# Patient Record
Sex: FEMALE | Race: WHITE | NOT HISPANIC OR LATINO | Employment: OTHER | URBAN - NONMETROPOLITAN AREA
[De-identification: names, ages, dates, MRNs, and addresses within clinical notes are randomized per-mention and may not be internally consistent; named-entity substitution may affect disease eponyms.]

---

## 2017-03-22 ENCOUNTER — OFFICE VISIT (OUTPATIENT)
Dept: OBSTETRICS AND GYNECOLOGY | Facility: CLINIC | Age: 67
End: 2017-03-22

## 2017-03-22 VITALS
BODY MASS INDEX: 32.15 KG/M2 | HEIGHT: 65 IN | SYSTOLIC BLOOD PRESSURE: 130 MMHG | DIASTOLIC BLOOD PRESSURE: 71 MMHG | WEIGHT: 193 LBS

## 2017-03-22 DIAGNOSIS — N95.2 VAGINAL ATROPHY: ICD-10-CM

## 2017-03-22 DIAGNOSIS — R61 DIAPHORESIS: ICD-10-CM

## 2017-03-22 DIAGNOSIS — N32.81 OAB (OVERACTIVE BLADDER): ICD-10-CM

## 2017-03-22 DIAGNOSIS — R92.1 BREAST CALCIFICATIONS: ICD-10-CM

## 2017-03-22 DIAGNOSIS — Z01.419 WELL WOMAN EXAM WITH ROUTINE GYNECOLOGICAL EXAM: Primary | ICD-10-CM

## 2017-03-22 PROCEDURE — 99386 PREV VISIT NEW AGE 40-64: CPT | Performed by: NURSE PRACTITIONER

## 2017-03-22 RX ORDER — LORATADINE 10 MG/1
TABLET ORAL
Status: ON HOLD | COMMUNITY
End: 2020-12-11

## 2017-03-22 RX ORDER — LIOTHYRONINE SODIUM 5 UG/1
TABLET ORAL
Refills: 3 | Status: ON HOLD | COMMUNITY
Start: 2017-03-15 | End: 2020-02-14

## 2017-03-22 RX ORDER — PHENTERMINE HYDROCHLORIDE 30 MG/1
CAPSULE ORAL
Refills: 0 | Status: ON HOLD | COMMUNITY
Start: 2017-03-02 | End: 2020-02-14

## 2017-03-22 RX ORDER — GABAPENTIN 300 MG/1
300 CAPSULE ORAL 3 TIMES DAILY PRN
COMMUNITY
End: 2021-01-11 | Stop reason: HOSPADM

## 2017-03-22 RX ORDER — LEVOTHYROXINE SODIUM 112 MCG
TABLET ORAL
Refills: 11 | Status: ON HOLD | COMMUNITY
Start: 2017-03-02 | End: 2020-12-12

## 2017-03-22 RX ORDER — TOLTERODINE 4 MG/1
4 CAPSULE, EXTENDED RELEASE ORAL DAILY
Qty: 30 CAPSULE | Refills: 11 | Status: SHIPPED | OUTPATIENT
Start: 2017-03-22 | End: 2018-03-22

## 2017-03-22 RX ORDER — CELECOXIB 200 MG/1
200 CAPSULE ORAL DAILY
Refills: 6 | COMMUNITY
Start: 2017-03-02 | End: 2021-01-11 | Stop reason: HOSPADM

## 2017-03-22 NOTE — PROGRESS NOTES
Joann Finnegan is a 66 y.o. No obstetric history on file.     Chief Complaint   Patient presents with   • Gynecologic Exam     Pt is here today for yearly visit with no c/o.  Pt needs order for a mammo            HPI - Patient is in for annual exam.  She had a hysterectomy and BSO and is using compounded HRT that  Is provided by a physician she sees in [place].  She has horrible hot flashes for many years and has tried various HRT combinations and also has been seen by numerous other physicians to find other etiologies of the hot  feelings without any answers.  She has been to allergists and endocrinologists.  Joann has aa history of Hua's Level 3 melanoma excision several years ago.  She continues to see Dr. Velazquez for skin checks.      The following portions of the patient's history were reviewed and updated as appropriate:vital signs, allergies, current medications, past family history, past medical history, past social history, past surgical history and problem list.    Review of Systems   Constitutional: Positive for diaphoresis (excessive  heat and sweating 10 years  she has see allergist, endocrinologist) and fatigue. Negative for activity change.   HENT: Negative for congestion, ear discharge, hearing loss, postnasal drip and sneezing.    Eyes: Negative for pain, discharge and redness.   Respiratory: Negative for apnea, cough and shortness of breath.    Cardiovascular: Negative for chest pain and leg swelling.   Gastrointestinal: Negative for abdominal distention, anal bleeding, constipation and diarrhea.   Endocrine: Negative for cold intolerance and polydipsia.   Genitourinary: Negative for difficulty urinating, enuresis, genital sores and urgency.   Musculoskeletal: Positive for arthralgias and back pain.   Skin: Negative for color change and pallor.   Neurological: Negative for dizziness, seizures, syncope and light-headedness.   Psychiatric/Behavioral: Negative for agitation, confusion and  "dysphoric mood. The patient is not nervous/anxious.             /71 (BP Location: Right arm, Patient Position: Sitting, Cuff Size: Adult)  Ht 65\" (165.1 cm)  Wt 193 lb (87.5 kg)  Breastfeeding? No  BMI 32.12 kg/m2      Physical Exam   Constitutional: She is oriented to person, place, and time. She appears well-developed and well-nourished.   HENT:   Head: Normocephalic and atraumatic.   Right Ear: External ear normal.   Eyes: Conjunctivae are normal. Right eye exhibits no discharge. Left eye exhibits no discharge. No scleral icterus.   Neck: Normal range of motion. Neck supple. No tracheal deviation present. No thyromegaly present.   Cardiovascular: Normal rate and regular rhythm.    No murmur heard.  Pulmonary/Chest: Effort normal and breath sounds normal. No respiratory distress. She has no wheezes. She exhibits no tenderness. Right breast exhibits no inverted nipple, no mass and no nipple discharge. Left breast exhibits no inverted nipple, no mass and no nipple discharge.   Abdominal: Soft. She exhibits no distension and no mass. There is no tenderness. There is no guarding. Hernia confirmed negative in the right inguinal area and confirmed negative in the left inguinal area.   Genitourinary: Rectal exam shows no mass. Pelvic exam was performed with patient supine. There is no rash, tenderness or lesion on the right labia. There is no rash, tenderness or lesion on the left labia. Right adnexum displays no mass and no tenderness. Left adnexum displays no mass, no tenderness and no fullness. There is bleeding in the vagina. No erythema or tenderness in the vagina. No foreign body in the vagina. No signs of injury around the vagina. No vaginal discharge found.   Genitourinary Comments: B/S/U  Without lesions  Urethra  Normal  Perineum  Normal  Urethral meatus  Normal  Vagina  Normal, no lesions  Rectum  No masses  Bladder  nontender   Neurological: She is alert and oriented to person, place, and time. "   Skin: Skin is warm and dry.   Psychiatric: Her mood appears not anxious. Her affect is not blunt, not labile and not inappropriate. She does not exhibit a depressed mood.   Nursing note and vitals reviewed.       Assessment/Plan     Joann was seen today for gynecologic exam.      Diagnoses and all orders for this visit:        Joann was seen today for gynecologic exam.    Diagnoses and all orders for this visit:    Well woman exam with routine gynecological exam    Vaginal atrophy  -     conjugated estrogens (PREMARIN) vaginal cream 1 application; Insert 1 application into the vagina Once per day on Mon Thu.    Diaphoresis  -     Estradiol  -     Progesterone  -     Testosterone  -     DHEA-Sulfate  -     Cortisol  -     Vitamin D 25 Hydroxy    Breast calcifications  Repeat right diagnostic mammogram is due as well as screening for the left.    OAB (overactive bladder)  -     tolterodine LA (DETROL LA) 4 MG 24 hr capsule; Take 1 capsule by mouth Daily.    Patient is counseled re: BSE, diet, exercise, mammogram, calcium and Vit. D3    Diagnostic right mammogram and screening left mammogram ordered.    No orders of the defined types were placed in this encounter.          Alyce Harrison, APRN  3/22/2017

## 2017-03-23 LAB
25(OH)D3+25(OH)D2 SERPL-MCNC: 48.1 NG/ML (ref 30–100)
CORTIS SERPL-MCNC: 6 UG/DL
DHEA-S SERPL-MCNC: 206.8 UG/DL (ref 29.4–220.5)
ESTRADIOL SERPL-MCNC: 74.1 PG/ML
PROGEST SERPL-MCNC: 1.7 NG/ML
TESTOST SERPL-MCNC: 18 NG/DL (ref 3–41)

## 2017-10-23 ENCOUNTER — DOCUMENTATION (OUTPATIENT)
Dept: OBSTETRICS AND GYNECOLOGY | Facility: CLINIC | Age: 67
End: 2017-10-23

## 2017-10-23 DIAGNOSIS — R92.1 BREAST CALCIFICATIONS ON MAMMOGRAM: Primary | ICD-10-CM

## 2017-10-23 NOTE — PROGRESS NOTES
Right breast calcifications persist.  Patient is counseled.  Patient will see Dr. Aguero for consultation.

## 2017-11-08 ENCOUNTER — OFFICE VISIT (OUTPATIENT)
Dept: SURGERY | Age: 67
End: 2017-11-08
Payer: MEDICARE

## 2017-11-08 VITALS
BODY MASS INDEX: 32.89 KG/M2 | HEIGHT: 65 IN | WEIGHT: 197.4 LBS | TEMPERATURE: 98.8 F | DIASTOLIC BLOOD PRESSURE: 70 MMHG | SYSTOLIC BLOOD PRESSURE: 140 MMHG

## 2017-11-08 DIAGNOSIS — R92.0 MAMMOGRAPHIC MICROCALCIFICATION: ICD-10-CM

## 2017-11-08 DIAGNOSIS — R92.8 ABNORMAL MAMMOGRAM: ICD-10-CM

## 2017-11-08 DIAGNOSIS — N60.19 FIBROCYSTIC BREAST DISEASE (FCBD), UNSPECIFIED LATERALITY: ICD-10-CM

## 2017-11-08 PROCEDURE — 99203 OFFICE O/P NEW LOW 30 MIN: CPT | Performed by: SURGERY

## 2018-01-30 DIAGNOSIS — Z79.890 HORMONE REPLACEMENT THERAPY (HRT): Primary | ICD-10-CM

## 2018-01-30 NOTE — TELEPHONE ENCOUNTER
Patient pharmacy sent request for patient HRT med which is Estradiol 1mg/ Estradiol 2mg per 0.25ml. Patient is scheduled for yearly on 03/26/18. Patient medication is refilled x2.

## 2018-03-23 DIAGNOSIS — Z12.31 VISIT FOR SCREENING MAMMOGRAM: Primary | ICD-10-CM

## 2018-03-26 ENCOUNTER — OFFICE VISIT (OUTPATIENT)
Dept: OBSTETRICS AND GYNECOLOGY | Facility: CLINIC | Age: 68
End: 2018-03-26

## 2018-03-26 VITALS
BODY MASS INDEX: 31.65 KG/M2 | WEIGHT: 190 LBS | SYSTOLIC BLOOD PRESSURE: 148 MMHG | HEIGHT: 65 IN | DIASTOLIC BLOOD PRESSURE: 74 MMHG

## 2018-03-26 DIAGNOSIS — N95.1 MENOPAUSAL SWEATS: ICD-10-CM

## 2018-03-26 DIAGNOSIS — Z01.419 WELL WOMAN EXAM WITH ROUTINE GYNECOLOGICAL EXAM: ICD-10-CM

## 2018-03-26 DIAGNOSIS — N32.81 OAB (OVERACTIVE BLADDER): ICD-10-CM

## 2018-03-26 DIAGNOSIS — Z12.31 ENCOUNTER FOR SCREENING MAMMOGRAM FOR MALIGNANT NEOPLASM OF BREAST: Primary | ICD-10-CM

## 2018-03-26 DIAGNOSIS — R35.0 URINARY FREQUENCY: ICD-10-CM

## 2018-03-26 DIAGNOSIS — R68.82 DECREASED LIBIDO: ICD-10-CM

## 2018-03-26 DIAGNOSIS — N95.2 VAGINAL ATROPHY: ICD-10-CM

## 2018-03-26 PROCEDURE — G8419 CALC BMI OUT NRM PARAM NOF/U: HCPCS | Performed by: NURSE PRACTITIONER

## 2018-03-26 PROCEDURE — 1036F TOBACCO NON-USER: CPT | Performed by: NURSE PRACTITIONER

## 2018-03-26 PROCEDURE — G0101 CA SCREEN;PELVIC/BREAST EXAM: HCPCS | Performed by: NURSE PRACTITIONER

## 2018-03-26 NOTE — PROGRESS NOTES
"      Joann Finnegan is a 67 y.o.      Chief Complaint   Patient presents with   • Gynecologic Exam     Pt here for yearly no probs.           HPI - Patient is in for annual exam.    She is taking compounded HRT from .  She has decreased libido.    Patient has to get up 3 times per night and goes frequently all day.  She has no dysuria.  Patient had a Hyst BSO for menorrhagia.      The following portions of the patient's history were reviewed and updated as appropriate:vital signs, allergies, current medications, past family history, past medical history, past social history, past surgical history and problem list.      Current Outpatient Prescriptions:   •  celecoxib (CeleBREX) 200 MG capsule, TAKE 1 CAPSULE BY MOUTH ONCE DAILY, Disp: , Rfl: 6  •  Cholecalciferol (VITAMIN D3) 5000 UNITS tablet, Take  by mouth., Disp: , Rfl:   •  estradiol 0.1 MG/GM cream 0.25 g, Apply \"1 click\" to labia or bikini area twice daily, Disp: 15 g, Rfl: 2  •  liothyronine (CYTOMEL) 5 MCG tablet, TAKE 2 TALBETS BY MOUTH ONCE DAILY, Disp: , Rfl: 3  •  loratadine (CLARITIN) 10 MG tablet, Take  by mouth., Disp: , Rfl:   •  methylnaltrexone (RELISTOR) 12 MG/0.6ML solution, Inject  under the skin Every Other Day., Disp: , Rfl:   •  NON FORMULARY, Apply  topically., Disp: , Rfl:   •  SYNTHROID 112 MCG tablet, TAKE 1 TABLET BY MOUTH EVERY MORNING, Disp: , Rfl: 11  •  gabapentin (NEURONTIN) 300 MG capsule, Take  by mouth., Disp: , Rfl:   •  phentermine 30 MG capsule, TAKE 1 CAPSULE BY MOUTH IN THE MORNING, Disp: , Rfl: 0    Current Facility-Administered Medications:   •  conjugated estrogens (PREMARIN) vaginal cream 1 application, 1 g, Vaginal, Once per day on u, NITO [place]    Review of Systems   Constitutional: Positive for diaphoresis (excessive  heat and sweating 10 years  she has see allergist, endocrinologist) and fatigue. Negative for activity change.   HENT: Negative for congestion, ear discharge, hearing " "loss, postnasal drip and sneezing.    Eyes: Negative for pain, discharge and redness.   Respiratory: Negative for apnea, cough and shortness of breath.    Cardiovascular: Negative for chest pain and leg swelling.   Gastrointestinal: Negative for abdominal distention, anal bleeding, constipation and diarrhea.   Endocrine: Negative for cold intolerance and polydipsia.        Sweating     Genitourinary: Negative for difficulty urinating, enuresis, genital sores and urgency.   Musculoskeletal: Positive for arthralgias and back pain.   Skin: Negative for color change and pallor.        History melanoma  [place] level III   Neurological: Negative for dizziness, seizures, syncope and light-headedness.   Psychiatric/Behavioral: Negative for agitation, confusion and dysphoric mood. The patient is not nervous/anxious.      Breast ROS: negative  Followed by Dr. Aguero    Objective      /74   Ht 165.1 cm (65\")   Wt 86.2 kg (190 lb)   LMP 10/26/2003 (Exact Date) Comment: Hyst for DUB  BMI 31.62 kg/m²       Physical Exam   Constitutional: She is oriented to person, place, and time. She appears well-developed and well-nourished.   HENT:   Head: Normocephalic and atraumatic.   Right Ear: External ear normal.   Eyes: Conjunctivae are normal. Right eye exhibits no discharge. Left eye exhibits no discharge. No scleral icterus.   Neck: Normal range of motion. Neck supple. No tracheal deviation present. No thyromegaly present.   Cardiovascular: Normal rate and regular rhythm.    No murmur heard.  Pulmonary/Chest: Effort normal and breath sounds normal. No respiratory distress. She has no wheezes. She exhibits no tenderness. Right breast exhibits no inverted nipple, no mass and no nipple discharge. Left breast exhibits no inverted nipple, no mass and no nipple discharge.   fibrocystic   Abdominal: Soft. She exhibits no distension and no mass. There is no tenderness. There is no guarding. Hernia confirmed negative in the right " inguinal area and confirmed negative in the left inguinal area.   Genitourinary: Rectal exam shows no mass. Pelvic exam was performed with patient supine. There is no rash, tenderness or lesion on the right labia. There is no rash, tenderness or lesion on the left labia. Right adnexum displays no mass and no tenderness. Left adnexum displays no mass, no tenderness and no fullness. No erythema, tenderness or bleeding in the vagina. No foreign body in the vagina. No signs of injury around the vagina. No vaginal discharge found.   Genitourinary Comments: B/S/U  Without lesions  Urethra  Normal  Perineum  Normal  Urethral meatus  Normal  Vagina  Normal, no lesions  Rectum  No masses  Bladder  nontender   Neurological: She is alert and oriented to person, place, and time.   Skin: Skin is warm and dry.   Psychiatric: She has a normal mood and affect. Her behavior is normal. Her mood appears not anxious. Her affect is not blunt, not labile and not inappropriate. She does not exhibit a depressed mood.   Nursing note and vitals reviewed.       Assessment/Plan       Joann was seen today for gynecologic exam.    Diagnoses and all orders for this visit:    Encounter for screening mammogram for malignant neoplasm of breast  -     Mammo Screening Digital Tomosynthesis Bilateral With CAD; Future    Well woman exam with routine gynecological exam    Vaginal atrophy  Estrogen/testosterone compound    Urinary frequency  -     Urinalysis With Microscopic - Urine, Clean Catch  -     Urine Culture, Comprehen ([facility]) - Urine, Clean Catch    Menopausal sweats  -     Hormone Cream Base cream; Estradiol 2/Estriol 1 mgm/  0.25  1 click day  -     progesterone (PROMETRIUM) 200 MG capsule; Progesterone 200/DHEA 5 mgm 1 at hs    Decreased libido  -     Hormone Cream Base cream; Testosterone 1 /Biest 1     Vaginally  Twice per week    OAB (overactive bladder)  -     Mirabegron ER (MYRBETRIQ) 25 MG tablet sustained-release 24 hour 24 hr  tablet; Take 1 tablet by mouth Daily.    Patient is counseled re: BSE, diet, exercise, mammogram, calcium and Vit. D3          Alyce Harrison, APRN  3/26/2018

## 2018-03-28 LAB
APPEARANCE UR: CLEAR
BACTERIA #/AREA URNS HPF: ABNORMAL /HPF
BACTERIA UR CULT: NORMAL
BACTERIA UR CULT: NORMAL
BILIRUB UR QL STRIP: NEGATIVE
CASTS URNS MICRO: ABNORMAL
COLOR UR: YELLOW
EPI CELLS #/AREA URNS HPF: ABNORMAL /HPF
GLUCOSE UR QL: NEGATIVE
HGB UR QL STRIP: NEGATIVE
KETONES UR QL STRIP: NEGATIVE
LEUKOCYTE ESTERASE UR QL STRIP: NEGATIVE
NITRITE UR QL STRIP: NEGATIVE
PH UR STRIP: 7 [PH] (ref 5–8)
PROT UR QL STRIP: NEGATIVE
RBC #/AREA URNS HPF: ABNORMAL /HPF
SP GR UR: 1.02 (ref 1–1.03)
UROBILINOGEN UR STRIP-MCNC: (no result) MG/DL
WBC #/AREA URNS HPF: ABNORMAL /HPF

## 2018-04-25 ENCOUNTER — HOSPITAL ENCOUNTER (OUTPATIENT)
Dept: WOMENS IMAGING | Age: 68
Discharge: HOME | End: 2018-04-25
Payer: MEDICARE

## 2018-04-25 DIAGNOSIS — Z12.31 VISIT FOR SCREENING MAMMOGRAM: ICD-10-CM

## 2018-04-25 PROCEDURE — 77063 BREAST TOMOSYNTHESIS BI: CPT

## 2018-05-01 ENCOUNTER — TELEPHONE (OUTPATIENT)
Dept: WOMENS IMAGING | Age: 68
End: 2018-05-01

## 2018-05-03 ENCOUNTER — HOSPITAL ENCOUNTER (OUTPATIENT)
Dept: WOMENS IMAGING | Age: 68
Discharge: HOME | End: 2018-05-03
Payer: MEDICARE

## 2018-05-03 ENCOUNTER — HOSPITAL ENCOUNTER (OUTPATIENT)
Dept: ULTRASOUND IMAGING | Age: 68
Discharge: HOME | End: 2018-05-03
Payer: MEDICARE

## 2018-05-03 DIAGNOSIS — N64.89 BREAST ASYMMETRY: ICD-10-CM

## 2018-05-03 PROCEDURE — 76642 ULTRASOUND BREAST LIMITED: CPT

## 2018-05-03 PROCEDURE — G0279 TOMOSYNTHESIS, MAMMO: HCPCS

## 2018-05-11 ENCOUNTER — TELEPHONE (OUTPATIENT)
Dept: SURGERY | Age: 68
End: 2018-05-11

## 2018-05-16 ENCOUNTER — OFFICE VISIT (OUTPATIENT)
Dept: SURGERY | Age: 68
End: 2018-05-16
Payer: MEDICARE

## 2018-05-16 VITALS
BODY MASS INDEX: 32.82 KG/M2 | SYSTOLIC BLOOD PRESSURE: 134 MMHG | WEIGHT: 197 LBS | DIASTOLIC BLOOD PRESSURE: 80 MMHG | HEIGHT: 65 IN | HEART RATE: 68 BPM

## 2018-05-16 DIAGNOSIS — N63.11 MASS OF UPPER OUTER QUADRANT OF RIGHT BREAST: Primary | ICD-10-CM

## 2018-05-16 PROCEDURE — G8400 PT W/DXA NO RESULTS DOC: HCPCS | Performed by: PHYSICIAN ASSISTANT

## 2018-05-16 PROCEDURE — 4040F PNEUMOC VAC/ADMIN/RCVD: CPT | Performed by: PHYSICIAN ASSISTANT

## 2018-05-16 PROCEDURE — G8417 CALC BMI ABV UP PARAM F/U: HCPCS | Performed by: PHYSICIAN ASSISTANT

## 2018-05-16 PROCEDURE — G8427 DOCREV CUR MEDS BY ELIG CLIN: HCPCS | Performed by: PHYSICIAN ASSISTANT

## 2018-05-16 PROCEDURE — 1036F TOBACCO NON-USER: CPT | Performed by: PHYSICIAN ASSISTANT

## 2018-05-16 PROCEDURE — 3017F COLORECTAL CA SCREEN DOC REV: CPT | Performed by: PHYSICIAN ASSISTANT

## 2018-05-16 PROCEDURE — 1090F PRES/ABSN URINE INCON ASSESS: CPT | Performed by: PHYSICIAN ASSISTANT

## 2018-05-16 PROCEDURE — 1123F ACP DISCUSS/DSCN MKR DOCD: CPT | Performed by: PHYSICIAN ASSISTANT

## 2018-05-16 PROCEDURE — 99212 OFFICE O/P EST SF 10 MIN: CPT | Performed by: PHYSICIAN ASSISTANT

## 2018-05-16 PROCEDURE — 3014F SCREEN MAMMO DOC REV: CPT | Performed by: PHYSICIAN ASSISTANT

## 2018-05-16 RX ORDER — LEVOTHYROXINE SODIUM 112 MCG
125 TABLET ORAL DAILY
COMMUNITY
Start: 2018-04-20

## 2018-05-16 RX ORDER — DIAZEPAM 5 MG/1
TABLET ORAL
Qty: 10 TABLET | Refills: 0 | OUTPATIENT
Start: 2018-05-16 | End: 2018-05-18

## 2018-05-23 ENCOUNTER — HOSPITAL ENCOUNTER (OUTPATIENT)
Dept: WOMENS IMAGING | Age: 68
Discharge: HOME | End: 2018-05-23
Payer: MEDICARE

## 2018-05-23 DIAGNOSIS — N63.11 MASS OF UPPER OUTER QUADRANT OF RIGHT BREAST: ICD-10-CM

## 2018-05-23 PROCEDURE — 88305 TISSUE EXAM BY PATHOLOGIST: CPT

## 2018-05-23 PROCEDURE — 77065 DX MAMMO INCL CAD UNI: CPT

## 2018-05-23 PROCEDURE — 88341 IMHCHEM/IMCYTCHM EA ADD ANTB: CPT

## 2018-05-23 PROCEDURE — 88342 IMHCHEM/IMCYTCHM 1ST ANTB: CPT

## 2018-05-23 PROCEDURE — 88361 TUMOR IMMUNOHISTOCHEM/COMPUT: CPT

## 2018-05-23 PROCEDURE — 88374 M/PHMTRC ALYS ISHQUANT/SEMIQ: CPT

## 2018-05-23 PROCEDURE — 19083 BX BREAST 1ST LESION US IMAG: CPT

## 2018-05-25 ENCOUNTER — LAB REQUISITION (OUTPATIENT)
Dept: LAB | Facility: HOSPITAL | Age: 68
End: 2018-05-25

## 2018-05-25 DIAGNOSIS — Z00.00 ROUTINE GENERAL MEDICAL EXAMINATION AT A HEALTH CARE FACILITY: ICD-10-CM

## 2018-05-25 PROCEDURE — 88341 IMHCHEM/IMCYTCHM EA ADD ANTB: CPT

## 2018-05-25 PROCEDURE — 88342 IMHCHEM/IMCYTCHM 1ST ANTB: CPT

## 2018-05-26 LAB
LAB AP CASE REPORT: NORMAL
Lab: NORMAL
PATH REPORT.FINAL DX SPEC: NORMAL

## 2018-05-29 ENCOUNTER — TELEPHONE (OUTPATIENT)
Dept: SURGERY | Age: 68
End: 2018-05-29

## 2018-05-29 DIAGNOSIS — C50.411 MALIGNANT NEOPLASM OF UPPER-OUTER QUADRANT OF RIGHT FEMALE BREAST, UNSPECIFIED ESTROGEN RECEPTOR STATUS (HCC): Primary | ICD-10-CM

## 2018-05-31 DIAGNOSIS — C50.411 MALIGNANT NEOPLASM OF UPPER-OUTER QUADRANT OF RIGHT FEMALE BREAST, UNSPECIFIED ESTROGEN RECEPTOR STATUS (HCC): Primary | ICD-10-CM

## 2018-06-11 ENCOUNTER — TELEPHONE (OUTPATIENT)
Dept: OTHER | Age: 68
End: 2018-06-11

## 2018-06-15 ENCOUNTER — HOSPITAL ENCOUNTER (OUTPATIENT)
Dept: MRI IMAGING | Age: 68
Discharge: HOME | End: 2018-06-15
Payer: MEDICARE

## 2018-06-15 DIAGNOSIS — C50.411 MALIGNANT NEOPLASM OF UPPER-OUTER QUADRANT OF RIGHT FEMALE BREAST, UNSPECIFIED ESTROGEN RECEPTOR STATUS (HCC): ICD-10-CM

## 2018-06-15 LAB
GFR NON-AFRICAN AMERICAN: 55
PERFORMED ON: ABNORMAL
POC CREATININE: 1 MG/DL (ref 0.3–1.3)
POC SAMPLE TYPE: ABNORMAL

## 2018-06-15 PROCEDURE — A9577 INJ MULTIHANCE: HCPCS | Performed by: PHYSICIAN ASSISTANT

## 2018-06-15 PROCEDURE — 82565 ASSAY OF CREATININE: CPT

## 2018-06-15 PROCEDURE — 6360000004 HC RX CONTRAST MEDICATION: Performed by: PHYSICIAN ASSISTANT

## 2018-06-15 PROCEDURE — C8908 MRI W/O FOL W/CONT, BREAST,: HCPCS

## 2018-06-15 RX ADMIN — GADOBENATE DIMEGLUMINE 19 ML: 529 INJECTION, SOLUTION INTRAVENOUS at 20:05

## 2018-06-18 ENCOUNTER — INITIAL CONSULT (OUTPATIENT)
Dept: SURGERY | Age: 68
End: 2018-06-18
Payer: MEDICARE

## 2018-06-18 ENCOUNTER — HOSPITAL ENCOUNTER (OUTPATIENT)
Dept: WOMENS IMAGING | Age: 68
Discharge: HOME | End: 2018-06-18
Payer: MEDICARE

## 2018-06-18 DIAGNOSIS — Z17.0 MALIGNANT NEOPLASM OF UPPER-OUTER QUADRANT OF RIGHT BREAST IN FEMALE, ESTROGEN RECEPTOR POSITIVE (HCC): Primary | ICD-10-CM

## 2018-06-18 DIAGNOSIS — R93.89 ABNORMAL MRI: ICD-10-CM

## 2018-06-18 DIAGNOSIS — C50.411 MALIGNANT NEOPLASM OF UPPER-OUTER QUADRANT OF RIGHT BREAST IN FEMALE, ESTROGEN RECEPTOR POSITIVE (HCC): Primary | ICD-10-CM

## 2018-06-18 DIAGNOSIS — C50.411 MALIGNANT NEOPLASM OF UPPER-OUTER QUADRANT OF RIGHT FEMALE BREAST, UNSPECIFIED ESTROGEN RECEPTOR STATUS (HCC): ICD-10-CM

## 2018-06-18 PROCEDURE — 1090F PRES/ABSN URINE INCON ASSESS: CPT | Performed by: SURGERY

## 2018-06-18 PROCEDURE — 99215 OFFICE O/P EST HI 40 MIN: CPT | Performed by: SURGERY

## 2018-06-18 PROCEDURE — 3014F SCREEN MAMMO DOC REV: CPT | Performed by: SURGERY

## 2018-06-18 PROCEDURE — 99354 PR PROLONGED SVC OUTPATIENT SETTING 1ST HOUR: CPT | Performed by: SURGERY

## 2018-06-18 PROCEDURE — 1036F TOBACCO NON-USER: CPT | Performed by: SURGERY

## 2018-06-18 PROCEDURE — G8417 CALC BMI ABV UP PARAM F/U: HCPCS | Performed by: SURGERY

## 2018-06-18 PROCEDURE — 76642 ULTRASOUND BREAST LIMITED: CPT

## 2018-06-18 PROCEDURE — G8400 PT W/DXA NO RESULTS DOC: HCPCS | Performed by: SURGERY

## 2018-06-18 PROCEDURE — G8428 CUR MEDS NOT DOCUMENT: HCPCS | Performed by: SURGERY

## 2018-06-18 PROCEDURE — 3017F COLORECTAL CA SCREEN DOC REV: CPT | Performed by: SURGERY

## 2018-06-18 PROCEDURE — 99355 PR PROLONGED SVC OUTPATIENT SETTING EA ADDL 30 MIN: CPT | Performed by: SURGERY

## 2018-06-18 PROCEDURE — 4040F PNEUMOC VAC/ADMIN/RCVD: CPT | Performed by: SURGERY

## 2018-06-18 PROCEDURE — 1123F ACP DISCUSS/DSCN MKR DOCD: CPT | Performed by: SURGERY

## 2018-06-28 ENCOUNTER — TELEPHONE (OUTPATIENT)
Dept: SURGERY | Age: 68
End: 2018-06-28

## 2018-07-16 ENCOUNTER — HOSPITAL ENCOUNTER (OUTPATIENT)
Dept: PREADMISSION TESTING | Age: 68
Discharge: HOME | End: 2018-07-20
Payer: MEDICARE

## 2018-07-16 VITALS — WEIGHT: 201 LBS | BODY MASS INDEX: 33.49 KG/M2 | HEIGHT: 65 IN

## 2018-07-16 LAB
ANION GAP SERPL CALCULATED.3IONS-SCNC: 11 MMOL/L (ref 7–19)
BASOPHILS ABSOLUTE: 0.1 K/UL (ref 0–0.2)
BASOPHILS RELATIVE PERCENT: 0.9 % (ref 0–1)
BUN BLDV-MCNC: 13 MG/DL (ref 8–23)
CALCIUM SERPL-MCNC: 9.8 MG/DL (ref 8.8–10.2)
CHLORIDE BLD-SCNC: 105 MMOL/L (ref 98–111)
CO2: 25 MMOL/L (ref 22–29)
CREAT SERPL-MCNC: 0.8 MG/DL (ref 0.5–0.9)
EOSINOPHILS ABSOLUTE: 0.2 K/UL (ref 0–0.6)
EOSINOPHILS RELATIVE PERCENT: 2.4 % (ref 0–5)
GFR NON-AFRICAN AMERICAN: >60
GLUCOSE BLD-MCNC: 93 MG/DL (ref 74–109)
HCT VFR BLD CALC: 43 % (ref 37–47)
HEMOGLOBIN: 14.2 G/DL (ref 12–16)
LYMPHOCYTES ABSOLUTE: 1.6 K/UL (ref 1.1–4.5)
LYMPHOCYTES RELATIVE PERCENT: 21.4 % (ref 20–40)
MCH RBC QN AUTO: 30.9 PG (ref 27–31)
MCHC RBC AUTO-ENTMCNC: 33 G/DL (ref 33–37)
MCV RBC AUTO: 93.7 FL (ref 81–99)
MONOCYTES ABSOLUTE: 0.5 K/UL (ref 0–0.9)
MONOCYTES RELATIVE PERCENT: 6.8 % (ref 0–10)
NEUTROPHILS ABSOLUTE: 5 K/UL (ref 1.5–7.5)
NEUTROPHILS RELATIVE PERCENT: 68.2 % (ref 50–65)
PDW BLD-RTO: 12.3 % (ref 11.5–14.5)
PLATELET # BLD: 200 K/UL (ref 130–400)
PMV BLD AUTO: 11.8 FL (ref 9.4–12.3)
POTASSIUM SERPL-SCNC: 4 MMOL/L (ref 3.5–5)
RBC # BLD: 4.59 M/UL (ref 4.2–5.4)
SODIUM BLD-SCNC: 141 MMOL/L (ref 136–145)
WBC # BLD: 7.4 K/UL (ref 4.8–10.8)

## 2018-07-16 PROCEDURE — 85025 COMPLETE CBC W/AUTO DIFF WBC: CPT

## 2018-07-16 PROCEDURE — 80048 BASIC METABOLIC PNL TOTAL CA: CPT

## 2018-07-16 PROCEDURE — 93005 ELECTROCARDIOGRAM TRACING: CPT

## 2018-07-16 RX ORDER — CELECOXIB 200 MG/1
200 CAPSULE ORAL 2 TIMES DAILY
COMMUNITY
End: 2021-11-15

## 2018-07-17 ENCOUNTER — TELEPHONE (OUTPATIENT)
Dept: SURGERY | Age: 68
End: 2018-07-17

## 2018-07-18 LAB
EKG P AXIS: 45 DEGREES
EKG P-R INTERVAL: 172 MS
EKG Q-T INTERVAL: 390 MS
EKG QRS DURATION: 78 MS
EKG QTC CALCULATION (BAZETT): 393 MS
EKG T AXIS: 46 DEGREES

## 2018-07-19 ENCOUNTER — TELEPHONE (OUTPATIENT)
Dept: SURGERY | Age: 68
End: 2018-07-19

## 2018-07-20 RX ORDER — LETROZOLE 2.5 MG/1
2.5 TABLET, FILM COATED ORAL DAILY
Qty: 30 TABLET | Refills: 3 | Status: SHIPPED | OUTPATIENT
Start: 2018-07-20 | End: 2019-01-30 | Stop reason: ALTCHOICE

## 2018-07-26 ENCOUNTER — HOSPITAL ENCOUNTER (OUTPATIENT)
Dept: WOMENS IMAGING | Age: 68
Discharge: HOME | End: 2018-07-26
Payer: MEDICARE

## 2018-07-26 DIAGNOSIS — C50.411 MALIGNANT NEOPLASM OF UPPER-OUTER QUADRANT OF RIGHT FEMALE BREAST, UNSPECIFIED ESTROGEN RECEPTOR STATUS (HCC): ICD-10-CM

## 2018-07-26 PROCEDURE — 76642 ULTRASOUND BREAST LIMITED: CPT

## 2018-07-27 ENCOUNTER — ANESTHESIA EVENT (OUTPATIENT)
Dept: OPERATING ROOM | Age: 68
End: 2018-07-27
Payer: MEDICARE

## 2018-07-27 ENCOUNTER — ANESTHESIA (OUTPATIENT)
Dept: OPERATING ROOM | Age: 68
End: 2018-07-27
Payer: MEDICARE

## 2018-07-27 ENCOUNTER — HOSPITAL ENCOUNTER (OUTPATIENT)
Age: 68
Setting detail: OUTPATIENT SURGERY
Discharge: HOME | End: 2018-07-27
Attending: SURGERY | Admitting: SURGERY
Payer: MEDICARE

## 2018-07-27 ENCOUNTER — HOSPITAL ENCOUNTER (OUTPATIENT)
Dept: ULTRASOUND IMAGING | Age: 68
Discharge: HOME | End: 2018-07-27
Payer: MEDICARE

## 2018-07-27 ENCOUNTER — HOSPITAL ENCOUNTER (OUTPATIENT)
Dept: NUCLEAR MEDICINE | Age: 68
Discharge: HOME | End: 2018-07-29
Payer: MEDICARE

## 2018-07-27 VITALS
BODY MASS INDEX: 33.49 KG/M2 | OXYGEN SATURATION: 96 % | HEART RATE: 81 BPM | RESPIRATION RATE: 18 BRPM | DIASTOLIC BLOOD PRESSURE: 77 MMHG | TEMPERATURE: 97.8 F | HEIGHT: 65 IN | WEIGHT: 201 LBS | SYSTOLIC BLOOD PRESSURE: 142 MMHG

## 2018-07-27 VITALS
OXYGEN SATURATION: 97 % | TEMPERATURE: 98.2 F | RESPIRATION RATE: 11 BRPM | SYSTOLIC BLOOD PRESSURE: 139 MMHG | DIASTOLIC BLOOD PRESSURE: 77 MMHG

## 2018-07-27 DIAGNOSIS — C50.911 INVASIVE DUCTAL CARCINOMA OF BREAST, FEMALE, RIGHT (HCC): ICD-10-CM

## 2018-07-27 DIAGNOSIS — Z17.0 MALIGNANT NEOPLASM OF UPPER-OUTER QUADRANT OF RIGHT BREAST IN FEMALE, ESTROGEN RECEPTOR POSITIVE (HCC): Primary | ICD-10-CM

## 2018-07-27 DIAGNOSIS — C50.411 MALIGNANT NEOPLASM OF UPPER-OUTER QUADRANT OF RIGHT BREAST IN FEMALE, ESTROGEN RECEPTOR POSITIVE (HCC): Primary | ICD-10-CM

## 2018-07-27 PROCEDURE — 88307 TISSUE EXAM BY PATHOLOGIST: CPT

## 2018-07-27 PROCEDURE — 19285 PERQ DEV BREAST 1ST US IMAG: CPT | Performed by: SURGERY

## 2018-07-27 PROCEDURE — 3430000000 HC RX DIAGNOSTIC RADIOPHARMACEUTICAL: Performed by: SURGERY

## 2018-07-27 PROCEDURE — 19366 PR BREAST RECONSTRUC W OTHR TECHNIQ: CPT | Performed by: SURGERY

## 2018-07-27 PROCEDURE — 38525 BIOPSY/REMOVAL LYMPH NODES: CPT | Performed by: SURGERY

## 2018-07-27 PROCEDURE — 3600000005 HC SURGERY LEVEL 5 BASE: Performed by: SURGERY

## 2018-07-27 PROCEDURE — 76998 US GUIDE INTRAOP: CPT | Performed by: SURGERY

## 2018-07-27 PROCEDURE — 6360000002 HC RX W HCPCS

## 2018-07-27 PROCEDURE — 38900 IO MAP OF SENT LYMPH NODE: CPT | Performed by: SURGERY

## 2018-07-27 PROCEDURE — 19301 PARTIAL MASTECTOMY: CPT | Performed by: PHYSICIAN ASSISTANT

## 2018-07-27 PROCEDURE — 19340 INSJ BREAST IMPLT SM D MAST: CPT | Performed by: SURGERY

## 2018-07-27 PROCEDURE — 19297 PLACE BREAST CATH FOR RAD: CPT | Performed by: SURGERY

## 2018-07-27 PROCEDURE — 6360000002 HC RX W HCPCS: Performed by: NURSE ANESTHETIST, CERTIFIED REGISTERED

## 2018-07-27 PROCEDURE — S0028 INJECTION, FAMOTIDINE, 20 MG: HCPCS

## 2018-07-27 PROCEDURE — 7100000010 HC PHASE II RECOVERY - FIRST 15 MIN: Performed by: SURGERY

## 2018-07-27 PROCEDURE — 2780000010 HC IMPLANT OTHER: Performed by: SURGERY

## 2018-07-27 PROCEDURE — 2720000001 HC MISC SURG SUPPLY STERILE $51-500: Performed by: SURGERY

## 2018-07-27 PROCEDURE — C1728 CATH, BRACHYTX SEED ADM: HCPCS | Performed by: SURGERY

## 2018-07-27 PROCEDURE — 77424 IO RAD TX DELIVERY BY X-RAY: CPT | Performed by: SURGERY

## 2018-07-27 PROCEDURE — 2580000003 HC RX 258: Performed by: SURGERY

## 2018-07-27 PROCEDURE — 2720000000 US PLACE BREAST LOC DEVICE 1ST LESION RIGHT

## 2018-07-27 PROCEDURE — C1729 CATH, DRAINAGE: HCPCS | Performed by: SURGERY

## 2018-07-27 PROCEDURE — 38792 RA TRACER ID OF SENTINL NODE: CPT

## 2018-07-27 PROCEDURE — 3700000001 HC ADD 15 MINUTES (ANESTHESIA): Performed by: SURGERY

## 2018-07-27 PROCEDURE — 3600000015 HC SURGERY LEVEL 5 ADDTL 15MIN: Performed by: SURGERY

## 2018-07-27 PROCEDURE — 2709999900 HC NON-CHARGEABLE SUPPLY: Performed by: SURGERY

## 2018-07-27 PROCEDURE — 6360000002 HC RX W HCPCS: Performed by: SURGERY

## 2018-07-27 PROCEDURE — 7100000001 HC PACU RECOVERY - ADDTL 15 MIN: Performed by: SURGERY

## 2018-07-27 PROCEDURE — 3700000000 HC ANESTHESIA ATTENDED CARE: Performed by: SURGERY

## 2018-07-27 PROCEDURE — 7100000011 HC PHASE II RECOVERY - ADDTL 15 MIN: Performed by: SURGERY

## 2018-07-27 PROCEDURE — 7100000000 HC PACU RECOVERY - FIRST 15 MIN: Performed by: SURGERY

## 2018-07-27 PROCEDURE — 19366 PR BREAST RECONSTRUC W OTHR TECHNIQ: CPT | Performed by: PHYSICIAN ASSISTANT

## 2018-07-27 PROCEDURE — 2500000003 HC RX 250 WO HCPCS: Performed by: SURGERY

## 2018-07-27 PROCEDURE — A4648 IMPLANTABLE TISSUE MARKER: HCPCS | Performed by: SURGERY

## 2018-07-27 PROCEDURE — 2500000003 HC RX 250 WO HCPCS

## 2018-07-27 PROCEDURE — A9520 TC99 TILMANOCEPT DIAG 0.5MCI: HCPCS | Performed by: SURGERY

## 2018-07-27 PROCEDURE — 19301 PARTIAL MASTECTOMY: CPT | Performed by: SURGERY

## 2018-07-27 PROCEDURE — 6360000002 HC RX W HCPCS: Performed by: ANESTHESIOLOGY

## 2018-07-27 DEVICE — MARKER TISS W3XL3CM RADIOGRAPHIC BIOABSRB SPCR HLD RADPQ: Type: IMPLANTABLE DEVICE | Site: BREAST | Status: FUNCTIONAL

## 2018-07-27 RX ORDER — PROPOFOL 10 MG/ML
INJECTION, EMULSION INTRAVENOUS PRN
Status: DISCONTINUED | OUTPATIENT
Start: 2018-07-27 | End: 2018-07-27 | Stop reason: SDUPTHER

## 2018-07-27 RX ORDER — HYDROMORPHONE HCL IN 0.9% NACL 0.5 MG/ML
0.5 SYRINGE (ML) INTRAVENOUS EVERY 5 MIN PRN
Status: DISCONTINUED | OUTPATIENT
Start: 2018-07-27 | End: 2018-07-27 | Stop reason: HOSPADM

## 2018-07-27 RX ORDER — APREPITANT 40 MG/1
40 CAPSULE ORAL ONCE
Status: DISCONTINUED | OUTPATIENT
Start: 2018-07-27 | End: 2018-07-27 | Stop reason: HOSPADM

## 2018-07-27 RX ORDER — ONDANSETRON 2 MG/ML
INJECTION INTRAMUSCULAR; INTRAVENOUS PRN
Status: DISCONTINUED | OUTPATIENT
Start: 2018-07-27 | End: 2018-07-27 | Stop reason: SDUPTHER

## 2018-07-27 RX ORDER — MORPHINE SULFATE 4 MG/ML
4 INJECTION, SOLUTION INTRAMUSCULAR; INTRAVENOUS
Status: DISCONTINUED | OUTPATIENT
Start: 2018-07-27 | End: 2018-07-27 | Stop reason: HOSPADM

## 2018-07-27 RX ORDER — LIDOCAINE HYDROCHLORIDE 10 MG/ML
INJECTION, SOLUTION EPIDURAL; INFILTRATION; INTRACAUDAL; PERINEURAL PRN
Status: DISCONTINUED | OUTPATIENT
Start: 2018-07-27 | End: 2018-07-27 | Stop reason: SDUPTHER

## 2018-07-27 RX ORDER — DEXAMETHASONE SODIUM PHOSPHATE 10 MG/ML
10 INJECTION INTRAMUSCULAR; INTRAVENOUS
Status: DISCONTINUED | OUTPATIENT
Start: 2018-07-27 | End: 2018-07-27 | Stop reason: HOSPADM

## 2018-07-27 RX ORDER — HYDROMORPHONE HCL IN 0.9% NACL 0.5 MG/ML
0.25 SYRINGE (ML) INTRAVENOUS EVERY 5 MIN PRN
Status: DISCONTINUED | OUTPATIENT
Start: 2018-07-27 | End: 2018-07-27 | Stop reason: HOSPADM

## 2018-07-27 RX ORDER — SODIUM CHLORIDE, SODIUM LACTATE, POTASSIUM CHLORIDE, CALCIUM CHLORIDE 600; 310; 30; 20 MG/100ML; MG/100ML; MG/100ML; MG/100ML
INJECTION, SOLUTION INTRAVENOUS CONTINUOUS
Status: DISCONTINUED | OUTPATIENT
Start: 2018-07-27 | End: 2018-07-27 | Stop reason: HOSPADM

## 2018-07-27 RX ORDER — METOCLOPRAMIDE 10 MG/1
10 TABLET ORAL ONCE
Status: DISCONTINUED | OUTPATIENT
Start: 2018-07-27 | End: 2018-07-27 | Stop reason: HOSPADM

## 2018-07-27 RX ORDER — DEXAMETHASONE SODIUM PHOSPHATE 10 MG/ML
INJECTION INTRAMUSCULAR; INTRAVENOUS PRN
Status: DISCONTINUED | OUTPATIENT
Start: 2018-07-27 | End: 2018-07-27 | Stop reason: SDUPTHER

## 2018-07-27 RX ORDER — MORPHINE SULFATE 1 MG/ML
4 INJECTION, SOLUTION EPIDURAL; INTRATHECAL; INTRAVENOUS EVERY 5 MIN PRN
Status: DISCONTINUED | OUTPATIENT
Start: 2018-07-27 | End: 2018-07-27 | Stop reason: HOSPADM

## 2018-07-27 RX ORDER — FAMOTIDINE 20 MG/1
20 TABLET, FILM COATED ORAL
Status: DISCONTINUED | OUTPATIENT
Start: 2018-07-27 | End: 2018-07-27 | Stop reason: HOSPADM

## 2018-07-27 RX ORDER — DIPHENHYDRAMINE HYDROCHLORIDE 50 MG/ML
INJECTION INTRAMUSCULAR; INTRAVENOUS PRN
Status: DISCONTINUED | OUTPATIENT
Start: 2018-07-27 | End: 2018-07-27 | Stop reason: SDUPTHER

## 2018-07-27 RX ORDER — ROCURONIUM BROMIDE 10 MG/ML
INJECTION, SOLUTION INTRAVENOUS PRN
Status: DISCONTINUED | OUTPATIENT
Start: 2018-07-27 | End: 2018-07-27 | Stop reason: SDUPTHER

## 2018-07-27 RX ORDER — DIPHENHYDRAMINE HYDROCHLORIDE 50 MG/ML
12.5 INJECTION INTRAMUSCULAR; INTRAVENOUS
Status: DISCONTINUED | OUTPATIENT
Start: 2018-07-27 | End: 2018-07-27 | Stop reason: HOSPADM

## 2018-07-27 RX ORDER — HYDRALAZINE HYDROCHLORIDE 20 MG/ML
5 INJECTION INTRAMUSCULAR; INTRAVENOUS EVERY 10 MIN PRN
Status: DISCONTINUED | OUTPATIENT
Start: 2018-07-27 | End: 2018-07-27 | Stop reason: HOSPADM

## 2018-07-27 RX ORDER — SODIUM CHLORIDE 0.9 % (FLUSH) 0.9 %
10 SYRINGE (ML) INJECTION PRN
Status: DISCONTINUED | OUTPATIENT
Start: 2018-07-27 | End: 2018-07-27 | Stop reason: HOSPADM

## 2018-07-27 RX ORDER — CEPHALEXIN 500 MG/1
500 CAPSULE ORAL 3 TIMES DAILY
Qty: 30 CAPSULE | Refills: 0 | Status: SHIPPED | OUTPATIENT
Start: 2018-07-27 | End: 2018-08-09 | Stop reason: ALTCHOICE

## 2018-07-27 RX ORDER — HYDROCODONE BITARTRATE AND ACETAMINOPHEN 5; 325 MG/1; MG/1
1 TABLET ORAL EVERY 6 HOURS PRN
Qty: 20 TABLET | Refills: 0 | Status: SHIPPED | OUTPATIENT
Start: 2018-07-27 | End: 2019-07-27

## 2018-07-27 RX ORDER — LABETALOL HYDROCHLORIDE 5 MG/ML
5 INJECTION, SOLUTION INTRAVENOUS EVERY 10 MIN PRN
Status: DISCONTINUED | OUTPATIENT
Start: 2018-07-27 | End: 2018-07-27 | Stop reason: HOSPADM

## 2018-07-27 RX ORDER — FENTANYL CITRATE 50 UG/ML
50 INJECTION, SOLUTION INTRAMUSCULAR; INTRAVENOUS
Status: DISCONTINUED | OUTPATIENT
Start: 2018-07-27 | End: 2018-07-27 | Stop reason: HOSPADM

## 2018-07-27 RX ORDER — MIDAZOLAM HYDROCHLORIDE 1 MG/ML
2 INJECTION INTRAMUSCULAR; INTRAVENOUS
Status: COMPLETED | OUTPATIENT
Start: 2018-07-27 | End: 2018-07-27

## 2018-07-27 RX ORDER — PROPOFOL 10 MG/ML
INJECTION, EMULSION INTRAVENOUS CONTINUOUS PRN
Status: DISCONTINUED | OUTPATIENT
Start: 2018-07-27 | End: 2018-07-27 | Stop reason: SDUPTHER

## 2018-07-27 RX ORDER — METOCLOPRAMIDE HYDROCHLORIDE 5 MG/ML
10 INJECTION INTRAMUSCULAR; INTRAVENOUS
Status: DISCONTINUED | OUTPATIENT
Start: 2018-07-27 | End: 2018-07-27 | Stop reason: HOSPADM

## 2018-07-27 RX ORDER — SODIUM CHLORIDE 0.9 % (FLUSH) 0.9 %
10 SYRINGE (ML) INJECTION EVERY 12 HOURS SCHEDULED
Status: DISCONTINUED | OUTPATIENT
Start: 2018-07-27 | End: 2018-07-27 | Stop reason: HOSPADM

## 2018-07-27 RX ORDER — PROMETHAZINE HYDROCHLORIDE 25 MG/ML
6.25 INJECTION, SOLUTION INTRAMUSCULAR; INTRAVENOUS
Status: DISCONTINUED | OUTPATIENT
Start: 2018-07-27 | End: 2018-07-27 | Stop reason: HOSPADM

## 2018-07-27 RX ORDER — SUCCINYLCHOLINE CHLORIDE 20 MG/ML
INJECTION INTRAMUSCULAR; INTRAVENOUS PRN
Status: DISCONTINUED | OUTPATIENT
Start: 2018-07-27 | End: 2018-07-27 | Stop reason: SDUPTHER

## 2018-07-27 RX ORDER — FENTANYL CITRATE 50 UG/ML
INJECTION, SOLUTION INTRAMUSCULAR; INTRAVENOUS PRN
Status: DISCONTINUED | OUTPATIENT
Start: 2018-07-27 | End: 2018-07-27 | Stop reason: SDUPTHER

## 2018-07-27 RX ORDER — SCOLOPAMINE TRANSDERMAL SYSTEM 1 MG/1
1 PATCH, EXTENDED RELEASE TRANSDERMAL ONCE
Status: DISCONTINUED | OUTPATIENT
Start: 2018-07-27 | End: 2018-07-27 | Stop reason: HOSPADM

## 2018-07-27 RX ORDER — MORPHINE SULFATE 1 MG/ML
2 INJECTION, SOLUTION EPIDURAL; INTRATHECAL; INTRAVENOUS EVERY 5 MIN PRN
Status: DISCONTINUED | OUTPATIENT
Start: 2018-07-27 | End: 2018-07-27 | Stop reason: HOSPADM

## 2018-07-27 RX ORDER — LIDOCAINE HYDROCHLORIDE 10 MG/ML
1 INJECTION, SOLUTION EPIDURAL; INFILTRATION; INTRACAUDAL; PERINEURAL
Status: DISCONTINUED | OUTPATIENT
Start: 2018-07-27 | End: 2018-07-27 | Stop reason: HOSPADM

## 2018-07-27 RX ORDER — ISOSULFAN BLUE 50 MG/5ML
INJECTION, SOLUTION SUBCUTANEOUS PRN
Status: DISCONTINUED | OUTPATIENT
Start: 2018-07-27 | End: 2018-07-27 | Stop reason: HOSPADM

## 2018-07-27 RX ORDER — MEPERIDINE HYDROCHLORIDE 50 MG/ML
12.5 INJECTION INTRAMUSCULAR; INTRAVENOUS; SUBCUTANEOUS EVERY 5 MIN PRN
Status: DISCONTINUED | OUTPATIENT
Start: 2018-07-27 | End: 2018-07-27 | Stop reason: HOSPADM

## 2018-07-27 RX ADMIN — SODIUM CHLORIDE, SODIUM LACTATE, POTASSIUM CHLORIDE, AND CALCIUM CHLORIDE: 600; 310; 30; 20 INJECTION, SOLUTION INTRAVENOUS at 10:05

## 2018-07-27 RX ADMIN — ROCURONIUM BROMIDE 10 MG: 10 INJECTION INTRAVENOUS at 12:24

## 2018-07-27 RX ADMIN — PROPOFOL 100 MG: 10 INJECTION, EMULSION INTRAVENOUS at 12:24

## 2018-07-27 RX ADMIN — Medication 140 MG: at 12:24

## 2018-07-27 RX ADMIN — DIPHENHYDRAMINE HYDROCHLORIDE 25 MG: 50 INJECTION, SOLUTION INTRAMUSCULAR; INTRAVENOUS at 12:29

## 2018-07-27 RX ADMIN — LIDOCAINE HYDROCHLORIDE 40 MG: 10 INJECTION, SOLUTION EPIDURAL; INFILTRATION; INTRACAUDAL; PERINEURAL at 12:24

## 2018-07-27 RX ADMIN — FENTANYL CITRATE 50 MCG: 50 INJECTION INTRAMUSCULAR; INTRAVENOUS at 12:51

## 2018-07-27 RX ADMIN — FENTANYL CITRATE 50 MCG: 50 INJECTION INTRAMUSCULAR; INTRAVENOUS at 13:50

## 2018-07-27 RX ADMIN — PROPOFOL 140 MCG/KG/MIN: 10 INJECTION, EMULSION INTRAVENOUS at 12:24

## 2018-07-27 RX ADMIN — Medication 2 G: at 12:32

## 2018-07-27 RX ADMIN — FENTANYL CITRATE 100 MCG: 50 INJECTION INTRAMUSCULAR; INTRAVENOUS at 12:24

## 2018-07-27 RX ADMIN — FAMOTIDINE 20 MG: 10 INJECTION INTRAVENOUS at 12:29

## 2018-07-27 RX ADMIN — DEXAMETHASONE SODIUM PHOSPHATE 10 MG: 10 INJECTION INTRAMUSCULAR; INTRAVENOUS at 12:29

## 2018-07-27 RX ADMIN — ONDANSETRON HYDROCHLORIDE 4 MG: 2 INJECTION, SOLUTION INTRAMUSCULAR; INTRAVENOUS at 14:35

## 2018-07-27 RX ADMIN — SODIUM CHLORIDE, SODIUM LACTATE, POTASSIUM CHLORIDE, AND CALCIUM CHLORIDE: 600; 310; 30; 20 INJECTION, SOLUTION INTRAVENOUS at 13:45

## 2018-07-27 RX ADMIN — FENTANYL CITRATE 50 MCG: 50 INJECTION INTRAMUSCULAR; INTRAVENOUS at 12:43

## 2018-07-27 RX ADMIN — TILMANOCEPT 0.5 MILLICURIE: KIT at 10:08

## 2018-07-27 RX ADMIN — MIDAZOLAM 2 MG: 1 INJECTION INTRAMUSCULAR; INTRAVENOUS at 12:18

## 2018-07-27 ASSESSMENT — LIFESTYLE VARIABLES: SMOKING_STATUS: 0

## 2018-07-27 ASSESSMENT — PAIN DESCRIPTION - PAIN TYPE
TYPE: SURGICAL PAIN
TYPE: SURGICAL PAIN

## 2018-07-27 ASSESSMENT — PAIN DESCRIPTION - PROGRESSION: CLINICAL_PROGRESSION: NOT CHANGED

## 2018-07-27 ASSESSMENT — PAIN SCALES - GENERAL
PAINLEVEL_OUTOF10: 4

## 2018-07-27 ASSESSMENT — PAIN DESCRIPTION - FREQUENCY
FREQUENCY: CONTINUOUS
FREQUENCY: CONTINUOUS

## 2018-07-27 ASSESSMENT — PAIN DESCRIPTION - DESCRIPTORS
DESCRIPTORS: SORE
DESCRIPTORS: BURNING

## 2018-07-27 ASSESSMENT — PAIN DESCRIPTION - LOCATION
LOCATION: BREAST
LOCATION: BREAST

## 2018-07-27 ASSESSMENT — ENCOUNTER SYMPTOMS: SHORTNESS OF BREATH: 0

## 2018-07-27 ASSESSMENT — PAIN DESCRIPTION - ORIENTATION
ORIENTATION: RIGHT
ORIENTATION: RIGHT

## 2018-07-27 NOTE — CONSULTS
Radiation Oncology Consult Note    Requesting MD:  Jayde Vivas MD Admit Status:         History obtained from:   [x] Patient  [] Other (specify):     CC:  Carcinoma of right breast      HPI: Ms. Ela Nelson is a 79 y.o. female with a history of a mammographically detected lesion noted 4/25/2018 involving the right upper outer quadrant. Ultrasound of the breast showed an 8 mm hypoechoic lesion at the 9 o'clock position involving the right breast, 9 cm from the nipple. Ultrasound-guided biopsy obtained 5/23/2018 showed infiltrating ductal carcinoma, grade one measuring 0.8 cm at the 9 o'clock position. Breast MRI obtained 6/15/2018 showed the known infiltrating ductal carcinoma involving the right breast. Suspicious enhancement at the left breast 12 o'clock position was noted. The patient has seen Dr. Lina Owens in consultation and is planned for right breast lumpectomy, sentinel lymph node biopsy and consideration of intraoperative radiation therapy. Past Medical History:   Diagnosis Date    Arthritis     Cancer ([address]     melonoma    PONV (postoperative nausea and vomiting)     Restless leg syndrome     Sinus disease     Thyroid disease      Menstrual history: The patient is G1, P 1, Ab 0. Menopause was surgical at age 54. The patient did experience significant hot flashes following menopause. The patient did receive hormone replacement therapy.     Past Surgical History:   Procedure Laterality Date    APPENDECTOMY      BACK SURGERY      synovial cyst off spinal canal    BREAST SURGERY Right     biopsy    COLONOSCOPY      COSMETIC SURGERY      brow lift    DENTAL SURGERY      EYE SURGERY Bilateral     multi focial implants    HYSTERECTOMY      SKIN BIOPSY      melanoma, rt shoulder    TEMPOROMANDIBULAR JOINT SURGERY      TOTAL HIP ARTHROPLASTY Left 1/6/2016    HIP TOTAL ARTHROPLASTY ANTERIOR APPROACH performed by Vince Miles MD at [address] [place] [address]         Family History   Problem Relation

## 2018-07-27 NOTE — ANESTHESIA PRE PROCEDURE
Department of Anesthesiology  Preprocedure Note       Name:  Jeffrey Yeboahing   Age:  79 y.o.  :  1950                                          MRN:  [id number]         Date:  2018      Surgeon: Irlanda Díaz):  Mayco Rodríguez MD    Procedure: Procedure(s):  LUMPECTOMY W/SNB & INTRAOP US GUIDED NL & IORT    Medications prior to admission:   Prior to Admission medications    Medication Sig Start Date End Date Taking?  Authorizing Provider   letrozole [facility]) 2.5 MG tablet Take 1 tablet by mouth daily 18  Yes Mayco Rodríguez MD   celecoxib (CELEBREX) 200 MG capsule Take 200 mg by mouth 2 times daily   Yes Historical Provider, MD   SYNTHROID 112 MCG tablet Take 112 mcg by mouth Daily  18  Yes Historical Provider, MD   loratadine (CLARITIN) 10 MG tablet Take 10 mg by mouth daily Indications: Sinus Discomfort   Yes Historical Provider, MD   gabapentin (NEURONTIN) 300 MG capsule Take 300 mg by mouth nightly   Yes Historical Provider, MD   Cholecalciferol (VITAMIN D3) 5000 UNITS TABS Take 1 tablet by mouth daily    Yes Historical Provider, MD       Current medications:    Current Facility-Administered Medications   Medication Dose Route Frequency Provider Last Rate Last Dose    lactated ringers infusion   Intravenous Continuous Mayco Rodríguez  mL/hr at 18 1005      ceFAZolin (ANCEF) 2 g in 0.9% sodium chloride 50 mL IVPB  2 g Intravenous On Call to [id number], MD         Facility-Administered Medications Ordered in Other Encounters   Medication Dose Route Frequency Provider Last Rate Last Dose    technetium Tc 99m tilmanocept (LYMPHOSEEK) injection 0.5 millicurie  683 micro curie Intradermal ONCE PRN Mayco Rodríguez MD           Allergies:  No Known Allergies    Problem List:    Patient Active Problem List   Diagnosis Code    Primary osteoarthritis of left hip M16.12    Anemia D64.9    COPD (chronic obstructive pulmonary disease) ([facility] [address] J44.9    Hypothyroid E03.9    lb (91.2 kg)   05/16/18 197 lb (89.4 kg)     Body mass index is 33.45 kg/m². CBC:   Lab Results   Component Value Date    WBC 7.4 07/16/2018    RBC 4.59 07/16/2018    HGB 14.2 07/16/2018    HCT 43.0 07/16/2018    MCV 93.7 07/16/2018    RDW 12.3 07/16/2018     07/16/2018       CMP:   Lab Results   Component Value Date     07/16/2018    K 4.0 07/16/2018     07/16/2018    CO2 25 07/16/2018    BUN 13 07/16/2018    CREATININE 0.8 07/16/2018    LABGLOM >60 07/16/2018    GLUCOSE 93 07/16/2018    PROT 7.1 12/18/2015    CALCIUM 9.8 07/16/2018    BILITOT 0.5 12/18/2015    ALKPHOS 76 12/18/2015    AST 18 12/18/2015    ALT 20 12/18/2015       POC Tests: No results for input(s): POCGLU, POCNA, POCK, POCCL, POCBUN, POCHEMO, POCHCT in the last 72 hours. Coags:   Lab Results   Component Value Date    PROTIME 12.2 12/18/2015    INR 0.93 12/18/2015    APTT 30.6 12/18/2015       HCG (If Applicable): No results found for: PREGTESTUR, PREGSERUM, HCG, HCGQUANT     ABGs: No results found for: PHART, PO2ART, UCV8XRE, MIQ7FNI, BEART, T0WFKJWU     Type & Screen (If Applicable):  No results found for: LABABO, [address]    Anesthesia Evaluation  Patient summary reviewed and Nursing notes reviewed   history of anesthetic complications: PONV. Airway: Mallampati: II  TM distance: >3 FB   Neck ROM: full  Mouth opening: > = 3 FB Dental: normal exam   (+) upper dentures, lower dentures and edentulous      Pulmonary:Negative Pulmonary ROS and normal exam  breath sounds clear to auscultation  (+) COPD:      (-) shortness of breath and not a current smoker          Patient did not smoke on day of surgery.                  Cardiovascular:        (-) hypertension, CAD,  angina and  CHF    NYHA Classification: I  ECG reviewed  Rhythm: regular  Rate: normal           Beta Blocker:  Not on Beta Blocker         Neuro/Psych:   Negative Neuro/Psych ROS     (-) seizures, CVA and depression/anxiety             ROS comment: rls GI/Hepatic/Renal: Neg GI/Hepatic/Renal ROS       (-) hiatal hernia and GERD       Endo/Other: Negative Endo/Other ROS   (+) hypothyroidism, blood dyscrasia: anemia, arthritis: OA., malignancy/cancer. Pt had PAT visit. Abdominal:   (+) obese,     Abdomen: soft. Vascular:                                        Anesthesia Plan      general     ASA 3     (Due to high risk of ponv, will plan on a multimodal antiemetic regimen. (Scopalamine, emend, pepcid, zofran and perhaps decadron) unless contraindicated in this patient  )  Induction: intravenous. BIS  MIPS: Postoperative opioids intended and Prophylactic antiemetics administered. Anesthetic plan and risks discussed with patient. Use of blood products discussed with patient whom. Plan discussed with CRNA.     Attending anesthesiologist reviewed and agrees with Pre Eval content              Eufemia Ochoa MD   7/27/2018

## 2018-07-27 NOTE — OP NOTE
Radiation Oncology      Patient's Name/Date of Birth:    Judson Brantley / 1950 (20 y.o.)    DATE OF OPERATION:    07/27/2018    SURGEON:    Aide Roger MD  Phone Number: [phone]    OTHER SURGEONS:      Surgeon(s):  MD Aide Howell MD Nichole Arthur, MD    PREOPERATIVE DIAGNOSIS:    S62.807    POSTOPERATIVE DIAGNOSIS:    C50.411    BRIEF HISTORY:    Ms. Brooklyn Briseno is a 79 y.o. female with a history of a mammographically detected lesion noted 4/25/2018 involving the right upper outer quadrant. Ultrasound of the breast showed an 8 mm hypoechoic lesion at the 9 o'clock position involving the right breast, 9 cm from the nipple. Ultrasound-guided biopsy obtained 5/23/2018 showed infiltrating ductal carcinoma, grade one measuring 0.8 cm at the 9 o'clock position. Breast MRI obtained 6/15/2018 showed the known infiltrating ductal carcinoma involving the right breast. Suspicious enhancement at the left breast 12 o'clock position was noted. The patient has seen Dr. Gerhardt Punch in consultation and is planned for right breast lumpectomy, sentinel lymph node biopsy and consideration of intraoperative radiation therapy. STAGE: IA (T1b, N0, M0)    OPERATION PERFORMED:    1. Right partial mastectomy and sentinel lymph node biopsy of right axilla. 2. Ultrasound examination of the right breast following insertion of the Xoft balloon applicator into the lumpectomy cavity. 3. Treatment planning. 4. Intraoperative irradiation using the Xoft electronic brachytherapy system. DESCRIPTION OF PROCEDURE:    Under general anesthesia, following prepping and draping in the usual sterile manner, right partial mastectomy and right sentinel lymph node biopsy were performed by Dr. Moses Wolff. This will be dictated separately. A 3-4 cm Xoft balloon applicator was inserted into the lumpectomy cavity and filled with 40 cc sterile saline.  The breast tissue and overlying skin were approximated using suture material. Ultrasound examination showed a skin bridge of 3 cm. The miniature x-ray tube (with a measured length of 25.1 cm) was inserted into the central channel of the balloon applicator (with a measured depth of 24.95 cm) and a pull back test of the source was successfully performed. A thin, flexible lead shield was placed on the breast overlying the implant site. Rolling, leaded-glass shields were brought into the operating room and the room was evacuated except for the anesthesiologist, medical physicist and radiation oncologist. Radiation precaution signs were placed on the operating room entrances. The source was activated and a dose of 2000 cGy was delivered to the balloon surface in 605.0 seconds. Treatment started 07/27/2018 at 01:43:10 PM and was completed 07/27/2018 at 01:53:36 PM.    Following the treatment, the x-ray source was withdrawn and placed in a shielded chamber in the Xoft controller unit. The flexible lead shield was removed from the patient. Removal of the balloon applicator was performed. The volume of saline removed from the balloon applicator was verified. Closing of the lumpectomy and axillary incisions was performed by Dr. Rodolfo Rodriguez and also the subject of a separate dictation.     Physics support, including machine calibration and , was provided by [employer] PhD.     Sole Frost MD

## 2018-07-27 NOTE — H&P
HISTORY OF PRESENT ILLNESS:     Ms. Ekaterina Maguire  is recently status post ultrasound guided breast biopsy  on the right which revealed a 0.8  cm low grade invasive ductal carcinoma. It is ER positive at 91%. It is GA positive at 5%. It is HER-2 negative by IHC and FISH. Ki-67 is low at 9%.     MRI shows no additional lesions in the right breast. There is an area of somewhat suspicious enhancement in the left breast at approximately the 12 o'clock position. Ultrasound of this area did not demonstrate anything worrisome today. We will repeat this ultrasound in 3 months, get bilateral  mammograms and repeat ultrasound in 6 months and then repeat her MRI in one year. Scotty Arzola is a 79 y.o. female with the following history as [facility] in [facility]:  Patient Active Problem List    Diagnosis Date Noted    Malignant neoplasm of upper-outer quadrant of right female breast ([facility] [address] 05/29/2018    Abnormal mammogram 11/08/2017    Mammographic microcalcification 11/08/2017    Diffuse cystic mastopathy 11/08/2017    Anemia 01/07/2016    COPD (chronic obstructive pulmonary disease) ([facility] [address] 01/07/2016    Hypothyroid 01/07/2016    Constipation 01/07/2016    Primary osteoarthritis of left hip 01/06/2016     Current Facility-Administered Medications   Medication Dose Route Frequency Provider Last Rate Last Dose    lactated ringers infusion   Intravenous Continuous Donovan Dave  mL/hr at 07/27/18 1005       Facility-Administered Medications Ordered in Other Encounters   Medication Dose Route Frequency Provider Last Rate Last Dose    technetium Tc 99m tilmanocept (LYMPHOSEEK) injection 0.5 millicurie  267 micro curie Intradermal ONCE PRN Donovan Dave MD         Allergies: Patient has no known allergies.   Past Medical History:   Diagnosis Date    Arthritis     Cancer ([address]     melonoma    PONV (postoperative nausea and vomiting)     Restless leg syndrome     Sinus disease     Thyroid disease      Past

## 2018-07-27 NOTE — PROGRESS NOTES
The clamp of the dominic drain would not stay closed. A new bulb was placed on the drain tubing. The bulb was compressed and the plug placed. The new plug worked appropriately.

## 2018-07-27 NOTE — ANESTHESIA PRE PROCEDURE
Department of Anesthesiology  Preprocedure Note       Name:  Eliseo Montalvo   Age:  79 y.o.  :  1950                                          MRN:  [id number]         Date:  2018      Surgeon: Antonio Canseco):  Mayra Castañeda MD    Procedure: Procedure(s):  LUMPECTOMY W/SNB & INTRAOP US GUIDED NL & IORT    Medications prior to admission:   Prior to Admission medications    Medication Sig Start Date End Date Taking?  Authorizing Provider   letrozole [facility]) 2.5 MG tablet Take 1 tablet by mouth daily 18  Yes Mayra Castañeda MD   celecoxib (CELEBREX) 200 MG capsule Take 200 mg by mouth 2 times daily   Yes Historical Provider, MD   SYNTHROID 112 MCG tablet Take 112 mcg by mouth Daily  18  Yes Historical Provider, MD   loratadine (CLARITIN) 10 MG tablet Take 10 mg by mouth daily Indications: Sinus Discomfort   Yes Historical Provider, MD   gabapentin (NEURONTIN) 300 MG capsule Take 300 mg by mouth nightly   Yes Historical Provider, MD   Cholecalciferol (VITAMIN D3) 5000 UNITS TABS Take 1 tablet by mouth daily    Yes Historical Provider, MD       Current medications:    Current Facility-Administered Medications   Medication Dose Route Frequency Provider Last Rate Last Dose    lactated ringers infusion   Intravenous Continuous Mayra Castañeda  mL/hr at 18 1005       Facility-Administered Medications Ordered in Other Encounters   Medication Dose Route Frequency Provider Last Rate Last Dose    technetium Tc 99m tilmanocept (LYMPHOSEEK) injection 0.5 millicurie  052 micro curie Intradermal ONCE PRN Mayra Castañeda MD           Allergies:  No Known Allergies    Problem List:    Patient Active Problem List   Diagnosis Code    Primary osteoarthritis of left hip M16.12    Anemia D64.9    COPD (chronic obstructive pulmonary disease) ([facility] [address] J44.9    Hypothyroid E03.9    Constipation K59.00    Abnormal mammogram R92.8    Mammographic microcalcification R92.0    Diffuse cystic mastopathy N60.19    Malignant neoplasm of upper-outer quadrant of right female breast ([facility] [address] C50.411       Past Medical History:        Diagnosis Date    Arthritis     Cancer ([facility] [address]     melonoma    PONV (postoperative nausea and vomiting)     Restless leg syndrome     Sinus disease     Thyroid disease        Past Surgical History:        Procedure Laterality Date    APPENDECTOMY      BACK SURGERY      synovial cyst off spinal canal    BREAST SURGERY Right     biopsy    COLONOSCOPY      COSMETIC SURGERY      brow lift    DENTAL SURGERY      EYE SURGERY Bilateral     multi focial implants    HYSTERECTOMY      SKIN BIOPSY      melanoma, rt shoulder    TEMPOROMANDIBULAR JOINT SURGERY      TOTAL HIP ARTHROPLASTY Left 1/6/2016    HIP TOTAL ARTHROPLASTY ANTERIOR APPROACH performed by Suki Mayo MD at [address] [place] [address]         Social History:    Social History   Substance Use Topics    Smoking status: Former Smoker     Packs/day: 1.00     Years: 30.00     Quit date: 12/18/1999    Smokeless tobacco: Never Used    Alcohol use Yes      Comment: rarely                                Counseling given: Not Answered      Vital Signs (Current):   Vitals:    07/27/18 0955   BP: (!) 149/74   Pulse: 81   Resp: 18   Temp: 97.8 °F (36.6 °C)   TempSrc: Temporal   SpO2: 97%   Weight: 201 lb (91.2 kg)   Height: 5' 5\" (1.651 m)                                              BP Readings from Last 3 Encounters:   07/27/18 (!) 149/74   05/16/18 134/80   11/08/17 (!) 140/70       NPO Status: Time of last liquid consumption: 0000                        Time of last solid consumption: 0000                        Date of last liquid consumption: 07/26/18                        Date of last solid food consumption: 07/26/18    BMI:   Wt Readings from Last 3 Encounters:   07/27/18 201 lb (91.2 kg)   07/16/18 201 lb (91.2 kg)   05/16/18 197 lb (89.4 kg)     Body mass index is 33.45 kg/m².     CBC:   Lab Results   Component Value Date    WBC 7.4 07/16/2018    RBC 4.59 07/16/2018    HGB 14.2 07/16/2018    HCT 43.0 07/16/2018    MCV 93.7 07/16/2018    RDW 12.3 07/16/2018     07/16/2018       CMP:   Lab Results   Component Value Date     07/16/2018    K 4.0 07/16/2018     07/16/2018    CO2 25 07/16/2018    BUN 13 07/16/2018    CREATININE 0.8 07/16/2018    LABGLOM >60 07/16/2018    GLUCOSE 93 07/16/2018    PROT 7.1 12/18/2015    CALCIUM 9.8 07/16/2018    BILITOT 0.5 12/18/2015    ALKPHOS 76 12/18/2015    AST 18 12/18/2015    ALT 20 12/18/2015       POC Tests: No results for input(s): POCGLU, POCNA, POCK, POCCL, POCBUN, POCHEMO, POCHCT in the last 72 hours. Coags:   Lab Results   Component Value Date    PROTIME 12.2 12/18/2015    INR 0.93 12/18/2015    APTT 30.6 12/18/2015       HCG (If Applicable): No results found for: PREGTESTUR, PREGSERUM, HCG, HCGQUANT     ABGs: No results found for: PHART, PO2ART, UCY8IHZ, MEY2TML, BEART, S7AJSLRK     Type & Screen (If Applicable):  No results found for: LABABO, [address]    Anesthesia Evaluation  Patient summary reviewed and Nursing notes reviewed   history of anesthetic complications: PONV. Airway:         Dental:          Pulmonary:       (-) not a current smoker          Patient did not smoke on day of surgery. Cardiovascular:Negative CV ROS          ECG reviewed               Beta Blocker:  Not on Beta Blocker         Neuro/Psych:   Negative Neuro/Psych ROS              GI/Hepatic/Renal:             Endo/Other:    (+) hypothyroidism::., .                 Abdominal:           Vascular:                                        Anesthesia Plan      general     ASA 3     (Decadron/Zofran Intraop)  Induction: intravenous. BIS  MIPS: Postoperative opioids intended and Prophylactic antiemetics administered. Anesthetic plan and risks discussed with patient.                       Hiren Matta MD   7/27/2018

## 2018-07-27 NOTE — BRIEF OP NOTE
Brief Postoperative Note      DATE OF PROCEDURE: 7/27/2018     SURGEON: Ester Gabriel MD    PREOPERATIVE DIAGNOSIS:  C50.411    POSTOPERATIVE DIAGNOSIS: Same     OPERATION: Procedure(s):  LUMPECTOMY W/SNB & INTRAOP US GUIDED NL & IORT    ANESTHESIA: General    ESTIMATED BLOOD LOSS: Minimal    COMPLICATIONS: None. SPECIMENS:   ID Type Source Tests Collected by Time Destination   A : Right Breast Lump Tissue Breast SURGICAL PATHOLOGY Ester Gabriel MD 7/27/2018 1249    B : Right Breast Collins Node Tissue Breast SURGICAL PATHOLOGY Ester Gabriel MD 7/27/2018 1249    C : Right Breast Additional Cranial Margins Tissue Breast SURGICAL PATHOLOGY Ester Gabriel MD 7/27/2018 1301    D : Right Breast Additional Medial Margin Tissue Breast SURGICAL PATHOLOGY Ester Gabriel MD 7/27/2018 1303        DRAINS: ARIANNA  The patient tolerated the procedure well.     Electronically signed by Ester Gabriel MD  on 7/27/2018 at 2:25 PM

## 2018-07-27 NOTE — ANESTHESIA POSTPROCEDURE EVALUATION
Department of Anesthesiology  Postprocedure Note    Patient: Zaina Sarabia  MRN: [id number]  YOB: 1950  Date of evaluation: 7/27/2018  Time:  2:52 PM     Procedure Summary     Date:  07/27/18 Room / Location:  [facility] OR  / [facility] OR    Anesthesia Start:  3320 Anesthesia Stop:  6581    Procedure:  LUMPECTOMY W/SNB & INTRAOP US GUIDED NL & IORT (Right Breast) Diagnosis:  (C50.411)    Surgeon:  Dylan Curry MD Responsible Provider:  MANDIE Gr CRNA    Anesthesia Type:  general ASA Status:  3          Anesthesia Type: general    Polo Phase I:      Polo Phase II:      Last vitals: Reviewed and per EMR flowsheets.        Anesthesia Post Evaluation    Patient location during evaluation: bedside  Patient participation: complete - patient participated  Level of consciousness: awake and alert  Pain score: 0  Airway patency: patent  Nausea & Vomiting: no nausea  Complications: no  Cardiovascular status: hemodynamically stable  Respiratory status: acceptable  Hydration status: euvolemic

## 2018-07-28 NOTE — OP NOTE
[facility] [address] [facility]                                 OPERATIVE REPORT    PATIENT NAME: Tamika Salmeron                 :        1950  MED REC NO:   [id number]                              ROOM:  ACCOUNT NO:   [de-identified]                           ADMIT DATE: 2018  PROVIDER:     Lashanda Saxena MD      DATE OF PROCEDURE:  2018    PREOPERATIVE DIAGNOSIS:  Infiltrating ductal carcinoma, right breast.    POSTOPERATIVE DIAGNOSIS:  Infiltrating ductal carcinoma, right breast.    PROCEDURE:  1. Injection of radionuclide. 2.  Lymphatic mapping. 3.  Ultrasound-guided needle localization. 4.  Placement of needle. 5.  Right partial mastectomy. 6.  Right sentinel lymph node biopsy. 7.  Right breast reconstruction, other. 8.  Placement of BioZorb radiation tissue spacer, radiation marker, and  tumor site marker. 9.  Utilization of MarginProbe, intraoperative margin assessment device. 10.  IORT. SURGEON:  Lashanda Saxena MD    ASSISTANT:  Johnny Neves PA-C. He was present for all portions of the  case including all critical portions as well as closure. ANESTHESIA:  General.    INDICATIONS:  The patient is a 62-year-old lady with newly diagnosed  right-sided breast cancer. We discussed the risks and benefits of  lumpectomy and intraoperative radiation therapy. She understood and is  agreeable. OPERATIVE PROCEDURE:  Today, she was brought to the operating room,  underwent adequate general anesthesia. We then injected with 500  microcuries of Lymphoseek in the right periareolar dermis, then utilized  ultrasound to identify the previously placed clip and inserted a 5 cm  Kopans wire directly into the lesion. This was a very lateral lesion, so  actually prepped and draped in sterile fashion.   We made a long,  curvilinear, lateral incision at the lateral aspect of the breast and  elevated, dissected down the without  difficulty, placed a large round Robin-Casper drain over the subcu, closed  the subcu with 2-0 and 3-0 Vicryl and 4-0 Monocryl for the skin. Sterile  dressings were applied. Estimated blood loss, minimal.  Complications,  none. She tolerated the procedure well.           Jackson Lowe MD    D: 07/27/2018 15:52:09      T: 07/27/2018 21:07:01     [id number]  Job#: [id number]     Doc#: [id number]    CC:

## 2018-08-02 ENCOUNTER — OFFICE VISIT (OUTPATIENT)
Dept: SURGERY | Age: 68
End: 2018-08-02

## 2018-08-02 VITALS
DIASTOLIC BLOOD PRESSURE: 78 MMHG | TEMPERATURE: 98.1 F | WEIGHT: 203 LBS | BODY MASS INDEX: 33.82 KG/M2 | HEIGHT: 65 IN | SYSTOLIC BLOOD PRESSURE: 150 MMHG

## 2018-08-02 DIAGNOSIS — Z98.890 STATUS POST RIGHT BREAST LUMPECTOMY: ICD-10-CM

## 2018-08-02 DIAGNOSIS — C50.411 MALIGNANT NEOPLASM OF UPPER-OUTER QUADRANT OF RIGHT BREAST IN FEMALE, ESTROGEN RECEPTOR POSITIVE (HCC): Primary | ICD-10-CM

## 2018-08-02 DIAGNOSIS — Z17.0 MALIGNANT NEOPLASM OF UPPER-OUTER QUADRANT OF RIGHT BREAST IN FEMALE, ESTROGEN RECEPTOR POSITIVE (HCC): Primary | ICD-10-CM

## 2018-08-02 PROCEDURE — 99024 POSTOP FOLLOW-UP VISIT: CPT | Performed by: SURGERY

## 2018-08-03 ENCOUNTER — TELEPHONE (OUTPATIENT)
Dept: SURGERY | Age: 68
End: 2018-08-03

## 2018-08-05 PROBLEM — Z98.890 STATUS POST RIGHT BREAST LUMPECTOMY: Status: ACTIVE | Noted: 2018-08-05

## 2018-08-05 NOTE — PROGRESS NOTES
HISTORY OF PRESENT ILLNESS:    Ms. Regina Barrios  is status post right lumpectomy and IORT on 7/27/2018. This was following an ultrasound guided breast biopsy  on the right which revealed a 0.8  cm low grade invasive ductal carcinoma. It is ER positive at 91%. It is [place] positive at 5%. It is HER-2 negative by IHC and FISH. Ki-67 is low at 9%. MRI showed no additional lesions in the right breast. There is an area of somewhat suspicious enhancement in the left breast at approximately the 12 o'clock position. Ultrasound of this area did not demonstrate anything worrisome today. We will repeat this ultrasound in 3 months, get bilateral  mammograms and repeat ultrasound in 6 months and then repeat her MRI in one year. PATHOLOGY REVEALS:  FINAL DIAGNOSIS:       A. Right breast, needle localized lumpectomy:       Invasive ductal adenocarcinoma, Grade1, (pT1c, pN0).      Tumor measure s1.3cm in greatest dimension.       Tumor is 1.7mm from the closest (inferior) margin.       Lymphovascular invasion is not identified.       Margins of excision are negative.       B. Right breast, additional cranial margin, excision:       Benign breast parenchyma.       One benign lymph node (0/1).      Negative for malignancy.       C. Right breast, additional medial margin, excision:       Benign breast parenchyma.       Negative for malignancy.       D. Lymph node, right sentinel, excision:       No tumor seen in four lymph nodes (0/4). PHYSICAL EXAM:  The  wounds look good with no evidence of infection, fluid accumulation, or skin necrosis. ARIANNA drain was removed without incident    IMPRESSION:    Doing well s/p right lumpectomy with IORT     PLAN:  Quin Garcia will see her in 1 week. She will call if anything changes. Appointment with Dr. Sweetie Bro in 3-4 weeks  Appointment with me in 5-6 weeks    3 month left breast ultrasound    I spent over 50% of this visit counseling patient.   15 minutes of face to face time with

## 2018-08-09 ENCOUNTER — OFFICE VISIT (OUTPATIENT)
Dept: SURGERY | Age: 68
End: 2018-08-09

## 2018-08-09 VITALS — SYSTOLIC BLOOD PRESSURE: 146 MMHG | HEART RATE: 72 BPM | DIASTOLIC BLOOD PRESSURE: 80 MMHG

## 2018-08-09 DIAGNOSIS — Z85.3 PERSONAL HISTORY OF MALIGNANT NEOPLASM OF BREAST: Primary | ICD-10-CM

## 2018-08-09 DIAGNOSIS — C50.411 MALIGNANT NEOPLASM OF UPPER-OUTER QUADRANT OF RIGHT BREAST IN FEMALE, ESTROGEN RECEPTOR POSITIVE (HCC): Primary | ICD-10-CM

## 2018-08-09 DIAGNOSIS — Z17.0 MALIGNANT NEOPLASM OF UPPER-OUTER QUADRANT OF RIGHT BREAST IN FEMALE, ESTROGEN RECEPTOR POSITIVE (HCC): Primary | ICD-10-CM

## 2018-08-09 PROCEDURE — 99024 POSTOP FOLLOW-UP VISIT: CPT | Performed by: PHYSICIAN ASSISTANT

## 2018-08-17 ENCOUNTER — OFFICE VISIT (OUTPATIENT)
Dept: SURGERY | Age: 68
End: 2018-08-17

## 2018-08-17 VITALS — DIASTOLIC BLOOD PRESSURE: 80 MMHG | HEART RATE: 84 BPM | SYSTOLIC BLOOD PRESSURE: 148 MMHG

## 2018-08-17 DIAGNOSIS — Z85.3 PERSONAL HISTORY OF MALIGNANT NEOPLASM OF BREAST: Primary | ICD-10-CM

## 2018-08-17 PROCEDURE — 99024 POSTOP FOLLOW-UP VISIT: CPT | Performed by: PHYSICIAN ASSISTANT

## 2018-08-17 RX ORDER — TRIAMCINOLONE ACETONIDE 5 MG/G
CREAM TOPICAL
Qty: 1 TUBE | Refills: 5 | Status: SHIPPED | OUTPATIENT
Start: 2018-08-17 | End: 2018-08-24

## 2018-08-17 RX ORDER — HYDROXYZINE PAMOATE 25 MG/1
25 CAPSULE ORAL 3 TIMES DAILY PRN
Qty: 90 CAPSULE | Refills: 1 | Status: SHIPPED | OUTPATIENT
Start: 2018-08-17 | End: 2018-08-31

## 2018-08-23 NOTE — PROGRESS NOTES
HISTORY OF PRESENT ILLNESS:    Ms. Bienvenido Marie  is status post right lumpectomy and IORT on 7/27/2018. This was following an ultrasound guided breast biopsy  on the right which revealed a 0.8  cm low grade invasive ductal carcinoma. It is ER positive at 91%. It is [place] positive at 5%. It is HER-2 negative by IHC and FISH. Ki-67 is low at 9%. MRI showed no additional lesions in the right breast. There is an area of somewhat suspicious enhancement in the left breast at approximately the 12 o'clock position. Ultrasound of this area did not demonstrate anything worrisome today. We will repeat this ultrasound in 3 months, get bilateral  mammograms and repeat ultrasound in 6 months and then repeat her MRI in one year. PATHOLOGY REVEALS:  FINAL DIAGNOSIS:       A. Right breast, needle localized lumpectomy:       Invasive ductal adenocarcinoma, Grade1, (pT1c, pN0).      Tumor measure s1.3cm in greatest dimension.       Tumor is 1.7mm from the closest (inferior) margin.       Lymphovascular invasion is not identified.       Margins of excision are negative.       B. Right breast, additional cranial margin, excision:       Benign breast parenchyma.       One benign lymph node (0/1).      Negative for malignancy.       C. Right breast, additional medial margin, excision:       Benign breast parenchyma.       Negative for malignancy.       D. Lymph node, right sentinel, excision:       No tumor seen in four lymph nodes (0/4). PHYSICAL EXAM:  The  wounds look good with no evidence of infection, fluid accumulation, or skin necrosis. IMPRESSION:  Doing well s/p right lumpectomy with IORT     PLAN:  She will keep her appt with Dr. Yamel Cheatham.

## 2018-08-29 NOTE — PROGRESS NOTES
HISTORY OF PRESENT ILLNESS:    Ms. Brooklyn Briseno  is status post right lumpectomy and IORT on 7/27/2018. This was following an ultrasound guided breast biopsy  on the right which revealed a 0.8  cm low grade invasive ductal carcinoma. It is ER positive at 91%. It is [place] positive at 5%. It is HER-2 negative by IHC and FISH. Ki-67 is low at 9%. She has a rash and has been treated by her PCP. PATHOLOGY REVEALS:  FINAL DIAGNOSIS:       A. Right breast, needle localized lumpectomy:       Invasive ductal adenocarcinoma, Grade1, (pT1c, pN0).      Tumor measure s1.3cm in greatest dimension.       Tumor is 1.7mm from the closest (inferior) margin.       Lymphovascular invasion is not identified.       Margins of excision are negative.       B. Right breast, additional cranial margin, excision:       Benign breast parenchyma.       One benign lymph node (0/1).      Negative for malignancy.       C. Right breast, additional medial margin, excision:       Benign breast parenchyma.       Negative for malignancy.       D. Lymph node, right sentinel, excision:       No tumor seen in four lymph nodes (0/4). PHYSICAL EXAM:  The  wounds look good with no evidence of infection, fluid accumulation, or skin necrosis. IMPRESSION:  Doing well s/p right lumpectomy with IORT     PLAN:  She will keep her appt with Dr. Gerhardt Punch.

## 2018-08-30 ENCOUNTER — OFFICE VISIT (OUTPATIENT)
Dept: SURGERY | Age: 68
End: 2018-08-30

## 2018-08-30 VITALS
SYSTOLIC BLOOD PRESSURE: 130 MMHG | DIASTOLIC BLOOD PRESSURE: 82 MMHG | BODY MASS INDEX: 34.45 KG/M2 | TEMPERATURE: 97.8 F | WEIGHT: 207 LBS | HEART RATE: 81 BPM

## 2018-08-30 DIAGNOSIS — Z98.890 STATUS POST RIGHT BREAST LUMPECTOMY: Primary | ICD-10-CM

## 2018-08-30 DIAGNOSIS — Z17.0 MALIGNANT NEOPLASM OF UPPER-OUTER QUADRANT OF RIGHT BREAST IN FEMALE, ESTROGEN RECEPTOR POSITIVE (HCC): ICD-10-CM

## 2018-08-30 DIAGNOSIS — C50.411 MALIGNANT NEOPLASM OF UPPER-OUTER QUADRANT OF RIGHT BREAST IN FEMALE, ESTROGEN RECEPTOR POSITIVE (HCC): ICD-10-CM

## 2018-08-30 PROCEDURE — 99024 POSTOP FOLLOW-UP VISIT: CPT | Performed by: SURGERY

## 2018-08-30 RX ORDER — VENLAFAXINE HYDROCHLORIDE 37.5 MG/1
CAPSULE, EXTENDED RELEASE ORAL
COMMUNITY
Start: 2018-08-29 | End: 2018-11-07 | Stop reason: DRUGHIGH

## 2018-08-30 NOTE — PROGRESS NOTES
HISTORY OF PRESENT ILLNESS:    Ms. Shay Brown  is status post right lumpectomy and IORT on 7/27/2018. This was following an ultrasound guided breast biopsy  on the right which revealed a 0.8  cm low grade invasive ductal carcinoma. It is ER positive at 91%. It is [place] positive at 5%. It is HER-2 negative by IHC and FISH. Ki-67 is low at 9%. MRI showed no additional lesions in the right breast. There is an area of somewhat suspicious enhancement in the left breast at approximately the 12 o'clock position. Ultrasound of this area did not demonstrate anything worrisome today. We will repeat this ultrasound in 3 months, get bilateral  mammograms and repeat ultrasound in 6 months and then repeat her MRI in one year. PATHOLOGY REVEALS:  FINAL DIAGNOSIS:       A. Right breast, needle localized lumpectomy:       Invasive ductal adenocarcinoma, Grade1, (pT1c, pN0).      Tumor measure s1.3cm in greatest dimension.       Tumor is 1.7mm from the closest (inferior) margin.       Lymphovascular invasion is not identified.       Margins of excision are negative.       B. Right breast, additional cranial margin, excision:       Benign breast parenchyma.       One benign lymph node (0/1).      Negative for malignancy.       C. Right breast, additional medial margin, excision:       Benign breast parenchyma.       Negative for malignancy.       D. Lymph node, right sentinel, excision:       No tumor seen in four lymph nodes (0/4). PHYSICAL EXAM:  The  wounds look good with no evidence of infection, fluid accumulation, or skin necrosis. ARIANNA drain was removed without incident    IMPRESSION:    Doing well s/p right lumpectomy with IORT     PLAN:   She will call if anything changes. I will see her back in about 2 month with her left breast ultrasound    I spent over 50% of this visit counseling patient. 15 minutes of face to face time with patient.

## 2018-09-11 DIAGNOSIS — R92.8 OTHER ABNORMAL AND INCONCLUSIVE FINDINGS ON DIAGNOSTIC IMAGING OF BREAST: ICD-10-CM

## 2018-09-11 DIAGNOSIS — N63.20 LEFT BREAST MASS: Primary | ICD-10-CM

## 2018-09-17 DIAGNOSIS — Z98.890 STATUS POST RIGHT BREAST LUMPECTOMY: ICD-10-CM

## 2018-09-17 DIAGNOSIS — C50.411 MALIGNANT NEOPLASM OF UPPER-OUTER QUADRANT OF RIGHT BREAST IN FEMALE, ESTROGEN RECEPTOR POSITIVE (HCC): Primary | ICD-10-CM

## 2018-09-17 DIAGNOSIS — Z17.0 MALIGNANT NEOPLASM OF UPPER-OUTER QUADRANT OF RIGHT BREAST IN FEMALE, ESTROGEN RECEPTOR POSITIVE (HCC): Primary | ICD-10-CM

## 2018-11-07 ENCOUNTER — HOSPITAL ENCOUNTER (OUTPATIENT)
Dept: ULTRASOUND IMAGING | Age: 68
Discharge: HOME | End: 2018-11-07
Payer: MEDICARE

## 2018-11-07 ENCOUNTER — OFFICE VISIT (OUTPATIENT)
Dept: SURGERY | Age: 68
End: 2018-11-07
Payer: MEDICARE

## 2018-11-07 VITALS — SYSTOLIC BLOOD PRESSURE: 140 MMHG | HEART RATE: 76 BPM | DIASTOLIC BLOOD PRESSURE: 90 MMHG

## 2018-11-07 DIAGNOSIS — R92.8 OTHER ABNORMAL AND INCONCLUSIVE FINDINGS ON DIAGNOSTIC IMAGING OF BREAST: ICD-10-CM

## 2018-11-07 DIAGNOSIS — Z17.0 MALIGNANT NEOPLASM OF UPPER-OUTER QUADRANT OF RIGHT BREAST IN FEMALE, ESTROGEN RECEPTOR POSITIVE (HCC): Primary | ICD-10-CM

## 2018-11-07 DIAGNOSIS — Z98.890 STATUS POST RIGHT BREAST LUMPECTOMY: ICD-10-CM

## 2018-11-07 DIAGNOSIS — C50.411 MALIGNANT NEOPLASM OF UPPER-OUTER QUADRANT OF RIGHT BREAST IN FEMALE, ESTROGEN RECEPTOR POSITIVE (HCC): Primary | ICD-10-CM

## 2018-11-07 DIAGNOSIS — N63.20 LEFT BREAST MASS: ICD-10-CM

## 2018-11-07 PROCEDURE — 99213 OFFICE O/P EST LOW 20 MIN: CPT | Performed by: SURGERY

## 2018-11-07 PROCEDURE — 3017F COLORECTAL CA SCREEN DOC REV: CPT | Performed by: SURGERY

## 2018-11-07 PROCEDURE — 1101F PT FALLS ASSESS-DOCD LE1/YR: CPT | Performed by: SURGERY

## 2018-11-07 PROCEDURE — 4040F PNEUMOC VAC/ADMIN/RCVD: CPT | Performed by: SURGERY

## 2018-11-07 PROCEDURE — 1123F ACP DISCUSS/DSCN MKR DOCD: CPT | Performed by: SURGERY

## 2018-11-07 PROCEDURE — G8427 DOCREV CUR MEDS BY ELIG CLIN: HCPCS | Performed by: SURGERY

## 2018-11-07 PROCEDURE — G8417 CALC BMI ABV UP PARAM F/U: HCPCS | Performed by: SURGERY

## 2018-11-07 PROCEDURE — G8400 PT W/DXA NO RESULTS DOC: HCPCS | Performed by: SURGERY

## 2018-11-07 PROCEDURE — 1036F TOBACCO NON-USER: CPT | Performed by: SURGERY

## 2018-11-07 PROCEDURE — 3014F SCREEN MAMMO DOC REV: CPT | Performed by: SURGERY

## 2018-11-07 PROCEDURE — G8484 FLU IMMUNIZE NO ADMIN: HCPCS | Performed by: SURGERY

## 2018-11-07 PROCEDURE — 1090F PRES/ABSN URINE INCON ASSESS: CPT | Performed by: SURGERY

## 2018-11-07 PROCEDURE — 76642 ULTRASOUND BREAST LIMITED: CPT

## 2018-11-07 RX ORDER — VENLAFAXINE HYDROCHLORIDE 75 MG/1
CAPSULE, EXTENDED RELEASE ORAL
COMMUNITY
Start: 2018-10-24 | End: 2019-11-22 | Stop reason: SDUPTHER

## 2018-11-08 DIAGNOSIS — R92.8 OTHER ABNORMAL AND INCONCLUSIVE FINDINGS ON DIAGNOSTIC IMAGING OF BREAST: ICD-10-CM

## 2018-11-08 DIAGNOSIS — R93.89 ABNORMAL MRI: Primary | ICD-10-CM

## 2018-11-08 NOTE — PROGRESS NOTES
HISTORY OF PRESENT ILLNESS:    Ms. Negar Marvin  is status post right lumpectomy and IORT on 7/27/2018. This was following an ultrasound guided breast biopsy  on the right which revealed a 0.8  cm low grade invasive ductal carcinoma. It is ER positive at 91%. It is [place] positive at 5%. It is HER-2 negative by IHC and FISH. Ki-67 is low at 9%. MRI showed no additional lesions in the right breast. There is an area of somewhat suspicious enhancement in the left breast at approximately the 12 o'clock position. Ultrasound of this area did not demonstrate anything worrisome today. We will repeat this ultrasound in 3 months, get bilateral  mammograms and repeat ultrasound in 6 months and then repeat her MRI in one year. Narrative   EXAMINATION: US BREAST LIMITED LEFT 11/7/2018 1:00 PM   HISTORY: Personal history of right breast cancer, with abnormal   findings in the left breast on the previous MRI exam. No abnormality   seen on previous ultrasound. COMPARISON: Limited left breast ultrasound 6/18/2018, MRI bilateral   breasts 6/15/2018   FINDINGS:   Targeted left breast ultrasound was performed from the 10-2 o'clock   positions of the left breast. Normal-appearing tissue is seen without   solid or suspicious mass.       Impression   No sonographic abnormality in the left breast. Consider   repeat MRI exam to evaluate for interval change. BI-RADS Category   1-negative. PATHOLOGY REVEALS:  FINAL DIAGNOSIS:       A. Right breast, needle localized lumpectomy:       Invasive ductal adenocarcinoma, Grade1, (pT1c, pN0).      Tumor measure s1.3cm in greatest dimension.       Tumor is 1.7mm from the closest (inferior) margin.       Lymphovascular invasion is not identified.       Margins of excision are negative.       B. Right breast, additional cranial margin, excision:       Benign breast parenchyma.       One benign lymph node (0/1).      Negative for malignancy.       C.  Right breast, additional medial margin,

## 2018-12-12 ENCOUNTER — TELEPHONE (OUTPATIENT)
Dept: SURGERY | Age: 68
End: 2018-12-12

## 2019-01-30 ENCOUNTER — HOSPITAL ENCOUNTER (OUTPATIENT)
Dept: WOMENS IMAGING | Age: 69
Discharge: HOME | End: 2019-01-30
Payer: MEDICARE

## 2019-01-30 ENCOUNTER — OFFICE VISIT (OUTPATIENT)
Dept: SURGERY | Age: 69
End: 2019-01-30
Payer: MEDICARE

## 2019-01-30 VITALS — HEART RATE: 76 BPM | SYSTOLIC BLOOD PRESSURE: 142 MMHG | DIASTOLIC BLOOD PRESSURE: 80 MMHG

## 2019-01-30 DIAGNOSIS — Z17.0 MALIGNANT NEOPLASM OF UPPER-OUTER QUADRANT OF RIGHT BREAST IN FEMALE, ESTROGEN RECEPTOR POSITIVE (HCC): ICD-10-CM

## 2019-01-30 DIAGNOSIS — Z98.890 STATUS POST RIGHT BREAST LUMPECTOMY: ICD-10-CM

## 2019-01-30 DIAGNOSIS — Z17.0 MALIGNANT NEOPLASM OF UPPER-OUTER QUADRANT OF RIGHT BREAST IN FEMALE, ESTROGEN RECEPTOR POSITIVE (HCC): Primary | ICD-10-CM

## 2019-01-30 DIAGNOSIS — R92.8 OTHER ABNORMAL AND INCONCLUSIVE FINDINGS ON DIAGNOSTIC IMAGING OF BREAST: ICD-10-CM

## 2019-01-30 DIAGNOSIS — R93.89 ABNORMAL MRI: ICD-10-CM

## 2019-01-30 DIAGNOSIS — C50.411 MALIGNANT NEOPLASM OF UPPER-OUTER QUADRANT OF RIGHT BREAST IN FEMALE, ESTROGEN RECEPTOR POSITIVE (HCC): Primary | ICD-10-CM

## 2019-01-30 DIAGNOSIS — C50.411 MALIGNANT NEOPLASM OF UPPER-OUTER QUADRANT OF RIGHT BREAST IN FEMALE, ESTROGEN RECEPTOR POSITIVE (HCC): ICD-10-CM

## 2019-01-30 PROCEDURE — 1123F ACP DISCUSS/DSCN MKR DOCD: CPT | Performed by: SURGERY

## 2019-01-30 PROCEDURE — 4040F PNEUMOC VAC/ADMIN/RCVD: CPT | Performed by: SURGERY

## 2019-01-30 PROCEDURE — 99213 OFFICE O/P EST LOW 20 MIN: CPT | Performed by: SURGERY

## 2019-01-30 PROCEDURE — G0279 TOMOSYNTHESIS, MAMMO: HCPCS

## 2019-01-30 PROCEDURE — 1090F PRES/ABSN URINE INCON ASSESS: CPT | Performed by: SURGERY

## 2019-01-30 PROCEDURE — G8484 FLU IMMUNIZE NO ADMIN: HCPCS | Performed by: SURGERY

## 2019-01-30 PROCEDURE — G8400 PT W/DXA NO RESULTS DOC: HCPCS | Performed by: SURGERY

## 2019-01-30 PROCEDURE — G8417 CALC BMI ABV UP PARAM F/U: HCPCS | Performed by: SURGERY

## 2019-01-30 PROCEDURE — 1101F PT FALLS ASSESS-DOCD LE1/YR: CPT | Performed by: SURGERY

## 2019-01-30 PROCEDURE — 3017F COLORECTAL CA SCREEN DOC REV: CPT | Performed by: SURGERY

## 2019-01-30 PROCEDURE — G8427 DOCREV CUR MEDS BY ELIG CLIN: HCPCS | Performed by: SURGERY

## 2019-01-30 PROCEDURE — 76642 ULTRASOUND BREAST LIMITED: CPT

## 2019-01-30 PROCEDURE — 1036F TOBACCO NON-USER: CPT | Performed by: SURGERY

## 2019-01-30 RX ORDER — TAMOXIFEN CITRATE 20 MG/1
TABLET ORAL
COMMUNITY
Start: 2019-01-21 | End: 2019-08-28 | Stop reason: SDUPTHER

## 2019-03-05 DIAGNOSIS — Z85.3 PERSONAL HISTORY OF MALIGNANT NEOPLASM OF BREAST: Primary | ICD-10-CM

## 2019-03-05 DIAGNOSIS — Z98.890 STATUS POST RIGHT BREAST LUMPECTOMY: ICD-10-CM

## 2019-03-11 ENCOUNTER — LAB (OUTPATIENT)
Dept: LAB | Facility: HOSPITAL | Age: 69
End: 2019-03-11

## 2019-03-11 ENCOUNTER — TRANSCRIBE ORDERS (OUTPATIENT)
Dept: ADMINISTRATIVE | Facility: HOSPITAL | Age: 69
End: 2019-03-11

## 2019-03-11 DIAGNOSIS — R21 RASH AND OTHER NONSPECIFIC SKIN ERUPTION: Primary | ICD-10-CM

## 2019-03-11 DIAGNOSIS — R06.00 DYSPNEA, UNSPECIFIED TYPE: ICD-10-CM

## 2019-03-11 DIAGNOSIS — R21 RASH AND OTHER NONSPECIFIC SKIN ERUPTION: ICD-10-CM

## 2019-03-11 LAB
ALBUMIN SERPL-MCNC: 4.9 G/DL (ref 3.5–5)
ALBUMIN/GLOB SERPL: 1.5 G/DL (ref 1.1–2.5)
ALP SERPL-CCNC: 112 U/L (ref 24–120)
ALT SERPL W P-5'-P-CCNC: 74 U/L (ref 0–54)
ANION GAP SERPL CALCULATED.3IONS-SCNC: 13 MMOL/L (ref 4–13)
AST SERPL-CCNC: 73 U/L (ref 7–45)
BACTERIA UR QL AUTO: ABNORMAL /HPF
BASOPHILS # BLD AUTO: 0.08 10*3/MM3 (ref 0–0.2)
BASOPHILS NFR BLD AUTO: 1.1 % (ref 0–2)
BILIRUB SERPL-MCNC: 0.4 MG/DL (ref 0.1–1)
BILIRUB UR QL STRIP: NEGATIVE
BUN BLD-MCNC: 17 MG/DL (ref 5–21)
BUN/CREAT SERPL: 20.5 (ref 7–25)
CALCIUM SPEC-SCNC: 9.5 MG/DL (ref 8.4–10.4)
CHLORIDE SERPL-SCNC: 99 MMOL/L (ref 98–110)
CLARITY UR: CLEAR
CO2 SERPL-SCNC: 30 MMOL/L (ref 24–31)
COLOR UR: YELLOW
CREAT BLD-MCNC: 0.83 MG/DL (ref 0.5–1.4)
DEPRECATED RDW RBC AUTO: 43.3 FL (ref 40–54)
EOSINOPHIL # BLD AUTO: 0.13 10*3/MM3 (ref 0–0.7)
EOSINOPHIL NFR BLD AUTO: 1.9 % (ref 0–4)
ERYTHROCYTE [DISTWIDTH] IN BLOOD BY AUTOMATED COUNT: 12.5 % (ref 12–15)
ERYTHROCYTE [SEDIMENTATION RATE] IN BLOOD: 3 MM/HR (ref 0–20)
GFR SERPL CREATININE-BSD FRML MDRD: 69 ML/MIN/1.73
GLOBULIN UR ELPH-MCNC: 3.2 GM/DL
GLUCOSE BLD-MCNC: 99 MG/DL (ref 70–100)
GLUCOSE UR STRIP-MCNC: NEGATIVE MG/DL
HCT VFR BLD AUTO: 43.2 % (ref 37–47)
HGB BLD-MCNC: 14.4 G/DL (ref 12–16)
HGB UR QL STRIP.AUTO: NEGATIVE
HYALINE CASTS UR QL AUTO: ABNORMAL /LPF
IMM GRANULOCYTES # BLD AUTO: 0.02 10*3/MM3 (ref 0–0.05)
IMM GRANULOCYTES NFR BLD AUTO: 0.3 % (ref 0–5)
KETONES UR QL STRIP: NEGATIVE
LEUKOCYTE ESTERASE UR QL STRIP.AUTO: ABNORMAL
LYMPHOCYTES # BLD AUTO: 1.25 10*3/MM3 (ref 0.72–4.86)
LYMPHOCYTES NFR BLD AUTO: 17.9 % (ref 15–45)
MCH RBC QN AUTO: 31.6 PG (ref 28–32)
MCHC RBC AUTO-ENTMCNC: 33.3 G/DL (ref 33–36)
MCV RBC AUTO: 94.7 FL (ref 82–98)
MONOCYTES # BLD AUTO: 0.41 10*3/MM3 (ref 0.19–1.3)
MONOCYTES NFR BLD AUTO: 5.9 % (ref 4–12)
NEUTROPHILS # BLD AUTO: 5.09 10*3/MM3 (ref 1.87–8.4)
NEUTROPHILS NFR BLD AUTO: 72.9 % (ref 39–78)
NITRITE UR QL STRIP: NEGATIVE
NRBC BLD AUTO-RTO: 0 /100 WBC (ref 0–0)
PH UR STRIP.AUTO: 6.5 [PH] (ref 5–8)
PLATELET # BLD AUTO: 224 10*3/MM3 (ref 130–400)
PMV BLD AUTO: 12.1 FL (ref 6–12)
POTASSIUM BLD-SCNC: 4.2 MMOL/L (ref 3.5–5.3)
PROT SERPL-MCNC: 8.1 G/DL (ref 6.3–8.7)
PROT UR QL STRIP: NEGATIVE
RBC # BLD AUTO: 4.56 10*6/MM3 (ref 4.2–5.4)
RBC # UR: ABNORMAL /HPF
REF LAB TEST METHOD: ABNORMAL
SODIUM BLD-SCNC: 142 MMOL/L (ref 135–145)
SP GR UR STRIP: 1.02 (ref 1–1.03)
SQUAMOUS #/AREA URNS HPF: ABNORMAL /HPF
T4 FREE SERPL-MCNC: 0.91 NG/DL (ref 0.78–2.19)
TSH SERPL DL<=0.05 MIU/L-ACNC: 3.14 MIU/ML (ref 0.47–4.68)
UROBILINOGEN UR QL STRIP: ABNORMAL
WBC NRBC COR # BLD: 6.98 10*3/MM3 (ref 4.8–10.8)
WBC UR QL AUTO: ABNORMAL /HPF

## 2019-03-11 PROCEDURE — 84439 ASSAY OF FREE THYROXINE: CPT | Performed by: ALLERGY & IMMUNOLOGY

## 2019-03-11 PROCEDURE — 85025 COMPLETE CBC W/AUTO DIFF WBC: CPT | Performed by: ALLERGY & IMMUNOLOGY

## 2019-03-11 PROCEDURE — 86800 THYROGLOBULIN ANTIBODY: CPT | Performed by: ALLERGY & IMMUNOLOGY

## 2019-03-11 PROCEDURE — 85651 RBC SED RATE NONAUTOMATED: CPT | Performed by: ALLERGY & IMMUNOLOGY

## 2019-03-11 PROCEDURE — 83516 IMMUNOASSAY NONANTIBODY: CPT | Performed by: ALLERGY & IMMUNOLOGY

## 2019-03-11 PROCEDURE — 86376 MICROSOMAL ANTIBODY EACH: CPT | Performed by: ALLERGY & IMMUNOLOGY

## 2019-03-11 PROCEDURE — 83520 IMMUNOASSAY QUANT NOS NONAB: CPT | Performed by: ALLERGY & IMMUNOLOGY

## 2019-03-11 PROCEDURE — 80053 COMPREHEN METABOLIC PANEL: CPT | Performed by: ALLERGY & IMMUNOLOGY

## 2019-03-11 PROCEDURE — 84481 FREE ASSAY (FT-3): CPT | Performed by: ALLERGY & IMMUNOLOGY

## 2019-03-11 PROCEDURE — 84445 ASSAY OF TSI GLOBULIN: CPT | Performed by: ALLERGY & IMMUNOLOGY

## 2019-03-11 PROCEDURE — 84443 ASSAY THYROID STIM HORMONE: CPT | Performed by: ALLERGY & IMMUNOLOGY

## 2019-03-11 PROCEDURE — 36415 COLL VENOUS BLD VENIPUNCTURE: CPT | Performed by: ALLERGY & IMMUNOLOGY

## 2019-03-11 PROCEDURE — 81001 URINALYSIS AUTO W/SCOPE: CPT | Performed by: ALLERGY & IMMUNOLOGY

## 2019-03-12 LAB
T3FREE SERPL-MCNC: 2.3 PG/ML (ref 2–4.4)
THYROGLOB AB SERPL-ACNC: <1 IU/ML (ref 0–0.9)
THYROPEROXIDASE AB SERPL-ACNC: 9 IU/ML (ref 0–34)

## 2019-03-13 LAB
TRYPTASE SERPL-MCNC: 6.7 UG/L (ref 2.2–13.2)
TSI SER-MCNC: <0.1 IU/L (ref 0–0.55)

## 2019-03-14 LAB — ALPHA GAL IGE: <0.1 KU/L

## 2019-05-23 ENCOUNTER — TELEPHONE (OUTPATIENT)
Dept: SURGERY | Age: 69
End: 2019-05-23

## 2019-05-23 NOTE — TELEPHONE ENCOUNTER
Called patient to tell her we had scheduled her MRI of the breast on 7/31/19 at 12:00, she is to arrive at MRI center at 11:30. She will see Dr. Gurmeet Cuba the same day at 1:00.

## 2019-07-22 ENCOUNTER — HOSPITAL ENCOUNTER (OUTPATIENT)
Dept: NUCLEAR MEDICINE | Age: 69
Discharge: HOME | End: 2019-07-24
Payer: MEDICARE

## 2019-07-22 ENCOUNTER — HOSPITAL ENCOUNTER (OUTPATIENT)
Dept: NON INVASIVE DIAGNOSTICS | Age: 69
Discharge: HOME | End: 2019-07-22
Payer: MEDICARE

## 2019-07-22 DIAGNOSIS — R07.2 PRECORDIAL PAIN: ICD-10-CM

## 2019-07-22 DIAGNOSIS — R06.02 SHORTNESS OF BREATH: ICD-10-CM

## 2019-07-22 LAB
LV EF: 65 %
LVEF MODALITY: NORMAL

## 2019-07-22 PROCEDURE — 6360000002 HC RX W HCPCS: Performed by: INTERNAL MEDICINE

## 2019-07-22 PROCEDURE — A9500 TC99M SESTAMIBI: HCPCS | Performed by: NURSE PRACTITIONER

## 2019-07-22 PROCEDURE — 93017 CV STRESS TEST TRACING ONLY: CPT

## 2019-07-22 PROCEDURE — 78452 HT MUSCLE IMAGE SPECT MULT: CPT

## 2019-07-22 PROCEDURE — 3430000000 HC RX DIAGNOSTIC RADIOPHARMACEUTICAL: Performed by: NURSE PRACTITIONER

## 2019-07-22 RX ADMIN — TETRAKIS(2-METHOXYISOBUTYLISOCYANIDE)COPPER(I) TETRAFLUOROBORATE 10 MILLICURIE: 1 INJECTION, POWDER, LYOPHILIZED, FOR SOLUTION INTRAVENOUS at 14:29

## 2019-07-22 RX ADMIN — REGADENOSON 0.4 MG: 0.08 INJECTION, SOLUTION INTRAVENOUS at 13:45

## 2019-07-22 RX ADMIN — TETRAKIS(2-METHOXYISOBUTYLISOCYANIDE)COPPER(I) TETRAFLUOROBORATE 30 MILLICURIE: 1 INJECTION, POWDER, LYOPHILIZED, FOR SOLUTION INTRAVENOUS at 14:30

## 2019-07-31 ENCOUNTER — HOSPITAL ENCOUNTER (OUTPATIENT)
Dept: MRI IMAGING | Age: 69
Discharge: HOME | End: 2019-07-31
Payer: MEDICARE

## 2019-07-31 DIAGNOSIS — Z85.3 PERSONAL HISTORY OF MALIGNANT NEOPLASM OF BREAST: ICD-10-CM

## 2019-07-31 DIAGNOSIS — Z98.890 STATUS POST RIGHT BREAST LUMPECTOMY: ICD-10-CM

## 2019-07-31 PROCEDURE — 77049 MRI BREAST C-+ W/CAD BI: CPT

## 2019-07-31 PROCEDURE — A9577 INJ MULTIHANCE: HCPCS | Performed by: SURGERY

## 2019-07-31 PROCEDURE — 6360000004 HC RX CONTRAST MEDICATION: Performed by: SURGERY

## 2019-07-31 RX ADMIN — GADOBENATE DIMEGLUMINE 19 ML: 529 INJECTION, SOLUTION INTRAVENOUS at 13:15

## 2019-08-26 ENCOUNTER — OFFICE VISIT (OUTPATIENT)
Dept: SURGERY | Age: 69
End: 2019-08-26
Payer: MEDICARE

## 2019-08-26 VITALS
WEIGHT: 217 LBS | OXYGEN SATURATION: 98 % | TEMPERATURE: 97.7 F | HEIGHT: 65 IN | HEART RATE: 71 BPM | BODY MASS INDEX: 36.15 KG/M2

## 2019-08-26 DIAGNOSIS — Z98.890 STATUS POST RIGHT BREAST LUMPECTOMY: Primary | ICD-10-CM

## 2019-08-26 DIAGNOSIS — Z17.0 MALIGNANT NEOPLASM OF UPPER-OUTER QUADRANT OF RIGHT BREAST IN FEMALE, ESTROGEN RECEPTOR POSITIVE (HCC): ICD-10-CM

## 2019-08-26 DIAGNOSIS — C50.411 MALIGNANT NEOPLASM OF UPPER-OUTER QUADRANT OF RIGHT BREAST IN FEMALE, ESTROGEN RECEPTOR POSITIVE (HCC): ICD-10-CM

## 2019-08-26 PROCEDURE — 99213 OFFICE O/P EST LOW 20 MIN: CPT | Performed by: SURGERY

## 2019-08-26 PROCEDURE — 1036F TOBACCO NON-USER: CPT | Performed by: SURGERY

## 2019-08-26 PROCEDURE — G8400 PT W/DXA NO RESULTS DOC: HCPCS | Performed by: SURGERY

## 2019-08-26 PROCEDURE — 1090F PRES/ABSN URINE INCON ASSESS: CPT | Performed by: SURGERY

## 2019-08-26 PROCEDURE — 1123F ACP DISCUSS/DSCN MKR DOCD: CPT | Performed by: SURGERY

## 2019-08-26 PROCEDURE — G8427 DOCREV CUR MEDS BY ELIG CLIN: HCPCS | Performed by: SURGERY

## 2019-08-26 PROCEDURE — 3017F COLORECTAL CA SCREEN DOC REV: CPT | Performed by: SURGERY

## 2019-08-26 PROCEDURE — G8417 CALC BMI ABV UP PARAM F/U: HCPCS | Performed by: SURGERY

## 2019-08-26 PROCEDURE — 4040F PNEUMOC VAC/ADMIN/RCVD: CPT | Performed by: SURGERY

## 2019-08-26 RX ORDER — CLONIDINE 0.2 MG/24H
1 PATCH, EXTENDED RELEASE TRANSDERMAL
COMMUNITY
Start: 2019-08-21 | End: 2019-08-26

## 2019-08-26 RX ORDER — GABAPENTIN 300 MG/1
300 CAPSULE ORAL
COMMUNITY
End: 2019-08-26

## 2019-08-26 RX ORDER — CLONIDINE HYDROCHLORIDE 0.1 MG/1
TABLET ORAL
Refills: 3 | COMMUNITY
Start: 2019-07-24 | End: 2021-11-15

## 2019-08-26 RX ORDER — CLONIDINE 0.2 MG/24H
PATCH, EXTENDED RELEASE TRANSDERMAL
COMMUNITY
Start: 2019-08-23 | End: 2019-08-26

## 2019-08-26 RX ORDER — LEVOTHYROXINE SODIUM 112 UG/1
112 TABLET ORAL
COMMUNITY
Start: 2017-03-02 | End: 2019-08-26

## 2019-08-26 RX ORDER — LORATADINE 10 MG/1
10 TABLET ORAL
COMMUNITY
End: 2019-08-26

## 2019-08-26 NOTE — PROGRESS NOTES
HISTORY OF PRESENT ILLNESS:    Ms. Caitlin Amador  is status post right lumpectomy and IORT on 7/27/2018. This was following an ultrasound guided breast biopsy  on the right which revealed a 0.8  cm low grade invasive ductal carcinoma. It is ER positive at 91%. It is ID positive at 5%. It is HER-2 negative by IHC and FISH. Ki-67 is low at 9%. MRI showed no additional lesions in the right breast. There is an area of somewhat suspicious enhancement in the left breast at approximately the 12 o'clock position. Ultrasound of this area did not demonstrate anything worrisome today. We will get bilateral  mammograms and repeat ultrasound in 6 months and then repeat her MRI in one year. EXAMINATION:  JOÃO DIGITAL DIAGNOSTIC W OR WO CAD BILATERAL  1/30/2019   2:12 PM   HISTORY: Right breast cancer, postlumpectomy and radiation therapy. COMPARISON: 4/25/2018 and 4/24/2017 mammograms   TECHNIQUE:    2-D and 3-D bilateral digital mammograms were obtained as well as spot   compression imaging of the scar in the superior posterior left breast   in the true lateral projection. The mammograms were evaluated using computer-aided detection. FINDINGS:    Breast density composition: Category C, moderate amount of dense   glandular tissue. The architecture distortion associated with the scar is identified,   difficult to fully evaluate due to far posterior position. There are no developing densities or dominant masses. There are no   suspicious calcifications. There are no additional areas of suspicious   architectural distortion.           Ultrasound evaluation left breast was performed 1/30/2019   FINDINGS:    As described on the previous ultrasound, no definite ultrasound   apparent abnormality observed in the region of MR concern. The breast was evaluated from the 9:00 to the 3:00 position. Small   cysts were appreciated in the 10:00 position. There are no solid   masses.    There are no suspicious ultrasound findings Postoperative changes of the right chest wall with susceptibility   artifact, which limits evaluation. No axillary or internal mammary adenopathy. The visualized portion of the chest and abdomen are limited in   evaluation, grossly within normal limits. .       Impression   1. No MRI evidence of malignancy in the right or left breast.   BI-RADS Final Assessment Category 2: Benign     She is recently been diagnosed with central overheating at the [facility] clinic. She has been started on clonidine which apparently is helping. PHYSICAL EXAM:  The right lumpectomy incision is looking good. There are fibrocystic changes throughout both breasts and appropriate postoperative scarring on the right. There are no dominant masses, no skin or nipple changes, and no axillary adenopathy. There is nothing suspicious for new or recurrent malignancy. IMPRESSION:  Doing well s/p right lumpectomy with IORT     PLAN:   She will call if anything changes. I will see her back in 6 months with bilateral mammograms     I spent over 50% of this visit counseling patient. 15 minutes of face to face time with patient.

## 2019-08-28 DIAGNOSIS — C50.411 MALIGNANT NEOPLASM OF UPPER-OUTER QUADRANT OF RIGHT BREAST IN FEMALE, ESTROGEN RECEPTOR POSITIVE (HCC): ICD-10-CM

## 2019-08-28 DIAGNOSIS — Z17.0 MALIGNANT NEOPLASM OF UPPER-OUTER QUADRANT OF RIGHT BREAST IN FEMALE, ESTROGEN RECEPTOR POSITIVE (HCC): ICD-10-CM

## 2019-08-28 DIAGNOSIS — Z98.890 STATUS POST RIGHT BREAST LUMPECTOMY: Primary | ICD-10-CM

## 2019-08-28 RX ORDER — TAMOXIFEN CITRATE 20 MG/1
TABLET ORAL
Qty: 30 TABLET | Refills: 2 | Status: SHIPPED | OUTPATIENT
Start: 2019-08-28 | End: 2019-11-21 | Stop reason: SDUPTHER

## 2019-11-22 ENCOUNTER — OFFICE VISIT (OUTPATIENT)
Dept: HEMATOLOGY | Age: 69
End: 2019-11-22
Payer: MEDICARE

## 2019-11-22 ENCOUNTER — HOSPITAL ENCOUNTER (OUTPATIENT)
Dept: INFUSION THERAPY | Age: 69
Discharge: HOME | End: 2019-11-22
Payer: MEDICARE

## 2019-11-22 VITALS
HEART RATE: 71 BPM | BODY MASS INDEX: 34.97 KG/M2 | HEIGHT: 65 IN | OXYGEN SATURATION: 96 % | WEIGHT: 209.9 LBS | DIASTOLIC BLOOD PRESSURE: 82 MMHG | SYSTOLIC BLOOD PRESSURE: 130 MMHG

## 2019-11-22 DIAGNOSIS — Z08 ENCOUNTER FOR FOLLOW-UP SURVEILLANCE OF BREAST CANCER: ICD-10-CM

## 2019-11-22 DIAGNOSIS — T38.6X5D ADVERSE EFFECT OF TAMOXIFEN, SUBSEQUENT ENCOUNTER: ICD-10-CM

## 2019-11-22 DIAGNOSIS — T45.1X5A HOT FLASHES DUE TO TAMOXIFEN: ICD-10-CM

## 2019-11-22 DIAGNOSIS — R23.2 HOT FLASHES DUE TO TAMOXIFEN: ICD-10-CM

## 2019-11-22 DIAGNOSIS — C50.411 MALIGNANT NEOPLASM OF UPPER-OUTER QUADRANT OF RIGHT FEMALE BREAST, UNSPECIFIED ESTROGEN RECEPTOR STATUS (HCC): Primary | ICD-10-CM

## 2019-11-22 DIAGNOSIS — Z71.89 CARE PLAN DISCUSSED WITH PATIENT: ICD-10-CM

## 2019-11-22 DIAGNOSIS — Z85.3 ENCOUNTER FOR FOLLOW-UP SURVEILLANCE OF BREAST CANCER: ICD-10-CM

## 2019-11-22 PROCEDURE — 85025 COMPLETE CBC W/AUTO DIFF WBC: CPT

## 2019-11-22 PROCEDURE — 4040F PNEUMOC VAC/ADMIN/RCVD: CPT | Performed by: INTERNAL MEDICINE

## 2019-11-22 PROCEDURE — 3017F COLORECTAL CA SCREEN DOC REV: CPT | Performed by: INTERNAL MEDICINE

## 2019-11-22 PROCEDURE — 99214 OFFICE O/P EST MOD 30 MIN: CPT | Performed by: INTERNAL MEDICINE

## 2019-11-22 PROCEDURE — 1123F ACP DISCUSS/DSCN MKR DOCD: CPT | Performed by: INTERNAL MEDICINE

## 2019-11-22 PROCEDURE — 1090F PRES/ABSN URINE INCON ASSESS: CPT | Performed by: INTERNAL MEDICINE

## 2019-11-22 PROCEDURE — 1036F TOBACCO NON-USER: CPT | Performed by: INTERNAL MEDICINE

## 2019-11-22 PROCEDURE — 99211 OFF/OP EST MAY X REQ PHY/QHP: CPT

## 2019-11-22 PROCEDURE — G8484 FLU IMMUNIZE NO ADMIN: HCPCS | Performed by: INTERNAL MEDICINE

## 2019-11-22 PROCEDURE — G8427 DOCREV CUR MEDS BY ELIG CLIN: HCPCS | Performed by: INTERNAL MEDICINE

## 2019-11-22 PROCEDURE — G8400 PT W/DXA NO RESULTS DOC: HCPCS | Performed by: INTERNAL MEDICINE

## 2019-11-22 PROCEDURE — G8417 CALC BMI ABV UP PARAM F/U: HCPCS | Performed by: INTERNAL MEDICINE

## 2019-11-22 RX ORDER — OXYBUTYNIN CHLORIDE 5 MG/1
5 TABLET ORAL 2 TIMES DAILY
Qty: 60 TABLET | Refills: 1 | Status: SHIPPED | OUTPATIENT
Start: 2019-11-22 | End: 2020-01-24 | Stop reason: SDUPTHER

## 2019-11-22 RX ORDER — TAMOXIFEN CITRATE 20 MG/1
TABLET ORAL
Qty: 30 TABLET | Refills: 2 | Status: SHIPPED | OUTPATIENT
Start: 2019-11-22 | End: 2019-11-22 | Stop reason: SDUPTHER

## 2019-11-22 RX ORDER — TAMOXIFEN CITRATE 20 MG/1
20 TABLET ORAL DAILY
Qty: 90 TABLET | Refills: 2 | Status: SHIPPED | OUTPATIENT
Start: 2019-11-22 | End: 2020-11-11

## 2019-11-22 RX ORDER — VENLAFAXINE HYDROCHLORIDE 37.5 MG/1
37.5 CAPSULE, EXTENDED RELEASE ORAL DAILY
Qty: 30 CAPSULE | Refills: 2 | Status: SHIPPED | OUTPATIENT
Start: 2019-11-22 | End: 2020-03-13 | Stop reason: SDUPTHER

## 2020-01-24 RX ORDER — OXYBUTYNIN CHLORIDE 5 MG/1
5 TABLET ORAL 2 TIMES DAILY
Qty: 60 TABLET | Refills: 2 | Status: SHIPPED | OUTPATIENT
Start: 2020-01-24 | End: 2020-04-28

## 2020-01-31 ENCOUNTER — HOSPITAL ENCOUNTER (OUTPATIENT)
Dept: WOMENS IMAGING | Age: 70
Discharge: HOME | End: 2020-01-31
Payer: MEDICARE

## 2020-01-31 PROCEDURE — G0279 TOMOSYNTHESIS, MAMMO: HCPCS

## 2020-01-31 NOTE — PROGRESS NOTES
evaluation. No axillary or internal mammary adenopathy. The visualized portion of the chest and abdomen are limited in   evaluation, grossly within normal limits. .       Impression   1. No MRI evidence of malignancy in the right or left breast.   BI-RADS Final Assessment Category 2: Benign     She is recently been diagnosed with central [facility]. She has been started on clonidine which apparently is helping. 1/30/2020-Bilateral Mammogram  FINDINGS:    Breast density, category C, moderate amount of dense breast   parenchyma. Postsurgical changes identified in the upper outer quadrant the right   breasts particularly on the lateral view with surgical clips in the   axilla. The scar appears stable. There are no suspicious calcifications or dominant masses. There are no additional areas of architectural distortion. There are no developing densities or malignant features. Yearly mammographic follow-up is recommended or earlier if clinically   indicated.       Impression   1. Changes from right breast cancer, lumpectomy and radiation therapy. 2. No malignant features. 3. ACR BI-RADS Category 2, benign findings. PHYSICAL EXAM:  The right lumpectomy incision is looking good. There are fibrocystic changes throughout both breasts and appropriate postoperative scarring on the right. There are no dominant masses, no skin or nipple changes, and no axillary adenopathy. There is nothing suspicious for new or recurrent malignancy. IMPRESSION:  Doing well s/p right lumpectomy with IORT     PLAN: I  will see her back in 6 months for a physical exam. She will call if anything changes. I spent over 50% of this visit counseling patient. 15 minutes of face to face time with patient.

## 2020-02-03 ENCOUNTER — OFFICE VISIT (OUTPATIENT)
Dept: SURGERY | Age: 70
End: 2020-02-03
Payer: MEDICARE

## 2020-02-03 VITALS — DIASTOLIC BLOOD PRESSURE: 70 MMHG | HEART RATE: 80 BPM | SYSTOLIC BLOOD PRESSURE: 136 MMHG

## 2020-02-03 PROCEDURE — G8484 FLU IMMUNIZE NO ADMIN: HCPCS | Performed by: SURGERY

## 2020-02-03 PROCEDURE — G8400 PT W/DXA NO RESULTS DOC: HCPCS | Performed by: SURGERY

## 2020-02-03 PROCEDURE — 4040F PNEUMOC VAC/ADMIN/RCVD: CPT | Performed by: SURGERY

## 2020-02-03 PROCEDURE — G8417 CALC BMI ABV UP PARAM F/U: HCPCS | Performed by: SURGERY

## 2020-02-03 PROCEDURE — 1090F PRES/ABSN URINE INCON ASSESS: CPT | Performed by: SURGERY

## 2020-02-03 PROCEDURE — G8428 CUR MEDS NOT DOCUMENT: HCPCS | Performed by: SURGERY

## 2020-02-03 PROCEDURE — 1036F TOBACCO NON-USER: CPT | Performed by: SURGERY

## 2020-02-03 PROCEDURE — 99213 OFFICE O/P EST LOW 20 MIN: CPT | Performed by: SURGERY

## 2020-02-03 PROCEDURE — 1123F ACP DISCUSS/DSCN MKR DOCD: CPT | Performed by: SURGERY

## 2020-02-03 PROCEDURE — 3017F COLORECTAL CA SCREEN DOC REV: CPT | Performed by: SURGERY

## 2020-02-10 ENCOUNTER — OFFICE VISIT (OUTPATIENT)
Dept: GASTROENTEROLOGY | Facility: CLINIC | Age: 70
End: 2020-02-10

## 2020-02-10 VITALS
DIASTOLIC BLOOD PRESSURE: 80 MMHG | BODY MASS INDEX: 35.49 KG/M2 | WEIGHT: 213 LBS | OXYGEN SATURATION: 98 % | HEART RATE: 67 BPM | SYSTOLIC BLOOD PRESSURE: 128 MMHG | HEIGHT: 65 IN | TEMPERATURE: 97.9 F

## 2020-02-10 DIAGNOSIS — R13.10 DYSPHAGIA, UNSPECIFIED TYPE: Primary | ICD-10-CM

## 2020-02-10 DIAGNOSIS — R12 HEARTBURN: ICD-10-CM

## 2020-02-10 DIAGNOSIS — Z83.71 FAMILY HX COLONIC POLYPS: ICD-10-CM

## 2020-02-10 PROBLEM — Z83.719 FAMILY HX COLONIC POLYPS: Status: ACTIVE | Noted: 2020-02-10

## 2020-02-10 PROCEDURE — 99214 OFFICE O/P EST MOD 30 MIN: CPT | Performed by: NURSE PRACTITIONER

## 2020-02-10 RX ORDER — OXYBUTYNIN CHLORIDE 5 MG/1
5 TABLET ORAL 2 TIMES DAILY
COMMUNITY
End: 2021-01-11 | Stop reason: HOSPADM

## 2020-02-10 RX ORDER — CLONIDINE HYDROCHLORIDE 0.1 MG/1
0.1 TABLET ORAL DAILY
COMMUNITY
End: 2020-12-11

## 2020-02-10 NOTE — H&P (VIEW-ONLY)
"[facility] GASTROENTEROLOGY - OFFICE NOTE    2/10/2020    Joann Finnegan   1950    Primary Physician: Janak Sherwood APRN    Chief Complaint   Patient presents with   • Difficulty Swallowing   heartburn.       HISTORY OF PRESENT ILLNESS:    Joann Finnegan is a 69 y.o. female presents with dysphagia x 2 months. Points to the upper chest area where solid foods will hang. Drinking water will help. No odynophagia.       Heartburn  X 2 months. tums prn helps. No other meds used for heartburn. Heartburn occurs 2-3 times per week.  Sweets such as chocolate will make worse. Has gained weight. No regurgitation. No hematemesis. No n/v.       Breast cancer 2018 right, had \" partial mastectomy\" with intraoperative radiation therapy.       Last EGD December 2011 noting incomplete esophageal ring with dilation, very minimal distal esophagitis.  Last colonoscopy December 2011 normal with recommended recall 10 years.  Father had colon polyps. No family history of colon cancer.     Past Medical History:   Diagnosis Date   • Abnormal weight gain    • Arthritis    • Breast cancer (CMS/HCC)     right.    • Fatigue    • H/O melanoma excision     CLARKS  LEVEL III      BACK OF UPPER LEFT ARM   • Hypertension    • Hypothyroidism    • Melanoma (CMS/HCC)    • Restless leg syndrome    • Shortness of breath        Past Surgical History:   Procedure Laterality Date   • APPENDECTOMY     • BREAST BIOPSY Right    • BREAST BIOPSY     • BREAST LUMPECTOMY      right.    • BROW LIFT     • COLONOSCOPY     • ENDOSCOPY AND COLONOSCOPY  12/22/2011    very minimal distal esophagitis, incomplete esophagus ring dilated   • LUMBAR SPINAL TUMOR REMOVAL      Spinal Cyst removed from Spinal Canal   • MASTECTOMY, PARTIAL     • PARTIAL HIP ARTHROPLASTY Left    • SKIN CANCER EXCISION      Melanoma    • SQUAMOUS CELL CARCINOMA EXCISION     • TEMPOROMANDIBULAR JOINT ARTHROPLASTY     • TOTAL ABDOMINAL HYSTERECTOMY WITH SALPINGO OOPHORECTOMY " Bilateral        Outpatient Medications Marked as Taking for the 2/10/20 encounter (Office Visit) with Vandana Marroquin APRN   Medication Sig Dispense Refill   • celecoxib (CeleBREX) 200 MG capsule TAKE 1 CAPSULE BY MOUTH ONCE DAILY  6   • Cholecalciferol (VITAMIN D3) 5000 UNITS tablet Take  by mouth.     • cloNIDine (CATAPRES) 0.1 MG tablet Take 0.1 mg by mouth Daily.     • gabapentin (NEURONTIN) 300 MG capsule Take  by mouth.     • oxybutynin (DITROPAN) 5 MG tablet Take  by mouth 2 (Two) Times a Day.     • SYNTHROID 112 MCG tablet TAKE 1 TABLET BY MOUTH EVERY MORNING  11       Allergies   Allergen Reactions   • Requip [Ropinirole Hcl] Nausea And Vomiting       Social History     Socioeconomic History   • Marital status: Single     Spouse name: Not on file   • Number of children: Not on file   • Years of education: Not on file   • Highest education level: Not on file   Tobacco Use   • Smoking status: Former Smoker   • Smokeless tobacco: Never Used   Substance and Sexual Activity   • Alcohol use: Yes     Comment: rare   • Drug use: No   • Sexual activity: Yes     Partners: Male     Birth control/protection: Surgical       Family History   Problem Relation Age of Onset   • Arthritis Mother    • Seizures Mother    • Diabetes Father    • Skin cancer Father    • Heart attack Father    • Heart disease Father    • Colon polyps Father    • Arthritis Sister    • Ulcers Brother    • No Known Problems Maternal Grandmother    • No Known Problems Maternal Grandfather    • No Known Problems Paternal Grandmother    • No Known Problems Paternal Grandfather    • Ulcers Sister    • Colon cancer Neg Hx        Review of Systems   Constitutional: Negative for appetite change, chills, fatigue, fever and unexpected weight change.   HENT: Positive for trouble swallowing. Negative for sore throat.    Eyes: Negative for visual disturbance.   Respiratory: Negative for cough, shortness of breath and wheezing.    Cardiovascular: Negative for  "chest pain and palpitations.   Gastrointestinal: Negative for abdominal distention, abdominal pain, anal bleeding, blood in stool, constipation, diarrhea, nausea and vomiting.        As mentioned in hpi   Genitourinary: Negative for difficulty urinating and hematuria.   Musculoskeletal: Negative for arthralgias and back pain.   Skin: Negative for color change and rash.   Neurological: Negative for dizziness, seizures, syncope, light-headedness and headaches.   Hematological: Negative for adenopathy.   Psychiatric/Behavioral: Negative for confusion. The patient is not nervous/anxious.         Vitals:    02/10/20 1431   BP: 128/80   Pulse: 67   Temp: 97.9 °F (36.6 °C)   SpO2: 98%   Weight: 96.6 kg (213 lb)   Height: 165.1 cm (65\")      Body mass index is 35.45 kg/m².    Physical Exam   Constitutional: She appears well-developed and well-nourished. No distress.   HENT:   Head: Normocephalic and atraumatic.   Eyes: EOM are normal. No scleral icterus.   Neck: Neck supple. No JVD present.   Cardiovascular: Normal rate, regular rhythm and normal heart sounds.   Pulmonary/Chest: Effort normal and breath sounds normal.   Abdominal: Soft. Bowel sounds are normal. She exhibits no distension. There is no tenderness.   Musculoskeletal: Normal range of motion. She exhibits no deformity.   Neurological: She is alert.   Skin: Skin is warm and dry. No rash noted.   Psychiatric: She has a normal mood and affect. Her behavior is normal.   Vitals reviewed.              ASSESSMENT AND PLAN    Assessment/Plan     Joann was seen today for difficulty swallowing.    Diagnoses and all orders for this visit:    Dysphagia, unspecified type  -     Case Request; Standing  -     Case Request    Heartburn  -     Case Request; Standing  -     Case Request    Family hx colonic polyps  -     Case Request; Standing  -     Case Request    Other orders  -     Follow Anesthesia Guidelines / Protocol; Future  -     Obtain Informed Consent; Future  -    "  Discontinue: polyethylene glycol (GOLYTELY) 236 g solution; Take 4,000 mL by mouth 1 (One) Time for 1 dose. Take as directed per instruction sheet.  -     Sod Picosulfate-Mag Ox-Cit Acd (CLENPIQ) 10-3.5-12 MG-GM -GM/160ML solution; Take 2 bottles by mouth 1 (One) Time for 1 dose. Take as directed      In regards to dysphagia, differential diagnosis discussed.  Recommend cut food small pieces and chew well.  I do recommend EGD for further evaluation and she is agreeable.    In regards to heartburn, recommend strict antireflux precautions.  She is going to start on over-the-counter Prilosec once daily.  I discussed non pharmaceutical treatment of gerd.  This includes gradually losing weight to achieve ideal body wt., elevation of the head of bed by 4-6 inches, nothing to eat or drink 3 hours prior to lying down, avoiding tight clothing, stress reduction, tobacco cessation, reduction of alcohol intake, and dietary restrictions (avoiding caffeine, coffee, fatty foods, mints, chocolate, spicy foods and tomato based sauces as much as possible).        In regards to family history of colon polyp ( father), plan for repeat colonoscopy.      Hold celebrex 5 days prior to procedure.       ESOPHAGOGASTRODUODENOSCOPY WITH ANESTHESIA (N/A), COLONOSCOPY WITH ANESTHESIA (N/A)   Risk, benefits, and alternatives of endoscopy were explained in full.  They understand that there is a risk of bleeding, perforation, and infection.  The risk of perforation goes up with esophageal dilation.  Other options to evaluate UGI complaints could involve barium swallow or UGI series, but these would be diagnostic tests only.  Patient was given time to ask questions.  I answered them to their satisfaction and they are agreeable to proceedingAll risks, benefits, alternatives, and indications of colonoscopy procedure have been discussed with the patient. Risks to include perforation of the colon requiring possible surgery or colostomy, risk of  bleeding from biopsies or removal of colon tissue, possibility of missing a colon polyp or cancer, or adverse drug reaction.  Benefits to include the diagnosis and management of disease of the colon and rectum. Alternatives to include barium enema, radiographic evaluation, lab testing or no intervention. Pt verbalizes understanding and agrees.              Body mass index is 35.45 kg/m².    Patient's Body mass index is 35.45 kg/m². BMI is above normal parameters. Recommendations include: no follow up , recommend weight loss .           NITO Louie    EMR Dragon/transcription disclaimer:  Much of this encounter note is electronic transcription/translation of spoken language to printed text.  The electronic translation of spoken language may be erroneous, or at times, nonsensical words or phrases may be inadvertently transcribed.  Although I have reviewed the note for such errors, some may still exist.

## 2020-02-10 NOTE — PROGRESS NOTES
"[facility] GASTROENTEROLOGY - OFFICE NOTE    2/10/2020    Joann Finnegan   1950    Primary Physician: Janak Sherwood APRN    Chief Complaint   Patient presents with   • Difficulty Swallowing   heartburn.       HISTORY OF PRESENT ILLNESS:    Joann Finnegan is a 69 y.o. female presents with dysphagia x 2 months. Points to the upper chest area where solid foods will hang. Drinking water will help. No odynophagia.       Heartburn  X 2 months. tums prn helps. No other meds used for heartburn. Heartburn occurs 2-3 times per week.  Sweets such as chocolate will make worse. Has gained weight. No regurgitation. No hematemesis. No n/v.       Breast cancer 2018 right, had \" partial mastectomy\" with intraoperative radiation therapy.       Last EGD December 2011 noting incomplete esophageal ring with dilation, very minimal distal esophagitis.  Last colonoscopy December 2011 normal with recommended recall 10 years.  Father had colon polyps. No family history of colon cancer.     Past Medical History:   Diagnosis Date   • Abnormal weight gain    • Arthritis    • Breast cancer (CMS/HCC)     right.    • Fatigue    • H/O melanoma excision     CLARKS  LEVEL III      BACK OF UPPER LEFT ARM   • Hypertension    • Hypothyroidism    • Melanoma (CMS/HCC)    • Restless leg syndrome    • Shortness of breath        Past Surgical History:   Procedure Laterality Date   • APPENDECTOMY     • BREAST BIOPSY Right    • BREAST BIOPSY     • BREAST LUMPECTOMY      right.    • BROW LIFT     • COLONOSCOPY     • ENDOSCOPY AND COLONOSCOPY  12/22/2011    very minimal distal esophagitis, incomplete esophagus ring dilated   • LUMBAR SPINAL TUMOR REMOVAL      Spinal Cyst removed from Spinal Canal   • MASTECTOMY, PARTIAL     • PARTIAL HIP ARTHROPLASTY Left    • SKIN CANCER EXCISION      Melanoma    • SQUAMOUS CELL CARCINOMA EXCISION     • TEMPOROMANDIBULAR JOINT ARTHROPLASTY     • TOTAL ABDOMINAL HYSTERECTOMY WITH SALPINGO OOPHORECTOMY " Bilateral        Outpatient Medications Marked as Taking for the 2/10/20 encounter (Office Visit) with Vandana Marroquin APRN   Medication Sig Dispense Refill   • celecoxib (CeleBREX) 200 MG capsule TAKE 1 CAPSULE BY MOUTH ONCE DAILY  6   • Cholecalciferol (VITAMIN D3) 5000 UNITS tablet Take  by mouth.     • cloNIDine (CATAPRES) 0.1 MG tablet Take 0.1 mg by mouth Daily.     • gabapentin (NEURONTIN) 300 MG capsule Take  by mouth.     • oxybutynin (DITROPAN) 5 MG tablet Take  by mouth 2 (Two) Times a Day.     • SYNTHROID 112 MCG tablet TAKE 1 TABLET BY MOUTH EVERY MORNING  11       Allergies   Allergen Reactions   • Requip [Ropinirole Hcl] Nausea And Vomiting       Social History     Socioeconomic History   • Marital status: Single     Spouse name: Not on file   • Number of children: Not on file   • Years of education: Not on file   • Highest education level: Not on file   Tobacco Use   • Smoking status: Former Smoker   • Smokeless tobacco: Never Used   Substance and Sexual Activity   • Alcohol use: Yes     Comment: rare   • Drug use: No   • Sexual activity: Yes     Partners: Male     Birth control/protection: Surgical       Family History   Problem Relation Age of Onset   • Arthritis Mother    • Seizures Mother    • Diabetes Father    • Skin cancer Father    • Heart attack Father    • Heart disease Father    • Colon polyps Father    • Arthritis Sister    • Ulcers Brother    • No Known Problems Maternal Grandmother    • No Known Problems Maternal Grandfather    • No Known Problems Paternal Grandmother    • No Known Problems Paternal Grandfather    • Ulcers Sister    • Colon cancer Neg Hx        Review of Systems   Constitutional: Negative for appetite change, chills, fatigue, fever and unexpected weight change.   HENT: Positive for trouble swallowing. Negative for sore throat.    Eyes: Negative for visual disturbance.   Respiratory: Negative for cough, shortness of breath and wheezing.    Cardiovascular: Negative for  "chest pain and palpitations.   Gastrointestinal: Negative for abdominal distention, abdominal pain, anal bleeding, blood in stool, constipation, diarrhea, nausea and vomiting.        As mentioned in hpi   Genitourinary: Negative for difficulty urinating and hematuria.   Musculoskeletal: Negative for arthralgias and back pain.   Skin: Negative for color change and rash.   Neurological: Negative for dizziness, seizures, syncope, light-headedness and headaches.   Hematological: Negative for adenopathy.   Psychiatric/Behavioral: Negative for confusion. The patient is not nervous/anxious.         Vitals:    02/10/20 1431   BP: 128/80   Pulse: 67   Temp: 97.9 °F (36.6 °C)   SpO2: 98%   Weight: 96.6 kg (213 lb)   Height: 165.1 cm (65\")      Body mass index is 35.45 kg/m².    Physical Exam   Constitutional: She appears well-developed and well-nourished. No distress.   HENT:   Head: Normocephalic and atraumatic.   Eyes: EOM are normal. No scleral icterus.   Neck: Neck supple. No JVD present.   Cardiovascular: Normal rate, regular rhythm and normal heart sounds.   Pulmonary/Chest: Effort normal and breath sounds normal.   Abdominal: Soft. Bowel sounds are normal. She exhibits no distension. There is no tenderness.   Musculoskeletal: Normal range of motion. She exhibits no deformity.   Neurological: She is alert.   Skin: Skin is warm and dry. No rash noted.   Psychiatric: She has a normal mood and affect. Her behavior is normal.   Vitals reviewed.              ASSESSMENT AND PLAN    Assessment/Plan     Joann was seen today for difficulty swallowing.    Diagnoses and all orders for this visit:    Dysphagia, unspecified type  -     Case Request; Standing  -     Case Request    Heartburn  -     Case Request; Standing  -     Case Request    Family hx colonic polyps  -     Case Request; Standing  -     Case Request    Other orders  -     Follow Anesthesia Guidelines / Protocol; Future  -     Obtain Informed Consent; Future  -    "  Discontinue: polyethylene glycol (GOLYTELY) 236 g solution; Take 4,000 mL by mouth 1 (One) Time for 1 dose. Take as directed per instruction sheet.  -     Sod Picosulfate-Mag Ox-Cit Acd (CLENPIQ) 10-3.5-12 MG-GM -GM/160ML solution; Take 2 bottles by mouth 1 (One) Time for 1 dose. Take as directed      In regards to dysphagia, differential diagnosis discussed.  Recommend cut food small pieces and chew well.  I do recommend EGD for further evaluation and she is agreeable.    In regards to heartburn, recommend strict antireflux precautions.  She is going to start on over-the-counter Prilosec once daily.  I discussed non pharmaceutical treatment of gerd.  This includes gradually losing weight to achieve ideal body wt., elevation of the head of bed by 4-6 inches, nothing to eat or drink 3 hours prior to lying down, avoiding tight clothing, stress reduction, tobacco cessation, reduction of alcohol intake, and dietary restrictions (avoiding caffeine, coffee, fatty foods, mints, chocolate, spicy foods and tomato based sauces as much as possible).        In regards to family history of colon polyp ( father), plan for repeat colonoscopy.      Hold celebrex 5 days prior to procedure.       ESOPHAGOGASTRODUODENOSCOPY WITH ANESTHESIA (N/A), COLONOSCOPY WITH ANESTHESIA (N/A)   Risk, benefits, and alternatives of endoscopy were explained in full.  They understand that there is a risk of bleeding, perforation, and infection.  The risk of perforation goes up with esophageal dilation.  Other options to evaluate UGI complaints could involve barium swallow or UGI series, but these would be diagnostic tests only.  Patient was given time to ask questions.  I answered them to their satisfaction and they are agreeable to proceedingAll risks, benefits, alternatives, and indications of colonoscopy procedure have been discussed with the patient. Risks to include perforation of the colon requiring possible surgery or colostomy, risk of  bleeding from biopsies or removal of colon tissue, possibility of missing a colon polyp or cancer, or adverse drug reaction.  Benefits to include the diagnosis and management of disease of the colon and rectum. Alternatives to include barium enema, radiographic evaluation, lab testing or no intervention. Pt verbalizes understanding and agrees.              Body mass index is 35.45 kg/m².    Patient's Body mass index is 35.45 kg/m². BMI is above normal parameters. Recommendations include: no follow up , recommend weight loss .           NITO Louie    EMR Dragon/transcription disclaimer:  Much of this encounter note is electronic transcription/translation of spoken language to printed text.  The electronic translation of spoken language may be erroneous, or at times, nonsensical words or phrases may be inadvertently transcribed.  Although I have reviewed the note for such errors, some may still exist.

## 2020-02-14 ENCOUNTER — ANESTHESIA EVENT (OUTPATIENT)
Dept: GASTROENTEROLOGY | Facility: HOSPITAL | Age: 70
End: 2020-02-14

## 2020-02-14 ENCOUNTER — HOSPITAL ENCOUNTER (OUTPATIENT)
Facility: HOSPITAL | Age: 70
Setting detail: HOSPITAL OUTPATIENT SURGERY
Discharge: HOME | End: 2020-02-14
Attending: INTERNAL MEDICINE | Admitting: INTERNAL MEDICINE

## 2020-02-14 ENCOUNTER — ANESTHESIA (OUTPATIENT)
Dept: GASTROENTEROLOGY | Facility: HOSPITAL | Age: 70
End: 2020-02-14

## 2020-02-14 VITALS
TEMPERATURE: 97.2 F | HEART RATE: 73 BPM | WEIGHT: 207 LBS | OXYGEN SATURATION: 95 % | BODY MASS INDEX: 34.49 KG/M2 | DIASTOLIC BLOOD PRESSURE: 80 MMHG | RESPIRATION RATE: 18 BRPM | SYSTOLIC BLOOD PRESSURE: 134 MMHG | HEIGHT: 65 IN

## 2020-02-14 DIAGNOSIS — R12 HEARTBURN: ICD-10-CM

## 2020-02-14 DIAGNOSIS — R13.10 DYSPHAGIA, UNSPECIFIED TYPE: ICD-10-CM

## 2020-02-14 DIAGNOSIS — Z83.71 FAMILY HX COLONIC POLYPS: ICD-10-CM

## 2020-02-14 PROCEDURE — 45385 COLONOSCOPY W/LESION REMOVAL: CPT | Performed by: INTERNAL MEDICINE

## 2020-02-14 PROCEDURE — 88305 TISSUE EXAM BY PATHOLOGIST: CPT | Performed by: INTERNAL MEDICINE

## 2020-02-14 PROCEDURE — 43248 EGD GUIDE WIRE INSERTION: CPT | Performed by: INTERNAL MEDICINE

## 2020-02-14 PROCEDURE — 25010000002 PROPOFOL 10 MG/ML EMULSION: Performed by: NURSE ANESTHETIST, CERTIFIED REGISTERED

## 2020-02-14 RX ORDER — ONDANSETRON 2 MG/ML
4 INJECTION INTRAMUSCULAR; INTRAVENOUS ONCE AS NEEDED
Status: DISCONTINUED | OUTPATIENT
Start: 2020-02-14 | End: 2020-02-14 | Stop reason: HOSPADM

## 2020-02-14 RX ORDER — PROPOFOL 10 MG/ML
VIAL (ML) INTRAVENOUS AS NEEDED
Status: DISCONTINUED | OUTPATIENT
Start: 2020-02-14 | End: 2020-02-14 | Stop reason: SURG

## 2020-02-14 RX ORDER — SODIUM CHLORIDE 9 MG/ML
500 INJECTION, SOLUTION INTRAVENOUS CONTINUOUS PRN
Status: DISCONTINUED | OUTPATIENT
Start: 2020-02-14 | End: 2020-02-14 | Stop reason: HOSPADM

## 2020-02-14 RX ORDER — SODIUM CHLORIDE 0.9 % (FLUSH) 0.9 %
10 SYRINGE (ML) INJECTION AS NEEDED
Status: DISCONTINUED | OUTPATIENT
Start: 2020-02-14 | End: 2020-02-14 | Stop reason: HOSPADM

## 2020-02-14 RX ADMIN — SODIUM CHLORIDE 500 ML: 9 INJECTION, SOLUTION INTRAVENOUS at 09:04

## 2020-02-14 RX ADMIN — PROPOFOL 500 MG: 10 INJECTION, EMULSION INTRAVENOUS at 09:34

## 2020-02-14 RX ADMIN — LIDOCAINE HYDROCHLORIDE 100 MG: 20 INJECTION, SOLUTION INTRAVENOUS at 09:34

## 2020-02-14 NOTE — ANESTHESIA POSTPROCEDURE EVALUATION
Patient: Joann Finnegan    Procedure Summary     Date:  02/14/20 Room / Location:  [facility] ENDOSCOPY 5 / [facility]    Anesthesia Start:  0931 Anesthesia Stop:  1005    Procedures:       ESOPHAGOGASTRODUODENOSCOPY WITH ANESTHESIA (N/A )      COLONOSCOPY WITH ANESTHESIA (N/A ) Diagnosis:       Dysphagia, unspecified type      Heartburn      Family hx colonic polyps      (Dysphagia, unspecified type [R13.10])      (Heartburn [R12])      (Family hx colonic polyps [Z83.71])    Surgeon:  Donovan Hernandez MD Provider:  Carlos Etienne CRNA    Anesthesia Type:  MAC ASA Status:  2          Anesthesia Type: MAC    Vitals  Vitals Value Taken Time   BP     Temp     Pulse 79 2/14/2020 10:05 AM   Resp     SpO2     Vitals shown include unvalidated device data.        Post Anesthesia Care and Evaluation    Patient location during evaluation: PHASE II  Patient participation: complete - patient participated  Level of consciousness: awake  Pain management: adequate  Airway patency: patent  Anesthetic complications: No anesthetic complications  Respiratory status: acceptable  Hydration status: acceptable

## 2020-02-14 NOTE — ANESTHESIA PREPROCEDURE EVALUATION
Anesthesia Evaluation     Patient summary reviewed   no history of anesthetic complications:  NPO Solid Status: > 8 hours  NPO Liquid Status: > 2 hours           Airway   Mallampati: I  TM distance: >3 FB  Neck ROM: full  No difficulty expected  Dental    (+) upper dentures and lower dentures    Pulmonary    (+) sleep apnea (prior cpap use, none in 6 years ),   (-) COPD, asthma  Cardiovascular   Exercise tolerance: good (4-7 METS)    (+) hypertension,   (-) past MI, CAD, cardiac stents      Neuro/Psych  (-) seizures, TIA, CVA  GI/Hepatic/Renal/Endo    (+) obesity,   thyroid problem hypothyroidism  (-) liver disease, no renal disease, diabetes    Musculoskeletal     Abdominal    Substance History      OB/GYN          Other                        Anesthesia Plan    ASA 2     MAC     intravenous induction     Anesthetic plan, all risks, benefits, and alternatives have been provided, discussed and informed consent has been obtained with: patient.

## 2020-02-17 LAB
CYTO UR: NORMAL
LAB AP CASE REPORT: NORMAL
PATH REPORT.FINAL DX SPEC: NORMAL
PATH REPORT.GROSS SPEC: NORMAL

## 2020-03-13 RX ORDER — VENLAFAXINE HYDROCHLORIDE 37.5 MG/1
37.5 CAPSULE, EXTENDED RELEASE ORAL DAILY
Qty: 30 CAPSULE | Refills: 2 | Status: SHIPPED | OUTPATIENT
Start: 2020-03-13 | End: 2021-11-15

## 2020-04-01 VITALS
BODY MASS INDEX: 36.44 KG/M2 | OXYGEN SATURATION: 98 % | HEART RATE: 72 BPM | DIASTOLIC BLOOD PRESSURE: 76 MMHG | SYSTOLIC BLOOD PRESSURE: 136 MMHG | WEIGHT: 219 LBS

## 2020-04-23 VITALS
SYSTOLIC BLOOD PRESSURE: 136 MMHG | WEIGHT: 219 LBS | DIASTOLIC BLOOD PRESSURE: 76 MMHG | BODY MASS INDEX: 36.44 KG/M2 | HEART RATE: 72 BPM

## 2020-04-23 PROBLEM — N30.90 CYSTITIS: Status: ACTIVE | Noted: 2020-04-23

## 2020-04-28 RX ORDER — OXYBUTYNIN CHLORIDE 5 MG/1
TABLET ORAL
Qty: 60 TABLET | Refills: 0 | Status: SHIPPED | OUTPATIENT
Start: 2020-04-28 | End: 2020-04-29

## 2020-04-29 RX ORDER — OXYBUTYNIN CHLORIDE 5 MG/1
TABLET ORAL
Qty: 60 TABLET | Refills: 0 | Status: SHIPPED | OUTPATIENT
Start: 2020-04-29 | End: 2020-05-27

## 2020-05-27 RX ORDER — OXYBUTYNIN CHLORIDE 5 MG/1
TABLET ORAL
Qty: 60 TABLET | Refills: 0 | Status: SHIPPED | OUTPATIENT
Start: 2020-05-27 | End: 2020-06-26 | Stop reason: SDUPTHER

## 2020-06-25 NOTE — PROGRESS NOTES
celecoxib (CELEBREX) 200 MG capsule Take 200 mg by mouth 2 times daily      SYNTHROID 112 MCG tablet Take 112 mcg by mouth Daily Taking 125 mg      gabapentin (NEURONTIN) 300 MG capsule Take 300 mg by mouth nightly       No current facility-administered medications for this visit. REVIEW OF SYSTEMS:    Constitutional: no fever, no night sweats, no fatigue;   HEENT: no blurring of vision, no double vision, no hearing difficulty, no tinnitus,no ulceration, no dysphagia  Lungs: no cough, no shortness of breath, no wheeze;   CVS: no palpitation, no chest pain, no shortness of breath;  GI: no abdominal pain, no nausea , no vomiting, no constipation;   ELEAZAR: no dysuria, frequency and urgency, no hematuria, no kidney stones;   Musculoskeletal: no joint pain, swelling , stiffness;   Endocrine: no polyuria, polydypsia, heat intolerence; Hematology/lymphatic: no easy brusing or bleeding, no hx of clotting disorder; no peripheral adenopathy. Dermatology: no skin rash, no eczema, no pruritis;   Psychiatry: no depression, no anxiety,no panic attacks, no suicide ideation; Neurology: no syncope, no seizures, no numbness or tingling of hands, no numbness or tingling of feet, no paresis;     PHYSICAL EXAM:    Vitals signs:  BP (!) 158/62   Pulse 94   Temp 97.8 °F (36.6 °C) (Temporal)   Ht 5' 5\" (1.651 m)   Wt 216 lb 9.6 oz (98.2 kg)   SpO2 96%   BMI 36.04 kg/m²    Pain scale:  Pain Score:   8     CONSTITUTIONAL: Alert, appropriate, no acute distress,   EYES: Non icteric, EOM intact, pupils equal round and reactive to light and accommodation. ENT: Oral mucus membranes moist, no oral pharyngeal lesions. External inspection of ears and nose are normal.   NECK: Supple, no masses. No palpable thyroid mass    CHEST/LUNGS: CTA bilaterally, normal respiratory effort   BREAST: Right lumpectomy scar well-healed. No evidence of palpable nodule in the breast.  No axillary adenopathy. No supraclavicular adenopathy. in face to face encounter with the patient, out of which more than 50% of the time was spent in counseling patient or family and coordination of care. Counseling included but was not limited to time spent reviewing labs, imaging studies/ treatment plan and answering questions.

## 2020-06-26 ENCOUNTER — OFFICE VISIT (OUTPATIENT)
Dept: HEMATOLOGY | Age: 70
End: 2020-06-26
Payer: MEDICARE

## 2020-06-26 ENCOUNTER — HOSPITAL ENCOUNTER (OUTPATIENT)
Dept: INFUSION THERAPY | Age: 70
Discharge: HOME | End: 2020-06-26
Payer: MEDICARE

## 2020-06-26 VITALS
TEMPERATURE: 97.8 F | DIASTOLIC BLOOD PRESSURE: 62 MMHG | HEART RATE: 94 BPM | HEIGHT: 65 IN | OXYGEN SATURATION: 96 % | SYSTOLIC BLOOD PRESSURE: 158 MMHG | WEIGHT: 216.6 LBS | BODY MASS INDEX: 36.09 KG/M2

## 2020-06-26 DIAGNOSIS — C50.411 MALIGNANT NEOPLASM OF UPPER-OUTER QUADRANT OF RIGHT FEMALE BREAST, UNSPECIFIED ESTROGEN RECEPTOR STATUS (HCC): ICD-10-CM

## 2020-06-26 LAB
BASOPHILS ABSOLUTE: 0.05 K/UL (ref 0.01–0.08)
BASOPHILS RELATIVE PERCENT: 0.5 % (ref 0.1–1.2)
EOSINOPHILS ABSOLUTE: 0.14 K/UL (ref 0.04–0.54)
EOSINOPHILS RELATIVE PERCENT: 1.5 % (ref 0.7–7)
HCT VFR BLD CALC: 38.4 % (ref 34.1–44.9)
HEMOGLOBIN: 12.9 G/DL (ref 11.2–15.7)
LYMPHOCYTES ABSOLUTE: 1.57 K/UL (ref 1.18–3.74)
LYMPHOCYTES RELATIVE PERCENT: 17 % (ref 19.3–53.1)
MCH RBC QN AUTO: 33.6 PG (ref 25.6–32.2)
MCHC RBC AUTO-ENTMCNC: 33.6 G/DL (ref 32.3–35.5)
MCV RBC AUTO: 100 FL (ref 79.4–94.8)
MONOCYTES ABSOLUTE: 0.58 K/UL (ref 0.24–0.82)
MONOCYTES RELATIVE PERCENT: 6.3 % (ref 4.7–12.5)
NEUTROPHILS ABSOLUTE: 6.88 K/UL (ref 1.56–6.13)
NEUTROPHILS RELATIVE PERCENT: 74.7 % (ref 34–71.1)
PDW BLD-RTO: 12.5 % (ref 11.7–14.4)
PLATELET # BLD: 121 K/UL (ref 182–369)
PMV BLD AUTO: 11.5 FL (ref 7.4–10.4)
RBC # BLD: 3.84 M/UL (ref 3.93–5.22)
WBC # BLD: 9.22 K/UL (ref 3.98–10.04)

## 2020-06-26 PROCEDURE — G8417 CALC BMI ABV UP PARAM F/U: HCPCS | Performed by: INTERNAL MEDICINE

## 2020-06-26 PROCEDURE — 1090F PRES/ABSN URINE INCON ASSESS: CPT | Performed by: INTERNAL MEDICINE

## 2020-06-26 PROCEDURE — 99211 OFF/OP EST MAY X REQ PHY/QHP: CPT

## 2020-06-26 PROCEDURE — 1036F TOBACCO NON-USER: CPT | Performed by: INTERNAL MEDICINE

## 2020-06-26 PROCEDURE — 85025 COMPLETE CBC W/AUTO DIFF WBC: CPT

## 2020-06-26 PROCEDURE — G8427 DOCREV CUR MEDS BY ELIG CLIN: HCPCS | Performed by: INTERNAL MEDICINE

## 2020-06-26 PROCEDURE — 3017F COLORECTAL CA SCREEN DOC REV: CPT | Performed by: INTERNAL MEDICINE

## 2020-06-26 PROCEDURE — 99213 OFFICE O/P EST LOW 20 MIN: CPT | Performed by: INTERNAL MEDICINE

## 2020-06-26 PROCEDURE — G8400 PT W/DXA NO RESULTS DOC: HCPCS | Performed by: INTERNAL MEDICINE

## 2020-06-26 PROCEDURE — 1123F ACP DISCUSS/DSCN MKR DOCD: CPT | Performed by: INTERNAL MEDICINE

## 2020-06-26 PROCEDURE — 4040F PNEUMOC VAC/ADMIN/RCVD: CPT | Performed by: INTERNAL MEDICINE

## 2020-06-26 RX ORDER — OXYBUTYNIN CHLORIDE 5 MG/1
5 TABLET ORAL 2 TIMES DAILY
Qty: 60 TABLET | Refills: 3 | Status: SHIPPED | OUTPATIENT
Start: 2020-06-26 | End: 2020-10-23

## 2020-08-10 ENCOUNTER — OFFICE VISIT (OUTPATIENT)
Dept: SURGERY | Age: 70
End: 2020-08-10
Payer: MEDICARE

## 2020-08-10 VITALS — DIASTOLIC BLOOD PRESSURE: 80 MMHG | HEART RATE: 80 BPM | SYSTOLIC BLOOD PRESSURE: 124 MMHG

## 2020-08-10 PROCEDURE — 1036F TOBACCO NON-USER: CPT | Performed by: SURGERY

## 2020-08-10 PROCEDURE — G8400 PT W/DXA NO RESULTS DOC: HCPCS | Performed by: SURGERY

## 2020-08-10 PROCEDURE — 4040F PNEUMOC VAC/ADMIN/RCVD: CPT | Performed by: SURGERY

## 2020-08-10 PROCEDURE — G8417 CALC BMI ABV UP PARAM F/U: HCPCS | Performed by: SURGERY

## 2020-08-10 PROCEDURE — G8427 DOCREV CUR MEDS BY ELIG CLIN: HCPCS | Performed by: SURGERY

## 2020-08-10 PROCEDURE — 1090F PRES/ABSN URINE INCON ASSESS: CPT | Performed by: SURGERY

## 2020-08-10 PROCEDURE — 3017F COLORECTAL CA SCREEN DOC REV: CPT | Performed by: SURGERY

## 2020-08-10 PROCEDURE — 99213 OFFICE O/P EST LOW 20 MIN: CPT | Performed by: SURGERY

## 2020-08-10 PROCEDURE — 1123F ACP DISCUSS/DSCN MKR DOCD: CPT | Performed by: SURGERY

## 2020-10-23 RX ORDER — OXYBUTYNIN CHLORIDE 5 MG/1
TABLET ORAL
Qty: 60 TABLET | Refills: 0 | Status: SHIPPED | OUTPATIENT
Start: 2020-10-23 | End: 2020-11-23

## 2020-11-11 RX ORDER — TAMOXIFEN CITRATE 20 MG/1
TABLET ORAL
Qty: 90 TABLET | Refills: 0 | Status: SHIPPED | OUTPATIENT
Start: 2020-11-11 | End: 2021-05-21

## 2020-11-23 RX ORDER — OXYBUTYNIN CHLORIDE 5 MG/1
TABLET ORAL
Qty: 60 TABLET | Refills: 2 | Status: SHIPPED | OUTPATIENT
Start: 2020-11-23 | End: 2021-06-15 | Stop reason: SDUPTHER

## 2020-12-11 ENCOUNTER — APPOINTMENT (OUTPATIENT)
Dept: CARDIOLOGY | Facility: HOSPITAL | Age: 70
End: 2020-12-11

## 2020-12-11 ENCOUNTER — HOSPITAL ENCOUNTER (INPATIENT)
Facility: HOSPITAL | Age: 70
LOS: 31 days | Discharge: NURSING, REHAB OR LONG-TERM CARE | End: 2021-01-11
Attending: EMERGENCY MEDICINE | Admitting: INTERNAL MEDICINE

## 2020-12-11 ENCOUNTER — APPOINTMENT (OUTPATIENT)
Dept: GENERAL RADIOLOGY | Facility: HOSPITAL | Age: 70
End: 2020-12-11

## 2020-12-11 DIAGNOSIS — J12.82 PNEUMONIA DUE TO COVID-19 VIRUS: Primary | ICD-10-CM

## 2020-12-11 DIAGNOSIS — Z74.09 IMPAIRED MOBILITY: ICD-10-CM

## 2020-12-11 DIAGNOSIS — J96.01 ACUTE RESPIRATORY FAILURE WITH HYPOXIA (HCC): ICD-10-CM

## 2020-12-11 DIAGNOSIS — R09.02 HYPOXIC: ICD-10-CM

## 2020-12-11 DIAGNOSIS — R13.12 OROPHARYNGEAL DYSPHAGIA: ICD-10-CM

## 2020-12-11 DIAGNOSIS — Z74.09 IMPAIRED MOBILITY AND ADLS: ICD-10-CM

## 2020-12-11 DIAGNOSIS — U07.1 PNEUMONIA DUE TO COVID-19 VIRUS: Primary | ICD-10-CM

## 2020-12-11 DIAGNOSIS — Z78.9 IMPAIRED MOBILITY AND ADLS: ICD-10-CM

## 2020-12-11 LAB
ABO GROUP BLD: NORMAL
ALBUMIN SERPL-MCNC: 3.6 G/DL (ref 3.5–5.2)
ALBUMIN/GLOB SERPL: 0.9 G/DL
ALP SERPL-CCNC: 102 U/L (ref 39–117)
ALT SERPL W P-5'-P-CCNC: 61 U/L (ref 1–33)
ANION GAP SERPL CALCULATED.3IONS-SCNC: 11 MMOL/L (ref 5–15)
ARTERIAL PATENCY WRIST A: POSITIVE
AST SERPL-CCNC: 52 U/L (ref 1–32)
ATMOSPHERIC PRESS: 749 MMHG
B PARAPERT DNA SPEC QL NAA+PROBE: NOT DETECTED
B PERT DNA SPEC QL NAA+PROBE: NOT DETECTED
BASE EXCESS BLDA CALC-SCNC: 3.6 MMOL/L (ref 0–2)
BASOPHILS # BLD AUTO: 0.05 10*3/MM3 (ref 0–0.2)
BASOPHILS NFR BLD AUTO: 0.4 % (ref 0–1.5)
BDY SITE: ABNORMAL
BILIRUB SERPL-MCNC: 0.5 MG/DL (ref 0–1.2)
BLD GP AB SCN SERPL QL: NEGATIVE
BODY TEMPERATURE: 37 C
BUN SERPL-MCNC: 19 MG/DL (ref 8–23)
BUN/CREAT SERPL: 23.5 (ref 7–25)
C PNEUM DNA NPH QL NAA+NON-PROBE: NOT DETECTED
CALCIUM SPEC-SCNC: 9.1 MG/DL (ref 8.6–10.5)
CHLORIDE SERPL-SCNC: 102 MMOL/L (ref 98–107)
CO2 SERPL-SCNC: 28 MMOL/L (ref 22–29)
CREAT SERPL-MCNC: 0.81 MG/DL (ref 0.57–1)
CRP SERPL-MCNC: 1.47 MG/DL (ref 0–0.5)
D DIMER PPP FEU-MCNC: 0.42 MG/L (FEU) (ref 0–0.5)
D-LACTATE SERPL-SCNC: 1.9 MMOL/L (ref 0.5–2)
D-LACTATE SERPL-SCNC: 2.5 MMOL/L (ref 0.5–2)
DEPRECATED RDW RBC AUTO: 42.5 FL (ref 37–54)
EOSINOPHIL # BLD AUTO: 0 10*3/MM3 (ref 0–0.4)
EOSINOPHIL NFR BLD AUTO: 0 % (ref 0.3–6.2)
ERYTHROCYTE [DISTWIDTH] IN BLOOD BY AUTOMATED COUNT: 12.2 % (ref 12.3–15.4)
FERRITIN SERPL-MCNC: 773.8 NG/ML (ref 13–150)
FLUAV H1 2009 PAND RNA NPH QL NAA+PROBE: NOT DETECTED
FLUAV H1 HA GENE NPH QL NAA+PROBE: NOT DETECTED
FLUAV H3 RNA NPH QL NAA+PROBE: NOT DETECTED
FLUAV SUBTYP SPEC NAA+PROBE: NOT DETECTED
FLUBV RNA ISLT QL NAA+PROBE: NOT DETECTED
GAS FLOW AIRWAY: 15 LPM
GFR SERPL CREATININE-BSD FRML MDRD: 70 ML/MIN/1.73
GLOBULIN UR ELPH-MCNC: 3.9 GM/DL
GLUCOSE SERPL-MCNC: 101 MG/DL (ref 65–99)
HADV DNA SPEC NAA+PROBE: NOT DETECTED
HAV IGM SERPL QL IA: NORMAL
HBV CORE IGM SERPL QL IA: NORMAL
HBV SURFACE AG SERPL QL IA: NORMAL
HCO3 BLDA-SCNC: 27.6 MMOL/L (ref 20–26)
HCOV 229E RNA SPEC QL NAA+PROBE: NOT DETECTED
HCOV HKU1 RNA SPEC QL NAA+PROBE: NOT DETECTED
HCOV NL63 RNA SPEC QL NAA+PROBE: NOT DETECTED
HCOV OC43 RNA SPEC QL NAA+PROBE: NOT DETECTED
HCT VFR BLD AUTO: 46.2 % (ref 34–46.6)
HCV AB SER DONR QL: NORMAL
HGB BLD-MCNC: 15.7 G/DL (ref 12–15.9)
HMPV RNA NPH QL NAA+NON-PROBE: NOT DETECTED
HOLD SPECIMEN: NORMAL
HOLD SPECIMEN: NORMAL
HPIV1 RNA SPEC QL NAA+PROBE: NOT DETECTED
HPIV2 RNA SPEC QL NAA+PROBE: NOT DETECTED
HPIV3 RNA NPH QL NAA+PROBE: NOT DETECTED
HPIV4 P GENE NPH QL NAA+PROBE: NOT DETECTED
IMM GRANULOCYTES # BLD AUTO: 0.2 10*3/MM3 (ref 0–0.05)
IMM GRANULOCYTES NFR BLD AUTO: 1.4 % (ref 0–0.5)
INHALED O2 CONCENTRATION: 100 %
L PNEUMO1 AG UR QL IA: NEGATIVE
LACTATE HOLD SPECIMEN: NORMAL
LDH SERPL-CCNC: 442 U/L (ref 135–214)
LYMPHOCYTES # BLD AUTO: 0.73 10*3/MM3 (ref 0.7–3.1)
LYMPHOCYTES NFR BLD AUTO: 5.2 % (ref 19.6–45.3)
Lab: ABNORMAL
M PNEUMO IGG SER IA-ACNC: NOT DETECTED
MCH RBC QN AUTO: 32.1 PG (ref 26.6–33)
MCHC RBC AUTO-ENTMCNC: 34 G/DL (ref 31.5–35.7)
MCV RBC AUTO: 94.5 FL (ref 79–97)
MODALITY: ABNORMAL
MONOCYTES # BLD AUTO: 0.58 10*3/MM3 (ref 0.1–0.9)
MONOCYTES NFR BLD AUTO: 4.2 % (ref 5–12)
NEUTROPHILS NFR BLD AUTO: 12.37 10*3/MM3 (ref 1.7–7)
NEUTROPHILS NFR BLD AUTO: 88.8 % (ref 42.7–76)
NRBC BLD AUTO-RTO: 0 /100 WBC (ref 0–0.2)
PCO2 BLDA: 38.7 MM HG (ref 35–45)
PCO2 TEMP ADJ BLD: 38.7 MM HG (ref 35–45)
PH BLDA: 7.46 PH UNITS (ref 7.35–7.45)
PH, TEMP CORRECTED: 7.46 PH UNITS (ref 7.35–7.45)
PLATELET # BLD AUTO: 179 10*3/MM3 (ref 140–450)
PMV BLD AUTO: 11.9 FL (ref 6–12)
PO2 BLDA: 67.9 MM HG (ref 83–108)
PO2 TEMP ADJ BLD: 67.9 MM HG (ref 83–108)
POTASSIUM SERPL-SCNC: 3.8 MMOL/L (ref 3.5–5.2)
PROCALCITONIN SERPL-MCNC: 0.05 NG/ML (ref 0–0.25)
PROT SERPL-MCNC: 7.5 G/DL (ref 6–8.5)
RBC # BLD AUTO: 4.89 10*6/MM3 (ref 3.77–5.28)
RH BLD: POSITIVE
RHINOVIRUS RNA SPEC NAA+PROBE: NOT DETECTED
RSV RNA NPH QL NAA+NON-PROBE: NOT DETECTED
S PNEUM AG SPEC QL LA: NEGATIVE
SAO2 % BLDCOA: 94.6 % (ref 94–99)
SARS-COV-2 RDRP RESP QL NAA+PROBE: DETECTED
SODIUM SERPL-SCNC: 141 MMOL/L (ref 136–145)
T&S EXPIRATION DATE: NORMAL
VENTILATOR MODE: ABNORMAL
WBC # BLD AUTO: 13.93 10*3/MM3 (ref 3.4–10.8)
WHOLE BLOOD HOLD SPECIMEN: NORMAL
WHOLE BLOOD HOLD SPECIMEN: NORMAL

## 2020-12-11 PROCEDURE — 25010000002 ENOXAPARIN PER 10 MG: Performed by: FAMILY MEDICINE

## 2020-12-11 PROCEDURE — 87040 BLOOD CULTURE FOR BACTERIA: CPT | Performed by: EMERGENCY MEDICINE

## 2020-12-11 PROCEDURE — XW033E5 INTRODUCTION OF REMDESIVIR ANTI-INFECTIVE INTO PERIPHERAL VEIN, PERCUTANEOUS APPROACH, NEW TECHNOLOGY GROUP 5: ICD-10-PCS | Performed by: INTERNAL MEDICINE

## 2020-12-11 PROCEDURE — 86140 C-REACTIVE PROTEIN: CPT | Performed by: EMERGENCY MEDICINE

## 2020-12-11 PROCEDURE — 36415 COLL VENOUS BLD VENIPUNCTURE: CPT | Performed by: EMERGENCY MEDICINE

## 2020-12-11 PROCEDURE — 86901 BLOOD TYPING SEROLOGIC RH(D): CPT | Performed by: EMERGENCY MEDICINE

## 2020-12-11 PROCEDURE — 93010 ELECTROCARDIOGRAM REPORT: CPT | Performed by: INTERNAL MEDICINE

## 2020-12-11 PROCEDURE — 86850 RBC ANTIBODY SCREEN: CPT | Performed by: EMERGENCY MEDICINE

## 2020-12-11 PROCEDURE — 71045 X-RAY EXAM CHEST 1 VIEW: CPT

## 2020-12-11 PROCEDURE — 87635 SARS-COV-2 COVID-19 AMP PRB: CPT | Performed by: EMERGENCY MEDICINE

## 2020-12-11 PROCEDURE — 99222 1ST HOSP IP/OBS MODERATE 55: CPT | Performed by: INTERNAL MEDICINE

## 2020-12-11 PROCEDURE — 0100U HC BIOFIRE FILMARRAY RESP PANEL 2: CPT | Performed by: FAMILY MEDICINE

## 2020-12-11 PROCEDURE — 94799 UNLISTED PULMONARY SVC/PX: CPT

## 2020-12-11 PROCEDURE — 84145 PROCALCITONIN (PCT): CPT | Performed by: EMERGENCY MEDICINE

## 2020-12-11 PROCEDURE — 36600 WITHDRAWAL OF ARTERIAL BLOOD: CPT

## 2020-12-11 PROCEDURE — 80053 COMPREHEN METABOLIC PANEL: CPT | Performed by: EMERGENCY MEDICINE

## 2020-12-11 PROCEDURE — 85379 FIBRIN DEGRADATION QUANT: CPT | Performed by: EMERGENCY MEDICINE

## 2020-12-11 PROCEDURE — 85025 COMPLETE CBC W/AUTO DIFF WBC: CPT | Performed by: EMERGENCY MEDICINE

## 2020-12-11 PROCEDURE — 82803 BLOOD GASES ANY COMBINATION: CPT

## 2020-12-11 PROCEDURE — 83605 ASSAY OF LACTIC ACID: CPT | Performed by: EMERGENCY MEDICINE

## 2020-12-11 PROCEDURE — 87899 AGENT NOS ASSAY W/OPTIC: CPT | Performed by: FAMILY MEDICINE

## 2020-12-11 PROCEDURE — 83615 LACTATE (LD) (LDH) ENZYME: CPT | Performed by: EMERGENCY MEDICINE

## 2020-12-11 PROCEDURE — 99284 EMERGENCY DEPT VISIT MOD MDM: CPT

## 2020-12-11 PROCEDURE — 94640 AIRWAY INHALATION TREATMENT: CPT

## 2020-12-11 PROCEDURE — 25010000002 DEXAMETHASONE PER 1 MG: Performed by: EMERGENCY MEDICINE

## 2020-12-11 PROCEDURE — 82728 ASSAY OF FERRITIN: CPT | Performed by: EMERGENCY MEDICINE

## 2020-12-11 PROCEDURE — 25010000002 CEFTRIAXONE PER 250 MG: Performed by: EMERGENCY MEDICINE

## 2020-12-11 PROCEDURE — 93005 ELECTROCARDIOGRAM TRACING: CPT | Performed by: EMERGENCY MEDICINE

## 2020-12-11 PROCEDURE — 80074 ACUTE HEPATITIS PANEL: CPT | Performed by: FAMILY MEDICINE

## 2020-12-11 PROCEDURE — 86900 BLOOD TYPING SEROLOGIC ABO: CPT | Performed by: EMERGENCY MEDICINE

## 2020-12-11 RX ORDER — IPRATROPIUM BROMIDE AND ALBUTEROL SULFATE 2.5; .5 MG/3ML; MG/3ML
SOLUTION RESPIRATORY (INHALATION)
Status: COMPLETED
Start: 2020-12-11 | End: 2020-12-11

## 2020-12-11 RX ORDER — FAMOTIDINE 20 MG/1
20 TABLET, FILM COATED ORAL 2 TIMES DAILY
Status: DISCONTINUED | OUTPATIENT
Start: 2020-12-11 | End: 2020-12-13

## 2020-12-11 RX ORDER — DEXAMETHASONE SODIUM PHOSPHATE 10 MG/ML
10 INJECTION INTRAMUSCULAR; INTRAVENOUS ONCE
Status: COMPLETED | OUTPATIENT
Start: 2020-12-11 | End: 2020-12-11

## 2020-12-11 RX ORDER — ONDANSETRON 2 MG/ML
4 INJECTION INTRAMUSCULAR; INTRAVENOUS EVERY 6 HOURS PRN
Status: DISCONTINUED | OUTPATIENT
Start: 2020-12-11 | End: 2021-01-11 | Stop reason: HOSPADM

## 2020-12-11 RX ORDER — AMITRIPTYLINE HYDROCHLORIDE 25 MG/1
25 TABLET, FILM COATED ORAL NIGHTLY
Status: DISCONTINUED | OUTPATIENT
Start: 2020-12-11 | End: 2020-12-14

## 2020-12-11 RX ORDER — BUDESONIDE AND FORMOTEROL FUMARATE DIHYDRATE 160; 4.5 UG/1; UG/1
2 AEROSOL RESPIRATORY (INHALATION)
Status: DISCONTINUED | OUTPATIENT
Start: 2020-12-11 | End: 2020-12-31

## 2020-12-11 RX ORDER — ALBUTEROL SULFATE 90 UG/1
2 AEROSOL, METERED RESPIRATORY (INHALATION) EVERY 6 HOURS PRN
Status: DISCONTINUED | OUTPATIENT
Start: 2020-12-11 | End: 2021-01-11 | Stop reason: HOSPADM

## 2020-12-11 RX ORDER — LEVOTHYROXINE SODIUM 112 UG/1
112 TABLET ORAL
Status: DISCONTINUED | OUTPATIENT
Start: 2020-12-12 | End: 2020-12-12

## 2020-12-11 RX ORDER — AMITRIPTYLINE HYDROCHLORIDE 25 MG/1
25 TABLET, FILM COATED ORAL NIGHTLY
Status: ON HOLD | COMMUNITY
End: 2020-12-11

## 2020-12-11 RX ORDER — ACETAMINOPHEN 325 MG/1
650 TABLET ORAL EVERY 4 HOURS PRN
Status: DISCONTINUED | OUTPATIENT
Start: 2020-12-11 | End: 2020-12-19

## 2020-12-11 RX ORDER — DEXTROMETHORPHAN POLISTIREX 30 MG/5ML
60 SUSPENSION ORAL EVERY 12 HOURS PRN
Status: DISCONTINUED | OUTPATIENT
Start: 2020-12-11 | End: 2021-01-02

## 2020-12-11 RX ORDER — ZINC GLUCONATE 50 MG
50 TABLET ORAL DAILY
COMMUNITY
End: 2021-01-11 | Stop reason: HOSPADM

## 2020-12-11 RX ORDER — ACETAMINOPHEN 650 MG/1
650 SUPPOSITORY RECTAL EVERY 4 HOURS PRN
Status: DISCONTINUED | OUTPATIENT
Start: 2020-12-11 | End: 2020-12-19

## 2020-12-11 RX ORDER — SODIUM CHLORIDE 0.9 % (FLUSH) 0.9 %
10 SYRINGE (ML) INJECTION AS NEEDED
Status: DISCONTINUED | OUTPATIENT
Start: 2020-12-11 | End: 2021-01-11 | Stop reason: HOSPADM

## 2020-12-11 RX ORDER — ZINC SULFATE 50(220)MG
220 CAPSULE ORAL DAILY
Status: DISCONTINUED | OUTPATIENT
Start: 2020-12-11 | End: 2020-12-19

## 2020-12-11 RX ORDER — LANOLIN ALCOHOL/MO/W.PET/CERES
3 CREAM (GRAM) TOPICAL NIGHTLY
Status: DISCONTINUED | OUTPATIENT
Start: 2020-12-11 | End: 2020-12-14

## 2020-12-11 RX ORDER — BENZONATATE 100 MG/1
100 CAPSULE ORAL 3 TIMES DAILY PRN
Status: DISCONTINUED | OUTPATIENT
Start: 2020-12-11 | End: 2021-01-11 | Stop reason: HOSPADM

## 2020-12-11 RX ORDER — IPRATROPIUM BROMIDE AND ALBUTEROL SULFATE 2.5; .5 MG/3ML; MG/3ML
3 SOLUTION RESPIRATORY (INHALATION) ONCE
Status: COMPLETED | OUTPATIENT
Start: 2020-12-11 | End: 2020-12-11

## 2020-12-11 RX ORDER — DEXAMETHASONE SODIUM PHOSPHATE 4 MG/ML
6 INJECTION, SOLUTION INTRA-ARTICULAR; INTRALESIONAL; INTRAMUSCULAR; INTRAVENOUS; SOFT TISSUE
Status: COMPLETED | OUTPATIENT
Start: 2020-12-12 | End: 2020-12-21

## 2020-12-11 RX ORDER — ATORVASTATIN CALCIUM 10 MG/1
10 TABLET, FILM COATED ORAL NIGHTLY
Status: DISCONTINUED | OUTPATIENT
Start: 2020-12-11 | End: 2020-12-19

## 2020-12-11 RX ORDER — IPRATROPIUM BROMIDE AND ALBUTEROL SULFATE 2.5; .5 MG/3ML; MG/3ML
3 SOLUTION RESPIRATORY (INHALATION) EVERY 6 HOURS PRN
Status: DISCONTINUED | OUTPATIENT
Start: 2020-12-11 | End: 2020-12-11

## 2020-12-11 RX ORDER — ALBUTEROL SULFATE 90 UG/1
2 AEROSOL, METERED RESPIRATORY (INHALATION)
Status: DISCONTINUED | OUTPATIENT
Start: 2020-12-11 | End: 2020-12-31

## 2020-12-11 RX ORDER — ASCORBIC ACID 500 MG
500 TABLET ORAL 2 TIMES DAILY
Status: DISCONTINUED | OUTPATIENT
Start: 2020-12-11 | End: 2020-12-19

## 2020-12-11 RX ORDER — MELATONIN
2000 DAILY
Status: DISCONTINUED | OUTPATIENT
Start: 2020-12-11 | End: 2020-12-19

## 2020-12-11 RX ADMIN — THIAMINE HCL TAB 100 MG 100 MG: 100 TAB at 17:22

## 2020-12-11 RX ADMIN — ALBUTEROL SULFATE 2 PUFF: 90 AEROSOL, METERED RESPIRATORY (INHALATION) at 20:41

## 2020-12-11 RX ADMIN — OXYCODONE HYDROCHLORIDE AND ACETAMINOPHEN 500 MG: 500 TABLET ORAL at 20:44

## 2020-12-11 RX ADMIN — REMDESIVIR 200 MG: 100 INJECTION, POWDER, LYOPHILIZED, FOR SOLUTION INTRAVENOUS at 17:22

## 2020-12-11 RX ADMIN — Medication 3 MG: at 20:44

## 2020-12-11 RX ADMIN — FAMOTIDINE 20 MG: 20 TABLET, FILM COATED ORAL at 20:44

## 2020-12-11 RX ADMIN — IPRATROPIUM BROMIDE AND ALBUTEROL SULFATE: 2.5; .5 SOLUTION RESPIRATORY (INHALATION) at 12:17

## 2020-12-11 RX ADMIN — IPRATROPIUM BROMIDE AND ALBUTEROL SULFATE 3 ML: 2.5; .5 SOLUTION RESPIRATORY (INHALATION) at 12:06

## 2020-12-11 RX ADMIN — ATORVASTATIN CALCIUM 10 MG: 10 TABLET, FILM COATED ORAL at 20:43

## 2020-12-11 RX ADMIN — CEFTRIAXONE SODIUM 1 G: 1 INJECTION, POWDER, FOR SOLUTION INTRAMUSCULAR; INTRAVENOUS at 12:39

## 2020-12-11 RX ADMIN — DEXAMETHASONE SODIUM PHOSPHATE 10 MG: 10 INJECTION INTRAMUSCULAR; INTRAVENOUS at 12:37

## 2020-12-11 RX ADMIN — BENZONATATE 100 MG: 100 CAPSULE ORAL at 20:47

## 2020-12-11 RX ADMIN — ENOXAPARIN SODIUM 40 MG: 40 INJECTION SUBCUTANEOUS at 17:27

## 2020-12-11 RX ADMIN — AMITRIPTYLINE HYDROCHLORIDE 25 MG: 25 TABLET, FILM COATED ORAL at 20:44

## 2020-12-11 RX ADMIN — BUDESONIDE AND FORMOTEROL FUMARATE DIHYDRATE 2 PUFF: 160; 4.5 AEROSOL RESPIRATORY (INHALATION) at 20:46

## 2020-12-12 ENCOUNTER — APPOINTMENT (OUTPATIENT)
Dept: CARDIOLOGY | Facility: HOSPITAL | Age: 70
End: 2020-12-12

## 2020-12-12 PROBLEM — E66.9 OBESITY (BMI 30-39.9): Status: ACTIVE | Noted: 2020-12-12

## 2020-12-12 LAB
ALBUMIN SERPL-MCNC: 3.5 G/DL (ref 3.5–5.2)
ALBUMIN/GLOB SERPL: 1.1 G/DL
ALP SERPL-CCNC: 84 U/L (ref 39–117)
ALT SERPL W P-5'-P-CCNC: 46 U/L (ref 1–33)
ANION GAP SERPL CALCULATED.3IONS-SCNC: 10 MMOL/L (ref 5–15)
ARTERIAL PATENCY WRIST A: POSITIVE
AST SERPL-CCNC: 34 U/L (ref 1–32)
ATMOSPHERIC PRESS: 745 MMHG
BASE EXCESS BLDA CALC-SCNC: 4.7 MMOL/L (ref 0–2)
BASOPHILS # BLD AUTO: 0.02 10*3/MM3 (ref 0–0.2)
BASOPHILS NFR BLD AUTO: 0.2 % (ref 0–1.5)
BDY SITE: ABNORMAL
BH CV ECHO MEAS - AO MAX PG (FULL): 3.9 MMHG
BH CV ECHO MEAS - AO MAX PG: 9.7 MMHG
BH CV ECHO MEAS - AO MEAN PG (FULL): 3 MMHG
BH CV ECHO MEAS - AO MEAN PG: 6 MMHG
BH CV ECHO MEAS - AO ROOT AREA (BSA CORRECTED): 1.4
BH CV ECHO MEAS - AO ROOT AREA: 6.2 CM^2
BH CV ECHO MEAS - AO ROOT DIAM: 2.8 CM
BH CV ECHO MEAS - AO V2 MAX: 156 CM/SEC
BH CV ECHO MEAS - AO V2 MEAN: 116 CM/SEC
BH CV ECHO MEAS - AO V2 VTI: 32.5 CM
BH CV ECHO MEAS - AVA(I,A): 2.1 CM^2
BH CV ECHO MEAS - AVA(I,D): 2.1 CM^2
BH CV ECHO MEAS - AVA(V,A): 2.4 CM^2
BH CV ECHO MEAS - AVA(V,D): 2.4 CM^2
BH CV ECHO MEAS - BSA(HAYCOCK): 2.1 M^2
BH CV ECHO MEAS - BSA: 2 M^2
BH CV ECHO MEAS - BZI_BMI: 32.9 KILOGRAMS/M^2
BH CV ECHO MEAS - BZI_METRIC_HEIGHT: 165.1 CM
BH CV ECHO MEAS - BZI_METRIC_WEIGHT: 89.8 KG
BH CV ECHO MEAS - EDV(CUBED): 114.1 ML
BH CV ECHO MEAS - EDV(MOD-SP4): 92.1 ML
BH CV ECHO MEAS - EDV(TEICH): 110.2 ML
BH CV ECHO MEAS - EF(CUBED): 89.7 %
BH CV ECHO MEAS - EF(MOD-SP4): 79.7 %
BH CV ECHO MEAS - EF(TEICH): 84.1 %
BH CV ECHO MEAS - ESV(CUBED): 11.7 ML
BH CV ECHO MEAS - ESV(MOD-SP4): 18.7 ML
BH CV ECHO MEAS - ESV(TEICH): 17.5 ML
BH CV ECHO MEAS - FS: 53.2 %
BH CV ECHO MEAS - IVS/LVPW: 1.1
BH CV ECHO MEAS - IVSD: 0.91 CM
BH CV ECHO MEAS - LA DIMENSION: 2.5 CM
BH CV ECHO MEAS - LA/AO: 0.89
BH CV ECHO MEAS - LAT PEAK E' VEL: 9.5 CM/SEC
BH CV ECHO MEAS - LV DIASTOLIC VOL/BSA (35-75): 46.8 ML/M^2
BH CV ECHO MEAS - LV MASS(C)D: 143.9 GRAMS
BH CV ECHO MEAS - LV MASS(C)DI: 73.1 GRAMS/M^2
BH CV ECHO MEAS - LV MAX PG: 5.9 MMHG
BH CV ECHO MEAS - LV MEAN PG: 3 MMHG
BH CV ECHO MEAS - LV SYSTOLIC VOL/BSA (12-30): 9.5 ML/M^2
BH CV ECHO MEAS - LV V1 MAX: 121 CM/SEC
BH CV ECHO MEAS - LV V1 MEAN: 81.1 CM/SEC
BH CV ECHO MEAS - LV V1 VTI: 21.7 CM
BH CV ECHO MEAS - LVIDD: 4.9 CM
BH CV ECHO MEAS - LVIDS: 2.3 CM
BH CV ECHO MEAS - LVLD AP4: 8.2 CM
BH CV ECHO MEAS - LVLS AP4: 6.2 CM
BH CV ECHO MEAS - LVOT AREA (M): 3.1 CM^2
BH CV ECHO MEAS - LVOT AREA: 3.1 CM^2
BH CV ECHO MEAS - LVOT DIAM: 2 CM
BH CV ECHO MEAS - LVPWD: 0.83 CM
BH CV ECHO MEAS - MED PEAK E' VEL: 6.53 CM/SEC
BH CV ECHO MEAS - MV A MAX VEL: 112 CM/SEC
BH CV ECHO MEAS - MV DEC TIME: 0.3 SEC
BH CV ECHO MEAS - MV E MAX VEL: 83.9 CM/SEC
BH CV ECHO MEAS - MV E/A: 0.75
BH CV ECHO MEAS - PA MAX PG: 2.5 MMHG
BH CV ECHO MEAS - PA V2 MAX: 79.1 CM/SEC
BH CV ECHO MEAS - SI(AO): 101.6 ML/M^2
BH CV ECHO MEAS - SI(CUBED): 52 ML/M^2
BH CV ECHO MEAS - SI(LVOT): 34.6 ML/M^2
BH CV ECHO MEAS - SI(MOD-SP4): 37.3 ML/M^2
BH CV ECHO MEAS - SI(TEICH): 47 ML/M^2
BH CV ECHO MEAS - SV(AO): 200.1 ML
BH CV ECHO MEAS - SV(CUBED): 102.4 ML
BH CV ECHO MEAS - SV(LVOT): 68.2 ML
BH CV ECHO MEAS - SV(MOD-SP4): 73.4 ML
BH CV ECHO MEAS - SV(TEICH): 92.6 ML
BH CV ECHO MEASUREMENTS AVERAGE E/E' RATIO: 10.47
BILIRUB SERPL-MCNC: 0.4 MG/DL (ref 0–1.2)
BODY TEMPERATURE: 37 C
BUN SERPL-MCNC: 23 MG/DL (ref 8–23)
BUN/CREAT SERPL: 25.3 (ref 7–25)
CALCIUM SPEC-SCNC: 9.2 MG/DL (ref 8.6–10.5)
CHLORIDE SERPL-SCNC: 104 MMOL/L (ref 98–107)
CHOLEST SERPL-MCNC: 141 MG/DL (ref 0–200)
CK SERPL-CCNC: 44 U/L (ref 20–180)
CO2 SERPL-SCNC: 28 MMOL/L (ref 22–29)
CREAT SERPL-MCNC: 0.91 MG/DL (ref 0.57–1)
CRP SERPL-MCNC: 2.7 MG/DL (ref 0–0.5)
D DIMER PPP FEU-MCNC: 0.28 MG/L (FEU) (ref 0–0.5)
DEPRECATED RDW RBC AUTO: 42.6 FL (ref 37–54)
EOSINOPHIL # BLD AUTO: 0 10*3/MM3 (ref 0–0.4)
EOSINOPHIL NFR BLD AUTO: 0 % (ref 0.3–6.2)
ERYTHROCYTE [DISTWIDTH] IN BLOOD BY AUTOMATED COUNT: 12.2 % (ref 12.3–15.4)
FERRITIN SERPL-MCNC: 631.2 NG/ML (ref 13–150)
FIBRINOGEN PPP-MCNC: 543 MG/DL (ref 240–460)
GAS FLOW AIRWAY: 30 LPM
GFR SERPL CREATININE-BSD FRML MDRD: 61 ML/MIN/1.73
GLOBULIN UR ELPH-MCNC: 3.2 GM/DL
GLUCOSE SERPL-MCNC: 97 MG/DL (ref 65–99)
HBA1C MFR BLD: 5.3 % (ref 4.8–5.6)
HCO3 BLDA-SCNC: 29.7 MMOL/L (ref 20–26)
HCT VFR BLD AUTO: 38.2 % (ref 34–46.6)
HDLC SERPL-MCNC: 44 MG/DL (ref 40–60)
HGB BLD-MCNC: 13 G/DL (ref 12–15.9)
INHALED O2 CONCENTRATION: 90 %
LDH SERPL-CCNC: 351 U/L (ref 135–214)
LDLC SERPL CALC-MCNC: 79 MG/DL (ref 0–100)
LDLC/HDLC SERPL: 1.75 {RATIO}
LEFT ATRIUM VOLUME INDEX: 20.7 ML/M2
LEFT ATRIUM VOLUME: 40.8 CM3
LYMPHOCYTES # BLD AUTO: 0.98 10*3/MM3 (ref 0.7–3.1)
LYMPHOCYTES NFR BLD AUTO: 10.3 % (ref 19.6–45.3)
Lab: ABNORMAL
MAXIMAL PREDICTED HEART RATE: 152 BPM
MCH RBC QN AUTO: 32 PG (ref 26.6–33)
MCHC RBC AUTO-ENTMCNC: 34 G/DL (ref 31.5–35.7)
MCV RBC AUTO: 94.1 FL (ref 79–97)
MODALITY: ABNORMAL
MONOCYTES # BLD AUTO: 0.36 10*3/MM3 (ref 0.1–0.9)
MONOCYTES NFR BLD AUTO: 3.8 % (ref 5–12)
NEUTROPHILS NFR BLD AUTO: 8.1 10*3/MM3 (ref 1.7–7)
NEUTROPHILS NFR BLD AUTO: 84.8 % (ref 42.7–76)
PCO2 BLDA: 44 MM HG (ref 35–45)
PCO2 TEMP ADJ BLD: 44 MM HG (ref 35–45)
PH BLDA: 7.44 PH UNITS (ref 7.35–7.45)
PH, TEMP CORRECTED: 7.44 PH UNITS (ref 7.35–7.45)
PLATELET # BLD AUTO: 137 10*3/MM3 (ref 140–450)
PMV BLD AUTO: 11.5 FL (ref 6–12)
PO2 BLDA: 77.6 MM HG (ref 83–108)
PO2 TEMP ADJ BLD: 77.6 MM HG (ref 83–108)
POTASSIUM SERPL-SCNC: 4.1 MMOL/L (ref 3.5–5.2)
PROT SERPL-MCNC: 6.7 G/DL (ref 6–8.5)
RBC # BLD AUTO: 4.06 10*6/MM3 (ref 3.77–5.28)
SAO2 % BLDCOA: 95.6 % (ref 94–99)
SODIUM SERPL-SCNC: 142 MMOL/L (ref 136–145)
STRESS TARGET HR: 129 BPM
TRIGL SERPL-MCNC: 99 MG/DL (ref 0–150)
TSH SERPL DL<=0.05 MIU/L-ACNC: 0.23 UIU/ML (ref 0.27–4.2)
VENTILATOR MODE: ABNORMAL
VLDLC SERPL-MCNC: 18 MG/DL (ref 5–40)
WBC # BLD AUTO: 9.55 10*3/MM3 (ref 3.4–10.8)

## 2020-12-12 PROCEDURE — 94799 UNLISTED PULMONARY SVC/PX: CPT

## 2020-12-12 PROCEDURE — 86140 C-REACTIVE PROTEIN: CPT | Performed by: FAMILY MEDICINE

## 2020-12-12 PROCEDURE — 84443 ASSAY THYROID STIM HORMONE: CPT | Performed by: FAMILY MEDICINE

## 2020-12-12 PROCEDURE — 85384 FIBRINOGEN ACTIVITY: CPT | Performed by: FAMILY MEDICINE

## 2020-12-12 PROCEDURE — 80053 COMPREHEN METABOLIC PANEL: CPT | Performed by: FAMILY MEDICINE

## 2020-12-12 PROCEDURE — 25010000002 PERFLUTREN 6.52 MG/ML SUSPENSION: Performed by: INTERNAL MEDICINE

## 2020-12-12 PROCEDURE — 63710000001 DEXAMETHASONE PER 0.25 MG: Performed by: FAMILY MEDICINE

## 2020-12-12 PROCEDURE — 25010000002 ENOXAPARIN PER 10 MG: Performed by: FAMILY MEDICINE

## 2020-12-12 PROCEDURE — 80061 LIPID PANEL: CPT | Performed by: FAMILY MEDICINE

## 2020-12-12 PROCEDURE — 36600 WITHDRAWAL OF ARTERIAL BLOOD: CPT

## 2020-12-12 PROCEDURE — 93306 TTE W/DOPPLER COMPLETE: CPT | Performed by: INTERNAL MEDICINE

## 2020-12-12 PROCEDURE — 83036 HEMOGLOBIN GLYCOSYLATED A1C: CPT | Performed by: FAMILY MEDICINE

## 2020-12-12 PROCEDURE — 93306 TTE W/DOPPLER COMPLETE: CPT

## 2020-12-12 PROCEDURE — 82550 ASSAY OF CK (CPK): CPT | Performed by: FAMILY MEDICINE

## 2020-12-12 PROCEDURE — XW13325 TRANSFUSION OF CONVALESCENT PLASMA (NONAUTOLOGOUS) INTO PERIPHERAL VEIN, PERCUTANEOUS APPROACH, NEW TECHNOLOGY GROUP 5: ICD-10-PCS | Performed by: INTERNAL MEDICINE

## 2020-12-12 PROCEDURE — 85025 COMPLETE CBC W/AUTO DIFF WBC: CPT | Performed by: FAMILY MEDICINE

## 2020-12-12 PROCEDURE — 82728 ASSAY OF FERRITIN: CPT | Performed by: FAMILY MEDICINE

## 2020-12-12 PROCEDURE — 83615 LACTATE (LD) (LDH) ENZYME: CPT | Performed by: FAMILY MEDICINE

## 2020-12-12 PROCEDURE — 85379 FIBRIN DEGRADATION QUANT: CPT | Performed by: FAMILY MEDICINE

## 2020-12-12 PROCEDURE — 82803 BLOOD GASES ANY COMBINATION: CPT

## 2020-12-12 PROCEDURE — 99232 SBSQ HOSP IP/OBS MODERATE 35: CPT | Performed by: INTERNAL MEDICINE

## 2020-12-12 RX ORDER — LEVOTHYROXINE SODIUM 0.12 MG/1
125 TABLET ORAL
Status: DISCONTINUED | OUTPATIENT
Start: 2020-12-13 | End: 2020-12-19

## 2020-12-12 RX ORDER — AMITRIPTYLINE HYDROCHLORIDE 25 MG/1
25 TABLET, FILM COATED ORAL NIGHTLY
COMMUNITY
End: 2021-01-11 | Stop reason: HOSPADM

## 2020-12-12 RX ORDER — LEVOTHYROXINE SODIUM 0.12 MG/1
125 TABLET ORAL DAILY
COMMUNITY

## 2020-12-12 RX ORDER — BUDESONIDE AND FORMOTEROL FUMARATE DIHYDRATE 160; 4.5 UG/1; UG/1
2 AEROSOL RESPIRATORY (INHALATION)
COMMUNITY
End: 2021-01-11 | Stop reason: HOSPADM

## 2020-12-12 RX ORDER — TAMOXIFEN CITRATE 20 MG/1
20 TABLET ORAL DAILY
COMMUNITY
End: 2021-01-11 | Stop reason: HOSPADM

## 2020-12-12 RX ADMIN — ALBUTEROL SULFATE 2 PUFF: 90 AEROSOL, METERED RESPIRATORY (INHALATION) at 09:36

## 2020-12-12 RX ADMIN — OXYCODONE HYDROCHLORIDE AND ACETAMINOPHEN 500 MG: 500 TABLET ORAL at 20:34

## 2020-12-12 RX ADMIN — ATORVASTATIN CALCIUM 10 MG: 10 TABLET, FILM COATED ORAL at 20:34

## 2020-12-12 RX ADMIN — BUDESONIDE AND FORMOTEROL FUMARATE DIHYDRATE 2 PUFF: 160; 4.5 AEROSOL RESPIRATORY (INHALATION) at 09:36

## 2020-12-12 RX ADMIN — ALBUTEROL SULFATE 2 PUFF: 90 AEROSOL, METERED RESPIRATORY (INHALATION) at 13:12

## 2020-12-12 RX ADMIN — BENZONATATE 100 MG: 100 CAPSULE ORAL at 20:34

## 2020-12-12 RX ADMIN — Medication 3 MG: at 20:34

## 2020-12-12 RX ADMIN — REMDESIVIR 100 MG: 100 INJECTION, POWDER, LYOPHILIZED, FOR SOLUTION INTRAVENOUS at 17:34

## 2020-12-12 RX ADMIN — FAMOTIDINE 20 MG: 20 TABLET, FILM COATED ORAL at 09:35

## 2020-12-12 RX ADMIN — OXYCODONE HYDROCHLORIDE AND ACETAMINOPHEN 500 MG: 500 TABLET ORAL at 09:35

## 2020-12-12 RX ADMIN — ENOXAPARIN SODIUM 40 MG: 40 INJECTION SUBCUTANEOUS at 16:47

## 2020-12-12 RX ADMIN — LEVOTHYROXINE SODIUM 112 MCG: 112 TABLET ORAL at 05:40

## 2020-12-12 RX ADMIN — FAMOTIDINE 20 MG: 20 TABLET, FILM COATED ORAL at 20:34

## 2020-12-12 RX ADMIN — ZINC SULFATE 220 MG (50 MG) CAPSULE 220 MG: CAPSULE at 09:35

## 2020-12-12 RX ADMIN — AMITRIPTYLINE HYDROCHLORIDE 25 MG: 25 TABLET, FILM COATED ORAL at 20:34

## 2020-12-12 RX ADMIN — DEXAMETHASONE 6 MG: 4 TABLET ORAL at 09:35

## 2020-12-12 RX ADMIN — BUDESONIDE AND FORMOTEROL FUMARATE DIHYDRATE 2 PUFF: 160; 4.5 AEROSOL RESPIRATORY (INHALATION) at 21:28

## 2020-12-12 RX ADMIN — THIAMINE HCL TAB 100 MG 100 MG: 100 TAB at 09:35

## 2020-12-12 RX ADMIN — ALBUTEROL SULFATE 2 PUFF: 90 AEROSOL, METERED RESPIRATORY (INHALATION) at 21:28

## 2020-12-12 RX ADMIN — ALBUTEROL SULFATE 2 PUFF: 90 AEROSOL, METERED RESPIRATORY (INHALATION) at 16:49

## 2020-12-12 RX ADMIN — CHOLECALCIFEROL (VITAMIN D3) 25 MCG (1,000 UNIT) TABLET 2000 UNITS: TABLET at 09:35

## 2020-12-12 RX ADMIN — PERFLUTREN 9.78 MG: 6.52 INJECTION, SUSPENSION INTRAVENOUS at 16:02

## 2020-12-13 ENCOUNTER — APPOINTMENT (OUTPATIENT)
Dept: GENERAL RADIOLOGY | Facility: HOSPITAL | Age: 70
End: 2020-12-13

## 2020-12-13 PROBLEM — R79.89 ELEVATED FERRITIN: Status: ACTIVE | Noted: 2020-12-13

## 2020-12-13 LAB
25(OH)D3 SERPL-MCNC: 40.6 NG/ML (ref 30–100)
ALBUMIN SERPL-MCNC: 3 G/DL (ref 3.5–5.2)
ALBUMIN/GLOB SERPL: 0.9 G/DL
ALP SERPL-CCNC: 83 U/L (ref 39–117)
ALT SERPL W P-5'-P-CCNC: 46 U/L (ref 1–33)
ANION GAP SERPL CALCULATED.3IONS-SCNC: 9 MMOL/L (ref 5–15)
ARTERIAL PATENCY WRIST A: ABNORMAL
AST SERPL-CCNC: 39 U/L (ref 1–32)
ATMOSPHERIC PRESS: 753 MMHG
BASE EXCESS BLDA CALC-SCNC: 3.3 MMOL/L (ref 0–2)
BASOPHILS # BLD AUTO: 0.01 10*3/MM3 (ref 0–0.2)
BASOPHILS NFR BLD AUTO: 0.1 % (ref 0–1.5)
BDY SITE: ABNORMAL
BH BB BLOOD EXPIRATION DATE: NORMAL
BH BB BLOOD TYPE BARCODE: 6200
BH BB DISPENSE STATUS: NORMAL
BH BB PRODUCT CODE: NORMAL
BH BB UNIT NUMBER: NORMAL
BILIRUB SERPL-MCNC: 0.5 MG/DL (ref 0–1.2)
BODY TEMPERATURE: 37 C
BUN SERPL-MCNC: 24 MG/DL (ref 8–23)
BUN/CREAT SERPL: 30 (ref 7–25)
CALCIUM SPEC-SCNC: 8.4 MG/DL (ref 8.6–10.5)
CHLORIDE SERPL-SCNC: 104 MMOL/L (ref 98–107)
CK SERPL-CCNC: 40 U/L (ref 20–180)
CO2 SERPL-SCNC: 27 MMOL/L (ref 22–29)
CREAT SERPL-MCNC: 0.8 MG/DL (ref 0.57–1)
CRP SERPL-MCNC: 4.42 MG/DL (ref 0–0.5)
D DIMER PPP FEU-MCNC: 0.67 MG/L (FEU) (ref 0–0.5)
DEPRECATED RDW RBC AUTO: 42.1 FL (ref 37–54)
EOSINOPHIL # BLD AUTO: 0 10*3/MM3 (ref 0–0.4)
EOSINOPHIL NFR BLD AUTO: 0 % (ref 0.3–6.2)
ERYTHROCYTE [DISTWIDTH] IN BLOOD BY AUTOMATED COUNT: 11.9 % (ref 12.3–15.4)
FERRITIN SERPL-MCNC: 708.3 NG/ML (ref 13–150)
GFR SERPL CREATININE-BSD FRML MDRD: 71 ML/MIN/1.73
GLOBULIN UR ELPH-MCNC: 3.4 GM/DL
GLUCOSE SERPL-MCNC: 105 MG/DL (ref 65–99)
HCO3 BLDA-SCNC: 30.8 MMOL/L (ref 20–26)
HCT VFR BLD AUTO: 41.2 % (ref 34–46.6)
HGB BLD-MCNC: 13.6 G/DL (ref 12–15.9)
IMM GRANULOCYTES # BLD AUTO: 0.09 10*3/MM3 (ref 0–0.05)
IMM GRANULOCYTES NFR BLD AUTO: 0.9 % (ref 0–0.5)
INHALED O2 CONCENTRATION: 100 %
INR PPP: 1.21 (ref 0.91–1.09)
LDH SERPL-CCNC: 327 U/L (ref 135–214)
LYMPHOCYTES # BLD AUTO: 0.89 10*3/MM3 (ref 0.7–3.1)
LYMPHOCYTES NFR BLD AUTO: 9.2 % (ref 19.6–45.3)
Lab: ABNORMAL
MCH RBC QN AUTO: 31.5 PG (ref 26.6–33)
MCHC RBC AUTO-ENTMCNC: 33 G/DL (ref 31.5–35.7)
MCV RBC AUTO: 95.4 FL (ref 79–97)
MODALITY: ABNORMAL
MONOCYTES # BLD AUTO: 0.25 10*3/MM3 (ref 0.1–0.9)
MONOCYTES NFR BLD AUTO: 2.6 % (ref 5–12)
NEUTROPHILS NFR BLD AUTO: 8.44 10*3/MM3 (ref 1.7–7)
NEUTROPHILS NFR BLD AUTO: 87.2 % (ref 42.7–76)
NRBC BLD AUTO-RTO: 0 /100 WBC (ref 0–0.2)
PCO2 BLDA: 55.9 MM HG (ref 35–45)
PCO2 TEMP ADJ BLD: 55.9 MM HG (ref 35–45)
PEEP RESPIRATORY: 12 CM[H2O]
PH BLDA: 7.35 PH UNITS (ref 7.35–7.45)
PH, TEMP CORRECTED: 7.35 PH UNITS (ref 7.35–7.45)
PLATELET # BLD AUTO: 129 10*3/MM3 (ref 140–450)
PMV BLD AUTO: 11.5 FL (ref 6–12)
PO2 BLDA: 109 MM HG (ref 83–108)
PO2 TEMP ADJ BLD: 109 MM HG (ref 83–108)
POTASSIUM SERPL-SCNC: 3.8 MMOL/L (ref 3.5–5.2)
PROCALCITONIN SERPL-MCNC: 0.06 NG/ML (ref 0–0.25)
PROT SERPL-MCNC: 6.4 G/DL (ref 6–8.5)
PROTHROMBIN TIME: 14.9 SECONDS (ref 11.9–14.6)
RBC # BLD AUTO: 4.32 10*6/MM3 (ref 3.77–5.28)
SAO2 % BLDCOA: 98 % (ref 94–99)
SET MECH RESP RATE: 16
SODIUM SERPL-SCNC: 140 MMOL/L (ref 136–145)
UNIT  ABO: NORMAL
UNIT  RH: NORMAL
VENTILATOR MODE: AC
VT ON VENT VENT: 550 ML
WBC # BLD AUTO: 9.68 10*3/MM3 (ref 3.4–10.8)

## 2020-12-13 PROCEDURE — 94770: CPT

## 2020-12-13 PROCEDURE — 25010000002 FUROSEMIDE PER 20 MG: Performed by: INTERNAL MEDICINE

## 2020-12-13 PROCEDURE — 82728 ASSAY OF FERRITIN: CPT | Performed by: INTERNAL MEDICINE

## 2020-12-13 PROCEDURE — 94799 UNLISTED PULMONARY SVC/PX: CPT

## 2020-12-13 PROCEDURE — 25010000002 FENTANYL CITRATE (PF) 100 MCG/2ML SOLUTION 50 ML VIAL: Performed by: INTERNAL MEDICINE

## 2020-12-13 PROCEDURE — B548ZZA ULTRASONOGRAPHY OF SUPERIOR VENA CAVA, GUIDANCE: ICD-10-PCS | Performed by: INTERNAL MEDICINE

## 2020-12-13 PROCEDURE — 0BH17EZ INSERTION OF ENDOTRACHEAL AIRWAY INTO TRACHEA, VIA NATURAL OR ARTIFICIAL OPENING: ICD-10-PCS | Performed by: INTERNAL MEDICINE

## 2020-12-13 PROCEDURE — 84145 PROCALCITONIN (PCT): CPT | Performed by: INTERNAL MEDICINE

## 2020-12-13 PROCEDURE — 82306 VITAMIN D 25 HYDROXY: CPT | Performed by: INTERNAL MEDICINE

## 2020-12-13 PROCEDURE — 85379 FIBRIN DEGRADATION QUANT: CPT | Performed by: INTERNAL MEDICINE

## 2020-12-13 PROCEDURE — 31500 INSERT EMERGENCY AIRWAY: CPT | Performed by: INTERNAL MEDICINE

## 2020-12-13 PROCEDURE — 99233 SBSQ HOSP IP/OBS HIGH 50: CPT | Performed by: INTERNAL MEDICINE

## 2020-12-13 PROCEDURE — 82803 BLOOD GASES ANY COMBINATION: CPT

## 2020-12-13 PROCEDURE — 80053 COMPREHEN METABOLIC PANEL: CPT | Performed by: INTERNAL MEDICINE

## 2020-12-13 PROCEDURE — 85025 COMPLETE CBC W/AUTO DIFF WBC: CPT | Performed by: INTERNAL MEDICINE

## 2020-12-13 PROCEDURE — 82550 ASSAY OF CK (CPK): CPT | Performed by: INTERNAL MEDICINE

## 2020-12-13 PROCEDURE — 74018 RADEX ABDOMEN 1 VIEW: CPT

## 2020-12-13 PROCEDURE — 25010000002 ENOXAPARIN PER 10 MG: Performed by: FAMILY MEDICINE

## 2020-12-13 PROCEDURE — 86140 C-REACTIVE PROTEIN: CPT | Performed by: INTERNAL MEDICINE

## 2020-12-13 PROCEDURE — 25010000002 DEXAMETHASONE PER 1 MG: Performed by: FAMILY MEDICINE

## 2020-12-13 PROCEDURE — 83615 LACTATE (LD) (LDH) ENZYME: CPT | Performed by: INTERNAL MEDICINE

## 2020-12-13 PROCEDURE — 85610 PROTHROMBIN TIME: CPT | Performed by: INTERNAL MEDICINE

## 2020-12-13 PROCEDURE — 25010000002 MIDAZOLAM 50 MG/10ML SOLUTION 10 ML VIAL: Performed by: INTERNAL MEDICINE

## 2020-12-13 PROCEDURE — 71045 X-RAY EXAM CHEST 1 VIEW: CPT

## 2020-12-13 PROCEDURE — 94002 VENT MGMT INPAT INIT DAY: CPT

## 2020-12-13 PROCEDURE — 03HY32Z INSERTION OF MONITORING DEVICE INTO UPPER ARTERY, PERCUTANEOUS APPROACH: ICD-10-PCS | Performed by: INTERNAL MEDICINE

## 2020-12-13 PROCEDURE — 5A1955Z RESPIRATORY VENTILATION, GREATER THAN 96 CONSECUTIVE HOURS: ICD-10-PCS | Performed by: INTERNAL MEDICINE

## 2020-12-13 PROCEDURE — 02HV33Z INSERTION OF INFUSION DEVICE INTO SUPERIOR VENA CAVA, PERCUTANEOUS APPROACH: ICD-10-PCS | Performed by: INTERNAL MEDICINE

## 2020-12-13 PROCEDURE — 94660 CPAP INITIATION&MGMT: CPT

## 2020-12-13 RX ORDER — FUROSEMIDE 10 MG/ML
40 INJECTION INTRAMUSCULAR; INTRAVENOUS ONCE
Status: COMPLETED | OUTPATIENT
Start: 2020-12-13 | End: 2020-12-13

## 2020-12-13 RX ORDER — LIDOCAINE 50 MG/G
2 PATCH TOPICAL EVERY 24 HOURS
Status: COMPLETED | OUTPATIENT
Start: 2020-12-13 | End: 2020-12-20

## 2020-12-13 RX ORDER — FAMOTIDINE 10 MG/ML
20 INJECTION, SOLUTION INTRAVENOUS EVERY 12 HOURS SCHEDULED
Status: DISCONTINUED | OUTPATIENT
Start: 2020-12-13 | End: 2021-01-02

## 2020-12-13 RX ADMIN — Medication 3 MG: at 20:01

## 2020-12-13 RX ADMIN — FENTANYL CITRATE 300 MCG/HR: 50 INJECTION, SOLUTION INTRAMUSCULAR; INTRAVENOUS at 21:21

## 2020-12-13 RX ADMIN — FAMOTIDINE 20 MG: 10 INJECTION INTRAVENOUS at 20:01

## 2020-12-13 RX ADMIN — ZINC SULFATE 220 MG (50 MG) CAPSULE 220 MG: CAPSULE at 10:55

## 2020-12-13 RX ADMIN — ENOXAPARIN SODIUM 40 MG: 40 INJECTION SUBCUTANEOUS at 17:44

## 2020-12-13 RX ADMIN — ATORVASTATIN CALCIUM 10 MG: 10 TABLET, FILM COATED ORAL at 20:01

## 2020-12-13 RX ADMIN — ALBUTEROL SULFATE 2 PUFF: 90 AEROSOL, METERED RESPIRATORY (INHALATION) at 20:43

## 2020-12-13 RX ADMIN — ALBUTEROL SULFATE 2 PUFF: 90 AEROSOL, METERED RESPIRATORY (INHALATION) at 15:46

## 2020-12-13 RX ADMIN — BUDESONIDE AND FORMOTEROL FUMARATE DIHYDRATE 2 PUFF: 160; 4.5 AEROSOL RESPIRATORY (INHALATION) at 07:28

## 2020-12-13 RX ADMIN — REMDESIVIR 100 MG: 100 INJECTION, POWDER, LYOPHILIZED, FOR SOLUTION INTRAVENOUS at 17:44

## 2020-12-13 RX ADMIN — FENTANYL CITRATE 50 MCG/HR: 50 INJECTION, SOLUTION INTRAMUSCULAR; INTRAVENOUS at 12:45

## 2020-12-13 RX ADMIN — MIDAZOLAM 1 MG/HR: 5 INJECTION INTRAMUSCULAR; INTRAVENOUS at 13:33

## 2020-12-13 RX ADMIN — LEVOTHYROXINE SODIUM 125 MCG: 125 TABLET ORAL at 06:20

## 2020-12-13 RX ADMIN — DEXAMETHASONE SODIUM PHOSPHATE 6 MG: 4 INJECTION, SOLUTION INTRA-ARTICULAR; INTRALESIONAL; INTRAMUSCULAR; INTRAVENOUS; SOFT TISSUE at 10:55

## 2020-12-13 RX ADMIN — MIDAZOLAM 10 MG/HR: 5 INJECTION INTRAMUSCULAR; INTRAVENOUS at 23:05

## 2020-12-13 RX ADMIN — BUDESONIDE AND FORMOTEROL FUMARATE DIHYDRATE 2 PUFF: 160; 4.5 AEROSOL RESPIRATORY (INHALATION) at 20:43

## 2020-12-13 RX ADMIN — AMITRIPTYLINE HYDROCHLORIDE 25 MG: 25 TABLET, FILM COATED ORAL at 20:01

## 2020-12-13 RX ADMIN — ALBUTEROL SULFATE 2 PUFF: 90 AEROSOL, METERED RESPIRATORY (INHALATION) at 11:00

## 2020-12-13 RX ADMIN — OXYCODONE HYDROCHLORIDE AND ACETAMINOPHEN 500 MG: 500 TABLET ORAL at 10:55

## 2020-12-13 RX ADMIN — FAMOTIDINE 20 MG: 20 TABLET, FILM COATED ORAL at 10:55

## 2020-12-13 RX ADMIN — CHOLECALCIFEROL (VITAMIN D3) 25 MCG (1,000 UNIT) TABLET 2000 UNITS: TABLET at 10:55

## 2020-12-13 RX ADMIN — LIDOCAINE 2 PATCH: 50 PATCH CUTANEOUS at 19:49

## 2020-12-13 RX ADMIN — ALBUTEROL SULFATE 2 PUFF: 90 AEROSOL, METERED RESPIRATORY (INHALATION) at 07:28

## 2020-12-13 RX ADMIN — OXYCODONE HYDROCHLORIDE AND ACETAMINOPHEN 500 MG: 500 TABLET ORAL at 20:01

## 2020-12-13 RX ADMIN — FUROSEMIDE 40 MG: 10 INJECTION, SOLUTION INTRAVENOUS at 10:54

## 2020-12-13 RX ADMIN — THIAMINE HCL TAB 100 MG 100 MG: 100 TAB at 10:55

## 2020-12-14 PROBLEM — D72.829 LEUKOCYTOSIS: Status: ACTIVE | Noted: 2020-12-14

## 2020-12-14 PROBLEM — U07.1 ACUTE RESPIRATORY DISTRESS SYNDROME (ARDS) DUE TO COVID-19 VIRUS (HCC): Status: ACTIVE | Noted: 2020-12-14

## 2020-12-14 PROBLEM — J80 ACUTE RESPIRATORY DISTRESS SYNDROME (ARDS) DUE TO COVID-19 VIRUS: Status: ACTIVE | Noted: 2020-12-14

## 2020-12-14 PROBLEM — R79.89 ELEVATED LFTS: Status: ACTIVE | Noted: 2020-12-14

## 2020-12-14 LAB
ALBUMIN SERPL-MCNC: 3.2 G/DL (ref 3.5–5.2)
ALBUMIN/GLOB SERPL: 0.9 G/DL
ALP SERPL-CCNC: 82 U/L (ref 39–117)
ALT SERPL W P-5'-P-CCNC: 38 U/L (ref 1–33)
ANION GAP SERPL CALCULATED.3IONS-SCNC: 12 MMOL/L (ref 5–15)
ARTERIAL PATENCY WRIST A: ABNORMAL
AST SERPL-CCNC: 33 U/L (ref 1–32)
ATMOSPHERIC PRESS: 753 MMHG
BASE EXCESS BLDA CALC-SCNC: 3.9 MMOL/L (ref 0–2)
BASOPHILS # BLD AUTO: 0.02 10*3/MM3 (ref 0–0.2)
BASOPHILS NFR BLD AUTO: 0.2 % (ref 0–1.5)
BDY SITE: ABNORMAL
BILIRUB SERPL-MCNC: 0.7 MG/DL (ref 0–1.2)
BODY TEMPERATURE: 37 C
BUN SERPL-MCNC: 27 MG/DL (ref 8–23)
BUN/CREAT SERPL: 36 (ref 7–25)
CALCIUM SPEC-SCNC: 8.9 MG/DL (ref 8.6–10.5)
CHLORIDE SERPL-SCNC: 102 MMOL/L (ref 98–107)
CO2 SERPL-SCNC: 27 MMOL/L (ref 22–29)
CREAT SERPL-MCNC: 0.75 MG/DL (ref 0.57–1)
D DIMER PPP FEU-MCNC: 1.35 MG/L (FEU) (ref 0–0.5)
DEPRECATED RDW RBC AUTO: 40.4 FL (ref 37–54)
EOSINOPHIL # BLD AUTO: 0 10*3/MM3 (ref 0–0.4)
EOSINOPHIL NFR BLD AUTO: 0 % (ref 0.3–6.2)
ERYTHROCYTE [DISTWIDTH] IN BLOOD BY AUTOMATED COUNT: 11.9 % (ref 12.3–15.4)
GFR SERPL CREATININE-BSD FRML MDRD: 77 ML/MIN/1.73
GLOBULIN UR ELPH-MCNC: 3.5 GM/DL
GLUCOSE BLDC GLUCOMTR-MCNC: 132 MG/DL (ref 70–130)
GLUCOSE SERPL-MCNC: 133 MG/DL (ref 65–99)
HCO3 BLDA-SCNC: 29.6 MMOL/L (ref 20–26)
HCT VFR BLD AUTO: 40 % (ref 34–46.6)
HGB BLD-MCNC: 14 G/DL (ref 12–15.9)
IMM GRANULOCYTES # BLD AUTO: 0.1 10*3/MM3 (ref 0–0.05)
IMM GRANULOCYTES NFR BLD AUTO: 0.8 % (ref 0–0.5)
INHALED O2 CONCENTRATION: 85 %
LYMPHOCYTES # BLD AUTO: 0.43 10*3/MM3 (ref 0.7–3.1)
LYMPHOCYTES NFR BLD AUTO: 3.4 % (ref 19.6–45.3)
Lab: ABNORMAL
MCH RBC QN AUTO: 32.1 PG (ref 26.6–33)
MCHC RBC AUTO-ENTMCNC: 35 G/DL (ref 31.5–35.7)
MCV RBC AUTO: 91.7 FL (ref 79–97)
MODALITY: ABNORMAL
MONOCYTES # BLD AUTO: 0.24 10*3/MM3 (ref 0.1–0.9)
MONOCYTES NFR BLD AUTO: 1.9 % (ref 5–12)
NEUTROPHILS NFR BLD AUTO: 11.89 10*3/MM3 (ref 1.7–7)
NEUTROPHILS NFR BLD AUTO: 93.7 % (ref 42.7–76)
NRBC BLD AUTO-RTO: 0 /100 WBC (ref 0–0.2)
PCO2 BLDA: 47 MM HG (ref 35–45)
PCO2 TEMP ADJ BLD: 47 MM HG (ref 35–45)
PEEP RESPIRATORY: 12 CM[H2O]
PH BLDA: 7.41 PH UNITS (ref 7.35–7.45)
PH, TEMP CORRECTED: 7.41 PH UNITS (ref 7.35–7.45)
PLATELET # BLD AUTO: 151 10*3/MM3 (ref 140–450)
PMV BLD AUTO: 11.2 FL (ref 6–12)
PO2 BLDA: 222 MM HG (ref 83–108)
PO2 TEMP ADJ BLD: 222 MM HG (ref 83–108)
POTASSIUM SERPL-SCNC: 4 MMOL/L (ref 3.5–5.2)
PROT SERPL-MCNC: 6.7 G/DL (ref 6–8.5)
QT INTERVAL: 408 MS
QTC INTERVAL: 455 MS
RBC # BLD AUTO: 4.36 10*6/MM3 (ref 3.77–5.28)
SAO2 % BLDCOA: 100 % (ref 94–99)
SET MECH RESP RATE: 20
SODIUM SERPL-SCNC: 141 MMOL/L (ref 136–145)
VENTILATOR MODE: AC
VT ON VENT VENT: 550 ML
WBC # BLD AUTO: 12.68 10*3/MM3 (ref 3.4–10.8)

## 2020-12-14 PROCEDURE — 85025 COMPLETE CBC W/AUTO DIFF WBC: CPT | Performed by: INTERNAL MEDICINE

## 2020-12-14 PROCEDURE — 94799 UNLISTED PULMONARY SVC/PX: CPT

## 2020-12-14 PROCEDURE — 99233 SBSQ HOSP IP/OBS HIGH 50: CPT | Performed by: INTERNAL MEDICINE

## 2020-12-14 PROCEDURE — 94003 VENT MGMT INPAT SUBQ DAY: CPT

## 2020-12-14 PROCEDURE — 25010000002 MIDAZOLAM 50 MG/10ML SOLUTION 10 ML VIAL: Performed by: INTERNAL MEDICINE

## 2020-12-14 PROCEDURE — 25010000002 FENTANYL CITRATE (PF) 100 MCG/2ML SOLUTION 50 ML VIAL: Performed by: INTERNAL MEDICINE

## 2020-12-14 PROCEDURE — 82962 GLUCOSE BLOOD TEST: CPT

## 2020-12-14 PROCEDURE — 82803 BLOOD GASES ANY COMBINATION: CPT

## 2020-12-14 PROCEDURE — 80053 COMPREHEN METABOLIC PANEL: CPT | Performed by: INTERNAL MEDICINE

## 2020-12-14 PROCEDURE — 25010000002 ENOXAPARIN PER 10 MG: Performed by: INTERNAL MEDICINE

## 2020-12-14 PROCEDURE — 94770: CPT

## 2020-12-14 PROCEDURE — 85379 FIBRIN DEGRADATION QUANT: CPT | Performed by: INTERNAL MEDICINE

## 2020-12-14 PROCEDURE — 63710000001 DEXAMETHASONE PER 0.25 MG: Performed by: FAMILY MEDICINE

## 2020-12-14 RX ADMIN — FAMOTIDINE 20 MG: 10 INJECTION INTRAVENOUS at 20:09

## 2020-12-14 RX ADMIN — MIDAZOLAM 4 MG/HR: 5 INJECTION INTRAMUSCULAR; INTRAVENOUS at 17:37

## 2020-12-14 RX ADMIN — MIDAZOLAM 5 MG/HR: 5 INJECTION INTRAMUSCULAR; INTRAVENOUS at 06:19

## 2020-12-14 RX ADMIN — ALBUTEROL SULFATE 2 PUFF: 90 AEROSOL, METERED RESPIRATORY (INHALATION) at 07:00

## 2020-12-14 RX ADMIN — DEXAMETHASONE 6 MG: 4 TABLET ORAL at 08:02

## 2020-12-14 RX ADMIN — FENTANYL CITRATE 300 MCG/HR: 50 INJECTION, SOLUTION INTRAMUSCULAR; INTRAVENOUS at 06:21

## 2020-12-14 RX ADMIN — FENTANYL CITRATE 300 MCG/HR: 50 INJECTION, SOLUTION INTRAMUSCULAR; INTRAVENOUS at 22:48

## 2020-12-14 RX ADMIN — LIDOCAINE 2 PATCH: 50 PATCH CUTANEOUS at 20:09

## 2020-12-14 RX ADMIN — CHOLECALCIFEROL (VITAMIN D3) 25 MCG (1,000 UNIT) TABLET 2000 UNITS: TABLET at 08:02

## 2020-12-14 RX ADMIN — SODIUM CHLORIDE, PRESERVATIVE FREE 10 ML: 5 INJECTION INTRAVENOUS at 08:02

## 2020-12-14 RX ADMIN — ENOXAPARIN SODIUM 40 MG: 40 INJECTION SUBCUTANEOUS at 20:08

## 2020-12-14 RX ADMIN — ALBUTEROL SULFATE 2 PUFF: 90 AEROSOL, METERED RESPIRATORY (INHALATION) at 13:51

## 2020-12-14 RX ADMIN — ALBUTEROL SULFATE 2 PUFF: 90 AEROSOL, METERED RESPIRATORY (INHALATION) at 10:49

## 2020-12-14 RX ADMIN — OXYCODONE HYDROCHLORIDE AND ACETAMINOPHEN 500 MG: 500 TABLET ORAL at 20:08

## 2020-12-14 RX ADMIN — LEVOTHYROXINE SODIUM 125 MCG: 125 TABLET ORAL at 06:10

## 2020-12-14 RX ADMIN — OXYCODONE HYDROCHLORIDE AND ACETAMINOPHEN 500 MG: 500 TABLET ORAL at 08:02

## 2020-12-14 RX ADMIN — FAMOTIDINE 20 MG: 10 INJECTION INTRAVENOUS at 08:02

## 2020-12-14 RX ADMIN — THIAMINE HCL TAB 100 MG 100 MG: 100 TAB at 08:02

## 2020-12-14 RX ADMIN — ATORVASTATIN CALCIUM 10 MG: 10 TABLET, FILM COATED ORAL at 20:08

## 2020-12-14 RX ADMIN — ENOXAPARIN SODIUM 40 MG: 40 INJECTION SUBCUTANEOUS at 11:37

## 2020-12-14 RX ADMIN — REMDESIVIR 100 MG: 100 INJECTION, POWDER, LYOPHILIZED, FOR SOLUTION INTRAVENOUS at 16:37

## 2020-12-14 RX ADMIN — SODIUM CHLORIDE, PRESERVATIVE FREE 10 ML: 5 INJECTION INTRAVENOUS at 20:09

## 2020-12-14 RX ADMIN — FENTANYL CITRATE 300 MCG/HR: 50 INJECTION, SOLUTION INTRAMUSCULAR; INTRAVENOUS at 15:42

## 2020-12-14 RX ADMIN — BUDESONIDE AND FORMOTEROL FUMARATE DIHYDRATE 2 PUFF: 160; 4.5 AEROSOL RESPIRATORY (INHALATION) at 07:00

## 2020-12-14 RX ADMIN — ZINC SULFATE 220 MG (50 MG) CAPSULE 220 MG: CAPSULE at 08:02

## 2020-12-14 RX ADMIN — BUDESONIDE AND FORMOTEROL FUMARATE DIHYDRATE 2 PUFF: 160; 4.5 AEROSOL RESPIRATORY (INHALATION) at 20:54

## 2020-12-14 RX ADMIN — ALBUTEROL SULFATE 2 PUFF: 90 AEROSOL, METERED RESPIRATORY (INHALATION) at 20:54

## 2020-12-15 LAB
ALBUMIN SERPL-MCNC: 2.7 G/DL (ref 3.5–5.2)
ALBUMIN/GLOB SERPL: 0.8 G/DL
ALP SERPL-CCNC: 75 U/L (ref 39–117)
ALT SERPL W P-5'-P-CCNC: 38 U/L (ref 1–33)
ANION GAP SERPL CALCULATED.3IONS-SCNC: 9 MMOL/L (ref 5–15)
AST SERPL-CCNC: 63 U/L (ref 1–32)
BILIRUB SERPL-MCNC: 0.4 MG/DL (ref 0–1.2)
BUN SERPL-MCNC: 36 MG/DL (ref 8–23)
BUN/CREAT SERPL: 58.1 (ref 7–25)
CALCIUM SPEC-SCNC: 9.1 MG/DL (ref 8.6–10.5)
CHLORIDE SERPL-SCNC: 105 MMOL/L (ref 98–107)
CO2 SERPL-SCNC: 26 MMOL/L (ref 22–29)
CREAT SERPL-MCNC: 0.62 MG/DL (ref 0.57–1)
D DIMER PPP FEU-MCNC: 0.99 MG/L (FEU) (ref 0–0.5)
DEPRECATED RDW RBC AUTO: 39.8 FL (ref 37–54)
ERYTHROCYTE [DISTWIDTH] IN BLOOD BY AUTOMATED COUNT: 11.8 % (ref 12.3–15.4)
GFR SERPL CREATININE-BSD FRML MDRD: 96 ML/MIN/1.73
GLOBULIN UR ELPH-MCNC: 3.5 GM/DL
GLUCOSE BLDC GLUCOMTR-MCNC: 107 MG/DL (ref 70–130)
GLUCOSE BLDC GLUCOMTR-MCNC: 120 MG/DL (ref 70–130)
GLUCOSE BLDC GLUCOMTR-MCNC: 143 MG/DL (ref 70–130)
GLUCOSE SERPL-MCNC: 119 MG/DL (ref 65–99)
HCT VFR BLD AUTO: 34.7 % (ref 34–46.6)
HGB BLD-MCNC: 12.3 G/DL (ref 12–15.9)
MCH RBC QN AUTO: 32.4 PG (ref 26.6–33)
MCHC RBC AUTO-ENTMCNC: 35.4 G/DL (ref 31.5–35.7)
MCV RBC AUTO: 91.3 FL (ref 79–97)
MRSA DNA SPEC QL NAA+PROBE: NORMAL
PLATELET # BLD AUTO: 133 10*3/MM3 (ref 140–450)
PMV BLD AUTO: 11.9 FL (ref 6–12)
POTASSIUM SERPL-SCNC: 4.3 MMOL/L (ref 3.5–5.2)
PROCALCITONIN SERPL-MCNC: 1.18 NG/ML (ref 0–0.25)
PROT SERPL-MCNC: 6.2 G/DL (ref 6–8.5)
RBC # BLD AUTO: 3.8 10*6/MM3 (ref 3.77–5.28)
SODIUM SERPL-SCNC: 140 MMOL/L (ref 136–145)
WBC # BLD AUTO: 9.45 10*3/MM3 (ref 3.4–10.8)

## 2020-12-15 PROCEDURE — 87070 CULTURE OTHR SPECIMN AEROBIC: CPT | Performed by: INTERNAL MEDICINE

## 2020-12-15 PROCEDURE — 82962 GLUCOSE BLOOD TEST: CPT

## 2020-12-15 PROCEDURE — 85379 FIBRIN DEGRADATION QUANT: CPT | Performed by: INTERNAL MEDICINE

## 2020-12-15 PROCEDURE — 94003 VENT MGMT INPAT SUBQ DAY: CPT

## 2020-12-15 PROCEDURE — 94799 UNLISTED PULMONARY SVC/PX: CPT

## 2020-12-15 PROCEDURE — 25010000002 FENTANYL CITRATE (PF) 100 MCG/2ML SOLUTION 50 ML VIAL: Performed by: INTERNAL MEDICINE

## 2020-12-15 PROCEDURE — 25010000002 MIDAZOLAM 50 MG/10ML SOLUTION 10 ML VIAL: Performed by: INTERNAL MEDICINE

## 2020-12-15 PROCEDURE — 84145 PROCALCITONIN (PCT): CPT | Performed by: INTERNAL MEDICINE

## 2020-12-15 PROCEDURE — 25010000002 DEXAMETHASONE PER 1 MG: Performed by: FAMILY MEDICINE

## 2020-12-15 PROCEDURE — 94770: CPT

## 2020-12-15 PROCEDURE — 25010000002 ENOXAPARIN PER 10 MG: Performed by: INTERNAL MEDICINE

## 2020-12-15 PROCEDURE — 87641 MR-STAPH DNA AMP PROBE: CPT | Performed by: INTERNAL MEDICINE

## 2020-12-15 PROCEDURE — 87205 SMEAR GRAM STAIN: CPT | Performed by: INTERNAL MEDICINE

## 2020-12-15 PROCEDURE — 99233 SBSQ HOSP IP/OBS HIGH 50: CPT | Performed by: INTERNAL MEDICINE

## 2020-12-15 PROCEDURE — 94640 AIRWAY INHALATION TREATMENT: CPT

## 2020-12-15 PROCEDURE — 85027 COMPLETE CBC AUTOMATED: CPT | Performed by: INTERNAL MEDICINE

## 2020-12-15 PROCEDURE — 80053 COMPREHEN METABOLIC PANEL: CPT | Performed by: INTERNAL MEDICINE

## 2020-12-15 PROCEDURE — 25010000002 PIPERACILLIN SOD-TAZOBACTAM PER 1 G: Performed by: INTERNAL MEDICINE

## 2020-12-15 RX ADMIN — FAMOTIDINE 20 MG: 10 INJECTION INTRAVENOUS at 20:11

## 2020-12-15 RX ADMIN — SODIUM CHLORIDE, PRESERVATIVE FREE 10 ML: 5 INJECTION INTRAVENOUS at 09:02

## 2020-12-15 RX ADMIN — SODIUM CHLORIDE, PRESERVATIVE FREE 10 ML: 5 INJECTION INTRAVENOUS at 20:10

## 2020-12-15 RX ADMIN — BUDESONIDE AND FORMOTEROL FUMARATE DIHYDRATE 2 PUFF: 160; 4.5 AEROSOL RESPIRATORY (INHALATION) at 21:08

## 2020-12-15 RX ADMIN — OXYCODONE HYDROCHLORIDE AND ACETAMINOPHEN 500 MG: 500 TABLET ORAL at 20:10

## 2020-12-15 RX ADMIN — ALBUTEROL SULFATE 2 PUFF: 90 AEROSOL, METERED RESPIRATORY (INHALATION) at 21:08

## 2020-12-15 RX ADMIN — ENOXAPARIN SODIUM 40 MG: 40 INJECTION SUBCUTANEOUS at 09:02

## 2020-12-15 RX ADMIN — TAZOBACTAM SODIUM AND PIPERACILLIN SODIUM 3.38 G: 375; 3 INJECTION, SOLUTION INTRAVENOUS at 11:46

## 2020-12-15 RX ADMIN — ALBUTEROL SULFATE 2 PUFF: 90 AEROSOL, METERED RESPIRATORY (INHALATION) at 11:47

## 2020-12-15 RX ADMIN — TAZOBACTAM SODIUM AND PIPERACILLIN SODIUM 3.38 G: 375; 3 INJECTION, SOLUTION INTRAVENOUS at 18:17

## 2020-12-15 RX ADMIN — FAMOTIDINE 20 MG: 10 INJECTION INTRAVENOUS at 09:02

## 2020-12-15 RX ADMIN — THIAMINE HCL TAB 100 MG 100 MG: 100 TAB at 09:02

## 2020-12-15 RX ADMIN — LEVOTHYROXINE SODIUM 125 MCG: 125 TABLET ORAL at 05:18

## 2020-12-15 RX ADMIN — ZINC SULFATE 220 MG (50 MG) CAPSULE 220 MG: CAPSULE at 09:29

## 2020-12-15 RX ADMIN — BUDESONIDE AND FORMOTEROL FUMARATE DIHYDRATE 2 PUFF: 160; 4.5 AEROSOL RESPIRATORY (INHALATION) at 07:54

## 2020-12-15 RX ADMIN — ENOXAPARIN SODIUM 40 MG: 40 INJECTION SUBCUTANEOUS at 20:10

## 2020-12-15 RX ADMIN — MIDAZOLAM 4 MG/HR: 5 INJECTION INTRAMUSCULAR; INTRAVENOUS at 14:35

## 2020-12-15 RX ADMIN — CHOLECALCIFEROL (VITAMIN D3) 25 MCG (1,000 UNIT) TABLET 2000 UNITS: TABLET at 09:02

## 2020-12-15 RX ADMIN — DEXAMETHASONE SODIUM PHOSPHATE 6 MG: 4 INJECTION, SOLUTION INTRA-ARTICULAR; INTRALESIONAL; INTRAMUSCULAR; INTRAVENOUS; SOFT TISSUE at 09:02

## 2020-12-15 RX ADMIN — FENTANYL CITRATE 250 MCG/HR: 50 INJECTION, SOLUTION INTRAMUSCULAR; INTRAVENOUS at 16:36

## 2020-12-15 RX ADMIN — ATORVASTATIN CALCIUM 10 MG: 10 TABLET, FILM COATED ORAL at 20:10

## 2020-12-15 RX ADMIN — FENTANYL CITRATE 300 MCG/HR: 50 INJECTION, SOLUTION INTRAMUSCULAR; INTRAVENOUS at 06:31

## 2020-12-15 RX ADMIN — ALBUTEROL SULFATE 2 PUFF: 90 AEROSOL, METERED RESPIRATORY (INHALATION) at 07:54

## 2020-12-15 RX ADMIN — ALBUTEROL SULFATE 2 PUFF: 90 AEROSOL, METERED RESPIRATORY (INHALATION) at 14:35

## 2020-12-15 RX ADMIN — REMDESIVIR 100 MG: 100 INJECTION, POWDER, LYOPHILIZED, FOR SOLUTION INTRAVENOUS at 16:36

## 2020-12-15 RX ADMIN — MIDAZOLAM 6 MG/HR: 5 INJECTION INTRAMUSCULAR; INTRAVENOUS at 00:11

## 2020-12-15 RX ADMIN — OXYCODONE HYDROCHLORIDE AND ACETAMINOPHEN 500 MG: 500 TABLET ORAL at 09:02

## 2020-12-15 RX ADMIN — LIDOCAINE 2 PATCH: 50 PATCH CUTANEOUS at 20:10

## 2020-12-16 ENCOUNTER — APPOINTMENT (OUTPATIENT)
Dept: GENERAL RADIOLOGY | Facility: HOSPITAL | Age: 70
End: 2020-12-16

## 2020-12-16 LAB
ALBUMIN SERPL-MCNC: 2.7 G/DL (ref 3.5–5.2)
ALBUMIN/GLOB SERPL: 0.8 G/DL
ALP SERPL-CCNC: 71 U/L (ref 39–117)
ALT SERPL W P-5'-P-CCNC: 37 U/L (ref 1–33)
ANION GAP SERPL CALCULATED.3IONS-SCNC: 6 MMOL/L (ref 5–15)
ARTERIAL PATENCY WRIST A: POSITIVE
AST SERPL-CCNC: 62 U/L (ref 1–32)
ATMOSPHERIC PRESS: 748 MMHG
BACTERIA SPEC AEROBE CULT: NORMAL
BACTERIA SPEC AEROBE CULT: NORMAL
BASE EXCESS BLDA CALC-SCNC: 4.5 MMOL/L (ref 0–2)
BASOPHILS # BLD AUTO: 0.01 10*3/MM3 (ref 0–0.2)
BASOPHILS NFR BLD AUTO: 0.1 % (ref 0–1.5)
BDY SITE: ABNORMAL
BILIRUB SERPL-MCNC: 0.3 MG/DL (ref 0–1.2)
BODY TEMPERATURE: 37 C
BUN SERPL-MCNC: 37 MG/DL (ref 8–23)
BUN/CREAT SERPL: 57.8 (ref 7–25)
CALCIUM SPEC-SCNC: 8.6 MG/DL (ref 8.6–10.5)
CHLORIDE SERPL-SCNC: 106 MMOL/L (ref 98–107)
CK SERPL-CCNC: 986 U/L (ref 20–180)
CO2 SERPL-SCNC: 28 MMOL/L (ref 22–29)
CREAT SERPL-MCNC: 0.64 MG/DL (ref 0.57–1)
CRP SERPL-MCNC: 5.47 MG/DL (ref 0–0.5)
D DIMER PPP FEU-MCNC: 0.78 MG/L (FEU) (ref 0–0.5)
DEPRECATED RDW RBC AUTO: 41.1 FL (ref 37–54)
EOSINOPHIL # BLD AUTO: 0 10*3/MM3 (ref 0–0.4)
EOSINOPHIL NFR BLD AUTO: 0 % (ref 0.3–6.2)
ERYTHROCYTE [DISTWIDTH] IN BLOOD BY AUTOMATED COUNT: 11.9 % (ref 12.3–15.4)
FERRITIN SERPL-MCNC: 704 NG/ML (ref 13–150)
GFR SERPL CREATININE-BSD FRML MDRD: 92 ML/MIN/1.73
GLOBULIN UR ELPH-MCNC: 3.3 GM/DL
GLUCOSE BLDC GLUCOMTR-MCNC: 113 MG/DL (ref 70–130)
GLUCOSE BLDC GLUCOMTR-MCNC: 123 MG/DL (ref 70–130)
GLUCOSE BLDC GLUCOMTR-MCNC: 135 MG/DL (ref 70–130)
GLUCOSE SERPL-MCNC: 129 MG/DL (ref 65–99)
HCO3 BLDA-SCNC: 29.6 MMOL/L (ref 20–26)
HCT VFR BLD AUTO: 35.6 % (ref 34–46.6)
HGB BLD-MCNC: 12 G/DL (ref 12–15.9)
IMM GRANULOCYTES # BLD AUTO: 0.04 10*3/MM3 (ref 0–0.05)
IMM GRANULOCYTES NFR BLD AUTO: 0.4 % (ref 0–0.5)
INHALED O2 CONCENTRATION: 45 %
INR PPP: 1.25 (ref 0.91–1.09)
LDH SERPL-CCNC: 305 U/L (ref 135–214)
LYMPHOCYTES # BLD AUTO: 0.29 10*3/MM3 (ref 0.7–3.1)
LYMPHOCYTES NFR BLD AUTO: 3.1 % (ref 19.6–45.3)
Lab: ABNORMAL
MCH RBC QN AUTO: 31.9 PG (ref 26.6–33)
MCHC RBC AUTO-ENTMCNC: 33.7 G/DL (ref 31.5–35.7)
MCV RBC AUTO: 94.7 FL (ref 79–97)
MODALITY: ABNORMAL
MONOCYTES # BLD AUTO: 0.26 10*3/MM3 (ref 0.1–0.9)
MONOCYTES NFR BLD AUTO: 2.8 % (ref 5–12)
NEUTROPHILS NFR BLD AUTO: 8.62 10*3/MM3 (ref 1.7–7)
NEUTROPHILS NFR BLD AUTO: 93.6 % (ref 42.7–76)
NRBC BLD AUTO-RTO: 0 /100 WBC (ref 0–0.2)
PCO2 BLDA: 45.2 MM HG (ref 35–45)
PCO2 TEMP ADJ BLD: 45.2 MM HG (ref 35–45)
PEEP RESPIRATORY: 10 CM[H2O]
PH BLDA: 7.42 PH UNITS (ref 7.35–7.45)
PH, TEMP CORRECTED: 7.42 PH UNITS (ref 7.35–7.45)
PLATELET # BLD AUTO: 130 10*3/MM3 (ref 140–450)
PMV BLD AUTO: 12 FL (ref 6–12)
PO2 BLDA: 79.7 MM HG (ref 83–108)
PO2 TEMP ADJ BLD: 79.7 MM HG (ref 83–108)
POTASSIUM SERPL-SCNC: 4.3 MMOL/L (ref 3.5–5.2)
PROCALCITONIN SERPL-MCNC: 0.61 NG/ML (ref 0–0.25)
PROT SERPL-MCNC: 6 G/DL (ref 6–8.5)
PROTHROMBIN TIME: 15.3 SECONDS (ref 11.9–14.6)
RBC # BLD AUTO: 3.76 10*6/MM3 (ref 3.77–5.28)
SAO2 % BLDCOA: 96.6 % (ref 94–99)
SET MECH RESP RATE: 20
SODIUM SERPL-SCNC: 140 MMOL/L (ref 136–145)
VENTILATOR MODE: AC
VT ON VENT VENT: 500 ML
WBC # BLD AUTO: 9.22 10*3/MM3 (ref 3.4–10.8)

## 2020-12-16 PROCEDURE — 25010000002 PIPERACILLIN SOD-TAZOBACTAM PER 1 G: Performed by: INTERNAL MEDICINE

## 2020-12-16 PROCEDURE — 94770: CPT

## 2020-12-16 PROCEDURE — 94003 VENT MGMT INPAT SUBQ DAY: CPT

## 2020-12-16 PROCEDURE — 94799 UNLISTED PULMONARY SVC/PX: CPT

## 2020-12-16 PROCEDURE — 86140 C-REACTIVE PROTEIN: CPT | Performed by: INTERNAL MEDICINE

## 2020-12-16 PROCEDURE — 71045 X-RAY EXAM CHEST 1 VIEW: CPT

## 2020-12-16 PROCEDURE — 84145 PROCALCITONIN (PCT): CPT | Performed by: INTERNAL MEDICINE

## 2020-12-16 PROCEDURE — 85379 FIBRIN DEGRADATION QUANT: CPT | Performed by: INTERNAL MEDICINE

## 2020-12-16 PROCEDURE — 25010000002 FENTANYL CITRATE (PF) 100 MCG/2ML SOLUTION 50 ML VIAL: Performed by: INTERNAL MEDICINE

## 2020-12-16 PROCEDURE — 82728 ASSAY OF FERRITIN: CPT | Performed by: INTERNAL MEDICINE

## 2020-12-16 PROCEDURE — 83615 LACTATE (LD) (LDH) ENZYME: CPT | Performed by: INTERNAL MEDICINE

## 2020-12-16 PROCEDURE — 36600 WITHDRAWAL OF ARTERIAL BLOOD: CPT

## 2020-12-16 PROCEDURE — 80053 COMPREHEN METABOLIC PANEL: CPT | Performed by: INTERNAL MEDICINE

## 2020-12-16 PROCEDURE — 99233 SBSQ HOSP IP/OBS HIGH 50: CPT | Performed by: INTERNAL MEDICINE

## 2020-12-16 PROCEDURE — 82962 GLUCOSE BLOOD TEST: CPT

## 2020-12-16 PROCEDURE — 85025 COMPLETE CBC W/AUTO DIFF WBC: CPT | Performed by: INTERNAL MEDICINE

## 2020-12-16 PROCEDURE — 85610 PROTHROMBIN TIME: CPT | Performed by: INTERNAL MEDICINE

## 2020-12-16 PROCEDURE — 82550 ASSAY OF CK (CPK): CPT | Performed by: INTERNAL MEDICINE

## 2020-12-16 PROCEDURE — 25010000002 MIDAZOLAM 50 MG/10ML SOLUTION 10 ML VIAL: Performed by: INTERNAL MEDICINE

## 2020-12-16 PROCEDURE — 82803 BLOOD GASES ANY COMBINATION: CPT

## 2020-12-16 PROCEDURE — 25010000002 DEXAMETHASONE PER 1 MG: Performed by: FAMILY MEDICINE

## 2020-12-16 PROCEDURE — 25010000002 ENOXAPARIN PER 10 MG: Performed by: INTERNAL MEDICINE

## 2020-12-16 RX ADMIN — BUDESONIDE AND FORMOTEROL FUMARATE DIHYDRATE 2 PUFF: 160; 4.5 AEROSOL RESPIRATORY (INHALATION) at 20:04

## 2020-12-16 RX ADMIN — ENOXAPARIN SODIUM 40 MG: 40 INJECTION SUBCUTANEOUS at 20:55

## 2020-12-16 RX ADMIN — FAMOTIDINE 20 MG: 10 INJECTION INTRAVENOUS at 20:55

## 2020-12-16 RX ADMIN — LIDOCAINE 2 PATCH: 50 PATCH CUTANEOUS at 19:56

## 2020-12-16 RX ADMIN — OXYCODONE HYDROCHLORIDE AND ACETAMINOPHEN 500 MG: 500 TABLET ORAL at 09:06

## 2020-12-16 RX ADMIN — SODIUM CHLORIDE, PRESERVATIVE FREE 10 ML: 5 INJECTION INTRAVENOUS at 09:06

## 2020-12-16 RX ADMIN — FAMOTIDINE 20 MG: 10 INJECTION INTRAVENOUS at 09:06

## 2020-12-16 RX ADMIN — ALBUTEROL SULFATE 2 PUFF: 90 AEROSOL, METERED RESPIRATORY (INHALATION) at 07:19

## 2020-12-16 RX ADMIN — MIDAZOLAM 4 MG/HR: 5 INJECTION INTRAMUSCULAR; INTRAVENOUS at 09:07

## 2020-12-16 RX ADMIN — THIAMINE HCL TAB 100 MG 100 MG: 100 TAB at 09:06

## 2020-12-16 RX ADMIN — TAZOBACTAM SODIUM AND PIPERACILLIN SODIUM 3.38 G: 375; 3 INJECTION, SOLUTION INTRAVENOUS at 09:10

## 2020-12-16 RX ADMIN — ALBUTEROL SULFATE 2 PUFF: 90 AEROSOL, METERED RESPIRATORY (INHALATION) at 12:02

## 2020-12-16 RX ADMIN — FENTANYL CITRATE 250 MCG/HR: 50 INJECTION, SOLUTION INTRAMUSCULAR; INTRAVENOUS at 02:45

## 2020-12-16 RX ADMIN — ZINC SULFATE 220 MG (50 MG) CAPSULE 220 MG: CAPSULE at 09:07

## 2020-12-16 RX ADMIN — CHOLECALCIFEROL (VITAMIN D3) 25 MCG (1,000 UNIT) TABLET 2000 UNITS: TABLET at 09:06

## 2020-12-16 RX ADMIN — FENTANYL CITRATE 250 MCG/HR: 50 INJECTION, SOLUTION INTRAMUSCULAR; INTRAVENOUS at 19:54

## 2020-12-16 RX ADMIN — ALBUTEROL SULFATE 2 PUFF: 90 AEROSOL, METERED RESPIRATORY (INHALATION) at 15:26

## 2020-12-16 RX ADMIN — MIDAZOLAM 4 MG/HR: 5 INJECTION INTRAMUSCULAR; INTRAVENOUS at 02:45

## 2020-12-16 RX ADMIN — FENTANYL CITRATE 250 MCG/HR: 50 INJECTION, SOLUTION INTRAMUSCULAR; INTRAVENOUS at 09:07

## 2020-12-16 RX ADMIN — BUDESONIDE AND FORMOTEROL FUMARATE DIHYDRATE 2 PUFF: 160; 4.5 AEROSOL RESPIRATORY (INHALATION) at 07:19

## 2020-12-16 RX ADMIN — OXYCODONE HYDROCHLORIDE AND ACETAMINOPHEN 500 MG: 500 TABLET ORAL at 20:55

## 2020-12-16 RX ADMIN — ENOXAPARIN SODIUM 40 MG: 40 INJECTION SUBCUTANEOUS at 09:06

## 2020-12-16 RX ADMIN — SODIUM CHLORIDE, PRESERVATIVE FREE 10 ML: 5 INJECTION INTRAVENOUS at 20:55

## 2020-12-16 RX ADMIN — DEXAMETHASONE SODIUM PHOSPHATE 6 MG: 4 INJECTION, SOLUTION INTRA-ARTICULAR; INTRALESIONAL; INTRAMUSCULAR; INTRAVENOUS; SOFT TISSUE at 09:06

## 2020-12-16 RX ADMIN — LEVOTHYROXINE SODIUM 125 MCG: 125 TABLET ORAL at 06:25

## 2020-12-16 RX ADMIN — TAZOBACTAM SODIUM AND PIPERACILLIN SODIUM 3.38 G: 375; 3 INJECTION, SOLUTION INTRAVENOUS at 17:22

## 2020-12-16 RX ADMIN — TAZOBACTAM SODIUM AND PIPERACILLIN SODIUM 3.38 G: 375; 3 INJECTION, SOLUTION INTRAVENOUS at 02:45

## 2020-12-16 RX ADMIN — ALBUTEROL SULFATE 2 PUFF: 90 AEROSOL, METERED RESPIRATORY (INHALATION) at 20:04

## 2020-12-16 RX ADMIN — ATORVASTATIN CALCIUM 10 MG: 10 TABLET, FILM COATED ORAL at 20:55

## 2020-12-17 LAB
ANION GAP SERPL CALCULATED.3IONS-SCNC: 7 MMOL/L (ref 5–15)
ARTERIAL PATENCY WRIST A: POSITIVE
ATMOSPHERIC PRESS: 756 MMHG
BACTERIA SPEC RESP CULT: NORMAL
BASE EXCESS BLDA CALC-SCNC: 4.7 MMOL/L (ref 0–2)
BDY SITE: ABNORMAL
BODY TEMPERATURE: 37 C
BUN SERPL-MCNC: 40 MG/DL (ref 8–23)
BUN/CREAT SERPL: 55.6 (ref 7–25)
CALCIUM SPEC-SCNC: 8.5 MG/DL (ref 8.6–10.5)
CHLORIDE SERPL-SCNC: 104 MMOL/L (ref 98–107)
CO2 SERPL-SCNC: 30 MMOL/L (ref 22–29)
CREAT SERPL-MCNC: 0.72 MG/DL (ref 0.57–1)
DEPRECATED RDW RBC AUTO: 40.6 FL (ref 37–54)
ERYTHROCYTE [DISTWIDTH] IN BLOOD BY AUTOMATED COUNT: 11.8 % (ref 12.3–15.4)
GFR SERPL CREATININE-BSD FRML MDRD: 81 ML/MIN/1.73
GLUCOSE BLDC GLUCOMTR-MCNC: 170 MG/DL (ref 70–130)
GLUCOSE BLDC GLUCOMTR-MCNC: 171 MG/DL (ref 70–130)
GLUCOSE BLDC GLUCOMTR-MCNC: 199 MG/DL (ref 70–130)
GLUCOSE BLDC GLUCOMTR-MCNC: 210 MG/DL (ref 70–130)
GLUCOSE SERPL-MCNC: 192 MG/DL (ref 65–99)
GRAM STN SPEC: NORMAL
HCO3 BLDA-SCNC: 30.4 MMOL/L (ref 20–26)
HCT VFR BLD AUTO: 32.9 % (ref 34–46.6)
HGB BLD-MCNC: 11.2 G/DL (ref 12–15.9)
INHALED O2 CONCENTRATION: 45 %
Lab: ABNORMAL
MCH RBC QN AUTO: 32 PG (ref 26.6–33)
MCHC RBC AUTO-ENTMCNC: 34 G/DL (ref 31.5–35.7)
MCV RBC AUTO: 94 FL (ref 79–97)
MODALITY: ABNORMAL
PCO2 BLDA: 48.9 MM HG (ref 35–45)
PCO2 TEMP ADJ BLD: 48.9 MM HG (ref 35–45)
PEEP RESPIRATORY: 7 CM[H2O]
PH BLDA: 7.4 PH UNITS (ref 7.35–7.45)
PH, TEMP CORRECTED: 7.4 PH UNITS (ref 7.35–7.45)
PLATELET # BLD AUTO: 114 10*3/MM3 (ref 140–450)
PMV BLD AUTO: 12.3 FL (ref 6–12)
PO2 BLDA: 71.5 MM HG (ref 83–108)
PO2 TEMP ADJ BLD: 71.5 MM HG (ref 83–108)
POTASSIUM SERPL-SCNC: 4.5 MMOL/L (ref 3.5–5.2)
RBC # BLD AUTO: 3.5 10*6/MM3 (ref 3.77–5.28)
SAO2 % BLDCOA: 95.2 % (ref 94–99)
SET MECH RESP RATE: 20
SODIUM SERPL-SCNC: 141 MMOL/L (ref 136–145)
VENTILATOR MODE: AC
VT ON VENT VENT: 500 ML
WBC # BLD AUTO: 9.28 10*3/MM3 (ref 3.4–10.8)

## 2020-12-17 PROCEDURE — 82962 GLUCOSE BLOOD TEST: CPT

## 2020-12-17 PROCEDURE — 94770: CPT

## 2020-12-17 PROCEDURE — 94799 UNLISTED PULMONARY SVC/PX: CPT

## 2020-12-17 PROCEDURE — 85027 COMPLETE CBC AUTOMATED: CPT | Performed by: INTERNAL MEDICINE

## 2020-12-17 PROCEDURE — 99233 SBSQ HOSP IP/OBS HIGH 50: CPT | Performed by: INTERNAL MEDICINE

## 2020-12-17 PROCEDURE — 63710000001 INSULIN LISPRO (HUMAN) PER 5 UNITS: Performed by: FAMILY MEDICINE

## 2020-12-17 PROCEDURE — 82803 BLOOD GASES ANY COMBINATION: CPT

## 2020-12-17 PROCEDURE — 25010000002 DEXAMETHASONE PER 1 MG: Performed by: FAMILY MEDICINE

## 2020-12-17 PROCEDURE — 25010000002 PIPERACILLIN SOD-TAZOBACTAM PER 1 G: Performed by: INTERNAL MEDICINE

## 2020-12-17 PROCEDURE — 80048 BASIC METABOLIC PNL TOTAL CA: CPT | Performed by: INTERNAL MEDICINE

## 2020-12-17 PROCEDURE — 25010000002 ENOXAPARIN PER 10 MG: Performed by: INTERNAL MEDICINE

## 2020-12-17 PROCEDURE — 94003 VENT MGMT INPAT SUBQ DAY: CPT

## 2020-12-17 PROCEDURE — 25010000002 FENTANYL CITRATE (PF) 100 MCG/2ML SOLUTION 50 ML VIAL: Performed by: INTERNAL MEDICINE

## 2020-12-17 PROCEDURE — 36600 WITHDRAWAL OF ARTERIAL BLOOD: CPT

## 2020-12-17 RX ORDER — NICOTINE POLACRILEX 4 MG
15 LOZENGE BUCCAL
Status: DISCONTINUED | OUTPATIENT
Start: 2020-12-17 | End: 2021-01-11 | Stop reason: HOSPADM

## 2020-12-17 RX ORDER — HYDRALAZINE HYDROCHLORIDE 10 MG/1
10 TABLET, FILM COATED ORAL EVERY 6 HOURS PRN
Status: DISCONTINUED | OUTPATIENT
Start: 2020-12-17 | End: 2020-12-19

## 2020-12-17 RX ORDER — POLYETHYLENE GLYCOL 3350 17 G/17G
17 POWDER, FOR SOLUTION ORAL DAILY
Status: DISCONTINUED | OUTPATIENT
Start: 2020-12-17 | End: 2020-12-19

## 2020-12-17 RX ORDER — MORPHINE SULFATE 15 MG/1
15 TABLET ORAL EVERY 4 HOURS PRN
Status: DISCONTINUED | OUTPATIENT
Start: 2020-12-17 | End: 2020-12-19

## 2020-12-17 RX ORDER — LORAZEPAM 2 MG/ML
1 CONCENTRATE ORAL EVERY 4 HOURS PRN
Status: DISPENSED | OUTPATIENT
Start: 2020-12-17 | End: 2020-12-24

## 2020-12-17 RX ORDER — DEXTROSE MONOHYDRATE 25 G/50ML
25 INJECTION, SOLUTION INTRAVENOUS
Status: DISCONTINUED | OUTPATIENT
Start: 2020-12-17 | End: 2021-01-11 | Stop reason: HOSPADM

## 2020-12-17 RX ORDER — MORPHINE SULFATE 2 MG/ML
2 INJECTION, SOLUTION INTRAMUSCULAR; INTRAVENOUS EVERY 4 HOURS PRN
Status: DISCONTINUED | OUTPATIENT
Start: 2020-12-17 | End: 2020-12-17

## 2020-12-17 RX ADMIN — ALBUTEROL SULFATE 2 PUFF: 90 AEROSOL, METERED RESPIRATORY (INHALATION) at 06:48

## 2020-12-17 RX ADMIN — INSULIN LISPRO 2 UNITS: 100 INJECTION, SOLUTION INTRAVENOUS; SUBCUTANEOUS at 23:55

## 2020-12-17 RX ADMIN — BUDESONIDE AND FORMOTEROL FUMARATE DIHYDRATE 2 PUFF: 160; 4.5 AEROSOL RESPIRATORY (INHALATION) at 19:38

## 2020-12-17 RX ADMIN — TAZOBACTAM SODIUM AND PIPERACILLIN SODIUM 3.38 G: 375; 3 INJECTION, SOLUTION INTRAVENOUS at 02:19

## 2020-12-17 RX ADMIN — MORPHINE SULFATE 15 MG: 15 TABLET ORAL at 13:26

## 2020-12-17 RX ADMIN — FAMOTIDINE 20 MG: 10 INJECTION INTRAVENOUS at 08:35

## 2020-12-17 RX ADMIN — LORAZEPAM 1 MG: 2 SOLUTION, CONCENTRATE ORAL at 12:12

## 2020-12-17 RX ADMIN — LIDOCAINE 2 PATCH: 50 PATCH CUTANEOUS at 21:30

## 2020-12-17 RX ADMIN — ALBUTEROL SULFATE 2 PUFF: 90 AEROSOL, METERED RESPIRATORY (INHALATION) at 14:58

## 2020-12-17 RX ADMIN — FENTANYL CITRATE 250 MCG/HR: 50 INJECTION, SOLUTION INTRAMUSCULAR; INTRAVENOUS at 05:29

## 2020-12-17 RX ADMIN — FENTANYL CITRATE 200 MCG/HR: 50 INJECTION, SOLUTION INTRAMUSCULAR; INTRAVENOUS at 14:30

## 2020-12-17 RX ADMIN — ALBUTEROL SULFATE 2 PUFF: 90 AEROSOL, METERED RESPIRATORY (INHALATION) at 10:29

## 2020-12-17 RX ADMIN — ATORVASTATIN CALCIUM 10 MG: 10 TABLET, FILM COATED ORAL at 21:31

## 2020-12-17 RX ADMIN — POLYETHYLENE GLYCOL 3350 17 G: 17 POWDER, FOR SOLUTION ORAL at 11:36

## 2020-12-17 RX ADMIN — ENOXAPARIN SODIUM 40 MG: 40 INJECTION SUBCUTANEOUS at 21:31

## 2020-12-17 RX ADMIN — THIAMINE HCL TAB 100 MG 100 MG: 100 TAB at 08:34

## 2020-12-17 RX ADMIN — DOCUSATE SODIUM 50 MG: 50 LIQUID ORAL at 11:36

## 2020-12-17 RX ADMIN — DEXAMETHASONE SODIUM PHOSPHATE 6 MG: 4 INJECTION, SOLUTION INTRA-ARTICULAR; INTRALESIONAL; INTRAMUSCULAR; INTRAVENOUS; SOFT TISSUE at 08:34

## 2020-12-17 RX ADMIN — INSULIN LISPRO 3 UNITS: 100 INJECTION, SOLUTION INTRAVENOUS; SUBCUTANEOUS at 17:20

## 2020-12-17 RX ADMIN — TAZOBACTAM SODIUM AND PIPERACILLIN SODIUM 3.38 G: 375; 3 INJECTION, SOLUTION INTRAVENOUS at 17:21

## 2020-12-17 RX ADMIN — ZINC SULFATE 220 MG (50 MG) CAPSULE 220 MG: CAPSULE at 08:34

## 2020-12-17 RX ADMIN — OXYCODONE HYDROCHLORIDE AND ACETAMINOPHEN 500 MG: 500 TABLET ORAL at 08:34

## 2020-12-17 RX ADMIN — OXYCODONE HYDROCHLORIDE AND ACETAMINOPHEN 500 MG: 500 TABLET ORAL at 21:31

## 2020-12-17 RX ADMIN — TAZOBACTAM SODIUM AND PIPERACILLIN SODIUM 3.38 G: 375; 3 INJECTION, SOLUTION INTRAVENOUS at 09:44

## 2020-12-17 RX ADMIN — ENOXAPARIN SODIUM 40 MG: 40 INJECTION SUBCUTANEOUS at 08:35

## 2020-12-17 RX ADMIN — CHOLECALCIFEROL (VITAMIN D3) 25 MCG (1,000 UNIT) TABLET 2000 UNITS: TABLET at 08:34

## 2020-12-17 RX ADMIN — FAMOTIDINE 20 MG: 10 INJECTION INTRAVENOUS at 21:31

## 2020-12-17 RX ADMIN — BUDESONIDE AND FORMOTEROL FUMARATE DIHYDRATE 2 PUFF: 160; 4.5 AEROSOL RESPIRATORY (INHALATION) at 06:48

## 2020-12-17 RX ADMIN — LEVOTHYROXINE SODIUM 125 MCG: 125 TABLET ORAL at 05:23

## 2020-12-17 RX ADMIN — LORAZEPAM 1 MG: 2 SOLUTION, CONCENTRATE ORAL at 17:21

## 2020-12-18 ENCOUNTER — APPOINTMENT (OUTPATIENT)
Dept: GENERAL RADIOLOGY | Facility: HOSPITAL | Age: 70
End: 2020-12-18

## 2020-12-18 LAB
ANION GAP SERPL CALCULATED.3IONS-SCNC: 7 MMOL/L (ref 5–15)
BUN SERPL-MCNC: 36 MG/DL (ref 8–23)
BUN/CREAT SERPL: 61 (ref 7–25)
CALCIUM SPEC-SCNC: 8.7 MG/DL (ref 8.6–10.5)
CHLORIDE SERPL-SCNC: 98 MMOL/L (ref 98–107)
CO2 SERPL-SCNC: 31 MMOL/L (ref 22–29)
CREAT SERPL-MCNC: 0.59 MG/DL (ref 0.57–1)
DEPRECATED RDW RBC AUTO: 39.7 FL (ref 37–54)
ERYTHROCYTE [DISTWIDTH] IN BLOOD BY AUTOMATED COUNT: 11.7 % (ref 12.3–15.4)
GFR SERPL CREATININE-BSD FRML MDRD: 101 ML/MIN/1.73
GLUCOSE BLDC GLUCOMTR-MCNC: 146 MG/DL (ref 70–130)
GLUCOSE BLDC GLUCOMTR-MCNC: 150 MG/DL (ref 70–130)
GLUCOSE BLDC GLUCOMTR-MCNC: 151 MG/DL (ref 70–130)
GLUCOSE BLDC GLUCOMTR-MCNC: 168 MG/DL (ref 70–130)
GLUCOSE SERPL-MCNC: 153 MG/DL (ref 65–99)
HCT VFR BLD AUTO: 36.5 % (ref 34–46.6)
HGB BLD-MCNC: 12.8 G/DL (ref 12–15.9)
MAGNESIUM SERPL-MCNC: 2.2 MG/DL (ref 1.6–2.4)
MCH RBC QN AUTO: 32.5 PG (ref 26.6–33)
MCHC RBC AUTO-ENTMCNC: 35.1 G/DL (ref 31.5–35.7)
MCV RBC AUTO: 92.6 FL (ref 79–97)
PLATELET # BLD AUTO: 113 10*3/MM3 (ref 140–450)
PMV BLD AUTO: 12.4 FL (ref 6–12)
POTASSIUM SERPL-SCNC: 4.3 MMOL/L (ref 3.5–5.2)
RBC # BLD AUTO: 3.94 10*6/MM3 (ref 3.77–5.28)
SODIUM SERPL-SCNC: 136 MMOL/L (ref 136–145)
WBC # BLD AUTO: 12.53 10*3/MM3 (ref 3.4–10.8)

## 2020-12-18 PROCEDURE — 94799 UNLISTED PULMONARY SVC/PX: CPT

## 2020-12-18 PROCEDURE — 63710000001 INSULIN LISPRO (HUMAN) PER 5 UNITS: Performed by: FAMILY MEDICINE

## 2020-12-18 PROCEDURE — 25010000002 DEXAMETHASONE PER 1 MG: Performed by: FAMILY MEDICINE

## 2020-12-18 PROCEDURE — 25010000002 ENOXAPARIN PER 10 MG: Performed by: INTERNAL MEDICINE

## 2020-12-18 PROCEDURE — 71045 X-RAY EXAM CHEST 1 VIEW: CPT

## 2020-12-18 PROCEDURE — 80048 BASIC METABOLIC PNL TOTAL CA: CPT | Performed by: INTERNAL MEDICINE

## 2020-12-18 PROCEDURE — 94770: CPT

## 2020-12-18 PROCEDURE — 25010000002 PIPERACILLIN SOD-TAZOBACTAM PER 1 G: Performed by: INTERNAL MEDICINE

## 2020-12-18 PROCEDURE — 25010000002 MIDAZOLAM 50 MG/10ML SOLUTION 10 ML VIAL: Performed by: INTERNAL MEDICINE

## 2020-12-18 PROCEDURE — 25010000002 FENTANYL 10 MCG/1 ML NS: Performed by: FAMILY MEDICINE

## 2020-12-18 PROCEDURE — 94003 VENT MGMT INPAT SUBQ DAY: CPT

## 2020-12-18 PROCEDURE — 83735 ASSAY OF MAGNESIUM: CPT | Performed by: INTERNAL MEDICINE

## 2020-12-18 PROCEDURE — 82962 GLUCOSE BLOOD TEST: CPT

## 2020-12-18 PROCEDURE — 25010000002 FUROSEMIDE PER 20 MG: Performed by: INTERNAL MEDICINE

## 2020-12-18 PROCEDURE — 85027 COMPLETE CBC AUTOMATED: CPT | Performed by: INTERNAL MEDICINE

## 2020-12-18 PROCEDURE — 99233 SBSQ HOSP IP/OBS HIGH 50: CPT | Performed by: INTERNAL MEDICINE

## 2020-12-18 RX ORDER — AMOXICILLIN 250 MG
1 CAPSULE ORAL 2 TIMES DAILY
Status: DISCONTINUED | OUTPATIENT
Start: 2020-12-18 | End: 2020-12-19

## 2020-12-18 RX ORDER — GUAR GUM
1 PACKET (EA) ORAL 3 TIMES DAILY
Status: DISCONTINUED | OUTPATIENT
Start: 2020-12-18 | End: 2020-12-30

## 2020-12-18 RX ORDER — FUROSEMIDE 10 MG/ML
40 INJECTION INTRAMUSCULAR; INTRAVENOUS 2 TIMES DAILY
Status: COMPLETED | OUTPATIENT
Start: 2020-12-18 | End: 2020-12-18

## 2020-12-18 RX ORDER — BISACODYL 10 MG
10 SUPPOSITORY, RECTAL RECTAL ONCE
Status: COMPLETED | OUTPATIENT
Start: 2020-12-18 | End: 2020-12-18

## 2020-12-18 RX ADMIN — BUDESONIDE AND FORMOTEROL FUMARATE DIHYDRATE 2 PUFF: 160; 4.5 AEROSOL RESPIRATORY (INHALATION) at 06:19

## 2020-12-18 RX ADMIN — THIAMINE HCL TAB 100 MG 100 MG: 100 TAB at 08:02

## 2020-12-18 RX ADMIN — LEVOTHYROXINE SODIUM 125 MCG: 125 TABLET ORAL at 05:39

## 2020-12-18 RX ADMIN — OXYCODONE HYDROCHLORIDE AND ACETAMINOPHEN 500 MG: 500 TABLET ORAL at 08:01

## 2020-12-18 RX ADMIN — ENOXAPARIN SODIUM 40 MG: 40 INJECTION SUBCUTANEOUS at 20:00

## 2020-12-18 RX ADMIN — MIDAZOLAM 1 MG/HR: 5 INJECTION INTRAMUSCULAR; INTRAVENOUS at 03:00

## 2020-12-18 RX ADMIN — DOCUSATE SODIUM 50 MG: 50 LIQUID ORAL at 08:02

## 2020-12-18 RX ADMIN — POLYETHYLENE GLYCOL 3350 17 G: 17 POWDER, FOR SOLUTION ORAL at 08:02

## 2020-12-18 RX ADMIN — ALBUTEROL SULFATE 2 PUFF: 90 AEROSOL, METERED RESPIRATORY (INHALATION) at 06:19

## 2020-12-18 RX ADMIN — SENNOSIDES AND DOCUSATE SODIUM 1 TABLET: 8.6; 5 TABLET ORAL at 10:45

## 2020-12-18 RX ADMIN — ALBUTEROL SULFATE 2 PUFF: 90 AEROSOL, METERED RESPIRATORY (INHALATION) at 10:22

## 2020-12-18 RX ADMIN — ENOXAPARIN SODIUM 40 MG: 40 INJECTION SUBCUTANEOUS at 08:02

## 2020-12-18 RX ADMIN — Medication 1 PACKET: at 18:00

## 2020-12-18 RX ADMIN — DEXAMETHASONE SODIUM PHOSPHATE 6 MG: 4 INJECTION, SOLUTION INTRA-ARTICULAR; INTRALESIONAL; INTRAMUSCULAR; INTRAVENOUS; SOFT TISSUE at 08:01

## 2020-12-18 RX ADMIN — ALBUTEROL SULFATE 2 PUFF: 90 AEROSOL, METERED RESPIRATORY (INHALATION) at 20:56

## 2020-12-18 RX ADMIN — TAZOBACTAM SODIUM AND PIPERACILLIN SODIUM 3.38 G: 375; 3 INJECTION, SOLUTION INTRAVENOUS at 01:30

## 2020-12-18 RX ADMIN — FAMOTIDINE 20 MG: 10 INJECTION INTRAVENOUS at 08:02

## 2020-12-18 RX ADMIN — OXYCODONE HYDROCHLORIDE AND ACETAMINOPHEN 500 MG: 500 TABLET ORAL at 20:00

## 2020-12-18 RX ADMIN — BUDESONIDE AND FORMOTEROL FUMARATE DIHYDRATE 2 PUFF: 160; 4.5 AEROSOL RESPIRATORY (INHALATION) at 20:56

## 2020-12-18 RX ADMIN — LIDOCAINE 2 PATCH: 50 PATCH CUTANEOUS at 19:58

## 2020-12-18 RX ADMIN — INSULIN LISPRO 2 UNITS: 100 INJECTION, SOLUTION INTRAVENOUS; SUBCUTANEOUS at 17:02

## 2020-12-18 RX ADMIN — CHOLECALCIFEROL (VITAMIN D3) 25 MCG (1,000 UNIT) TABLET 2000 UNITS: TABLET at 08:01

## 2020-12-18 RX ADMIN — ALBUTEROL SULFATE 2 PUFF: 90 AEROSOL, METERED RESPIRATORY (INHALATION) at 14:52

## 2020-12-18 RX ADMIN — BISACODYL 10 MG: 10 SUPPOSITORY RECTAL at 10:45

## 2020-12-18 RX ADMIN — FUROSEMIDE 40 MG: 10 INJECTION, SOLUTION INTRAMUSCULAR; INTRAVENOUS at 20:00

## 2020-12-18 RX ADMIN — LORAZEPAM 1 MG: 2 SOLUTION, CONCENTRATE ORAL at 12:55

## 2020-12-18 RX ADMIN — ATORVASTATIN CALCIUM 10 MG: 10 TABLET, FILM COATED ORAL at 20:00

## 2020-12-18 RX ADMIN — FUROSEMIDE 40 MG: 10 INJECTION, SOLUTION INTRAMUSCULAR; INTRAVENOUS at 10:45

## 2020-12-18 RX ADMIN — INSULIN LISPRO 2 UNITS: 100 INJECTION, SOLUTION INTRAVENOUS; SUBCUTANEOUS at 12:55

## 2020-12-18 RX ADMIN — TAZOBACTAM SODIUM AND PIPERACILLIN SODIUM 3.38 G: 375; 3 INJECTION, SOLUTION INTRAVENOUS at 17:02

## 2020-12-18 RX ADMIN — MORPHINE SULFATE 15 MG: 15 TABLET ORAL at 08:02

## 2020-12-18 RX ADMIN — FAMOTIDINE 20 MG: 10 INJECTION INTRAVENOUS at 20:00

## 2020-12-18 RX ADMIN — TAZOBACTAM SODIUM AND PIPERACILLIN SODIUM 3.38 G: 375; 3 INJECTION, SOLUTION INTRAVENOUS at 10:45

## 2020-12-18 RX ADMIN — MORPHINE SULFATE 15 MG: 15 TABLET ORAL at 12:55

## 2020-12-18 RX ADMIN — Medication 1 PACKET: at 20:00

## 2020-12-18 RX ADMIN — SENNOSIDES AND DOCUSATE SODIUM 1 TABLET: 8.6; 5 TABLET ORAL at 20:00

## 2020-12-18 RX ADMIN — FENTANYL CITRATE 150 MCG/HR: 50 INJECTION, SOLUTION INTRAMUSCULAR; INTRAVENOUS at 07:45

## 2020-12-18 RX ADMIN — ZINC SULFATE 220 MG (50 MG) CAPSULE 220 MG: CAPSULE at 08:01

## 2020-12-18 RX ADMIN — LORAZEPAM 1 MG: 2 SOLUTION, CONCENTRATE ORAL at 08:02

## 2020-12-19 ENCOUNTER — APPOINTMENT (OUTPATIENT)
Dept: GENERAL RADIOLOGY | Facility: HOSPITAL | Age: 70
End: 2020-12-19

## 2020-12-19 LAB
ALBUMIN SERPL-MCNC: 3 G/DL (ref 3.5–5.2)
ALBUMIN/GLOB SERPL: 0.8 G/DL
ALP SERPL-CCNC: 83 U/L (ref 39–117)
ALT SERPL W P-5'-P-CCNC: 68 U/L (ref 1–33)
ANION GAP SERPL CALCULATED.3IONS-SCNC: 9 MMOL/L (ref 5–15)
AST SERPL-CCNC: 77 U/L (ref 1–32)
BASOPHILS # BLD AUTO: 0.06 10*3/MM3 (ref 0–0.2)
BASOPHILS NFR BLD AUTO: 0.3 % (ref 0–1.5)
BILIRUB SERPL-MCNC: 0.7 MG/DL (ref 0–1.2)
BUN SERPL-MCNC: 36 MG/DL (ref 8–23)
BUN/CREAT SERPL: 55.4 (ref 7–25)
CALCIUM SPEC-SCNC: 9 MG/DL (ref 8.6–10.5)
CHLORIDE SERPL-SCNC: 95 MMOL/L (ref 98–107)
CK SERPL-CCNC: 172 U/L (ref 20–180)
CO2 SERPL-SCNC: 34 MMOL/L (ref 22–29)
CREAT SERPL-MCNC: 0.65 MG/DL (ref 0.57–1)
CRP SERPL-MCNC: 1.84 MG/DL (ref 0–0.5)
DEPRECATED RDW RBC AUTO: 39.3 FL (ref 37–54)
EOSINOPHIL # BLD AUTO: 0.15 10*3/MM3 (ref 0–0.4)
EOSINOPHIL NFR BLD AUTO: 0.8 % (ref 0.3–6.2)
ERYTHROCYTE [DISTWIDTH] IN BLOOD BY AUTOMATED COUNT: 11.8 % (ref 12.3–15.4)
FERRITIN SERPL-MCNC: 769.5 NG/ML (ref 13–150)
GFR SERPL CREATININE-BSD FRML MDRD: 91 ML/MIN/1.73
GLOBULIN UR ELPH-MCNC: 3.7 GM/DL
GLUCOSE BLDC GLUCOMTR-MCNC: 109 MG/DL (ref 70–130)
GLUCOSE BLDC GLUCOMTR-MCNC: 127 MG/DL (ref 70–130)
GLUCOSE BLDC GLUCOMTR-MCNC: 132 MG/DL (ref 70–130)
GLUCOSE BLDC GLUCOMTR-MCNC: 132 MG/DL (ref 70–130)
GLUCOSE BLDC GLUCOMTR-MCNC: 150 MG/DL (ref 70–130)
GLUCOSE SERPL-MCNC: 155 MG/DL (ref 65–99)
HCT VFR BLD AUTO: 39.5 % (ref 34–46.6)
HGB BLD-MCNC: 13.6 G/DL (ref 12–15.9)
IMM GRANULOCYTES # BLD AUTO: 0.62 10*3/MM3 (ref 0–0.05)
IMM GRANULOCYTES NFR BLD AUTO: 3.5 % (ref 0–0.5)
INR PPP: 1.09 (ref 0.91–1.09)
LDH SERPL-CCNC: 406 U/L (ref 135–214)
LYMPHOCYTES # BLD AUTO: 1.21 10*3/MM3 (ref 0.7–3.1)
LYMPHOCYTES NFR BLD AUTO: 6.8 % (ref 19.6–45.3)
MCH RBC QN AUTO: 31.4 PG (ref 26.6–33)
MCHC RBC AUTO-ENTMCNC: 34.4 G/DL (ref 31.5–35.7)
MCV RBC AUTO: 91.2 FL (ref 79–97)
MONOCYTES # BLD AUTO: 0.88 10*3/MM3 (ref 0.1–0.9)
MONOCYTES NFR BLD AUTO: 5 % (ref 5–12)
NEUTROPHILS NFR BLD AUTO: 14.77 10*3/MM3 (ref 1.7–7)
NEUTROPHILS NFR BLD AUTO: 83.6 % (ref 42.7–76)
NRBC BLD AUTO-RTO: 0 /100 WBC (ref 0–0.2)
PLATELET # BLD AUTO: 135 10*3/MM3 (ref 140–450)
PMV BLD AUTO: 12.2 FL (ref 6–12)
POTASSIUM SERPL-SCNC: 3.6 MMOL/L (ref 3.5–5.2)
PROCALCITONIN SERPL-MCNC: 0.2 NG/ML (ref 0–0.25)
PROT SERPL-MCNC: 6.7 G/DL (ref 6–8.5)
PROTHROMBIN TIME: 13.7 SECONDS (ref 11.9–14.6)
RBC # BLD AUTO: 4.33 10*6/MM3 (ref 3.77–5.28)
SODIUM SERPL-SCNC: 138 MMOL/L (ref 136–145)
WBC # BLD AUTO: 17.69 10*3/MM3 (ref 3.4–10.8)

## 2020-12-19 PROCEDURE — 83615 LACTATE (LD) (LDH) ENZYME: CPT | Performed by: INTERNAL MEDICINE

## 2020-12-19 PROCEDURE — 94799 UNLISTED PULMONARY SVC/PX: CPT

## 2020-12-19 PROCEDURE — 25010000002 FENTANYL CITRATE (PF) 2500 MCG/50ML SOLUTION: Performed by: FAMILY MEDICINE

## 2020-12-19 PROCEDURE — 82962 GLUCOSE BLOOD TEST: CPT

## 2020-12-19 PROCEDURE — 85025 COMPLETE CBC W/AUTO DIFF WBC: CPT | Performed by: INTERNAL MEDICINE

## 2020-12-19 PROCEDURE — 84145 PROCALCITONIN (PCT): CPT | Performed by: INTERNAL MEDICINE

## 2020-12-19 PROCEDURE — 82550 ASSAY OF CK (CPK): CPT | Performed by: INTERNAL MEDICINE

## 2020-12-19 PROCEDURE — 85610 PROTHROMBIN TIME: CPT | Performed by: INTERNAL MEDICINE

## 2020-12-19 PROCEDURE — 25010000002 ENOXAPARIN PER 10 MG: Performed by: INTERNAL MEDICINE

## 2020-12-19 PROCEDURE — 71045 X-RAY EXAM CHEST 1 VIEW: CPT

## 2020-12-19 PROCEDURE — 94770: CPT

## 2020-12-19 PROCEDURE — 86140 C-REACTIVE PROTEIN: CPT | Performed by: INTERNAL MEDICINE

## 2020-12-19 PROCEDURE — 87040 BLOOD CULTURE FOR BACTERIA: CPT | Performed by: INTERNAL MEDICINE

## 2020-12-19 PROCEDURE — 94003 VENT MGMT INPAT SUBQ DAY: CPT

## 2020-12-19 PROCEDURE — 25010000002 DEXAMETHASONE PER 1 MG: Performed by: FAMILY MEDICINE

## 2020-12-19 PROCEDURE — 25010000002 PIPERACILLIN SOD-TAZOBACTAM PER 1 G: Performed by: INTERNAL MEDICINE

## 2020-12-19 PROCEDURE — 82728 ASSAY OF FERRITIN: CPT | Performed by: INTERNAL MEDICINE

## 2020-12-19 PROCEDURE — 80053 COMPREHEN METABOLIC PANEL: CPT | Performed by: INTERNAL MEDICINE

## 2020-12-19 RX ORDER — HYDRALAZINE HYDROCHLORIDE 10 MG/1
10 TABLET, FILM COATED ORAL EVERY 6 HOURS PRN
Status: DISCONTINUED | OUTPATIENT
Start: 2020-12-19 | End: 2020-12-26

## 2020-12-19 RX ORDER — AMOXICILLIN 250 MG
1 CAPSULE ORAL 2 TIMES DAILY
Status: DISCONTINUED | OUTPATIENT
Start: 2020-12-19 | End: 2021-01-07

## 2020-12-19 RX ORDER — ACETAMINOPHEN 325 MG/1
650 TABLET ORAL EVERY 4 HOURS PRN
Status: DISCONTINUED | OUTPATIENT
Start: 2020-12-19 | End: 2021-01-11 | Stop reason: HOSPADM

## 2020-12-19 RX ORDER — ZINC SULFATE 50(220)MG
220 CAPSULE ORAL DAILY
Status: DISCONTINUED | OUTPATIENT
Start: 2020-12-19 | End: 2020-12-28

## 2020-12-19 RX ORDER — LEVOTHYROXINE SODIUM 0.12 MG/1
125 TABLET ORAL
Status: DISCONTINUED | OUTPATIENT
Start: 2020-12-20 | End: 2021-01-11 | Stop reason: HOSPADM

## 2020-12-19 RX ORDER — ACETAMINOPHEN 650 MG/1
650 SUPPOSITORY RECTAL EVERY 4 HOURS PRN
Status: DISCONTINUED | OUTPATIENT
Start: 2020-12-19 | End: 2021-01-08

## 2020-12-19 RX ORDER — ASCORBIC ACID 500 MG
500 TABLET ORAL 2 TIMES DAILY
Status: DISCONTINUED | OUTPATIENT
Start: 2020-12-19 | End: 2020-12-28

## 2020-12-19 RX ORDER — MELATONIN
2000 DAILY
Status: DISCONTINUED | OUTPATIENT
Start: 2020-12-19 | End: 2020-12-31

## 2020-12-19 RX ORDER — POLYETHYLENE GLYCOL 3350 17 G/17G
17 POWDER, FOR SOLUTION ORAL DAILY
Status: DISCONTINUED | OUTPATIENT
Start: 2020-12-19 | End: 2021-01-07

## 2020-12-19 RX ORDER — DEXMEDETOMIDINE HYDROCHLORIDE 4 UG/ML
.2-1.5 INJECTION, SOLUTION INTRAVENOUS
Status: DISCONTINUED | OUTPATIENT
Start: 2020-12-19 | End: 2020-12-31

## 2020-12-19 RX ORDER — ATORVASTATIN CALCIUM 10 MG/1
10 TABLET, FILM COATED ORAL NIGHTLY
Status: DISCONTINUED | OUTPATIENT
Start: 2020-12-19 | End: 2020-12-28

## 2020-12-19 RX ORDER — MORPHINE SULFATE 15 MG/1
15 TABLET ORAL EVERY 4 HOURS PRN
Status: DISPENSED | OUTPATIENT
Start: 2020-12-19 | End: 2020-12-24

## 2020-12-19 RX ADMIN — ALBUTEROL SULFATE 2 PUFF: 90 AEROSOL, METERED RESPIRATORY (INHALATION) at 14:03

## 2020-12-19 RX ADMIN — DEXAMETHASONE SODIUM PHOSPHATE 6 MG: 4 INJECTION, SOLUTION INTRA-ARTICULAR; INTRALESIONAL; INTRAMUSCULAR; INTRAVENOUS; SOFT TISSUE at 09:09

## 2020-12-19 RX ADMIN — Medication 500 MG: at 09:09

## 2020-12-19 RX ADMIN — MORPHINE SULFATE 15 MG: 15 TABLET ORAL at 00:23

## 2020-12-19 RX ADMIN — LORAZEPAM 1 MG: 2 SOLUTION, CONCENTRATE ORAL at 00:24

## 2020-12-19 RX ADMIN — CHOLECALCIFEROL (VITAMIN D3) 25 MCG (1,000 UNIT) TABLET 2000 UNITS: TABLET at 09:09

## 2020-12-19 RX ADMIN — TAZOBACTAM SODIUM AND PIPERACILLIN SODIUM 3.38 G: 375; 3 INJECTION, SOLUTION INTRAVENOUS at 02:37

## 2020-12-19 RX ADMIN — TAZOBACTAM SODIUM AND PIPERACILLIN SODIUM 3.38 G: 375; 3 INJECTION, SOLUTION INTRAVENOUS at 17:35

## 2020-12-19 RX ADMIN — ALBUTEROL SULFATE 2 PUFF: 90 AEROSOL, METERED RESPIRATORY (INHALATION) at 21:31

## 2020-12-19 RX ADMIN — DOCUSATE SODIUM 50 MG AND SENNOSIDES 8.6 MG 1 TABLET: 8.6; 5 TABLET, FILM COATED ORAL at 09:09

## 2020-12-19 RX ADMIN — Medication 1 PACKET: at 11:32

## 2020-12-19 RX ADMIN — BUDESONIDE AND FORMOTEROL FUMARATE DIHYDRATE 2 PUFF: 160; 4.5 AEROSOL RESPIRATORY (INHALATION) at 06:13

## 2020-12-19 RX ADMIN — FAMOTIDINE 20 MG: 10 INJECTION INTRAVENOUS at 20:20

## 2020-12-19 RX ADMIN — FENTANYL CITRATE 300 MCG/HR: 50 INJECTION, SOLUTION INTRAMUSCULAR; INTRAVENOUS at 21:49

## 2020-12-19 RX ADMIN — ATORVASTATIN CALCIUM 10 MG: 10 TABLET, FILM COATED ORAL at 20:21

## 2020-12-19 RX ADMIN — TAZOBACTAM SODIUM AND PIPERACILLIN SODIUM 3.38 G: 375; 3 INJECTION, SOLUTION INTRAVENOUS at 11:32

## 2020-12-19 RX ADMIN — ALBUTEROL SULFATE 2 PUFF: 90 AEROSOL, METERED RESPIRATORY (INHALATION) at 10:09

## 2020-12-19 RX ADMIN — LIDOCAINE 2 PATCH: 50 PATCH CUTANEOUS at 20:20

## 2020-12-19 RX ADMIN — POLYETHYLENE GLYCOL (3350) 17 G: 17 POWDER, FOR SOLUTION ORAL at 09:09

## 2020-12-19 RX ADMIN — LEVOTHYROXINE SODIUM 125 MCG: 125 TABLET ORAL at 05:59

## 2020-12-19 RX ADMIN — Medication 1 PACKET: at 17:34

## 2020-12-19 RX ADMIN — Medication 500 MG: at 20:21

## 2020-12-19 RX ADMIN — DEXMEDETOMIDINE HYDROCHLORIDE IN 0.9% SODIUM CHLORIDE 0.5 MCG/KG/HR: 4 INJECTION INTRAVENOUS at 21:48

## 2020-12-19 RX ADMIN — THIAMINE HCL TAB 100 MG 100 MG: 100 TAB at 09:09

## 2020-12-19 RX ADMIN — FENTANYL CITRATE 150 MCG/HR: 50 INJECTION, SOLUTION INTRAMUSCULAR; INTRAVENOUS at 06:35

## 2020-12-19 RX ADMIN — Medication 1 PACKET: at 20:42

## 2020-12-19 RX ADMIN — ZINC SULFATE 220 MG (50 MG) CAPSULE 220 MG: CAPSULE at 09:09

## 2020-12-19 RX ADMIN — ENOXAPARIN SODIUM 40 MG: 40 INJECTION SUBCUTANEOUS at 20:21

## 2020-12-19 RX ADMIN — FAMOTIDINE 20 MG: 10 INJECTION INTRAVENOUS at 09:10

## 2020-12-19 RX ADMIN — ALBUTEROL SULFATE 2 PUFF: 90 AEROSOL, METERED RESPIRATORY (INHALATION) at 06:13

## 2020-12-19 RX ADMIN — ENOXAPARIN SODIUM 40 MG: 40 INJECTION SUBCUTANEOUS at 09:09

## 2020-12-19 RX ADMIN — BUDESONIDE AND FORMOTEROL FUMARATE DIHYDRATE 2 PUFF: 160; 4.5 AEROSOL RESPIRATORY (INHALATION) at 21:31

## 2020-12-19 RX ADMIN — DOCUSATE SODIUM 50 MG AND SENNOSIDES 8.6 MG 1 TABLET: 8.6; 5 TABLET, FILM COATED ORAL at 20:21

## 2020-12-20 ENCOUNTER — APPOINTMENT (OUTPATIENT)
Dept: GENERAL RADIOLOGY | Facility: HOSPITAL | Age: 70
End: 2020-12-20

## 2020-12-20 LAB
ANION GAP SERPL CALCULATED.3IONS-SCNC: 8 MMOL/L (ref 5–15)
BUN SERPL-MCNC: 27 MG/DL (ref 8–23)
BUN/CREAT SERPL: 46.6 (ref 7–25)
CALCIUM SPEC-SCNC: 8.7 MG/DL (ref 8.6–10.5)
CHLORIDE SERPL-SCNC: 98 MMOL/L (ref 98–107)
CO2 SERPL-SCNC: 32 MMOL/L (ref 22–29)
CREAT SERPL-MCNC: 0.58 MG/DL (ref 0.57–1)
DEPRECATED RDW RBC AUTO: 40.1 FL (ref 37–54)
ERYTHROCYTE [DISTWIDTH] IN BLOOD BY AUTOMATED COUNT: 12 % (ref 12.3–15.4)
GFR SERPL CREATININE-BSD FRML MDRD: 103 ML/MIN/1.73
GLUCOSE BLDC GLUCOMTR-MCNC: 126 MG/DL (ref 70–130)
GLUCOSE BLDC GLUCOMTR-MCNC: 140 MG/DL (ref 70–130)
GLUCOSE SERPL-MCNC: 137 MG/DL (ref 65–99)
HCT VFR BLD AUTO: 38.1 % (ref 34–46.6)
HGB BLD-MCNC: 13.4 G/DL (ref 12–15.9)
LYMPHOCYTES # BLD MANUAL: 0.48 10*3/MM3 (ref 0.7–3.1)
LYMPHOCYTES NFR BLD MANUAL: 3 % (ref 19.6–45.3)
LYMPHOCYTES NFR BLD MANUAL: 4 % (ref 5–12)
MCH RBC QN AUTO: 32.1 PG (ref 26.6–33)
MCHC RBC AUTO-ENTMCNC: 35.2 G/DL (ref 31.5–35.7)
MCV RBC AUTO: 91.1 FL (ref 79–97)
MONOCYTES # BLD AUTO: 0.63 10*3/MM3 (ref 0.1–0.9)
NEUTROPHILS # BLD AUTO: 14.6 10*3/MM3 (ref 1.7–7)
NEUTROPHILS NFR BLD MANUAL: 92 % (ref 42.7–76)
PLAT MORPH BLD: NORMAL
PLATELET # BLD AUTO: 131 10*3/MM3 (ref 140–450)
PMV BLD AUTO: 11.5 FL (ref 6–12)
POTASSIUM SERPL-SCNC: 3.6 MMOL/L (ref 3.5–5.2)
RBC # BLD AUTO: 4.18 10*6/MM3 (ref 3.77–5.28)
RBC MORPH BLD: NORMAL
SODIUM SERPL-SCNC: 138 MMOL/L (ref 136–145)
VARIANT LYMPHS NFR BLD MANUAL: 1 % (ref 0–5)
WBC # BLD AUTO: 15.87 10*3/MM3 (ref 3.4–10.8)
WBC MORPH BLD: NORMAL

## 2020-12-20 PROCEDURE — 25010000002 FENTANYL 10 MCG/1 ML NS: Performed by: FAMILY MEDICINE

## 2020-12-20 PROCEDURE — 87070 CULTURE OTHR SPECIMN AEROBIC: CPT | Performed by: INTERNAL MEDICINE

## 2020-12-20 PROCEDURE — 85027 COMPLETE CBC AUTOMATED: CPT | Performed by: INTERNAL MEDICINE

## 2020-12-20 PROCEDURE — 85007 BL SMEAR W/DIFF WBC COUNT: CPT | Performed by: INTERNAL MEDICINE

## 2020-12-20 PROCEDURE — 25010000002 METOCLOPRAMIDE PER 10 MG: Performed by: INTERNAL MEDICINE

## 2020-12-20 PROCEDURE — 94799 UNLISTED PULMONARY SVC/PX: CPT

## 2020-12-20 PROCEDURE — 25010000002 PHENYLEPHRINE 10 MG/ML SOLUTION 5 ML VIAL: Performed by: INTERNAL MEDICINE

## 2020-12-20 PROCEDURE — 82962 GLUCOSE BLOOD TEST: CPT

## 2020-12-20 PROCEDURE — 74018 RADEX ABDOMEN 1 VIEW: CPT

## 2020-12-20 PROCEDURE — 25010000002 DEXAMETHASONE PER 1 MG: Performed by: FAMILY MEDICINE

## 2020-12-20 PROCEDURE — 94003 VENT MGMT INPAT SUBQ DAY: CPT

## 2020-12-20 PROCEDURE — 80048 BASIC METABOLIC PNL TOTAL CA: CPT | Performed by: INTERNAL MEDICINE

## 2020-12-20 PROCEDURE — 25010000002 ENOXAPARIN PER 10 MG: Performed by: INTERNAL MEDICINE

## 2020-12-20 PROCEDURE — 87205 SMEAR GRAM STAIN: CPT | Performed by: INTERNAL MEDICINE

## 2020-12-20 PROCEDURE — 25010000002 PIPERACILLIN SOD-TAZOBACTAM PER 1 G: Performed by: INTERNAL MEDICINE

## 2020-12-20 PROCEDURE — 94770: CPT

## 2020-12-20 RX ORDER — METOCLOPRAMIDE HYDROCHLORIDE 5 MG/ML
5 INJECTION INTRAMUSCULAR; INTRAVENOUS 3 TIMES DAILY
Status: COMPLETED | OUTPATIENT
Start: 2020-12-20 | End: 2020-12-21

## 2020-12-20 RX ADMIN — FAMOTIDINE 20 MG: 10 INJECTION INTRAVENOUS at 20:09

## 2020-12-20 RX ADMIN — BUDESONIDE AND FORMOTEROL FUMARATE DIHYDRATE 2 PUFF: 160; 4.5 AEROSOL RESPIRATORY (INHALATION) at 21:07

## 2020-12-20 RX ADMIN — Medication 500 MG: at 09:04

## 2020-12-20 RX ADMIN — DEXMEDETOMIDINE HYDROCHLORIDE IN 0.9% SODIUM CHLORIDE 0.5 MCG/KG/HR: 4 INJECTION INTRAVENOUS at 04:22

## 2020-12-20 RX ADMIN — POLYETHYLENE GLYCOL (3350) 17 G: 17 POWDER, FOR SOLUTION ORAL at 09:04

## 2020-12-20 RX ADMIN — ALBUTEROL SULFATE 2 PUFF: 90 AEROSOL, METERED RESPIRATORY (INHALATION) at 21:06

## 2020-12-20 RX ADMIN — ZINC SULFATE 220 MG (50 MG) CAPSULE 220 MG: CAPSULE at 09:03

## 2020-12-20 RX ADMIN — DEXAMETHASONE SODIUM PHOSPHATE 6 MG: 4 INJECTION, SOLUTION INTRA-ARTICULAR; INTRALESIONAL; INTRAMUSCULAR; INTRAVENOUS; SOFT TISSUE at 09:04

## 2020-12-20 RX ADMIN — ALBUTEROL SULFATE 2 PUFF: 90 AEROSOL, METERED RESPIRATORY (INHALATION) at 10:16

## 2020-12-20 RX ADMIN — METOCLOPRAMIDE 5 MG: 5 INJECTION, SOLUTION INTRAMUSCULAR; INTRAVENOUS at 20:09

## 2020-12-20 RX ADMIN — DOCUSATE SODIUM 50 MG AND SENNOSIDES 8.6 MG 1 TABLET: 8.6; 5 TABLET, FILM COATED ORAL at 20:09

## 2020-12-20 RX ADMIN — Medication 1 PACKET: at 09:05

## 2020-12-20 RX ADMIN — PHENYLEPHRINE HYDROCHLORIDE 0.5 MCG/KG/MIN: 10 INJECTION INTRAVENOUS at 02:03

## 2020-12-20 RX ADMIN — DOCUSATE SODIUM 50 MG AND SENNOSIDES 8.6 MG 1 TABLET: 8.6; 5 TABLET, FILM COATED ORAL at 09:04

## 2020-12-20 RX ADMIN — THIAMINE HCL TAB 100 MG 100 MG: 100 TAB at 09:04

## 2020-12-20 RX ADMIN — ENOXAPARIN SODIUM 40 MG: 40 INJECTION SUBCUTANEOUS at 09:04

## 2020-12-20 RX ADMIN — SODIUM CHLORIDE, PRESERVATIVE FREE 10 ML: 5 INJECTION INTRAVENOUS at 20:08

## 2020-12-20 RX ADMIN — TAZOBACTAM SODIUM AND PIPERACILLIN SODIUM 3.38 G: 375; 3 INJECTION, SOLUTION INTRAVENOUS at 09:03

## 2020-12-20 RX ADMIN — ALBUTEROL SULFATE 2 PUFF: 90 AEROSOL, METERED RESPIRATORY (INHALATION) at 14:16

## 2020-12-20 RX ADMIN — Medication 1 PACKET: at 15:32

## 2020-12-20 RX ADMIN — DEXMEDETOMIDINE HYDROCHLORIDE IN 0.9% SODIUM CHLORIDE 0.5 MCG/KG/HR: 4 INJECTION INTRAVENOUS at 20:08

## 2020-12-20 RX ADMIN — DEXMEDETOMIDINE HYDROCHLORIDE IN 0.9% SODIUM CHLORIDE 0.5 MCG/KG/HR: 4 INJECTION INTRAVENOUS at 11:20

## 2020-12-20 RX ADMIN — Medication 500 MG: at 20:09

## 2020-12-20 RX ADMIN — ATORVASTATIN CALCIUM 10 MG: 10 TABLET, FILM COATED ORAL at 20:09

## 2020-12-20 RX ADMIN — TAZOBACTAM SODIUM AND PIPERACILLIN SODIUM 3.38 G: 375; 3 INJECTION, SOLUTION INTRAVENOUS at 17:58

## 2020-12-20 RX ADMIN — BUDESONIDE AND FORMOTEROL FUMARATE DIHYDRATE 2 PUFF: 160; 4.5 AEROSOL RESPIRATORY (INHALATION) at 06:22

## 2020-12-20 RX ADMIN — METOCLOPRAMIDE 5 MG: 5 INJECTION, SOLUTION INTRAMUSCULAR; INTRAVENOUS at 15:30

## 2020-12-20 RX ADMIN — TAZOBACTAM SODIUM AND PIPERACILLIN SODIUM 3.38 G: 375; 3 INJECTION, SOLUTION INTRAVENOUS at 02:03

## 2020-12-20 RX ADMIN — FENTANYL CITRATE 50 MCG/HR: 50 INJECTION, SOLUTION INTRAMUSCULAR; INTRAVENOUS at 19:30

## 2020-12-20 RX ADMIN — CHOLECALCIFEROL (VITAMIN D3) 25 MCG (1,000 UNIT) TABLET 2000 UNITS: TABLET at 09:03

## 2020-12-20 RX ADMIN — METOCLOPRAMIDE 5 MG: 5 INJECTION, SOLUTION INTRAMUSCULAR; INTRAVENOUS at 11:19

## 2020-12-20 RX ADMIN — ALBUTEROL SULFATE 2 PUFF: 90 AEROSOL, METERED RESPIRATORY (INHALATION) at 06:22

## 2020-12-20 RX ADMIN — FAMOTIDINE 20 MG: 10 INJECTION INTRAVENOUS at 09:03

## 2020-12-20 RX ADMIN — ENOXAPARIN SODIUM 40 MG: 40 INJECTION SUBCUTANEOUS at 20:09

## 2020-12-21 ENCOUNTER — APPOINTMENT (OUTPATIENT)
Dept: GENERAL RADIOLOGY | Facility: HOSPITAL | Age: 70
End: 2020-12-21

## 2020-12-21 LAB
ARTERIAL PATENCY WRIST A: POSITIVE
ATMOSPHERIC PRESS: 749 MMHG
BASE EXCESS BLDA CALC-SCNC: 10.8 MMOL/L (ref 0–2)
BDY SITE: ABNORMAL
BODY TEMPERATURE: 37 C
GLUCOSE BLDC GLUCOMTR-MCNC: 111 MG/DL (ref 70–130)
GLUCOSE BLDC GLUCOMTR-MCNC: 119 MG/DL (ref 70–130)
GLUCOSE BLDC GLUCOMTR-MCNC: 123 MG/DL (ref 70–130)
GLUCOSE BLDC GLUCOMTR-MCNC: 142 MG/DL (ref 70–130)
GLUCOSE BLDC GLUCOMTR-MCNC: 146 MG/DL (ref 70–130)
HCO3 BLDA-SCNC: 35.7 MMOL/L (ref 20–26)
INHALED O2 CONCENTRATION: 60 %
Lab: ABNORMAL
MODALITY: ABNORMAL
PCO2 BLDA: 47.2 MM HG (ref 35–45)
PCO2 TEMP ADJ BLD: 47.2 MM HG (ref 35–45)
PEEP RESPIRATORY: 6 CM[H2O]
PH BLDA: 7.49 PH UNITS (ref 7.35–7.45)
PH, TEMP CORRECTED: 7.49 PH UNITS (ref 7.35–7.45)
PO2 BLDA: 78.2 MM HG (ref 83–108)
PO2 TEMP ADJ BLD: 78.2 MM HG (ref 83–108)
SAO2 % BLDCOA: 96.5 % (ref 94–99)
SET MECH RESP RATE: 10
VENTILATOR MODE: AC
VT ON VENT VENT: 500 ML

## 2020-12-21 PROCEDURE — 25010000002 FENTANYL CITRATE (PF) 2500 MCG/50ML SOLUTION: Performed by: FAMILY MEDICINE

## 2020-12-21 PROCEDURE — 36600 WITHDRAWAL OF ARTERIAL BLOOD: CPT

## 2020-12-21 PROCEDURE — 99233 SBSQ HOSP IP/OBS HIGH 50: CPT | Performed by: INTERNAL MEDICINE

## 2020-12-21 PROCEDURE — 94799 UNLISTED PULMONARY SVC/PX: CPT

## 2020-12-21 PROCEDURE — 25010000002 ENOXAPARIN PER 10 MG: Performed by: INTERNAL MEDICINE

## 2020-12-21 PROCEDURE — 82803 BLOOD GASES ANY COMBINATION: CPT

## 2020-12-21 PROCEDURE — 25010000002 METOCLOPRAMIDE PER 10 MG: Performed by: INTERNAL MEDICINE

## 2020-12-21 PROCEDURE — 82962 GLUCOSE BLOOD TEST: CPT

## 2020-12-21 PROCEDURE — 94770: CPT

## 2020-12-21 PROCEDURE — 25010000002 PIPERACILLIN SOD-TAZOBACTAM PER 1 G: Performed by: INTERNAL MEDICINE

## 2020-12-21 PROCEDURE — 71045 X-RAY EXAM CHEST 1 VIEW: CPT

## 2020-12-21 PROCEDURE — 94003 VENT MGMT INPAT SUBQ DAY: CPT

## 2020-12-21 PROCEDURE — 25010000002 DEXAMETHASONE PER 1 MG: Performed by: FAMILY MEDICINE

## 2020-12-21 RX ORDER — DEXAMETHASONE SODIUM PHOSPHATE 4 MG/ML
4 INJECTION, SOLUTION INTRA-ARTICULAR; INTRALESIONAL; INTRAMUSCULAR; INTRAVENOUS; SOFT TISSUE
Status: DISCONTINUED | OUTPATIENT
Start: 2020-12-22 | End: 2020-12-23

## 2020-12-21 RX ORDER — VECURONIUM BROMIDE 1 MG/ML
100 INJECTION, POWDER, LYOPHILIZED, FOR SOLUTION INTRAVENOUS ONCE
Status: DISCONTINUED | OUTPATIENT
Start: 2020-12-21 | End: 2020-12-25

## 2020-12-21 RX ADMIN — ALBUTEROL SULFATE 2 PUFF: 90 AEROSOL, METERED RESPIRATORY (INHALATION) at 14:16

## 2020-12-21 RX ADMIN — DEXAMETHASONE SODIUM PHOSPHATE 6 MG: 4 INJECTION, SOLUTION INTRA-ARTICULAR; INTRALESIONAL; INTRAMUSCULAR; INTRAVENOUS; SOFT TISSUE at 08:53

## 2020-12-21 RX ADMIN — POLYETHYLENE GLYCOL (3350) 17 G: 17 POWDER, FOR SOLUTION ORAL at 08:53

## 2020-12-21 RX ADMIN — LEVOTHYROXINE SODIUM 125 MCG: 125 TABLET ORAL at 05:07

## 2020-12-21 RX ADMIN — ZINC SULFATE 220 MG (50 MG) CAPSULE 220 MG: CAPSULE at 08:53

## 2020-12-21 RX ADMIN — DEXMEDETOMIDINE HYDROCHLORIDE IN 0.9% SODIUM CHLORIDE 0.5 MCG/KG/HR: 4 INJECTION INTRAVENOUS at 23:38

## 2020-12-21 RX ADMIN — CHOLECALCIFEROL (VITAMIN D3) 25 MCG (1,000 UNIT) TABLET 2000 UNITS: TABLET at 08:53

## 2020-12-21 RX ADMIN — FAMOTIDINE 20 MG: 10 INJECTION INTRAVENOUS at 21:10

## 2020-12-21 RX ADMIN — BUDESONIDE AND FORMOTEROL FUMARATE DIHYDRATE 2 PUFF: 160; 4.5 AEROSOL RESPIRATORY (INHALATION) at 18:51

## 2020-12-21 RX ADMIN — TAZOBACTAM SODIUM AND PIPERACILLIN SODIUM 3.38 G: 375; 3 INJECTION, SOLUTION INTRAVENOUS at 17:40

## 2020-12-21 RX ADMIN — MORPHINE SULFATE 15 MG: 15 TABLET ORAL at 13:10

## 2020-12-21 RX ADMIN — ALBUTEROL SULFATE 2 PUFF: 90 AEROSOL, METERED RESPIRATORY (INHALATION) at 06:26

## 2020-12-21 RX ADMIN — Medication 1 PACKET: at 08:52

## 2020-12-21 RX ADMIN — DOCUSATE SODIUM 50 MG AND SENNOSIDES 8.6 MG 1 TABLET: 8.6; 5 TABLET, FILM COATED ORAL at 08:53

## 2020-12-21 RX ADMIN — Medication 1 PACKET: at 17:54

## 2020-12-21 RX ADMIN — ENOXAPARIN SODIUM 40 MG: 40 INJECTION SUBCUTANEOUS at 08:52

## 2020-12-21 RX ADMIN — TAZOBACTAM SODIUM AND PIPERACILLIN SODIUM 3.38 G: 375; 3 INJECTION, SOLUTION INTRAVENOUS at 02:36

## 2020-12-21 RX ADMIN — ALBUTEROL SULFATE 2 PUFF: 90 AEROSOL, METERED RESPIRATORY (INHALATION) at 18:51

## 2020-12-21 RX ADMIN — ALBUTEROL SULFATE 2 PUFF: 90 AEROSOL, METERED RESPIRATORY (INHALATION) at 10:15

## 2020-12-21 RX ADMIN — LORAZEPAM 1 MG: 2 SOLUTION, CONCENTRATE ORAL at 13:10

## 2020-12-21 RX ADMIN — METOCLOPRAMIDE 5 MG: 5 INJECTION, SOLUTION INTRAMUSCULAR; INTRAVENOUS at 21:10

## 2020-12-21 RX ADMIN — FENTANYL CITRATE 100 MCG/HR: 50 INJECTION, SOLUTION INTRAMUSCULAR; INTRAVENOUS at 12:50

## 2020-12-21 RX ADMIN — SODIUM CHLORIDE, PRESERVATIVE FREE 10 ML: 5 INJECTION INTRAVENOUS at 21:09

## 2020-12-21 RX ADMIN — MORPHINE SULFATE 15 MG: 15 TABLET ORAL at 08:53

## 2020-12-21 RX ADMIN — DEXMEDETOMIDINE HYDROCHLORIDE IN 0.9% SODIUM CHLORIDE 0.5 MCG/KG/HR: 4 INJECTION INTRAVENOUS at 12:50

## 2020-12-21 RX ADMIN — Medication 500 MG: at 08:53

## 2020-12-21 RX ADMIN — THIAMINE HCL TAB 100 MG 100 MG: 100 TAB at 08:53

## 2020-12-21 RX ADMIN — BUDESONIDE AND FORMOTEROL FUMARATE DIHYDRATE 2 PUFF: 160; 4.5 AEROSOL RESPIRATORY (INHALATION) at 06:26

## 2020-12-21 RX ADMIN — METOCLOPRAMIDE 5 MG: 5 INJECTION, SOLUTION INTRAMUSCULAR; INTRAVENOUS at 08:53

## 2020-12-21 RX ADMIN — MINERAL OIL AND WHITE PETROLATUM: 150; 830 OINTMENT OPHTHALMIC at 15:14

## 2020-12-21 RX ADMIN — TAZOBACTAM SODIUM AND PIPERACILLIN SODIUM 3.38 G: 375; 3 INJECTION, SOLUTION INTRAVENOUS at 09:30

## 2020-12-21 RX ADMIN — ATORVASTATIN CALCIUM 10 MG: 10 TABLET, FILM COATED ORAL at 21:09

## 2020-12-21 RX ADMIN — ENOXAPARIN SODIUM 40 MG: 40 INJECTION SUBCUTANEOUS at 21:10

## 2020-12-21 RX ADMIN — FAMOTIDINE 20 MG: 10 INJECTION INTRAVENOUS at 08:53

## 2020-12-21 RX ADMIN — Medication 500 MG: at 21:09

## 2020-12-21 RX ADMIN — METOCLOPRAMIDE 5 MG: 5 INJECTION, SOLUTION INTRAMUSCULAR; INTRAVENOUS at 15:14

## 2020-12-22 PROBLEM — D69.6 THROMBOCYTOPENIA: Status: ACTIVE | Noted: 2020-12-22

## 2020-12-22 LAB
ALBUMIN SERPL-MCNC: 2.8 G/DL (ref 3.5–5.2)
ALBUMIN/GLOB SERPL: 1 G/DL
ALP SERPL-CCNC: 62 U/L (ref 39–117)
ALT SERPL W P-5'-P-CCNC: 58 U/L (ref 1–33)
ANION GAP SERPL CALCULATED.3IONS-SCNC: 8 MMOL/L (ref 5–15)
AST SERPL-CCNC: 40 U/L (ref 1–32)
BACTERIA SPEC RESP CULT: ABNORMAL
BACTERIA SPEC RESP CULT: ABNORMAL
BILIRUB SERPL-MCNC: 0.6 MG/DL (ref 0–1.2)
BUN SERPL-MCNC: 34 MG/DL (ref 8–23)
BUN/CREAT SERPL: 58.6 (ref 7–25)
CALCIUM SPEC-SCNC: 8.7 MG/DL (ref 8.6–10.5)
CHLORIDE SERPL-SCNC: 101 MMOL/L (ref 98–107)
CO2 SERPL-SCNC: 33 MMOL/L (ref 22–29)
CREAT SERPL-MCNC: 0.58 MG/DL (ref 0.57–1)
CRP SERPL-MCNC: 1.32 MG/DL (ref 0–0.5)
CYTOLOGIST CVX/VAG CYTO: NORMAL
D DIMER PPP FEU-MCNC: 0.96 MG/L (FEU) (ref 0–0.5)
DEPRECATED RDW RBC AUTO: 41.9 FL (ref 37–54)
ERYTHROCYTE [DISTWIDTH] IN BLOOD BY AUTOMATED COUNT: 12.1 % (ref 12.3–15.4)
FERRITIN SERPL-MCNC: 641.9 NG/ML (ref 13–150)
GFR SERPL CREATININE-BSD FRML MDRD: 103 ML/MIN/1.73
GLOBULIN UR ELPH-MCNC: 2.9 GM/DL
GLUCOSE BLDC GLUCOMTR-MCNC: 117 MG/DL (ref 70–130)
GLUCOSE BLDC GLUCOMTR-MCNC: 119 MG/DL (ref 70–130)
GLUCOSE BLDC GLUCOMTR-MCNC: 138 MG/DL (ref 70–130)
GLUCOSE SERPL-MCNC: 123 MG/DL (ref 65–99)
GRAM STN SPEC: ABNORMAL
HCT VFR BLD AUTO: 31.6 % (ref 34–46.6)
HGB BLD-MCNC: 10.9 G/DL (ref 12–15.9)
MCH RBC QN AUTO: 32.5 PG (ref 26.6–33)
MCHC RBC AUTO-ENTMCNC: 34.5 G/DL (ref 31.5–35.7)
MCV RBC AUTO: 94.3 FL (ref 79–97)
PATH INTERP BLD-IMP: NORMAL
PLATELET # BLD AUTO: 96 10*3/MM3 (ref 140–450)
PMV BLD AUTO: 12.3 FL (ref 6–12)
POTASSIUM SERPL-SCNC: 3.9 MMOL/L (ref 3.5–5.2)
PROCALCITONIN SERPL-MCNC: 0.23 NG/ML (ref 0–0.25)
PROT SERPL-MCNC: 5.7 G/DL (ref 6–8.5)
RBC # BLD AUTO: 3.35 10*6/MM3 (ref 3.77–5.28)
SODIUM SERPL-SCNC: 142 MMOL/L (ref 136–145)
WBC # BLD AUTO: 8.84 10*3/MM3 (ref 3.4–10.8)

## 2020-12-22 PROCEDURE — 94770: CPT

## 2020-12-22 PROCEDURE — 94003 VENT MGMT INPAT SUBQ DAY: CPT

## 2020-12-22 PROCEDURE — 86140 C-REACTIVE PROTEIN: CPT | Performed by: INTERNAL MEDICINE

## 2020-12-22 PROCEDURE — 84145 PROCALCITONIN (PCT): CPT | Performed by: INTERNAL MEDICINE

## 2020-12-22 PROCEDURE — 94799 UNLISTED PULMONARY SVC/PX: CPT

## 2020-12-22 PROCEDURE — 85027 COMPLETE CBC AUTOMATED: CPT | Performed by: INTERNAL MEDICINE

## 2020-12-22 PROCEDURE — 99233 SBSQ HOSP IP/OBS HIGH 50: CPT | Performed by: INTERNAL MEDICINE

## 2020-12-22 PROCEDURE — 25010000002 DEXAMETHASONE PER 1 MG: Performed by: INTERNAL MEDICINE

## 2020-12-22 PROCEDURE — 85060 BLOOD SMEAR INTERPRETATION: CPT | Performed by: INTERNAL MEDICINE

## 2020-12-22 PROCEDURE — 82962 GLUCOSE BLOOD TEST: CPT

## 2020-12-22 PROCEDURE — 80053 COMPREHEN METABOLIC PANEL: CPT | Performed by: INTERNAL MEDICINE

## 2020-12-22 PROCEDURE — 82728 ASSAY OF FERRITIN: CPT | Performed by: INTERNAL MEDICINE

## 2020-12-22 PROCEDURE — 25010000002 ENOXAPARIN PER 10 MG: Performed by: INTERNAL MEDICINE

## 2020-12-22 PROCEDURE — 25010000002 PIPERACILLIN SOD-TAZOBACTAM PER 1 G: Performed by: INTERNAL MEDICINE

## 2020-12-22 PROCEDURE — 85379 FIBRIN DEGRADATION QUANT: CPT | Performed by: INTERNAL MEDICINE

## 2020-12-22 RX ADMIN — ALBUTEROL SULFATE 2 PUFF: 90 AEROSOL, METERED RESPIRATORY (INHALATION) at 18:50

## 2020-12-22 RX ADMIN — DEXAMETHASONE SODIUM PHOSPHATE 4 MG: 4 INJECTION, SOLUTION INTRAMUSCULAR; INTRAVENOUS at 08:39

## 2020-12-22 RX ADMIN — MINERAL OIL AND WHITE PETROLATUM: 150; 830 OINTMENT OPHTHALMIC at 05:47

## 2020-12-22 RX ADMIN — TAZOBACTAM SODIUM AND PIPERACILLIN SODIUM 3.38 G: 375; 3 INJECTION, SOLUTION INTRAVENOUS at 02:58

## 2020-12-22 RX ADMIN — DOCUSATE SODIUM 50 MG AND SENNOSIDES 8.6 MG 1 TABLET: 8.6; 5 TABLET, FILM COATED ORAL at 20:18

## 2020-12-22 RX ADMIN — BUDESONIDE AND FORMOTEROL FUMARATE DIHYDRATE 2 PUFF: 160; 4.5 AEROSOL RESPIRATORY (INHALATION) at 06:20

## 2020-12-22 RX ADMIN — Medication 500 MG: at 08:39

## 2020-12-22 RX ADMIN — BUDESONIDE AND FORMOTEROL FUMARATE DIHYDRATE 2 PUFF: 160; 4.5 AEROSOL RESPIRATORY (INHALATION) at 18:53

## 2020-12-22 RX ADMIN — MINERAL OIL AND WHITE PETROLATUM: 150; 830 OINTMENT OPHTHALMIC at 16:17

## 2020-12-22 RX ADMIN — DEXMEDETOMIDINE HYDROCHLORIDE IN 0.9% SODIUM CHLORIDE 0.4 MCG/KG/HR: 4 INJECTION INTRAVENOUS at 16:17

## 2020-12-22 RX ADMIN — ATORVASTATIN CALCIUM 10 MG: 10 TABLET, FILM COATED ORAL at 20:15

## 2020-12-22 RX ADMIN — MORPHINE SULFATE 15 MG: 15 TABLET ORAL at 08:39

## 2020-12-22 RX ADMIN — TAZOBACTAM SODIUM AND PIPERACILLIN SODIUM 3.38 G: 375; 3 INJECTION, SOLUTION INTRAVENOUS at 09:01

## 2020-12-22 RX ADMIN — ALBUTEROL SULFATE 2 PUFF: 90 AEROSOL, METERED RESPIRATORY (INHALATION) at 14:13

## 2020-12-22 RX ADMIN — MINERAL OIL AND WHITE PETROLATUM: 150; 830 OINTMENT OPHTHALMIC at 08:59

## 2020-12-22 RX ADMIN — MINERAL OIL AND WHITE PETROLATUM: 150; 830 OINTMENT OPHTHALMIC at 20:17

## 2020-12-22 RX ADMIN — ALBUTEROL SULFATE 2 PUFF: 90 AEROSOL, METERED RESPIRATORY (INHALATION) at 06:20

## 2020-12-22 RX ADMIN — ZINC SULFATE 220 MG (50 MG) CAPSULE 220 MG: CAPSULE at 08:39

## 2020-12-22 RX ADMIN — MINERAL OIL AND WHITE PETROLATUM: 150; 830 OINTMENT OPHTHALMIC at 02:18

## 2020-12-22 RX ADMIN — ENOXAPARIN SODIUM 40 MG: 40 INJECTION SUBCUTANEOUS at 08:39

## 2020-12-22 RX ADMIN — CHOLECALCIFEROL (VITAMIN D3) 25 MCG (1,000 UNIT) TABLET 2000 UNITS: TABLET at 08:39

## 2020-12-22 RX ADMIN — DEXMEDETOMIDINE HYDROCHLORIDE IN 0.9% SODIUM CHLORIDE 0.5 MCG/KG/HR: 4 INJECTION INTRAVENOUS at 05:48

## 2020-12-22 RX ADMIN — Medication 1 PACKET: at 08:39

## 2020-12-22 RX ADMIN — FAMOTIDINE 20 MG: 10 INJECTION INTRAVENOUS at 20:17

## 2020-12-22 RX ADMIN — Medication 1 PACKET: at 15:48

## 2020-12-22 RX ADMIN — LEVOTHYROXINE SODIUM 125 MCG: 125 TABLET ORAL at 05:47

## 2020-12-22 RX ADMIN — Medication 500 MG: at 20:17

## 2020-12-22 RX ADMIN — LORAZEPAM 1 MG: 2 SOLUTION, CONCENTRATE ORAL at 08:39

## 2020-12-22 RX ADMIN — THIAMINE HCL TAB 100 MG 100 MG: 100 TAB at 08:39

## 2020-12-22 RX ADMIN — ALBUTEROL SULFATE 2 PUFF: 90 AEROSOL, METERED RESPIRATORY (INHALATION) at 10:15

## 2020-12-22 RX ADMIN — MORPHINE SULFATE 15 MG: 15 TABLET ORAL at 15:48

## 2020-12-22 RX ADMIN — FAMOTIDINE 20 MG: 10 INJECTION INTRAVENOUS at 08:39

## 2020-12-23 LAB
ARTERIAL PATENCY WRIST A: ABNORMAL
ATMOSPHERIC PRESS: 750 MMHG
BASE EXCESS BLDA CALC-SCNC: 10.2 MMOL/L (ref 0–2)
BDY SITE: ABNORMAL
BODY TEMPERATURE: 37 C
DEPRECATED RDW RBC AUTO: 41.3 FL (ref 37–54)
ERYTHROCYTE [DISTWIDTH] IN BLOOD BY AUTOMATED COUNT: 12.1 % (ref 12.3–15.4)
GLUCOSE BLDC GLUCOMTR-MCNC: 105 MG/DL (ref 70–130)
GLUCOSE BLDC GLUCOMTR-MCNC: 105 MG/DL (ref 70–130)
GLUCOSE BLDC GLUCOMTR-MCNC: 120 MG/DL (ref 70–130)
GLUCOSE BLDC GLUCOMTR-MCNC: 133 MG/DL (ref 70–130)
HCO3 BLDA-SCNC: 34.2 MMOL/L (ref 20–26)
HCT VFR BLD AUTO: 31.6 % (ref 34–46.6)
HGB BLD-MCNC: 11 G/DL (ref 12–15.9)
INHALED O2 CONCENTRATION: 45 %
Lab: ABNORMAL
MCH RBC QN AUTO: 32.4 PG (ref 26.6–33)
MCHC RBC AUTO-ENTMCNC: 34.8 G/DL (ref 31.5–35.7)
MCV RBC AUTO: 93.2 FL (ref 79–97)
MODALITY: ABNORMAL
PCO2 BLDA: 42.5 MM HG (ref 35–45)
PCO2 TEMP ADJ BLD: 42.5 MM HG (ref 35–45)
PEEP RESPIRATORY: 5 CM[H2O]
PH BLDA: 7.51 PH UNITS (ref 7.35–7.45)
PH, TEMP CORRECTED: 7.51 PH UNITS (ref 7.35–7.45)
PLATELET # BLD AUTO: 81 10*3/MM3 (ref 140–450)
PMV BLD AUTO: 11.6 FL (ref 6–12)
PO2 BLDA: 64.8 MM HG (ref 83–108)
PO2 TEMP ADJ BLD: 64.8 MM HG (ref 83–108)
RBC # BLD AUTO: 3.39 10*6/MM3 (ref 3.77–5.28)
SAO2 % BLDCOA: 94.6 % (ref 94–99)
SET MECH RESP RATE: 10
VENTILATOR MODE: AC
VT ON VENT VENT: 500 ML
WBC # BLD AUTO: 8.83 10*3/MM3 (ref 3.4–10.8)

## 2020-12-23 PROCEDURE — 99233 SBSQ HOSP IP/OBS HIGH 50: CPT | Performed by: INTERNAL MEDICINE

## 2020-12-23 PROCEDURE — 82962 GLUCOSE BLOOD TEST: CPT

## 2020-12-23 PROCEDURE — 94799 UNLISTED PULMONARY SVC/PX: CPT

## 2020-12-23 PROCEDURE — 94003 VENT MGMT INPAT SUBQ DAY: CPT

## 2020-12-23 PROCEDURE — 36600 WITHDRAWAL OF ARTERIAL BLOOD: CPT

## 2020-12-23 PROCEDURE — 82803 BLOOD GASES ANY COMBINATION: CPT

## 2020-12-23 PROCEDURE — 85027 COMPLETE CBC AUTOMATED: CPT | Performed by: INTERNAL MEDICINE

## 2020-12-23 PROCEDURE — 94770: CPT

## 2020-12-23 PROCEDURE — 25010000002 ENOXAPARIN PER 10 MG: Performed by: INTERNAL MEDICINE

## 2020-12-23 RX ORDER — DEXAMETHASONE SODIUM PHOSPHATE 4 MG/ML
2 INJECTION, SOLUTION INTRA-ARTICULAR; INTRALESIONAL; INTRAMUSCULAR; INTRAVENOUS; SOFT TISSUE
Status: DISCONTINUED | OUTPATIENT
Start: 2020-12-24 | End: 2020-12-28

## 2020-12-23 RX ADMIN — ATORVASTATIN CALCIUM 10 MG: 10 TABLET, FILM COATED ORAL at 20:20

## 2020-12-23 RX ADMIN — FAMOTIDINE 20 MG: 10 INJECTION INTRAVENOUS at 20:19

## 2020-12-23 RX ADMIN — MINERAL OIL AND WHITE PETROLATUM: 150; 830 OINTMENT OPHTHALMIC at 14:22

## 2020-12-23 RX ADMIN — FAMOTIDINE 20 MG: 10 INJECTION INTRAVENOUS at 09:00

## 2020-12-23 RX ADMIN — POLYETHYLENE GLYCOL (3350) 17 G: 17 POWDER, FOR SOLUTION ORAL at 09:00

## 2020-12-23 RX ADMIN — Medication 500 MG: at 09:00

## 2020-12-23 RX ADMIN — BUDESONIDE AND FORMOTEROL FUMARATE DIHYDRATE 2 PUFF: 160; 4.5 AEROSOL RESPIRATORY (INHALATION) at 20:09

## 2020-12-23 RX ADMIN — ALBUTEROL SULFATE 2 PUFF: 90 AEROSOL, METERED RESPIRATORY (INHALATION) at 10:10

## 2020-12-23 RX ADMIN — LEVOTHYROXINE SODIUM 125 MCG: 125 TABLET ORAL at 06:12

## 2020-12-23 RX ADMIN — Medication 1 PACKET: at 09:00

## 2020-12-23 RX ADMIN — BUDESONIDE AND FORMOTEROL FUMARATE DIHYDRATE 2 PUFF: 160; 4.5 AEROSOL RESPIRATORY (INHALATION) at 06:46

## 2020-12-23 RX ADMIN — ENOXAPARIN SODIUM 40 MG: 40 INJECTION SUBCUTANEOUS at 09:00

## 2020-12-23 RX ADMIN — MINERAL OIL AND WHITE PETROLATUM: 150; 830 OINTMENT OPHTHALMIC at 09:01

## 2020-12-23 RX ADMIN — DEXMEDETOMIDINE HYDROCHLORIDE IN 0.9% SODIUM CHLORIDE 0.4 MCG/KG/HR: 4 INJECTION INTRAVENOUS at 23:29

## 2020-12-23 RX ADMIN — MORPHINE SULFATE 15 MG: 15 TABLET ORAL at 20:19

## 2020-12-23 RX ADMIN — DEXMEDETOMIDINE HYDROCHLORIDE IN 0.9% SODIUM CHLORIDE 0.4 MCG/KG/HR: 4 INJECTION INTRAVENOUS at 04:01

## 2020-12-23 RX ADMIN — ZINC SULFATE 220 MG (50 MG) CAPSULE 220 MG: CAPSULE at 09:00

## 2020-12-23 RX ADMIN — DEXMEDETOMIDINE HYDROCHLORIDE IN 0.9% SODIUM CHLORIDE 0.4 MCG/KG/HR: 4 INJECTION INTRAVENOUS at 14:22

## 2020-12-23 RX ADMIN — CHOLECALCIFEROL (VITAMIN D3) 25 MCG (1,000 UNIT) TABLET 2000 UNITS: TABLET at 09:00

## 2020-12-23 RX ADMIN — ALBUTEROL SULFATE 2 PUFF: 90 AEROSOL, METERED RESPIRATORY (INHALATION) at 13:52

## 2020-12-23 RX ADMIN — DOCUSATE SODIUM 50 MG AND SENNOSIDES 8.6 MG 1 TABLET: 8.6; 5 TABLET, FILM COATED ORAL at 09:00

## 2020-12-23 RX ADMIN — ALBUTEROL SULFATE 2 PUFF: 90 AEROSOL, METERED RESPIRATORY (INHALATION) at 06:46

## 2020-12-23 RX ADMIN — Medication 500 MG: at 20:19

## 2020-12-23 RX ADMIN — MINERAL OIL AND WHITE PETROLATUM: 150; 830 OINTMENT OPHTHALMIC at 06:12

## 2020-12-23 RX ADMIN — ALBUTEROL SULFATE 2 PUFF: 90 AEROSOL, METERED RESPIRATORY (INHALATION) at 20:09

## 2020-12-23 RX ADMIN — THIAMINE HCL TAB 100 MG 100 MG: 100 TAB at 09:00

## 2020-12-23 RX ADMIN — DOCUSATE SODIUM 50 MG AND SENNOSIDES 8.6 MG 1 TABLET: 8.6; 5 TABLET, FILM COATED ORAL at 20:20

## 2020-12-23 RX ADMIN — MINERAL OIL AND WHITE PETROLATUM: 150; 830 OINTMENT OPHTHALMIC at 03:35

## 2020-12-23 RX ADMIN — SODIUM CHLORIDE, PRESERVATIVE FREE 10 ML: 5 INJECTION INTRAVENOUS at 09:00

## 2020-12-24 LAB
BACTERIA SPEC AEROBE CULT: NORMAL
BACTERIA SPEC AEROBE CULT: NORMAL
BASOPHILS # BLD AUTO: 0.02 10*3/MM3 (ref 0–0.2)
BASOPHILS NFR BLD AUTO: 0.2 % (ref 0–1.5)
DEPRECATED RDW RBC AUTO: 41.4 FL (ref 37–54)
EOSINOPHIL # BLD AUTO: 0.19 10*3/MM3 (ref 0–0.4)
EOSINOPHIL NFR BLD AUTO: 1.8 % (ref 0.3–6.2)
ERYTHROCYTE [DISTWIDTH] IN BLOOD BY AUTOMATED COUNT: 12.3 % (ref 12.3–15.4)
GLUCOSE BLDC GLUCOMTR-MCNC: 148 MG/DL (ref 70–130)
GLUCOSE BLDC GLUCOMTR-MCNC: 169 MG/DL (ref 70–130)
GLUCOSE BLDC GLUCOMTR-MCNC: 188 MG/DL (ref 70–130)
HCT VFR BLD AUTO: 31.1 % (ref 34–46.6)
HGB BLD-MCNC: 11 G/DL (ref 12–15.9)
IMM GRANULOCYTES # BLD AUTO: 0.06 10*3/MM3 (ref 0–0.05)
IMM GRANULOCYTES NFR BLD AUTO: 0.6 % (ref 0–0.5)
LYMPHOCYTES # BLD AUTO: 1.15 10*3/MM3 (ref 0.7–3.1)
LYMPHOCYTES NFR BLD AUTO: 10.6 % (ref 19.6–45.3)
MCH RBC QN AUTO: 32.7 PG (ref 26.6–33)
MCHC RBC AUTO-ENTMCNC: 35.4 G/DL (ref 31.5–35.7)
MCV RBC AUTO: 92.6 FL (ref 79–97)
MONOCYTES # BLD AUTO: 0.59 10*3/MM3 (ref 0.1–0.9)
MONOCYTES NFR BLD AUTO: 5.5 % (ref 5–12)
NEUTROPHILS NFR BLD AUTO: 8.8 10*3/MM3 (ref 1.7–7)
NEUTROPHILS NFR BLD AUTO: 81.3 % (ref 42.7–76)
NRBC BLD AUTO-RTO: 0 /100 WBC (ref 0–0.2)
PLATELET # BLD AUTO: 80 10*3/MM3 (ref 140–450)
PMV BLD AUTO: 11.6 FL (ref 6–12)
RBC # BLD AUTO: 3.36 10*6/MM3 (ref 3.77–5.28)
WBC # BLD AUTO: 10.81 10*3/MM3 (ref 3.4–10.8)

## 2020-12-24 PROCEDURE — 25010000002 ENOXAPARIN PER 10 MG: Performed by: INTERNAL MEDICINE

## 2020-12-24 PROCEDURE — 94799 UNLISTED PULMONARY SVC/PX: CPT

## 2020-12-24 PROCEDURE — 94003 VENT MGMT INPAT SUBQ DAY: CPT

## 2020-12-24 PROCEDURE — 85025 COMPLETE CBC W/AUTO DIFF WBC: CPT | Performed by: INTERNAL MEDICINE

## 2020-12-24 PROCEDURE — 99233 SBSQ HOSP IP/OBS HIGH 50: CPT | Performed by: INTERNAL MEDICINE

## 2020-12-24 PROCEDURE — 63710000001 INSULIN LISPRO (HUMAN) PER 5 UNITS: Performed by: FAMILY MEDICINE

## 2020-12-24 PROCEDURE — 25010000002 DEXAMETHASONE PER 1 MG: Performed by: NURSE PRACTITIONER

## 2020-12-24 PROCEDURE — 82962 GLUCOSE BLOOD TEST: CPT

## 2020-12-24 PROCEDURE — 94770: CPT

## 2020-12-24 RX ORDER — MORPHINE SULFATE 15 MG/1
15 TABLET ORAL EVERY 4 HOURS PRN
Status: DISCONTINUED | OUTPATIENT
Start: 2020-12-24 | End: 2020-12-31

## 2020-12-24 RX ORDER — LORAZEPAM 2 MG/ML
1 CONCENTRATE ORAL EVERY 4 HOURS PRN
Status: DISCONTINUED | OUTPATIENT
Start: 2020-12-24 | End: 2020-12-30

## 2020-12-24 RX ADMIN — ALBUTEROL SULFATE 2 PUFF: 90 AEROSOL, METERED RESPIRATORY (INHALATION) at 14:56

## 2020-12-24 RX ADMIN — ALBUTEROL SULFATE 2 PUFF: 90 AEROSOL, METERED RESPIRATORY (INHALATION) at 09:58

## 2020-12-24 RX ADMIN — ATORVASTATIN CALCIUM 10 MG: 10 TABLET, FILM COATED ORAL at 20:36

## 2020-12-24 RX ADMIN — DEXMEDETOMIDINE HYDROCHLORIDE IN 0.9% SODIUM CHLORIDE 0.4 MCG/KG/HR: 4 INJECTION INTRAVENOUS at 15:02

## 2020-12-24 RX ADMIN — Medication 500 MG: at 20:36

## 2020-12-24 RX ADMIN — BUDESONIDE AND FORMOTEROL FUMARATE DIHYDRATE 2 PUFF: 160; 4.5 AEROSOL RESPIRATORY (INHALATION) at 18:50

## 2020-12-24 RX ADMIN — ALBUTEROL SULFATE 2 PUFF: 90 AEROSOL, METERED RESPIRATORY (INHALATION) at 18:50

## 2020-12-24 RX ADMIN — FAMOTIDINE 20 MG: 10 INJECTION INTRAVENOUS at 20:36

## 2020-12-24 RX ADMIN — LEVOTHYROXINE SODIUM 125 MCG: 125 TABLET ORAL at 05:37

## 2020-12-24 RX ADMIN — MINERAL OIL AND WHITE PETROLATUM: 150; 830 OINTMENT OPHTHALMIC at 09:04

## 2020-12-24 RX ADMIN — Medication 1 PACKET: at 11:43

## 2020-12-24 RX ADMIN — ALBUTEROL SULFATE 2 PUFF: 90 AEROSOL, METERED RESPIRATORY (INHALATION) at 06:48

## 2020-12-24 RX ADMIN — CHOLECALCIFEROL (VITAMIN D3) 25 MCG (1,000 UNIT) TABLET 2000 UNITS: TABLET at 09:02

## 2020-12-24 RX ADMIN — INSULIN LISPRO 2 UNITS: 100 INJECTION, SOLUTION INTRAVENOUS; SUBCUTANEOUS at 12:01

## 2020-12-24 RX ADMIN — LORAZEPAM 1 MG: 2 LIQUID ORAL at 20:46

## 2020-12-24 RX ADMIN — BUDESONIDE AND FORMOTEROL FUMARATE DIHYDRATE 2 PUFF: 160; 4.5 AEROSOL RESPIRATORY (INHALATION) at 06:48

## 2020-12-24 RX ADMIN — FAMOTIDINE 20 MG: 10 INJECTION INTRAVENOUS at 09:01

## 2020-12-24 RX ADMIN — MORPHINE SULFATE 15 MG: 15 TABLET ORAL at 12:01

## 2020-12-24 RX ADMIN — MORPHINE SULFATE 15 MG: 15 TABLET ORAL at 20:43

## 2020-12-24 RX ADMIN — THIAMINE HCL TAB 100 MG 100 MG: 100 TAB at 09:01

## 2020-12-24 RX ADMIN — NOREPINEPHRINE BITARTRATE 0.02 MCG/KG/MIN: 1 INJECTION, SOLUTION, CONCENTRATE INTRAVENOUS at 22:34

## 2020-12-24 RX ADMIN — ZINC SULFATE 220 MG (50 MG) CAPSULE 220 MG: CAPSULE at 09:01

## 2020-12-24 RX ADMIN — Medication 1 PACKET: at 18:55

## 2020-12-24 RX ADMIN — DEXMEDETOMIDINE HYDROCHLORIDE IN 0.9% SODIUM CHLORIDE 0.9 MCG/KG/HR: 4 INJECTION INTRAVENOUS at 22:57

## 2020-12-24 RX ADMIN — ENOXAPARIN SODIUM 40 MG: 40 INJECTION SUBCUTANEOUS at 09:01

## 2020-12-24 RX ADMIN — MINERAL OIL AND WHITE PETROLATUM 1 APPLICATION: 150; 830 OINTMENT OPHTHALMIC at 20:54

## 2020-12-24 RX ADMIN — SODIUM CHLORIDE, PRESERVATIVE FREE 10 ML: 5 INJECTION INTRAVENOUS at 20:36

## 2020-12-24 RX ADMIN — INSULIN LISPRO 2 UNITS: 100 INJECTION, SOLUTION INTRAVENOUS; SUBCUTANEOUS at 05:46

## 2020-12-24 RX ADMIN — DEXAMETHASONE SODIUM PHOSPHATE 2 MG: 4 INJECTION, SOLUTION INTRAMUSCULAR; INTRAVENOUS at 09:01

## 2020-12-24 RX ADMIN — Medication 500 MG: at 11:44

## 2020-12-24 RX ADMIN — MINERAL OIL AND WHITE PETROLATUM: 150; 830 OINTMENT OPHTHALMIC at 16:34

## 2020-12-25 LAB
ANION GAP SERPL CALCULATED.3IONS-SCNC: 8 MMOL/L (ref 5–15)
BUN SERPL-MCNC: 21 MG/DL (ref 8–23)
BUN/CREAT SERPL: 48.8 (ref 7–25)
CALCIUM SPEC-SCNC: 8.8 MG/DL (ref 8.6–10.5)
CHLORIDE SERPL-SCNC: 106 MMOL/L (ref 98–107)
CO2 SERPL-SCNC: 27 MMOL/L (ref 22–29)
CREAT SERPL-MCNC: 0.43 MG/DL (ref 0.57–1)
CRP SERPL-MCNC: 2.49 MG/DL (ref 0–0.5)
D DIMER PPP FEU-MCNC: 2.24 MG/L (FEU) (ref 0–0.5)
DEPRECATED RDW RBC AUTO: 40 FL (ref 37–54)
ERYTHROCYTE [DISTWIDTH] IN BLOOD BY AUTOMATED COUNT: 12.1 % (ref 12.3–15.4)
FERRITIN SERPL-MCNC: 586.9 NG/ML (ref 13–150)
GFR SERPL CREATININE-BSD FRML MDRD: 146 ML/MIN/1.73
GLUCOSE BLDC GLUCOMTR-MCNC: 116 MG/DL (ref 70–130)
GLUCOSE BLDC GLUCOMTR-MCNC: 117 MG/DL (ref 70–130)
GLUCOSE BLDC GLUCOMTR-MCNC: 124 MG/DL (ref 70–130)
GLUCOSE BLDC GLUCOMTR-MCNC: 140 MG/DL (ref 70–130)
GLUCOSE BLDC GLUCOMTR-MCNC: 152 MG/DL (ref 70–130)
GLUCOSE SERPL-MCNC: 132 MG/DL (ref 65–99)
HCT VFR BLD AUTO: 35.4 % (ref 34–46.6)
HGB BLD-MCNC: 12.7 G/DL (ref 12–15.9)
MCH RBC QN AUTO: 32.7 PG (ref 26.6–33)
MCHC RBC AUTO-ENTMCNC: 35.9 G/DL (ref 31.5–35.7)
MCV RBC AUTO: 91.2 FL (ref 79–97)
PLATELET # BLD AUTO: 119 10*3/MM3 (ref 140–450)
PMV BLD AUTO: 11.7 FL (ref 6–12)
POTASSIUM SERPL-SCNC: 3.7 MMOL/L (ref 3.5–5.2)
PROCALCITONIN SERPL-MCNC: 0.17 NG/ML (ref 0–0.25)
RBC # BLD AUTO: 3.88 10*6/MM3 (ref 3.77–5.28)
SODIUM SERPL-SCNC: 141 MMOL/L (ref 136–145)
WBC # BLD AUTO: 17.32 10*3/MM3 (ref 3.4–10.8)

## 2020-12-25 PROCEDURE — 82728 ASSAY OF FERRITIN: CPT | Performed by: INTERNAL MEDICINE

## 2020-12-25 PROCEDURE — 80048 BASIC METABOLIC PNL TOTAL CA: CPT | Performed by: INTERNAL MEDICINE

## 2020-12-25 PROCEDURE — 25010000002 ENOXAPARIN PER 10 MG: Performed by: INTERNAL MEDICINE

## 2020-12-25 PROCEDURE — 94799 UNLISTED PULMONARY SVC/PX: CPT

## 2020-12-25 PROCEDURE — 99233 SBSQ HOSP IP/OBS HIGH 50: CPT | Performed by: INTERNAL MEDICINE

## 2020-12-25 PROCEDURE — 82962 GLUCOSE BLOOD TEST: CPT

## 2020-12-25 PROCEDURE — 84145 PROCALCITONIN (PCT): CPT | Performed by: INTERNAL MEDICINE

## 2020-12-25 PROCEDURE — 85379 FIBRIN DEGRADATION QUANT: CPT | Performed by: INTERNAL MEDICINE

## 2020-12-25 PROCEDURE — 94003 VENT MGMT INPAT SUBQ DAY: CPT

## 2020-12-25 PROCEDURE — 94770: CPT

## 2020-12-25 PROCEDURE — 86140 C-REACTIVE PROTEIN: CPT | Performed by: INTERNAL MEDICINE

## 2020-12-25 PROCEDURE — 25010000002 DEXAMETHASONE PER 1 MG: Performed by: NURSE PRACTITIONER

## 2020-12-25 PROCEDURE — 85027 COMPLETE CBC AUTOMATED: CPT | Performed by: INTERNAL MEDICINE

## 2020-12-25 RX ADMIN — ALBUTEROL SULFATE 2 PUFF: 90 AEROSOL, METERED RESPIRATORY (INHALATION) at 14:40

## 2020-12-25 RX ADMIN — LEVOTHYROXINE SODIUM 125 MCG: 125 TABLET ORAL at 05:21

## 2020-12-25 RX ADMIN — SODIUM CHLORIDE, PRESERVATIVE FREE 10 ML: 5 INJECTION INTRAVENOUS at 08:22

## 2020-12-25 RX ADMIN — FAMOTIDINE 20 MG: 10 INJECTION INTRAVENOUS at 20:06

## 2020-12-25 RX ADMIN — MINERAL OIL AND WHITE PETROLATUM 1 APPLICATION: 150; 830 OINTMENT OPHTHALMIC at 01:31

## 2020-12-25 RX ADMIN — BUDESONIDE AND FORMOTEROL FUMARATE DIHYDRATE 2 PUFF: 160; 4.5 AEROSOL RESPIRATORY (INHALATION) at 06:58

## 2020-12-25 RX ADMIN — ATORVASTATIN CALCIUM 10 MG: 10 TABLET, FILM COATED ORAL at 20:06

## 2020-12-25 RX ADMIN — Medication 500 MG: at 08:22

## 2020-12-25 RX ADMIN — LORAZEPAM 1 MG: 2 LIQUID ORAL at 17:37

## 2020-12-25 RX ADMIN — BUDESONIDE AND FORMOTEROL FUMARATE DIHYDRATE 2 PUFF: 160; 4.5 AEROSOL RESPIRATORY (INHALATION) at 18:56

## 2020-12-25 RX ADMIN — MORPHINE SULFATE 15 MG: 15 TABLET ORAL at 15:08

## 2020-12-25 RX ADMIN — DEXMEDETOMIDINE HYDROCHLORIDE IN 0.9% SODIUM CHLORIDE 0.5 MCG/KG/HR: 4 INJECTION INTRAVENOUS at 22:28

## 2020-12-25 RX ADMIN — Medication 500 MG: at 20:06

## 2020-12-25 RX ADMIN — DEXMEDETOMIDINE HYDROCHLORIDE IN 0.9% SODIUM CHLORIDE 0.5 MCG/KG/HR: 4 INJECTION INTRAVENOUS at 15:39

## 2020-12-25 RX ADMIN — MORPHINE SULFATE 15 MG: 15 TABLET ORAL at 05:01

## 2020-12-25 RX ADMIN — THIAMINE HCL TAB 100 MG 100 MG: 100 TAB at 08:22

## 2020-12-25 RX ADMIN — DEXMEDETOMIDINE HYDROCHLORIDE IN 0.9% SODIUM CHLORIDE 0.4 MCG/KG/HR: 4 INJECTION INTRAVENOUS at 05:56

## 2020-12-25 RX ADMIN — Medication 1 PACKET: at 22:36

## 2020-12-25 RX ADMIN — ENOXAPARIN SODIUM 40 MG: 40 INJECTION SUBCUTANEOUS at 09:32

## 2020-12-25 RX ADMIN — LORAZEPAM 1 MG: 2 LIQUID ORAL at 22:30

## 2020-12-25 RX ADMIN — FAMOTIDINE 20 MG: 10 INJECTION INTRAVENOUS at 09:32

## 2020-12-25 RX ADMIN — MINERAL OIL AND WHITE PETROLATUM 1 APPLICATION: 150; 830 OINTMENT OPHTHALMIC at 04:50

## 2020-12-25 RX ADMIN — MORPHINE SULFATE 15 MG: 15 TABLET ORAL at 22:40

## 2020-12-25 RX ADMIN — CHOLECALCIFEROL (VITAMIN D3) 25 MCG (1,000 UNIT) TABLET 2000 UNITS: TABLET at 08:22

## 2020-12-25 RX ADMIN — ALBUTEROL SULFATE 2 PUFF: 90 AEROSOL, METERED RESPIRATORY (INHALATION) at 11:24

## 2020-12-25 RX ADMIN — ALBUTEROL SULFATE 2 PUFF: 90 AEROSOL, METERED RESPIRATORY (INHALATION) at 06:58

## 2020-12-25 RX ADMIN — MINERAL OIL AND WHITE PETROLATUM 1 APPLICATION: 150; 830 OINTMENT OPHTHALMIC at 20:07

## 2020-12-25 RX ADMIN — DEXAMETHASONE SODIUM PHOSPHATE 2 MG: 4 INJECTION, SOLUTION INTRAMUSCULAR; INTRAVENOUS at 08:22

## 2020-12-25 RX ADMIN — ZINC SULFATE 220 MG (50 MG) CAPSULE 220 MG: CAPSULE at 08:22

## 2020-12-25 RX ADMIN — ALBUTEROL SULFATE 2 PUFF: 90 AEROSOL, METERED RESPIRATORY (INHALATION) at 18:56

## 2020-12-26 ENCOUNTER — APPOINTMENT (OUTPATIENT)
Dept: GENERAL RADIOLOGY | Facility: HOSPITAL | Age: 70
End: 2020-12-26

## 2020-12-26 LAB
ALBUMIN SERPL-MCNC: 2.9 G/DL (ref 3.5–5.2)
ALBUMIN/GLOB SERPL: 0.8 G/DL
ALP SERPL-CCNC: 71 U/L (ref 39–117)
ALT SERPL W P-5'-P-CCNC: 71 U/L (ref 1–33)
ANION GAP SERPL CALCULATED.3IONS-SCNC: 7 MMOL/L (ref 5–15)
AST SERPL-CCNC: 51 U/L (ref 1–32)
BASOPHILS # BLD AUTO: 0.04 10*3/MM3 (ref 0–0.2)
BASOPHILS NFR BLD AUTO: 0.3 % (ref 0–1.5)
BILIRUB SERPL-MCNC: 0.7 MG/DL (ref 0–1.2)
BUN SERPL-MCNC: 23 MG/DL (ref 8–23)
BUN/CREAT SERPL: 46 (ref 7–25)
CALCIUM SPEC-SCNC: 8.8 MG/DL (ref 8.6–10.5)
CHLORIDE SERPL-SCNC: 104 MMOL/L (ref 98–107)
CO2 SERPL-SCNC: 28 MMOL/L (ref 22–29)
CREAT SERPL-MCNC: 0.5 MG/DL (ref 0.57–1)
DEPRECATED RDW RBC AUTO: 41.8 FL (ref 37–54)
EOSINOPHIL # BLD AUTO: 0.33 10*3/MM3 (ref 0–0.4)
EOSINOPHIL NFR BLD AUTO: 2.2 % (ref 0.3–6.2)
ERYTHROCYTE [DISTWIDTH] IN BLOOD BY AUTOMATED COUNT: 12.6 % (ref 12.3–15.4)
GFR SERPL CREATININE-BSD FRML MDRD: 123 ML/MIN/1.73
GLOBULIN UR ELPH-MCNC: 3.6 GM/DL
GLUCOSE BLDC GLUCOMTR-MCNC: 126 MG/DL (ref 70–130)
GLUCOSE BLDC GLUCOMTR-MCNC: 137 MG/DL (ref 70–130)
GLUCOSE BLDC GLUCOMTR-MCNC: 170 MG/DL (ref 70–130)
GLUCOSE SERPL-MCNC: 141 MG/DL (ref 65–99)
HCT VFR BLD AUTO: 31.5 % (ref 34–46.6)
HGB BLD-MCNC: 11.2 G/DL (ref 12–15.9)
IMM GRANULOCYTES # BLD AUTO: 0.11 10*3/MM3 (ref 0–0.05)
IMM GRANULOCYTES NFR BLD AUTO: 0.7 % (ref 0–0.5)
LYMPHOCYTES # BLD AUTO: 2.02 10*3/MM3 (ref 0.7–3.1)
LYMPHOCYTES NFR BLD AUTO: 13.4 % (ref 19.6–45.3)
MCH RBC QN AUTO: 32.9 PG (ref 26.6–33)
MCHC RBC AUTO-ENTMCNC: 35.6 G/DL (ref 31.5–35.7)
MCV RBC AUTO: 92.6 FL (ref 79–97)
MONOCYTES # BLD AUTO: 0.65 10*3/MM3 (ref 0.1–0.9)
MONOCYTES NFR BLD AUTO: 4.3 % (ref 5–12)
NEUTROPHILS NFR BLD AUTO: 11.98 10*3/MM3 (ref 1.7–7)
NEUTROPHILS NFR BLD AUTO: 79.1 % (ref 42.7–76)
NRBC BLD AUTO-RTO: 0 /100 WBC (ref 0–0.2)
PLATELET # BLD AUTO: 112 10*3/MM3 (ref 140–450)
PMV BLD AUTO: 11.9 FL (ref 6–12)
POTASSIUM SERPL-SCNC: 4.1 MMOL/L (ref 3.5–5.2)
PROT SERPL-MCNC: 6.5 G/DL (ref 6–8.5)
RBC # BLD AUTO: 3.4 10*6/MM3 (ref 3.77–5.28)
SODIUM SERPL-SCNC: 139 MMOL/L (ref 136–145)
WBC # BLD AUTO: 15.13 10*3/MM3 (ref 3.4–10.8)

## 2020-12-26 PROCEDURE — 94770: CPT

## 2020-12-26 PROCEDURE — 25010000002 ENOXAPARIN PER 10 MG: Performed by: INTERNAL MEDICINE

## 2020-12-26 PROCEDURE — 25010000002 FUROSEMIDE PER 20 MG: Performed by: INTERNAL MEDICINE

## 2020-12-26 PROCEDURE — 63710000001 INSULIN LISPRO (HUMAN) PER 5 UNITS: Performed by: FAMILY MEDICINE

## 2020-12-26 PROCEDURE — 94003 VENT MGMT INPAT SUBQ DAY: CPT

## 2020-12-26 PROCEDURE — 71045 X-RAY EXAM CHEST 1 VIEW: CPT

## 2020-12-26 PROCEDURE — 94799 UNLISTED PULMONARY SVC/PX: CPT

## 2020-12-26 PROCEDURE — 82962 GLUCOSE BLOOD TEST: CPT

## 2020-12-26 PROCEDURE — 85025 COMPLETE CBC W/AUTO DIFF WBC: CPT | Performed by: INTERNAL MEDICINE

## 2020-12-26 PROCEDURE — 25010000002 MIDAZOLAM PER 1 MG

## 2020-12-26 PROCEDURE — 25010000002 DEXAMETHASONE PER 1 MG: Performed by: NURSE PRACTITIONER

## 2020-12-26 PROCEDURE — 80053 COMPREHEN METABOLIC PANEL: CPT | Performed by: INTERNAL MEDICINE

## 2020-12-26 PROCEDURE — 99233 SBSQ HOSP IP/OBS HIGH 50: CPT | Performed by: INTERNAL MEDICINE

## 2020-12-26 RX ORDER — FUROSEMIDE 10 MG/ML
40 INJECTION INTRAMUSCULAR; INTRAVENOUS ONCE
Status: COMPLETED | OUTPATIENT
Start: 2020-12-26 | End: 2020-12-26

## 2020-12-26 RX ORDER — MIDAZOLAM HYDROCHLORIDE 1 MG/ML
4 INJECTION, SOLUTION INTRAMUSCULAR; INTRAVENOUS ONCE
Status: DISCONTINUED | OUTPATIENT
Start: 2020-12-26 | End: 2020-12-27

## 2020-12-26 RX ORDER — MIDAZOLAM HYDROCHLORIDE 1 MG/ML
INJECTION INTRAMUSCULAR; INTRAVENOUS
Status: COMPLETED
Start: 2020-12-26 | End: 2020-12-26

## 2020-12-26 RX ADMIN — LORAZEPAM 1 MG: 2 LIQUID ORAL at 11:06

## 2020-12-26 RX ADMIN — MINERAL OIL AND WHITE PETROLATUM 1 APPLICATION: 150; 830 OINTMENT OPHTHALMIC at 01:00

## 2020-12-26 RX ADMIN — DOCUSATE SODIUM 50 MG AND SENNOSIDES 8.6 MG 1 TABLET: 8.6; 5 TABLET, FILM COATED ORAL at 08:23

## 2020-12-26 RX ADMIN — CHOLECALCIFEROL (VITAMIN D3) 25 MCG (1,000 UNIT) TABLET 2000 UNITS: TABLET at 08:22

## 2020-12-26 RX ADMIN — Medication 1 PACKET: at 20:26

## 2020-12-26 RX ADMIN — ZINC SULFATE 220 MG (50 MG) CAPSULE 220 MG: CAPSULE at 08:22

## 2020-12-26 RX ADMIN — ALBUTEROL SULFATE 2 PUFF: 90 AEROSOL, METERED RESPIRATORY (INHALATION) at 18:26

## 2020-12-26 RX ADMIN — MORPHINE SULFATE 15 MG: 15 TABLET ORAL at 15:18

## 2020-12-26 RX ADMIN — ALBUTEROL SULFATE 2 PUFF: 90 AEROSOL, METERED RESPIRATORY (INHALATION) at 14:00

## 2020-12-26 RX ADMIN — LORAZEPAM 1 MG: 2 LIQUID ORAL at 16:24

## 2020-12-26 RX ADMIN — NOREPINEPHRINE BITARTRATE 0.02 MCG/KG/MIN: 1 INJECTION, SOLUTION, CONCENTRATE INTRAVENOUS at 03:29

## 2020-12-26 RX ADMIN — FUROSEMIDE 40 MG: 10 INJECTION, SOLUTION INTRAMUSCULAR; INTRAVENOUS at 11:06

## 2020-12-26 RX ADMIN — ALBUTEROL SULFATE 2 PUFF: 90 AEROSOL, METERED RESPIRATORY (INHALATION) at 10:15

## 2020-12-26 RX ADMIN — LEVOTHYROXINE SODIUM 125 MCG: 125 TABLET ORAL at 05:04

## 2020-12-26 RX ADMIN — Medication 1 PACKET: at 16:24

## 2020-12-26 RX ADMIN — BUDESONIDE AND FORMOTEROL FUMARATE DIHYDRATE 2 PUFF: 160; 4.5 AEROSOL RESPIRATORY (INHALATION) at 18:26

## 2020-12-26 RX ADMIN — SODIUM CHLORIDE, PRESERVATIVE FREE 10 ML: 5 INJECTION INTRAVENOUS at 20:27

## 2020-12-26 RX ADMIN — LORAZEPAM 1 MG: 2 LIQUID ORAL at 03:30

## 2020-12-26 RX ADMIN — DEXMEDETOMIDINE HYDROCHLORIDE IN 0.9% SODIUM CHLORIDE 0.4 MCG/KG/HR: 4 INJECTION INTRAVENOUS at 05:04

## 2020-12-26 RX ADMIN — MINERAL OIL AND WHITE PETROLATUM: 150; 830 OINTMENT OPHTHALMIC at 15:18

## 2020-12-26 RX ADMIN — FAMOTIDINE 20 MG: 10 INJECTION INTRAVENOUS at 20:27

## 2020-12-26 RX ADMIN — Medication 500 MG: at 08:23

## 2020-12-26 RX ADMIN — Medication 500 MG: at 20:26

## 2020-12-26 RX ADMIN — ALBUTEROL SULFATE 2 PUFF: 90 AEROSOL, METERED RESPIRATORY (INHALATION) at 06:21

## 2020-12-26 RX ADMIN — FAMOTIDINE 20 MG: 10 INJECTION INTRAVENOUS at 08:23

## 2020-12-26 RX ADMIN — DEXMEDETOMIDINE HYDROCHLORIDE IN 0.9% SODIUM CHLORIDE 0.5 MCG/KG/HR: 4 INJECTION INTRAVENOUS at 16:26

## 2020-12-26 RX ADMIN — MINERAL OIL AND WHITE PETROLATUM: 150; 830 OINTMENT OPHTHALMIC at 11:10

## 2020-12-26 RX ADMIN — INSULIN LISPRO 2 UNITS: 100 INJECTION, SOLUTION INTRAVENOUS; SUBCUTANEOUS at 11:19

## 2020-12-26 RX ADMIN — DOCUSATE SODIUM 50 MG AND SENNOSIDES 8.6 MG 1 TABLET: 8.6; 5 TABLET, FILM COATED ORAL at 20:26

## 2020-12-26 RX ADMIN — MINERAL OIL AND WHITE PETROLATUM 1 APPLICATION: 150; 830 OINTMENT OPHTHALMIC at 05:04

## 2020-12-26 RX ADMIN — Medication 1 PACKET: at 11:04

## 2020-12-26 RX ADMIN — MINERAL OIL AND WHITE PETROLATUM: 150; 830 OINTMENT OPHTHALMIC at 20:33

## 2020-12-26 RX ADMIN — DEXAMETHASONE SODIUM PHOSPHATE 2 MG: 4 INJECTION, SOLUTION INTRAMUSCULAR; INTRAVENOUS at 08:22

## 2020-12-26 RX ADMIN — ATORVASTATIN CALCIUM 10 MG: 10 TABLET, FILM COATED ORAL at 20:26

## 2020-12-26 RX ADMIN — MIDAZOLAM 4 MG: 1 INJECTION INTRAMUSCULAR; INTRAVENOUS at 16:57

## 2020-12-26 RX ADMIN — BUDESONIDE AND FORMOTEROL FUMARATE DIHYDRATE 2 PUFF: 160; 4.5 AEROSOL RESPIRATORY (INHALATION) at 06:21

## 2020-12-26 RX ADMIN — THIAMINE HCL TAB 100 MG 100 MG: 100 TAB at 08:22

## 2020-12-26 RX ADMIN — SODIUM CHLORIDE, PRESERVATIVE FREE 10 ML: 5 INJECTION INTRAVENOUS at 08:23

## 2020-12-26 RX ADMIN — ENOXAPARIN SODIUM 40 MG: 40 INJECTION SUBCUTANEOUS at 08:22

## 2020-12-26 RX ADMIN — MINERAL OIL AND WHITE PETROLATUM: 150; 830 OINTMENT OPHTHALMIC at 16:57

## 2020-12-26 RX ADMIN — MORPHINE SULFATE 15 MG: 15 TABLET ORAL at 03:30

## 2020-12-27 LAB
GLUCOSE BLDC GLUCOMTR-MCNC: 117 MG/DL (ref 70–130)
GLUCOSE BLDC GLUCOMTR-MCNC: 126 MG/DL (ref 70–130)
GLUCOSE BLDC GLUCOMTR-MCNC: 134 MG/DL (ref 70–130)
GLUCOSE BLDC GLUCOMTR-MCNC: 155 MG/DL (ref 70–130)
GLUCOSE BLDC GLUCOMTR-MCNC: 176 MG/DL (ref 70–130)

## 2020-12-27 PROCEDURE — 25010000002 DEXAMETHASONE PER 1 MG: Performed by: NURSE PRACTITIONER

## 2020-12-27 PROCEDURE — 94799 UNLISTED PULMONARY SVC/PX: CPT

## 2020-12-27 PROCEDURE — 94003 VENT MGMT INPAT SUBQ DAY: CPT

## 2020-12-27 PROCEDURE — 63710000001 INSULIN LISPRO (HUMAN) PER 5 UNITS: Performed by: FAMILY MEDICINE

## 2020-12-27 PROCEDURE — 82962 GLUCOSE BLOOD TEST: CPT

## 2020-12-27 PROCEDURE — 99233 SBSQ HOSP IP/OBS HIGH 50: CPT | Performed by: INTERNAL MEDICINE

## 2020-12-27 PROCEDURE — 94770: CPT

## 2020-12-27 PROCEDURE — 25010000002 ENOXAPARIN PER 10 MG: Performed by: INTERNAL MEDICINE

## 2020-12-27 RX ADMIN — ALBUTEROL SULFATE 2 PUFF: 90 AEROSOL, METERED RESPIRATORY (INHALATION) at 14:01

## 2020-12-27 RX ADMIN — Medication 1 PACKET: at 09:51

## 2020-12-27 RX ADMIN — MINERAL OIL AND WHITE PETROLATUM: 150; 830 OINTMENT OPHTHALMIC at 18:31

## 2020-12-27 RX ADMIN — BUDESONIDE AND FORMOTEROL FUMARATE DIHYDRATE 2 PUFF: 160; 4.5 AEROSOL RESPIRATORY (INHALATION) at 06:05

## 2020-12-27 RX ADMIN — MINERAL OIL AND WHITE PETROLATUM: 150; 830 OINTMENT OPHTHALMIC at 01:14

## 2020-12-27 RX ADMIN — SODIUM CHLORIDE, PRESERVATIVE FREE 10 ML: 5 INJECTION INTRAVENOUS at 09:50

## 2020-12-27 RX ADMIN — MINERAL OIL AND WHITE PETROLATUM: 150; 830 OINTMENT OPHTHALMIC at 05:45

## 2020-12-27 RX ADMIN — Medication 500 MG: at 09:50

## 2020-12-27 RX ADMIN — DEXMEDETOMIDINE HYDROCHLORIDE IN 0.9% SODIUM CHLORIDE 0.5 MCG/KG/HR: 4 INJECTION INTRAVENOUS at 10:02

## 2020-12-27 RX ADMIN — ALBUTEROL SULFATE 2 PUFF: 90 AEROSOL, METERED RESPIRATORY (INHALATION) at 10:00

## 2020-12-27 RX ADMIN — LORAZEPAM 1 MG: 2 LIQUID ORAL at 15:50

## 2020-12-27 RX ADMIN — Medication 500 MG: at 20:02

## 2020-12-27 RX ADMIN — INSULIN LISPRO 2 UNITS: 100 INJECTION, SOLUTION INTRAVENOUS; SUBCUTANEOUS at 18:31

## 2020-12-27 RX ADMIN — MORPHINE SULFATE 15 MG: 15 TABLET ORAL at 09:50

## 2020-12-27 RX ADMIN — DOCUSATE SODIUM 50 MG AND SENNOSIDES 8.6 MG 1 TABLET: 8.6; 5 TABLET, FILM COATED ORAL at 20:02

## 2020-12-27 RX ADMIN — MINERAL OIL AND WHITE PETROLATUM: 150; 830 OINTMENT OPHTHALMIC at 09:50

## 2020-12-27 RX ADMIN — DEXMEDETOMIDINE HYDROCHLORIDE IN 0.9% SODIUM CHLORIDE 0.5 MCG/KG/HR: 4 INJECTION INTRAVENOUS at 20:03

## 2020-12-27 RX ADMIN — THIAMINE HCL TAB 100 MG 100 MG: 100 TAB at 09:50

## 2020-12-27 RX ADMIN — BUDESONIDE AND FORMOTEROL FUMARATE DIHYDRATE 2 PUFF: 160; 4.5 AEROSOL RESPIRATORY (INHALATION) at 19:24

## 2020-12-27 RX ADMIN — CHOLECALCIFEROL (VITAMIN D3) 25 MCG (1,000 UNIT) TABLET 2000 UNITS: TABLET at 09:50

## 2020-12-27 RX ADMIN — DEXMEDETOMIDINE HYDROCHLORIDE IN 0.9% SODIUM CHLORIDE 0.6 MCG/KG/HR: 4 INJECTION INTRAVENOUS at 00:24

## 2020-12-27 RX ADMIN — FAMOTIDINE 20 MG: 10 INJECTION INTRAVENOUS at 20:03

## 2020-12-27 RX ADMIN — MINERAL OIL AND WHITE PETROLATUM: 150; 830 OINTMENT OPHTHALMIC at 22:41

## 2020-12-27 RX ADMIN — MORPHINE SULFATE 15 MG: 15 TABLET ORAL at 23:40

## 2020-12-27 RX ADMIN — ALBUTEROL SULFATE 2 PUFF: 90 AEROSOL, METERED RESPIRATORY (INHALATION) at 06:05

## 2020-12-27 RX ADMIN — FAMOTIDINE 20 MG: 10 INJECTION INTRAVENOUS at 09:50

## 2020-12-27 RX ADMIN — MORPHINE SULFATE 15 MG: 15 TABLET ORAL at 04:01

## 2020-12-27 RX ADMIN — DOCUSATE SODIUM 50 MG AND SENNOSIDES 8.6 MG 1 TABLET: 8.6; 5 TABLET, FILM COATED ORAL at 09:50

## 2020-12-27 RX ADMIN — MINERAL OIL AND WHITE PETROLATUM: 150; 830 OINTMENT OPHTHALMIC at 12:48

## 2020-12-27 RX ADMIN — DEXAMETHASONE SODIUM PHOSPHATE 2 MG: 4 INJECTION, SOLUTION INTRAMUSCULAR; INTRAVENOUS at 09:50

## 2020-12-27 RX ADMIN — INSULIN LISPRO 2 UNITS: 100 INJECTION, SOLUTION INTRAVENOUS; SUBCUTANEOUS at 12:47

## 2020-12-27 RX ADMIN — Medication 1 PACKET: at 15:52

## 2020-12-27 RX ADMIN — ZINC SULFATE 220 MG (50 MG) CAPSULE 220 MG: CAPSULE at 09:50

## 2020-12-27 RX ADMIN — LEVOTHYROXINE SODIUM 125 MCG: 125 TABLET ORAL at 05:45

## 2020-12-27 RX ADMIN — ALBUTEROL SULFATE 2 PUFF: 90 AEROSOL, METERED RESPIRATORY (INHALATION) at 19:24

## 2020-12-27 RX ADMIN — LORAZEPAM 1 MG: 2 LIQUID ORAL at 23:21

## 2020-12-27 RX ADMIN — ENOXAPARIN SODIUM 40 MG: 40 INJECTION SUBCUTANEOUS at 09:51

## 2020-12-27 RX ADMIN — ATORVASTATIN CALCIUM 10 MG: 10 TABLET, FILM COATED ORAL at 20:02

## 2020-12-28 LAB
ALBUMIN SERPL-MCNC: 3 G/DL (ref 3.5–5.2)
ALBUMIN/GLOB SERPL: 0.8 G/DL
ALP SERPL-CCNC: 71 U/L (ref 39–117)
ALT SERPL W P-5'-P-CCNC: 77 U/L (ref 1–33)
ANION GAP SERPL CALCULATED.3IONS-SCNC: 7 MMOL/L (ref 5–15)
AST SERPL-CCNC: 47 U/L (ref 1–32)
BILIRUB SERPL-MCNC: 0.6 MG/DL (ref 0–1.2)
BUN SERPL-MCNC: 23 MG/DL (ref 8–23)
BUN/CREAT SERPL: 53.5 (ref 7–25)
CALCIUM SPEC-SCNC: 9 MG/DL (ref 8.6–10.5)
CHLORIDE SERPL-SCNC: 104 MMOL/L (ref 98–107)
CO2 SERPL-SCNC: 28 MMOL/L (ref 22–29)
CREAT SERPL-MCNC: 0.43 MG/DL (ref 0.57–1)
DEPRECATED RDW RBC AUTO: 41.7 FL (ref 37–54)
ERYTHROCYTE [DISTWIDTH] IN BLOOD BY AUTOMATED COUNT: 12.7 % (ref 12.3–15.4)
GFR SERPL CREATININE-BSD FRML MDRD: 146 ML/MIN/1.73
GLOBULIN UR ELPH-MCNC: 3.8 GM/DL
GLUCOSE BLDC GLUCOMTR-MCNC: 127 MG/DL (ref 70–130)
GLUCOSE BLDC GLUCOMTR-MCNC: 146 MG/DL (ref 70–130)
GLUCOSE BLDC GLUCOMTR-MCNC: 156 MG/DL (ref 70–130)
GLUCOSE BLDC GLUCOMTR-MCNC: 186 MG/DL (ref 70–130)
GLUCOSE SERPL-MCNC: 149 MG/DL (ref 65–99)
HCT VFR BLD AUTO: 30.6 % (ref 34–46.6)
HGB BLD-MCNC: 10.8 G/DL (ref 12–15.9)
MCH RBC QN AUTO: 32.9 PG (ref 26.6–33)
MCHC RBC AUTO-ENTMCNC: 35.3 G/DL (ref 31.5–35.7)
MCV RBC AUTO: 93.3 FL (ref 79–97)
PLATELET # BLD AUTO: 110 10*3/MM3 (ref 140–450)
PMV BLD AUTO: 11.7 FL (ref 6–12)
POTASSIUM SERPL-SCNC: 4 MMOL/L (ref 3.5–5.2)
PROT SERPL-MCNC: 6.8 G/DL (ref 6–8.5)
RBC # BLD AUTO: 3.28 10*6/MM3 (ref 3.77–5.28)
SODIUM SERPL-SCNC: 139 MMOL/L (ref 136–145)
WBC # BLD AUTO: 12.86 10*3/MM3 (ref 3.4–10.8)

## 2020-12-28 PROCEDURE — 63710000001 INSULIN LISPRO (HUMAN) PER 5 UNITS: Performed by: FAMILY MEDICINE

## 2020-12-28 PROCEDURE — 94799 UNLISTED PULMONARY SVC/PX: CPT

## 2020-12-28 PROCEDURE — 25010000002 ENOXAPARIN PER 10 MG: Performed by: INTERNAL MEDICINE

## 2020-12-28 PROCEDURE — 25010000002 DEXAMETHASONE PER 1 MG: Performed by: NURSE PRACTITIONER

## 2020-12-28 PROCEDURE — 99291 CRITICAL CARE FIRST HOUR: CPT | Performed by: INTERNAL MEDICINE

## 2020-12-28 PROCEDURE — 85027 COMPLETE CBC AUTOMATED: CPT | Performed by: INTERNAL MEDICINE

## 2020-12-28 PROCEDURE — 94770: CPT

## 2020-12-28 PROCEDURE — 97166 OT EVAL MOD COMPLEX 45 MIN: CPT | Performed by: OCCUPATIONAL THERAPIST

## 2020-12-28 PROCEDURE — 82962 GLUCOSE BLOOD TEST: CPT

## 2020-12-28 PROCEDURE — 80053 COMPREHEN METABOLIC PANEL: CPT | Performed by: INTERNAL MEDICINE

## 2020-12-28 PROCEDURE — 97163 PT EVAL HIGH COMPLEX 45 MIN: CPT | Performed by: PHYSICAL THERAPIST

## 2020-12-28 RX ORDER — LORAZEPAM 2 MG/ML
1 INJECTION INTRAMUSCULAR ONCE
Status: DISCONTINUED | OUTPATIENT
Start: 2020-12-28 | End: 2020-12-30

## 2020-12-28 RX ADMIN — POLYETHYLENE GLYCOL (3350) 17 G: 17 POWDER, FOR SOLUTION ORAL at 09:26

## 2020-12-28 RX ADMIN — CHOLECALCIFEROL (VITAMIN D3) 25 MCG (1,000 UNIT) TABLET 2000 UNITS: TABLET at 09:26

## 2020-12-28 RX ADMIN — THIAMINE HCL TAB 100 MG 100 MG: 100 TAB at 09:26

## 2020-12-28 RX ADMIN — ALBUTEROL SULFATE 2 PUFF: 90 AEROSOL, METERED RESPIRATORY (INHALATION) at 11:15

## 2020-12-28 RX ADMIN — NOREPINEPHRINE BITARTRATE 0.02 MCG/KG/MIN: 1 INJECTION, SOLUTION, CONCENTRATE INTRAVENOUS at 01:29

## 2020-12-28 RX ADMIN — ALBUTEROL SULFATE 2 PUFF: 90 AEROSOL, METERED RESPIRATORY (INHALATION) at 07:26

## 2020-12-28 RX ADMIN — DEXMEDETOMIDINE HYDROCHLORIDE IN 0.9% SODIUM CHLORIDE 0.6 MCG/KG/HR: 4 INJECTION INTRAVENOUS at 18:09

## 2020-12-28 RX ADMIN — DEXAMETHASONE SODIUM PHOSPHATE 2 MG: 4 INJECTION, SOLUTION INTRAMUSCULAR; INTRAVENOUS at 09:26

## 2020-12-28 RX ADMIN — DOCUSATE SODIUM 50 MG AND SENNOSIDES 8.6 MG 1 TABLET: 8.6; 5 TABLET, FILM COATED ORAL at 20:25

## 2020-12-28 RX ADMIN — Medication 1 PACKET: at 01:10

## 2020-12-28 RX ADMIN — FAMOTIDINE 20 MG: 10 INJECTION INTRAVENOUS at 09:26

## 2020-12-28 RX ADMIN — MORPHINE SULFATE 15 MG: 15 TABLET ORAL at 11:39

## 2020-12-28 RX ADMIN — Medication 1 PACKET: at 20:25

## 2020-12-28 RX ADMIN — DOCUSATE SODIUM 50 MG AND SENNOSIDES 8.6 MG 1 TABLET: 8.6; 5 TABLET, FILM COATED ORAL at 09:26

## 2020-12-28 RX ADMIN — MINERAL OIL AND WHITE PETROLATUM: 150; 830 OINTMENT OPHTHALMIC at 09:26

## 2020-12-28 RX ADMIN — LORAZEPAM 1 MG: 2 LIQUID ORAL at 11:21

## 2020-12-28 RX ADMIN — BUDESONIDE AND FORMOTEROL FUMARATE DIHYDRATE 2 PUFF: 160; 4.5 AEROSOL RESPIRATORY (INHALATION) at 21:42

## 2020-12-28 RX ADMIN — LEVOTHYROXINE SODIUM 125 MCG: 125 TABLET ORAL at 06:23

## 2020-12-28 RX ADMIN — INSULIN LISPRO 2 UNITS: 100 INJECTION, SOLUTION INTRAVENOUS; SUBCUTANEOUS at 17:15

## 2020-12-28 RX ADMIN — MINERAL OIL AND WHITE PETROLATUM: 150; 830 OINTMENT OPHTHALMIC at 17:13

## 2020-12-28 RX ADMIN — Medication 1 PACKET: at 15:46

## 2020-12-28 RX ADMIN — ALBUTEROL SULFATE 2 PUFF: 90 AEROSOL, METERED RESPIRATORY (INHALATION) at 21:42

## 2020-12-28 RX ADMIN — LORAZEPAM 1 MG: 2 LIQUID ORAL at 20:26

## 2020-12-28 RX ADMIN — BUDESONIDE AND FORMOTEROL FUMARATE DIHYDRATE 2 PUFF: 160; 4.5 AEROSOL RESPIRATORY (INHALATION) at 07:26

## 2020-12-28 RX ADMIN — Medication 500 MG: at 09:26

## 2020-12-28 RX ADMIN — ZINC SULFATE 220 MG (50 MG) CAPSULE 220 MG: CAPSULE at 09:26

## 2020-12-28 RX ADMIN — ENOXAPARIN SODIUM 40 MG: 40 INJECTION SUBCUTANEOUS at 09:26

## 2020-12-28 RX ADMIN — MINERAL OIL AND WHITE PETROLATUM: 150; 830 OINTMENT OPHTHALMIC at 01:11

## 2020-12-28 RX ADMIN — MINERAL OIL AND WHITE PETROLATUM: 150; 830 OINTMENT OPHTHALMIC at 20:40

## 2020-12-28 RX ADMIN — MINERAL OIL AND WHITE PETROLATUM: 150; 830 OINTMENT OPHTHALMIC at 12:35

## 2020-12-28 RX ADMIN — DEXMEDETOMIDINE HYDROCHLORIDE IN 0.9% SODIUM CHLORIDE 0.6 MCG/KG/HR: 4 INJECTION INTRAVENOUS at 01:16

## 2020-12-28 RX ADMIN — INSULIN LISPRO 2 UNITS: 100 INJECTION, SOLUTION INTRAVENOUS; SUBCUTANEOUS at 06:22

## 2020-12-28 RX ADMIN — FAMOTIDINE 20 MG: 10 INJECTION INTRAVENOUS at 20:25

## 2020-12-28 RX ADMIN — MORPHINE SULFATE 15 MG: 15 TABLET ORAL at 20:26

## 2020-12-28 RX ADMIN — MINERAL OIL AND WHITE PETROLATUM: 150; 830 OINTMENT OPHTHALMIC at 04:34

## 2020-12-29 LAB
GLUCOSE BLDC GLUCOMTR-MCNC: 122 MG/DL (ref 70–130)
GLUCOSE BLDC GLUCOMTR-MCNC: 143 MG/DL (ref 70–130)
GLUCOSE BLDC GLUCOMTR-MCNC: 146 MG/DL (ref 70–130)

## 2020-12-29 PROCEDURE — 25010000002 FUROSEMIDE PER 20 MG: Performed by: INTERNAL MEDICINE

## 2020-12-29 PROCEDURE — 87186 SC STD MICRODIL/AGAR DIL: CPT | Performed by: INTERNAL MEDICINE

## 2020-12-29 PROCEDURE — 82962 GLUCOSE BLOOD TEST: CPT

## 2020-12-29 PROCEDURE — 94799 UNLISTED PULMONARY SVC/PX: CPT

## 2020-12-29 PROCEDURE — 99233 SBSQ HOSP IP/OBS HIGH 50: CPT | Performed by: INTERNAL MEDICINE

## 2020-12-29 PROCEDURE — 94003 VENT MGMT INPAT SUBQ DAY: CPT

## 2020-12-29 PROCEDURE — 25010000002 ENOXAPARIN PER 10 MG: Performed by: INTERNAL MEDICINE

## 2020-12-29 PROCEDURE — 87147 CULTURE TYPE IMMUNOLOGIC: CPT | Performed by: INTERNAL MEDICINE

## 2020-12-29 PROCEDURE — 87205 SMEAR GRAM STAIN: CPT | Performed by: INTERNAL MEDICINE

## 2020-12-29 PROCEDURE — 25010000002 MORPHINE PER 10 MG

## 2020-12-29 PROCEDURE — 25010000002 LORAZEPAM PER 2 MG: Performed by: INTERNAL MEDICINE

## 2020-12-29 PROCEDURE — 87070 CULTURE OTHR SPECIMN AEROBIC: CPT | Performed by: INTERNAL MEDICINE

## 2020-12-29 PROCEDURE — 94770: CPT

## 2020-12-29 RX ORDER — LORAZEPAM 2 MG/ML
1 CONCENTRATE ORAL EVERY 6 HOURS SCHEDULED
Status: DISCONTINUED | OUTPATIENT
Start: 2020-12-29 | End: 2020-12-29 | Stop reason: SDUPTHER

## 2020-12-29 RX ORDER — LORAZEPAM 2 MG/ML
2 INJECTION INTRAMUSCULAR ONCE
Status: COMPLETED | OUTPATIENT
Start: 2020-12-29 | End: 2020-12-29

## 2020-12-29 RX ORDER — LORAZEPAM 2 MG/ML
2 CONCENTRATE ORAL ONCE
Status: DISCONTINUED | OUTPATIENT
Start: 2020-12-29 | End: 2020-12-29

## 2020-12-29 RX ORDER — OLANZAPINE 10 MG/1
10 TABLET, ORALLY DISINTEGRATING ORAL NIGHTLY
Status: DISCONTINUED | OUTPATIENT
Start: 2020-12-29 | End: 2020-12-30

## 2020-12-29 RX ORDER — LORAZEPAM 2 MG/ML
1 CONCENTRATE ORAL EVERY 6 HOURS
Status: DISCONTINUED | OUTPATIENT
Start: 2020-12-29 | End: 2020-12-30

## 2020-12-29 RX ORDER — MORPHINE SULFATE 2 MG/ML
2 INJECTION, SOLUTION INTRAMUSCULAR; INTRAVENOUS ONCE
Status: DISCONTINUED | OUTPATIENT
Start: 2020-12-29 | End: 2020-12-30

## 2020-12-29 RX ORDER — FUROSEMIDE 10 MG/ML
40 INJECTION INTRAMUSCULAR; INTRAVENOUS ONCE
Status: COMPLETED | OUTPATIENT
Start: 2020-12-29 | End: 2020-12-29

## 2020-12-29 RX ADMIN — LORAZEPAM 1 MG: 2 LIQUID ORAL at 09:13

## 2020-12-29 RX ADMIN — DEXMEDETOMIDINE HYDROCHLORIDE IN 0.9% SODIUM CHLORIDE 0.6 MCG/KG/HR: 4 INJECTION INTRAVENOUS at 06:15

## 2020-12-29 RX ADMIN — OLANZAPINE 10 MG: 10 TABLET, ORALLY DISINTEGRATING ORAL at 20:18

## 2020-12-29 RX ADMIN — DEXMEDETOMIDINE HYDROCHLORIDE IN 0.9% SODIUM CHLORIDE 0.7 MCG/KG/HR: 4 INJECTION INTRAVENOUS at 00:55

## 2020-12-29 RX ADMIN — MORPHINE SULFATE 2 MG: 4 INJECTION, SOLUTION INTRAMUSCULAR; INTRAVENOUS at 17:20

## 2020-12-29 RX ADMIN — BUDESONIDE AND FORMOTEROL FUMARATE DIHYDRATE 2 PUFF: 160; 4.5 AEROSOL RESPIRATORY (INHALATION) at 19:31

## 2020-12-29 RX ADMIN — BUDESONIDE AND FORMOTEROL FUMARATE DIHYDRATE 2 PUFF: 160; 4.5 AEROSOL RESPIRATORY (INHALATION) at 07:12

## 2020-12-29 RX ADMIN — DOCUSATE SODIUM 50 MG AND SENNOSIDES 8.6 MG 1 TABLET: 8.6; 5 TABLET, FILM COATED ORAL at 09:13

## 2020-12-29 RX ADMIN — ALBUTEROL SULFATE 2 PUFF: 90 AEROSOL, METERED RESPIRATORY (INHALATION) at 07:12

## 2020-12-29 RX ADMIN — MINERAL OIL AND WHITE PETROLATUM: 150; 830 OINTMENT OPHTHALMIC at 09:13

## 2020-12-29 RX ADMIN — LEVOTHYROXINE SODIUM 125 MCG: 125 TABLET ORAL at 06:17

## 2020-12-29 RX ADMIN — ENOXAPARIN SODIUM 40 MG: 40 INJECTION SUBCUTANEOUS at 09:13

## 2020-12-29 RX ADMIN — Medication 1 PACKET: at 20:17

## 2020-12-29 RX ADMIN — DEXMEDETOMIDINE HYDROCHLORIDE IN 0.9% SODIUM CHLORIDE 0.5 MCG/KG/HR: 4 INJECTION INTRAVENOUS at 16:42

## 2020-12-29 RX ADMIN — MINERAL OIL AND WHITE PETROLATUM: 150; 830 OINTMENT OPHTHALMIC at 05:18

## 2020-12-29 RX ADMIN — Medication 1 PACKET: at 09:13

## 2020-12-29 RX ADMIN — SODIUM CHLORIDE, PRESERVATIVE FREE 10 ML: 5 INJECTION INTRAVENOUS at 20:18

## 2020-12-29 RX ADMIN — LORAZEPAM 2 MG: 2 INJECTION INTRAMUSCULAR; INTRAVENOUS at 17:30

## 2020-12-29 RX ADMIN — CHOLECALCIFEROL (VITAMIN D3) 25 MCG (1,000 UNIT) TABLET 2000 UNITS: TABLET at 09:13

## 2020-12-29 RX ADMIN — DOCUSATE SODIUM 50 MG AND SENNOSIDES 8.6 MG 1 TABLET: 8.6; 5 TABLET, FILM COATED ORAL at 20:18

## 2020-12-29 RX ADMIN — ALBUTEROL SULFATE 2 PUFF: 90 AEROSOL, METERED RESPIRATORY (INHALATION) at 15:55

## 2020-12-29 RX ADMIN — LORAZEPAM 1 MG: 2 LIQUID ORAL at 05:18

## 2020-12-29 RX ADMIN — POLYETHYLENE GLYCOL (3350) 17 G: 17 POWDER, FOR SOLUTION ORAL at 09:13

## 2020-12-29 RX ADMIN — LORAZEPAM 1 MG: 2 SOLUTION, CONCENTRATE ORAL at 14:10

## 2020-12-29 RX ADMIN — ALBUTEROL SULFATE 2 PUFF: 90 AEROSOL, METERED RESPIRATORY (INHALATION) at 11:58

## 2020-12-29 RX ADMIN — SODIUM CHLORIDE, PRESERVATIVE FREE 10 ML: 5 INJECTION INTRAVENOUS at 09:13

## 2020-12-29 RX ADMIN — Medication 1 PACKET: at 16:48

## 2020-12-29 RX ADMIN — FAMOTIDINE 20 MG: 10 INJECTION INTRAVENOUS at 20:20

## 2020-12-29 RX ADMIN — MINERAL OIL AND WHITE PETROLATUM: 150; 830 OINTMENT OPHTHALMIC at 00:57

## 2020-12-29 RX ADMIN — ALBUTEROL SULFATE 2 PUFF: 90 AEROSOL, METERED RESPIRATORY (INHALATION) at 19:31

## 2020-12-29 RX ADMIN — LORAZEPAM 1 MG: 2 SOLUTION, CONCENTRATE ORAL at 20:18

## 2020-12-29 RX ADMIN — FUROSEMIDE 40 MG: 10 INJECTION, SOLUTION INTRAVENOUS at 22:18

## 2020-12-29 RX ADMIN — DEXMEDETOMIDINE HYDROCHLORIDE IN 0.9% SODIUM CHLORIDE 0.7 MCG/KG/HR: 4 INJECTION INTRAVENOUS at 22:56

## 2020-12-29 RX ADMIN — FAMOTIDINE 20 MG: 10 INJECTION INTRAVENOUS at 09:13

## 2020-12-30 LAB
ALBUMIN SERPL-MCNC: 3.1 G/DL (ref 3.5–5.2)
ALBUMIN/GLOB SERPL: 0.7 G/DL
ALP SERPL-CCNC: 84 U/L (ref 39–117)
ALT SERPL W P-5'-P-CCNC: 117 U/L (ref 1–33)
ANION GAP SERPL CALCULATED.3IONS-SCNC: 8 MMOL/L (ref 5–15)
AST SERPL-CCNC: 68 U/L (ref 1–32)
BASOPHILS # BLD AUTO: 0.04 10*3/MM3 (ref 0–0.2)
BASOPHILS NFR BLD AUTO: 0.4 % (ref 0–1.5)
BILIRUB SERPL-MCNC: 0.6 MG/DL (ref 0–1.2)
BUN SERPL-MCNC: 25 MG/DL (ref 8–23)
BUN/CREAT SERPL: 49 (ref 7–25)
CALCIUM SPEC-SCNC: 8.9 MG/DL (ref 8.6–10.5)
CHLORIDE SERPL-SCNC: 101 MMOL/L (ref 98–107)
CK SERPL-CCNC: 28 U/L (ref 20–180)
CO2 SERPL-SCNC: 29 MMOL/L (ref 22–29)
CREAT SERPL-MCNC: 0.51 MG/DL (ref 0.57–1)
CRP SERPL-MCNC: 4.84 MG/DL (ref 0–0.5)
D DIMER PPP FEU-MCNC: 1.91 MG/L (FEU) (ref 0–0.5)
DEPRECATED RDW RBC AUTO: 44 FL (ref 37–54)
EOSINOPHIL # BLD AUTO: 0.44 10*3/MM3 (ref 0–0.4)
EOSINOPHIL NFR BLD AUTO: 4.7 % (ref 0.3–6.2)
ERYTHROCYTE [DISTWIDTH] IN BLOOD BY AUTOMATED COUNT: 13.4 % (ref 12.3–15.4)
FERRITIN SERPL-MCNC: 452.5 NG/ML (ref 13–150)
GFR SERPL CREATININE-BSD FRML MDRD: 120 ML/MIN/1.73
GLOBULIN UR ELPH-MCNC: 4.5 GM/DL
GLUCOSE BLDC GLUCOMTR-MCNC: 138 MG/DL (ref 70–130)
GLUCOSE BLDC GLUCOMTR-MCNC: 144 MG/DL (ref 70–130)
GLUCOSE SERPL-MCNC: 146 MG/DL (ref 65–99)
HCT VFR BLD AUTO: 32.6 % (ref 34–46.6)
HGB BLD-MCNC: 11.6 G/DL (ref 12–15.9)
IMM GRANULOCYTES # BLD AUTO: 0.11 10*3/MM3 (ref 0–0.05)
IMM GRANULOCYTES NFR BLD AUTO: 1.2 % (ref 0–0.5)
INR PPP: 1.2 (ref 0.91–1.09)
LDH SERPL-CCNC: 275 U/L (ref 135–214)
LYMPHOCYTES # BLD AUTO: 1.49 10*3/MM3 (ref 0.7–3.1)
LYMPHOCYTES NFR BLD AUTO: 15.9 % (ref 19.6–45.3)
MCH RBC QN AUTO: 33.2 PG (ref 26.6–33)
MCHC RBC AUTO-ENTMCNC: 35.6 G/DL (ref 31.5–35.7)
MCV RBC AUTO: 93.4 FL (ref 79–97)
MONOCYTES # BLD AUTO: 0.51 10*3/MM3 (ref 0.1–0.9)
MONOCYTES NFR BLD AUTO: 5.4 % (ref 5–12)
NEUTROPHILS NFR BLD AUTO: 6.77 10*3/MM3 (ref 1.7–7)
NEUTROPHILS NFR BLD AUTO: 72.4 % (ref 42.7–76)
NRBC BLD AUTO-RTO: 0 /100 WBC (ref 0–0.2)
PLATELET # BLD AUTO: 134 10*3/MM3 (ref 140–450)
PMV BLD AUTO: 11.5 FL (ref 6–12)
POTASSIUM SERPL-SCNC: 3.6 MMOL/L (ref 3.5–5.2)
PROCALCITONIN SERPL-MCNC: 0.24 NG/ML (ref 0–0.25)
PROT SERPL-MCNC: 7.6 G/DL (ref 6–8.5)
PROTHROMBIN TIME: 14.8 SECONDS (ref 11.9–14.6)
RBC # BLD AUTO: 3.49 10*6/MM3 (ref 3.77–5.28)
SODIUM SERPL-SCNC: 138 MMOL/L (ref 136–145)
WBC # BLD AUTO: 9.36 10*3/MM3 (ref 3.4–10.8)

## 2020-12-30 PROCEDURE — 85379 FIBRIN DEGRADATION QUANT: CPT | Performed by: INTERNAL MEDICINE

## 2020-12-30 PROCEDURE — 94799 UNLISTED PULMONARY SVC/PX: CPT

## 2020-12-30 PROCEDURE — 87040 BLOOD CULTURE FOR BACTERIA: CPT | Performed by: INTERNAL MEDICINE

## 2020-12-30 PROCEDURE — 25010000002 LORAZEPAM PER 2 MG: Performed by: INTERNAL MEDICINE

## 2020-12-30 PROCEDURE — 80053 COMPREHEN METABOLIC PANEL: CPT | Performed by: INTERNAL MEDICINE

## 2020-12-30 PROCEDURE — 83615 LACTATE (LD) (LDH) ENZYME: CPT | Performed by: INTERNAL MEDICINE

## 2020-12-30 PROCEDURE — 82962 GLUCOSE BLOOD TEST: CPT

## 2020-12-30 PROCEDURE — 82728 ASSAY OF FERRITIN: CPT | Performed by: INTERNAL MEDICINE

## 2020-12-30 PROCEDURE — 85610 PROTHROMBIN TIME: CPT | Performed by: INTERNAL MEDICINE

## 2020-12-30 PROCEDURE — 86140 C-REACTIVE PROTEIN: CPT | Performed by: INTERNAL MEDICINE

## 2020-12-30 PROCEDURE — 99233 SBSQ HOSP IP/OBS HIGH 50: CPT | Performed by: INTERNAL MEDICINE

## 2020-12-30 PROCEDURE — 84145 PROCALCITONIN (PCT): CPT | Performed by: INTERNAL MEDICINE

## 2020-12-30 PROCEDURE — 97110 THERAPEUTIC EXERCISES: CPT

## 2020-12-30 PROCEDURE — 85025 COMPLETE CBC W/AUTO DIFF WBC: CPT | Performed by: INTERNAL MEDICINE

## 2020-12-30 PROCEDURE — 25010000002 ENOXAPARIN PER 10 MG: Performed by: INTERNAL MEDICINE

## 2020-12-30 PROCEDURE — 94003 VENT MGMT INPAT SUBQ DAY: CPT

## 2020-12-30 PROCEDURE — 82550 ASSAY OF CK (CPK): CPT | Performed by: INTERNAL MEDICINE

## 2020-12-30 RX ORDER — LORAZEPAM 2 MG/ML
1 INJECTION INTRAMUSCULAR EVERY 4 HOURS PRN
Status: DISCONTINUED | OUTPATIENT
Start: 2020-12-30 | End: 2020-12-31

## 2020-12-30 RX ADMIN — FAMOTIDINE 20 MG: 10 INJECTION INTRAVENOUS at 08:38

## 2020-12-30 RX ADMIN — ALBUTEROL SULFATE 2 PUFF: 90 AEROSOL, METERED RESPIRATORY (INHALATION) at 06:56

## 2020-12-30 RX ADMIN — LORAZEPAM 1 MG: 2 SOLUTION, CONCENTRATE ORAL at 08:37

## 2020-12-30 RX ADMIN — ALBUTEROL SULFATE 2 PUFF: 90 AEROSOL, METERED RESPIRATORY (INHALATION) at 21:47

## 2020-12-30 RX ADMIN — ACETAMINOPHEN 650 MG: 650 SUPPOSITORY RECTAL at 17:55

## 2020-12-30 RX ADMIN — LORAZEPAM 1 MG: 2 INJECTION INTRAMUSCULAR; INTRAVENOUS at 20:31

## 2020-12-30 RX ADMIN — BUDESONIDE AND FORMOTEROL FUMARATE DIHYDRATE 2 PUFF: 160; 4.5 AEROSOL RESPIRATORY (INHALATION) at 06:56

## 2020-12-30 RX ADMIN — CHOLECALCIFEROL (VITAMIN D3) 25 MCG (1,000 UNIT) TABLET 2000 UNITS: TABLET at 08:38

## 2020-12-30 RX ADMIN — POLYETHYLENE GLYCOL (3350) 17 G: 17 POWDER, FOR SOLUTION ORAL at 08:37

## 2020-12-30 RX ADMIN — LORAZEPAM 1 MG: 2 SOLUTION, CONCENTRATE ORAL at 02:03

## 2020-12-30 RX ADMIN — ENOXAPARIN SODIUM 40 MG: 40 INJECTION SUBCUTANEOUS at 08:38

## 2020-12-30 RX ADMIN — LEVOTHYROXINE SODIUM 125 MCG: 125 TABLET ORAL at 05:29

## 2020-12-30 RX ADMIN — DEXMEDETOMIDINE HYDROCHLORIDE IN 0.9% SODIUM CHLORIDE 0.7 MCG/KG/HR: 4 INJECTION INTRAVENOUS at 04:54

## 2020-12-30 RX ADMIN — DOCUSATE SODIUM 50 MG AND SENNOSIDES 8.6 MG 1 TABLET: 8.6; 5 TABLET, FILM COATED ORAL at 08:38

## 2020-12-30 RX ADMIN — FAMOTIDINE 20 MG: 10 INJECTION INTRAVENOUS at 20:22

## 2020-12-31 ENCOUNTER — APPOINTMENT (OUTPATIENT)
Dept: GENERAL RADIOLOGY | Facility: HOSPITAL | Age: 70
End: 2020-12-31

## 2020-12-31 LAB
BACTERIA SPEC RESP CULT: ABNORMAL
BACTERIA SPEC RESP CULT: ABNORMAL
GLUCOSE BLDC GLUCOMTR-MCNC: 115 MG/DL (ref 70–130)
GLUCOSE BLDC GLUCOMTR-MCNC: 116 MG/DL (ref 70–130)
GLUCOSE BLDC GLUCOMTR-MCNC: 116 MG/DL (ref 70–130)
GRAM STN SPEC: ABNORMAL
GRAM STN SPEC: ABNORMAL

## 2020-12-31 PROCEDURE — 02HV33Z INSERTION OF INFUSION DEVICE INTO SUPERIOR VENA CAVA, PERCUTANEOUS APPROACH: ICD-10-PCS | Performed by: INTERNAL MEDICINE

## 2020-12-31 PROCEDURE — 25010000002 ENOXAPARIN PER 10 MG: Performed by: INTERNAL MEDICINE

## 2020-12-31 PROCEDURE — 71045 X-RAY EXAM CHEST 1 VIEW: CPT

## 2020-12-31 PROCEDURE — 97535 SELF CARE MNGMENT TRAINING: CPT | Performed by: OCCUPATIONAL THERAPIST

## 2020-12-31 PROCEDURE — 94799 UNLISTED PULMONARY SVC/PX: CPT

## 2020-12-31 PROCEDURE — 97110 THERAPEUTIC EXERCISES: CPT

## 2020-12-31 PROCEDURE — 25010000002 CEFAZOLIN PER 500 MG: Performed by: INTERNAL MEDICINE

## 2020-12-31 PROCEDURE — 25010000002 LORAZEPAM PER 2 MG: Performed by: INTERNAL MEDICINE

## 2020-12-31 PROCEDURE — 92610 EVALUATE SWALLOWING FUNCTION: CPT

## 2020-12-31 PROCEDURE — 99231 SBSQ HOSP IP/OBS SF/LOW 25: CPT | Performed by: INTERNAL MEDICINE

## 2020-12-31 PROCEDURE — 82962 GLUCOSE BLOOD TEST: CPT

## 2020-12-31 RX ORDER — BUPIVACAINE HCL/0.9 % NACL/PF 0.1 %
2 PLASTIC BAG, INJECTION (ML) EPIDURAL EVERY 8 HOURS
Status: COMPLETED | OUTPATIENT
Start: 2020-12-31 | End: 2021-01-07

## 2020-12-31 RX ORDER — ALBUTEROL SULFATE 90 UG/1
2 AEROSOL, METERED RESPIRATORY (INHALATION)
Status: DISCONTINUED | OUTPATIENT
Start: 2020-12-31 | End: 2021-01-11 | Stop reason: HOSPADM

## 2020-12-31 RX ORDER — LIDOCAINE HYDROCHLORIDE 10 MG/ML
1 INJECTION, SOLUTION EPIDURAL; INFILTRATION; INTRACAUDAL; PERINEURAL ONCE
Status: COMPLETED | OUTPATIENT
Start: 2020-12-31 | End: 2020-12-31

## 2020-12-31 RX ADMIN — ALBUTEROL SULFATE 2 PUFF: 90 AEROSOL, METERED RESPIRATORY (INHALATION) at 19:48

## 2020-12-31 RX ADMIN — LORAZEPAM 1 MG: 2 INJECTION INTRAMUSCULAR; INTRAVENOUS at 05:55

## 2020-12-31 RX ADMIN — BUDESONIDE AND FORMOTEROL FUMARATE DIHYDRATE 2 PUFF: 160; 4.5 AEROSOL RESPIRATORY (INHALATION) at 06:22

## 2020-12-31 RX ADMIN — ALBUTEROL SULFATE 2 PUFF: 90 AEROSOL, METERED RESPIRATORY (INHALATION) at 06:22

## 2020-12-31 RX ADMIN — ALBUTEROL SULFATE 2 PUFF: 90 AEROSOL, METERED RESPIRATORY (INHALATION) at 14:06

## 2020-12-31 RX ADMIN — LORAZEPAM 1 MG: 2 INJECTION INTRAMUSCULAR; INTRAVENOUS at 01:04

## 2020-12-31 RX ADMIN — FAMOTIDINE 20 MG: 10 INJECTION INTRAVENOUS at 08:32

## 2020-12-31 RX ADMIN — FAMOTIDINE 20 MG: 10 INJECTION INTRAVENOUS at 21:20

## 2020-12-31 RX ADMIN — ALBUTEROL SULFATE 2 PUFF: 90 AEROSOL, METERED RESPIRATORY (INHALATION) at 10:05

## 2020-12-31 RX ADMIN — ENOXAPARIN SODIUM 40 MG: 40 INJECTION SUBCUTANEOUS at 08:32

## 2020-12-31 RX ADMIN — LIDOCAINE HYDROCHLORIDE 1 ML: 10 INJECTION, SOLUTION EPIDURAL; INFILTRATION; INTRACAUDAL; PERINEURAL at 15:44

## 2020-12-31 RX ADMIN — CEFAZOLIN SODIUM 2 G: 10 INJECTION, POWDER, FOR SOLUTION INTRAVENOUS at 18:25

## 2021-01-01 ENCOUNTER — APPOINTMENT (OUTPATIENT)
Dept: GENERAL RADIOLOGY | Facility: HOSPITAL | Age: 71
End: 2021-01-01

## 2021-01-01 LAB
AMORPH URATE CRY URNS QL MICRO: ABNORMAL /HPF
ANION GAP SERPL CALCULATED.3IONS-SCNC: 12 MMOL/L (ref 5–15)
ARTERIAL PATENCY WRIST A: POSITIVE
ATMOSPHERIC PRESS: 747 MMHG
BACTERIA UR QL AUTO: ABNORMAL /HPF
BASE EXCESS BLDA CALC-SCNC: 4.1 MMOL/L (ref 0–2)
BDY SITE: ABNORMAL
BILIRUB UR QL STRIP: ABNORMAL
BODY TEMPERATURE: 37 C
BUN SERPL-MCNC: 22 MG/DL (ref 8–23)
BUN/CREAT SERPL: 40 (ref 7–25)
CALCIUM SPEC-SCNC: 9.2 MG/DL (ref 8.6–10.5)
CHLORIDE SERPL-SCNC: 106 MMOL/L (ref 98–107)
CLARITY UR: ABNORMAL
CO2 SERPL-SCNC: 26 MMOL/L (ref 22–29)
COLOR UR: ABNORMAL
CREAT SERPL-MCNC: 0.55 MG/DL (ref 0.57–1)
DEPRECATED RDW RBC AUTO: 47.8 FL (ref 37–54)
ERYTHROCYTE [DISTWIDTH] IN BLOOD BY AUTOMATED COUNT: 14 % (ref 12.3–15.4)
GAS FLOW AIRWAY: 2 LPM
GFR SERPL CREATININE-BSD FRML MDRD: 110 ML/MIN/1.73
GLUCOSE BLDC GLUCOMTR-MCNC: 125 MG/DL (ref 70–130)
GLUCOSE BLDC GLUCOMTR-MCNC: 130 MG/DL (ref 70–130)
GLUCOSE BLDC GLUCOMTR-MCNC: 134 MG/DL (ref 70–130)
GLUCOSE BLDC GLUCOMTR-MCNC: 135 MG/DL (ref 70–130)
GLUCOSE SERPL-MCNC: 129 MG/DL (ref 65–99)
GLUCOSE UR STRIP-MCNC: NEGATIVE MG/DL
HCO3 BLDA-SCNC: 26.7 MMOL/L (ref 20–26)
HCT VFR BLD AUTO: 37.4 % (ref 34–46.6)
HGB BLD-MCNC: 12.8 G/DL (ref 12–15.9)
HGB UR QL STRIP.AUTO: ABNORMAL
HYALINE CASTS UR QL AUTO: ABNORMAL /LPF
KETONES UR QL STRIP: ABNORMAL
LEUKOCYTE ESTERASE UR QL STRIP.AUTO: ABNORMAL
Lab: ABNORMAL
MCH RBC QN AUTO: 32.3 PG (ref 26.6–33)
MCHC RBC AUTO-ENTMCNC: 34.2 G/DL (ref 31.5–35.7)
MCV RBC AUTO: 94.4 FL (ref 79–97)
MODALITY: ABNORMAL
NITRITE UR QL STRIP: NEGATIVE
PCO2 BLDA: 32.9 MM HG (ref 35–45)
PCO2 TEMP ADJ BLD: 32.9 MM HG (ref 35–45)
PH BLDA: 7.52 PH UNITS (ref 7.35–7.45)
PH UR STRIP.AUTO: <=5 [PH] (ref 5–8)
PH, TEMP CORRECTED: 7.52 PH UNITS (ref 7.35–7.45)
PLATELET # BLD AUTO: 170 10*3/MM3 (ref 140–450)
PMV BLD AUTO: 11.1 FL (ref 6–12)
PO2 BLDA: 57 MM HG (ref 83–108)
PO2 TEMP ADJ BLD: 57 MM HG (ref 83–108)
POTASSIUM SERPL-SCNC: 3.7 MMOL/L (ref 3.5–5.2)
PROT UR QL STRIP: ABNORMAL
RBC # BLD AUTO: 3.96 10*6/MM3 (ref 3.77–5.28)
RBC # UR: ABNORMAL /HPF
REF LAB TEST METHOD: ABNORMAL
SAO2 % BLDCOA: 91.5 % (ref 94–99)
SODIUM SERPL-SCNC: 144 MMOL/L (ref 136–145)
SP GR UR STRIP: >1.03 (ref 1–1.03)
SQUAMOUS #/AREA URNS HPF: ABNORMAL /HPF
UROBILINOGEN UR QL STRIP: ABNORMAL
VENTILATOR MODE: ABNORMAL
WBC # BLD AUTO: 12.25 10*3/MM3 (ref 3.4–10.8)
WBC UR QL AUTO: ABNORMAL /HPF
YEAST URNS QL MICRO: ABNORMAL /HPF

## 2021-01-01 PROCEDURE — 80048 BASIC METABOLIC PNL TOTAL CA: CPT | Performed by: INTERNAL MEDICINE

## 2021-01-01 PROCEDURE — 82962 GLUCOSE BLOOD TEST: CPT

## 2021-01-01 PROCEDURE — 25010000002 ENOXAPARIN PER 10 MG: Performed by: INTERNAL MEDICINE

## 2021-01-01 PROCEDURE — 85027 COMPLETE CBC AUTOMATED: CPT | Performed by: INTERNAL MEDICINE

## 2021-01-01 PROCEDURE — 94799 UNLISTED PULMONARY SVC/PX: CPT

## 2021-01-01 PROCEDURE — 36600 WITHDRAWAL OF ARTERIAL BLOOD: CPT

## 2021-01-01 PROCEDURE — 81001 URINALYSIS AUTO W/SCOPE: CPT | Performed by: INTERNAL MEDICINE

## 2021-01-01 PROCEDURE — 87086 URINE CULTURE/COLONY COUNT: CPT | Performed by: INTERNAL MEDICINE

## 2021-01-01 PROCEDURE — 74018 RADEX ABDOMEN 1 VIEW: CPT

## 2021-01-01 PROCEDURE — 82803 BLOOD GASES ANY COMBINATION: CPT

## 2021-01-01 PROCEDURE — 92526 ORAL FUNCTION THERAPY: CPT

## 2021-01-01 PROCEDURE — 97110 THERAPEUTIC EXERCISES: CPT

## 2021-01-01 PROCEDURE — 25010000002 CEFAZOLIN PER 500 MG: Performed by: INTERNAL MEDICINE

## 2021-01-01 PROCEDURE — 25010000002 FUROSEMIDE PER 20 MG: Performed by: INTERNAL MEDICINE

## 2021-01-01 RX ORDER — FUROSEMIDE 10 MG/ML
40 INJECTION INTRAMUSCULAR; INTRAVENOUS ONCE
Status: COMPLETED | OUTPATIENT
Start: 2021-01-01 | End: 2021-01-01

## 2021-01-01 RX ADMIN — FAMOTIDINE 20 MG: 10 INJECTION INTRAVENOUS at 20:19

## 2021-01-01 RX ADMIN — CEFAZOLIN SODIUM 2 G: 10 INJECTION, POWDER, FOR SOLUTION INTRAVENOUS at 02:25

## 2021-01-01 RX ADMIN — ENOXAPARIN SODIUM 40 MG: 40 INJECTION SUBCUTANEOUS at 08:05

## 2021-01-01 RX ADMIN — CEFAZOLIN SODIUM 2 G: 10 INJECTION, POWDER, FOR SOLUTION INTRAVENOUS at 18:13

## 2021-01-01 RX ADMIN — FAMOTIDINE 20 MG: 10 INJECTION INTRAVENOUS at 08:05

## 2021-01-01 RX ADMIN — ENOXAPARIN SODIUM 40 MG: 100 INJECTION SUBCUTANEOUS at 20:19

## 2021-01-01 RX ADMIN — CEFAZOLIN SODIUM 2 G: 10 INJECTION, POWDER, FOR SOLUTION INTRAVENOUS at 08:15

## 2021-01-01 RX ADMIN — FUROSEMIDE 40 MG: 10 INJECTION, SOLUTION INTRAMUSCULAR; INTRAVENOUS at 18:13

## 2021-01-01 RX ADMIN — SODIUM CHLORIDE, PRESERVATIVE FREE 10 ML: 5 INJECTION INTRAVENOUS at 08:06

## 2021-01-01 RX ADMIN — ALBUTEROL SULFATE 2 PUFF: 90 AEROSOL, METERED RESPIRATORY (INHALATION) at 20:32

## 2021-01-01 RX ADMIN — ALBUTEROL SULFATE 2 PUFF: 90 AEROSOL, METERED RESPIRATORY (INHALATION) at 14:06

## 2021-01-01 RX ADMIN — ALBUTEROL SULFATE 2 PUFF: 90 AEROSOL, METERED RESPIRATORY (INHALATION) at 06:15

## 2021-01-01 NOTE — PLAN OF CARE
Goal Outcome Evaluation:  Plan of Care Reviewed With: patient, other (see comments)(Nurse, Chelsie)  Progress: no change  Outcome Summary: Attempted to see pt for PO trials. Pt had her eyes open at times during session, but still appeared significantly weak and lethargic. Attempted oral care at beginning of session. Pt's oral cavity is very dry with dried secretions and scabs observed on her tongue. She also had a large piece of dried phlegm covering most of her soft palate but it was not easily removed via damp toothette or suction toothette. Unsure if there are sores or pressure wounds beneath, therefore did not try more aggressive methods to remove secretions. Recommend continuing NPO status and considering  kacy tube placement for nutrition. SLP will continue to follow.

## 2021-01-01 NOTE — PLAN OF CARE
Problem: Adult Inpatient Plan of Care  Goal: Plan of Care Review  Recent Flowsheet Documentation  Taken 1/1/2021 0906 by Rufina Degroot, KATHY  Progress: no change  Plan of Care Reviewed With: patient  Outcome Summary: PROM BUE and BLE.  Pt was very lethargic today.

## 2021-01-01 NOTE — THERAPY TREATMENT NOTE
Acute Care - Speech Language Pathology   Swallow Treatment Note [facility]     Patient Name: Joann Finnegan  : 1950  MRN: [id number]  Today's Date: 2021               Admit Date: 2020  Attempted to see pt for PO trials. Pt had her eyes open at times during session, but still appeared significantly weak and lethargic. Attempted oral care at beginning of session. Pt's oral cavity is very dry with dried secretions and scabs observed on her tongue. She also had a large piece of dried phlegm covering most of her soft palate but it was not easily removed via damp toothette or suction toothette. Unsure if there are sores or pressure wounds beneath, therefore did not try more aggressive methods to remove secretions.     Recommend continuing NPO status and considering  kacy tube placement for nutrition. SLP will continue to follow.  Litzy Mane, CCC-SLP 2021 14:57 CST    Visit Dx:     ICD-10-CM ICD-9-CM   1. Pneumonia due to COVID-19 virus  U07.1 480.8    J12.89    2. Hypoxic  R09.02 799.02   3. Acute respiratory failure with hypoxia (CMS/HCC)  J96.01 518.81   4. Impaired mobility  Z74.09 799.89   5. Impaired mobility and ADLs  Z74.09 V49.89    Z78.9    6. Oropharyngeal dysphagia  R13.12 787.22     Patient Active Problem List   Diagnosis   • Dysphagia   • Heartburn   • Family hx colonic polyps   • Pneumonia due to COVID-19 virus   • Acute respiratory failure with hypoxia (CMS/HCC)   • Obesity (BMI 30-39.9)   • Elevated ferritin and CRP   • Acute respiratory distress syndrome (ARDS) due to COVID-19 virus (CMS/HCC)   • Elevated LFTs   • Leukocytosis   • Thrombocytopenia (CMS/HCC)     Past Medical History:   Diagnosis Date   • Abnormal weight gain    • Arthritis    • Breast cancer (CMS/HCC)     right.    • Fatigue    • H/O melanoma excision     CLARKS  LEVEL III      BACK OF UPPER LEFT ARM   • Hypertension    • Hypothyroidism    • Melanoma (CMS/HCC)    • Restless leg syndrome    • Shortness  of breath      Past Surgical History:   Procedure Laterality Date   • APPENDECTOMY     • BREAST BIOPSY Right    • BREAST BIOPSY     • BREAST LUMPECTOMY      right.    • BROW LIFT     • COLONOSCOPY     • COLONOSCOPY N/A 2/14/2020    Procedure: COLONOSCOPY WITH ANESTHESIA;  Surgeon: Donovan Hernandez MD;  Location: [facility] ENDOSCOPY;  Service: Gastroenterology;  Laterality: N/A;  pre: family hx colon polyps  post: polyps  Janak Sherwood APRN   • ENDOSCOPY N/A 2/14/2020    Procedure: ESOPHAGOGASTRODUODENOSCOPY WITH ANESTHESIA;  Surgeon: Donovan Hernandez MD;  Location: [facility] ENDOSCOPY;  Service: Gastroenterology;  Laterality: N/A;  pre: dysphagia; heartburn  post: dilated  Janak Sherwood APRN   • ENDOSCOPY AND COLONOSCOPY  12/22/2011    very minimal distal esophagitis, incomplete esophagus ring dilated   • LUMBAR SPINAL TUMOR REMOVAL      Spinal Cyst removed from Spinal Canal   • MASTECTOMY, PARTIAL     • PARTIAL HIP ARTHROPLASTY Left    • SKIN CANCER EXCISION      Melanoma    • SQUAMOUS CELL CARCINOMA EXCISION     • TEMPOROMANDIBULAR JOINT ARTHROPLASTY     • TOTAL ABDOMINAL HYSTERECTOMY WITH SALPINGO OOPHORECTOMY Bilateral         SWALLOW EVALUATION (last 72 hours)      SLP Adult Swallow Evaluation     Row Name 01/01/21 1406 12/31/20 1257                Rehab Evaluation    Document Type  therapy note (daily note)  -MB  evaluation  -MB       Subjective Information  -- unable to respond  -MB  fatigue  -MB       Patient Observations  lethargic;cooperative  -MB  lethargic  -MB       Patient/Family/Caregiver Comments/Observations  No family present, enhanced isolation precautions  -MB  No family present, enhanced isolation precautions  -MB          General Information    Patient Profile Reviewed  --  yes  -MB       Pertinent History Of Current Problem  --  COVID, pneumonia, acute respiratory distress syndrome, thrombocytopenia, leukocytosis, mechanical ventilation 12/13/2020 - 12/30/2020  -MB       Current  Method of Nutrition  --  NPO  -MB       Precautions/Limitations, Vision  --  WFL with corrective lenses  -MB       Precautions/Limitations, Hearing  --  WFL;for purposes of eval  -MB       Prior Level of Function-Communication  --  WFL  -MB       Prior Level of Function-Swallowing  --  no diet consistency restrictions  -MB       Plans/Goals Discussed with  --  patient  -MB       Barriers to Rehab  --  medically complex;cognitive status  -MB       Patient's Goals for Discharge  --  patient did not state  -MB          Pain    Additional Documentation  --  Pain Scale: FACES Pre/Post-Treatment (Group)  -MB          Pain Scale: FACES Pre/Post-Treatment    Pain: FACES Scale, Pretreatment  2-->hurts little bit  -MB  2-->hurts little bit  -MB          Oral Motor Structure and Function    Secretion Management  --  dried secretions in oral cavity  -MB       Mucosal Quality  --  cracked;dry  -MB          Oral Musculature and Cranial Nerve Assessment    Oral Motor General Assessment  --  unable to assess  -MB          General Eating/Swallowing Observations    Respiratory Support Currently in Use  --  nasal cannula  -MB       Positioning During Eating  --  upright in bed  -MB       Utensils Used  --  spoon  -MB       Consistencies Trialed  --  ice chips  -MB          Clinical Swallow Eval    Oral Prep Phase  --  impaired  -MB       Oral Transit  --  impaired  -MB       Oral Residue  --  impaired  -MB       Pharyngeal Phase  --  suspected pharyngeal impairment  -MB       Clinical Swallow Evaluation Summary  --  Pt was lethargic and weak. She was unable to complete oral motor movements on command. Her oral cavity appears dry with dried secretions observed on her tongue. SLP presented ice to pt's lips. She did not protrude her tongue or attempt to obtain water residue from her lips. Presented a small ice chip via spoon to pt. She had little to no response the first time and did not obtain the ice. The second time she had minimal,  weak lip closure which removed the ice from the spoon. She had no oral manipulation of it and displayed a cough prior to initiating a swallow. No further trials were presented due to the pt's high risk for aspiration in her current state.   -MB          Oral Prep Concerns    Oral Prep Concerns  --  incomplete or weak lip closure around spoon  -MB       Incomplete or Weak Lip Closure Around Spoon  --  other (see comments) ice  -MB          Oral Transit Concerns    Oral Transit Concerns  --  unable to initiate oral transit  -MB          Oral Residue Concerns    Oral Residue Concerns  --  diffuse residue throughout oral cavity  -MB       Diffuse Residue Throughout Oral Cavity  --  other (see comments) ice/water residue  -MB          Pharyngeal Phase Concerns    Pharyngeal Phase Concerns  --  cough  -MB       Cough  --  other (see comments) ice  -MB          Clinical Impression    Daily Summary of Progress (SLP)  progress toward functional goals is gradual  -MB  --       Barriers to Overall Progress (SLP)  Medically complex, weak, lethargic  -MB  --       SLP Swallowing Diagnosis  --  severe;oral dysphagia;R/O pharyngeal dysphagia  -MB       Functional Impact  --  risk of aspiration/pneumonia;risk of malnutrition;risk of dehydration  -MB       Rehab Potential/Prognosis, Swallowing  --  re-evaluate goals as necessary  -MB       Swallow Criteria for Skilled Therapeutic Interventions Met  --  demonstrates skilled criteria  -MB       Plan for Continued Treatment (SLP)  Continue NPO, consider DHT  -MB  --          Recommendations    Therapy Frequency (Swallow)  --  at least;3 days per week  -MB       Predicted Duration Therapy Intervention (Days)  --  until discharge  -MB       SLP Diet Recommendation  --  NPO;temporary alternate methods of nutrition/hydration  -MB       Recommended Diagnostics  --  reassess via clinical swallow evaluation  -MB       Oral Care Recommendations  --  Oral Care BID/PRN  -MB       SLP Rec. for  Method of Medication Administration  --  meds via alternate route  -MB       Anticipated Discharge Disposition (SLP)  --  unknown  -MB          Swallow Goals (SLP)    Oral Nutrition/Hydration Goal Selection (SLP)  --  oral nutrition/hydration, SLP goal 1  -MB       Labial Strengthening Goal Selection (SLP)  --  labial strengthening, SLP goal 1  -MB       Lingual Strengthening Goal Selection (SLP)  --  lingual strengthening, SLP goal 1  -MB       Additional Documentation  --  labial strengthening goal selection (SLP);lingual strengthening goal selection (SLP)  -MB          Oral Nutrition/Hydration Goal 1 (SLP)    Oral Nutrition/Hydration Goal 1, SLP  Pt will tolerate least restrictive diet with no overt s/s of aspiration.   -MB  Pt will tolerate least restrictive diet with no overt s/s of aspiration.   -MB       Time Frame (Oral Nutrition/Hydration Goal 1, SLP)  by discharge  -MB  by discharge  -MB       Barriers (Oral Nutrition/Hydration Goal 1, SLP)  Medically complex, weak, lethargic  -MB  n/a  -MB       Progress/Outcomes (Oral Nutrition/Hydration Goal 1, SLP)  progress slower than expected  -MB  goal ongoing  -MB          Labial Strengthening Goal 1 (SLP)    Activity (Labial Strengthening Goal 1, SLP)  increase labial tone  -MB  increase labial tone  -MB       Increase Labial Tone  labial resistance exercises  -MB  labial resistance exercises  -MB       [place]/Accuracy (Labial Strengthening Goal 1, SLP)  with minimal cues (75-90% accuracy)  -MB  with minimal cues (75-90% accuracy)  -MB       Time Frame (Labial Strengthening Goal 1, SLP)  short term goal (STG);by discharge  -MB  short term goal (STG);by discharge  -MB       Barriers (Labial Strengthening Goal 1, SLP)  --  n/a  -MB       Progress/Outcomes (Labial Strengthening Goal 1, SLP)  --  goal ongoing  -MB          Lingual Strengthening Goal 1 (SLP)    Activity (Lingual Strengthening Goal 1, SLP)  increase lingual  tone/sensation/control/coordination/movement  -MB  increase lingual tone/sensation/control/coordination/movement  -MB       Increase Lingual Tone/Sensation/Control/Coordination/Movement  lingual movement exercises  -MB  lingual movement exercises  -MB       [place]/Accuracy (Lingual Strengthening Goal 1, SLP)  with minimal cues (75-90% accuracy)  -MB  with minimal cues (75-90% accuracy)  -MB       Time Frame (Lingual Strengthening Goal 1, SLP)  short term goal (STG);by discharge  -MB  short term goal (STG);by discharge  -MB       Barriers (Lingual Strengthening Goal 1, SLP)  --  n/a  -MB       Progress/Outcomes (Lingual Strengthening Goal 1, SLP)  --  goal ongoing  -MB         User Key  (r) = Recorded By, (t) = Taken By, (c) = Cosigned By    Initials Name Effective Dates    Litzy Pimentel, CCC-SLP 16 -           EDUCATION  The patient has been educated in the following areas:   Dysphagia (Swallowing Impairment).    SLP Recommendation and Plan                                         Daily Summary of Progress (SLP): progress toward functional goals is gradual    Plan for Continued Treatment (SLP): Continue NPO, consider DHT              Plan of Care Reviewed With: patient, other (see comments)(Nurse, Chelsie)  Progress: no change  Outcome Summary: Attempted to see pt for PO trials. Pt had her eyes open at times during session, but still appeared significantly weak and lethargic. Attempted oral care at beginning of session. Pt's oral cavity is very dry with dried secretions and scabs observed on her tongue. She also had a large piece of dried phlegm covering most of her soft palate but it was not easily removed via damp toothette or suction toothette. Unsure if there are sores or pressure wounds beneath, therefore did not try more aggressive methods to remove secretions. Recommend continuing NPO status and considering  kacy tube placement for nutrition. SLP will continue to follow.    SLP GOALS      Row Name 01/01/21 1406 12/31/20 1257          Oral Nutrition/Hydration Goal 1 (SLP)    Oral Nutrition/Hydration Goal 1, SLP  Pt will tolerate least restrictive diet with no overt s/s of aspiration.   -MB  Pt will tolerate least restrictive diet with no overt s/s of aspiration.   -MB     Time Frame (Oral Nutrition/Hydration Goal 1, SLP)  by discharge  -MB  by discharge  -MB     Barriers (Oral Nutrition/Hydration Goal 1, SLP)  Medically complex, weak, lethargic  -MB  n/a  -MB     Progress/Outcomes (Oral Nutrition/Hydration Goal 1, SLP)  progress slower than expected  -MB  goal ongoing  -MB        Labial Strengthening Goal 1 (SLP)    Activity (Labial Strengthening Goal 1, SLP)  increase labial tone  -MB  increase labial tone  -MB     Increase Labial Tone  labial resistance exercises  -MB  labial resistance exercises  -MB     [place]/Accuracy (Labial Strengthening Goal 1, SLP)  with minimal cues (75-90% accuracy)  -MB  with minimal cues (75-90% accuracy)  -MB     Time Frame (Labial Strengthening Goal 1, SLP)  short term goal (STG);by discharge  -MB  short term goal (STG);by discharge  -MB     Barriers (Labial Strengthening Goal 1, SLP)  --  n/a  -MB     Progress/Outcomes (Labial Strengthening Goal 1, SLP)  --  goal ongoing  -MB        Lingual Strengthening Goal 1 (SLP)    Activity (Lingual Strengthening Goal 1, SLP)  increase lingual tone/sensation/control/coordination/movement  -MB  increase lingual tone/sensation/control/coordination/movement  -MB     Increase Lingual Tone/Sensation/Control/Coordination/Movement  lingual movement exercises  -MB  lingual movement exercises  -MB     [place]/Accuracy (Lingual Strengthening Goal 1, SLP)  with minimal cues (75-90% accuracy)  -MB  with minimal cues (75-90% accuracy)  -MB     Time Frame (Lingual Strengthening Goal 1, SLP)  short term goal (STG);by discharge  -MB  short term goal (STG);by discharge  -MB     Barriers (Lingual Strengthening Goal 1, SLP)  --  n/a  -MB      Progress/Outcomes (Lingual Strengthening Goal 1, SLP)  --  goal ongoing  -MB       User Key  (r) = Recorded By, (t) = Taken By, (c) = Cosigned By    Initials Name Provider Type    Litzy Pimentel CCC-SLP Speech and Language Pathologist             Time Calculation:   Time Calculation- SLP     Row Name 01/01/21 1455             Time Calculation- SLP    SLP Start Time  1406  -MB      SLP Stop Time  1446  -MB      SLP Time Calculation (min)  40 min  -MB      SLP Received On  01/01/21  -MB        User Key  (r) = Recorded By, (t) = Taken By, (c) = Cosigned By    Initials Name Provider Type    Litzy Pimentel CCC-SLP Speech and Language Pathologist          Therapy Charges for Today     Code Description Service Date Service Provider Modifiers Qty    [id number]  ST EVAL ORAL PHARYNG SWALLOW 6 12/31/2020 Litzy Mane CCC-SLP GN 1    [id number]  ST TREATMENT SWALLOW 3 1/1/2021 Litzy Mane CCC-SLP GN 1               SILVINA Llanos  1/1/2021

## 2021-01-01 NOTE — THERAPY TREATMENT NOTE
Acute Care - Physical Therapy Treatment Note  [facility]     Patient Name: Joann Finnegan  : 1950  MRN: [id number]  Today's Date: 2021           PT Assessment (last 12 hours)      PT Evaluation and Treatment     Row Name 21 09          Physical Therapy Time and Intention    Subjective Information  no complaints difficulty for pt to keep eyes open   -WK     Document Type  therapy note (daily note)  -WK     Mode of Treatment  physical therapy  -WK     Row Name 21 09          General Information    Existing Precautions/Restrictions  fall;oxygen therapy device and L/min  -WK     Row Name 21          Bed Mobility    Comment (Bed Mobility)  unable due to pt unable to stay awake. very lethargic today   -WK     Row Name 21          Aerobic Exercise    Comment, Aerobic Exercise (Therapeutic Exercise)  PROM BUE and BLE.  Pt would open eye to cues but unable to maintain   -WK     Row Name             Wound 20 0957 Right lower chin    Wound - Properties Group Placement Date: 20  -KM Placement Time: 0957  -KM Side: Right  -KM Orientation: lower  -KM Location: chin  -KM Stage, Pressure Injury : deep tissue injury  -KM    Retired Wound - Properties Group Date first assessed: 20  -KM Time first assessed: 0957  -KM Side: Right  -KM Location: chin  -KM    Row Name             Wound 20 0959 Right upper lip    Wound - Properties Group Placement Date: 20  -KM Placement Time: 0959  -KM Present on Hospital Admission: N  -KM Side: Right  -KM Orientation: upper  -KM Location: lip  -KM Stage, Pressure Injury : deep tissue injury  -KM    Retired Wound - Properties Group Date first assessed: 20  -KM Time first assessed: 0959  -KM Present on Hospital Admission: N  -KM Side: Right  -KM Location: lip  -KM    Row Name             Wound 20 1425 Left cheek Pressure Injury    Wound - Properties Group Placement Date: 20  -RM Placement Time: 1425  -RM  Present on Hospital Admission: N  -RM Side: Left  -RM Location: cheek  -RM Primary Wound Type: Pressure inj  -RM    Retired Wound - Properties Group Date first assessed: 12/18/20  -RM Time first assessed: 1425  -RM Present on Hospital Admission: N  -RM Side: Left  -RM Location: cheek  -RM Primary Wound Type: Pressure inj  -RM    Row Name             Wound 12/21/20 1118 Left nose Pressure Injury    Wound - Properties Group Placement Date: 12/21/20  -CH Placement Time: 1118  -CH Present on Hospital Admission: N  -CH Side: Left  -CH Location: nose  -CH Primary Wound Type: Pressure inj  -CH    Retired Wound - Properties Group Date first assessed: 12/21/20  -CH Time first assessed: 1118  -CH Present on Hospital Admission: N  -CH Side: Left  -CH Location: nose  -CH Primary Wound Type: Pressure inj  -CH    Row Name 01/01/21 0906          Plan of Care Review    Plan of Care Reviewed With  patient  -WK     Progress  no change  -WK     Outcome Summary  PROM BUE and BLE.  Pt was very lethargic today.   -WK     Row Name 01/01/21 0906          Positioning and Restraints    Pre-Treatment Position  in bed  -WK     Post Treatment Position  bed  -WK     In Bed  call light within reach;encouraged to call for assist;notified nsg;patient within staff view chair setting position on bed per MD   -WK       User Key  (r) = Recorded By, (t) = Taken By, (c) = Cosigned By    Initials Name Provider Type    Maine Jaramillo RN Registered Nurse    Ya Dodson RN Registered Nurse    Rufina Hargrove PTA Physical Therapy Assistant    Gisselle Buchanan RN Registered Nurse        Physical Therapy Education                 Title: PT OT SLP Therapies (In Progress)     Topic: Physical Therapy (In Progress)     Point: Mobility training (In Progress)     Learning Progress Summary           Patient Acceptance, E, NL by WK at 1/1/2021 0935    Comment: BOA    Acceptance, E, NL by MS at 12/28/2020 0900    Comment: role of PT in her  care                   Point: Home exercise program (Not Started)     Learner Progress:  Not documented in this visit.          Point: Body mechanics (Not Started)     Learner Progress:  Not documented in this visit.          Point: Precautions (Not Started)     Learner Progress:  Not documented in this visit.                      User Key     Initials Effective Dates Name Provider Type Discipline    MS 06/19/18 -  Yennifer May R, PT, DPT, NCS Physical Therapist PT    WK 08/02/16 -  Rufina Degroot PTA Physical Therapy Assistant PT              PT Recommendation and Plan     Plan of Care Reviewed With: patient  Progress: no change  Outcome Summary: PROM BUE and BLE.  Pt was very lethargic today.   Outcome Measures     Row Name 12/31/20 1100             How much help from another is currently needed...    Putting on and taking off regular lower body clothing?  1  -JJ      Bathing (including washing, rinsing, and drying)  1  -JJ      Toileting (which includes using toilet bed pan or urinal)  1  -JJ      Putting on and taking off regular upper body clothing  1  -JJ      Taking care of personal grooming (such as brushing teeth)  1  -JJ      Eating meals  1  -JJ      AM-PAC 6 Clicks Score (OT)  6  -JJ         Functional Assessment    Outcome Measure Options  AM-PAC 6 Clicks Daily Activity (OT)  -JJ        User Key  (r) = Recorded By, (t) = Taken By, (c) = Cosigned By    Initials Name Provider Type    JTheresa Patterson OTR/L Occupational Therapist           Time Calculation:   PT Charges     Row Name 01/01/21 0935             Time Calculation    Start Time  0906  -WK      Stop Time  0930  -WK      Time Calculation (min)  24 min  -WK      PT Received On  01/01/21  -WK         Time Calculation- PT    Total Timed Code Minutes- PT  24 minute(s)  -WK        User Key  (r) = Recorded By, (t) = Taken By, (c) = Cosigned By    Initials Name Provider Type    WK Rufina Degroot PTA Physical Therapy Assistant        Therapy  Charges for Today     Code Description Service Date Service Provider Modifiers Qty    [id number] HC PT THER PROC EA 15 MIN 1/1/2021 Rufina Degroot, PTA GP 2          PT G-Codes  Outcome Measure Options: AM-PAC 6 Clicks Daily Activity (OT)  AM-PAC 6 Clicks Score (PT): 6  AM-PAC 6 Clicks Score (OT): 6    Rufina Degroot, KATHY  1/1/2021

## 2021-01-01 NOTE — PROGRESS NOTES
[facility] Medicine Services  INPATIENT PROGRESS NOTE    Patient Name: Joann Finnegan  Date of Admission: 12/11/2020  Today's Date: 01/01/21  Length of Stay: 21  Primary Care Physician: Janak Sherwood APRN    Subjective   Chief Complaint: shortness of breath  HPI     Patient was seen and examined at bedside.      Patient a little more alert today.  Was able to make her laugh a little today but still appears to be under the effect of the sedation medications as well as lethargic from prolonged intubation.  Suspect she will require dobhoff placement.        Review of Systems   Unable to perform ROS: Patient nonverbal      Attempted ROS but patient did not answer questions      All pertinent negatives and positives are as above. All other systems have been reviewed and are negative unless otherwise stated.     Objective    Temp:  [98.1 °F (36.7 °C)-99.6 °F (37.6 °C)] 98.4 °F (36.9 °C)  Heart Rate:  [] 104  Resp:  [17-32] 28  BP: (126-178)/() 158/73  Physical Exam  Vitals signs and nursing note reviewed.   Constitutional:       General: She is not in acute distress.     Appearance: She is obese.   HENT:      Head: Normocephalic and atraumatic.      Nose: No congestion or rhinorrhea.      Mouth/Throat:      Pharynx: No oropharyngeal exudate or posterior oropharyngeal erythema.   Eyes:      General: No scleral icterus.     Conjunctiva/sclera: Conjunctivae normal.   Cardiovascular:      Rate and Rhythm: Normal rate and regular rhythm.      Heart sounds: No murmur.   Pulmonary:      Effort: Pulmonary effort is normal. No respiratory distress.      Breath sounds: No stridor. No wheezing or rales.      Comments: Diminished at bases; tachypnea  Abdominal:      General: Abdomen is flat. Bowel sounds are normal. There is no distension.      Palpations: Abdomen is soft. There is no mass.      Tenderness: There is no abdominal tenderness.      Hernia: No hernia is present.    Musculoskeletal: Normal range of motion.         General: No swelling.   Skin:     General: Skin is warm and dry.      Coloration: Skin is not jaundiced or pale.      Findings: Lesion (left jaw line, abrasion/pressure injury) present.   Neurological:      General: No focal deficit present.      Mental Status: She is disoriented.   Psychiatric:      Comments: Anxious; lethargic         Results Review:  I have reviewed the labs, radiology results, and diagnostic studies.    Laboratory Data:   Results from last 7 days   Lab Units 01/01/21 0522 12/30/20 0204 12/28/20 0421   WBC 10*3/mm3 12.25* 9.36 12.86*   HEMOGLOBIN g/dL 12.8 11.6* 10.8*   HEMATOCRIT % 37.4 32.6* 30.6*   PLATELETS 10*3/mm3 170 134* 110*        Results from last 7 days   Lab Units 01/01/21 0523 12/30/20 0204 12/28/20 0421 12/26/20  0354   SODIUM mmol/L 144 138 139 139   POTASSIUM mmol/L 3.7 3.6 4.0 4.1   CHLORIDE mmol/L 106 101 104 104   CO2 mmol/L 26.0 29.0 28.0 28.0   BUN mg/dL 22 25* 23 23   CREATININE mg/dL 0.55* 0.51* 0.43* 0.50*   CALCIUM mg/dL 9.2 8.9 9.0 8.8   BILIRUBIN mg/dL  --  0.6 0.6 0.7   ALK PHOS U/L  --  84 71 71   ALT (SGPT) U/L  --  117* 77* 71*   AST (SGOT) U/L  --  68* 47* 51*   GLUCOSE mg/dL 129* 146* 149* 141*       Culture Data:   Blood Culture   Date Value Ref Range Status   12/11/2020 No growth at 24 hours  Preliminary   12/11/2020 No growth at 24 hours  Preliminary       Radiology Data:   Imaging Results (Last 24 Hours)     Procedure Component Value Units Date/Time    XR Chest 1 View [[id number]] Collected: 12/31/20 1627     Updated: 12/31/20 1632    Narrative:      HISTORY: PICC line placement     CXR: Frontal view the chest obtained.     COMPARISON: 12/26/2020     FINDINGS: A left upper extremity PICC line tip projects over the SVC. A  right IJ central line tip also projects closer to the atrial caval  junction. EKG wires project over the chest.     There are bilateral mixed interstitial alveolar opacities.  "Stable  mediastinal contours. No pleural effusion pneumothorax. Interval  extubation. Right axillary/chest wall surgical clips.       Impression:      1. Appropriate positioning left upper extremity PICC line with tip  projecting over the lower SVC.  2. Right IJ central line tip projects closer to the atrial caval  junction.  3. Removal of endotracheal and enteric tubes.  4. Similar diffuse bilateral pulmonary opacities concerning for  multifocal pneumonia.  This report was finalized on 12/31/2020 16:29 by Dr. Theresa Chapin MD.          I have reviewed the patient's current medications.     Assessment/Plan     Active Hospital Problems    Diagnosis   • **Pneumonia due to COVID-19 virus   • Thrombocytopenia (CMS/HCC)   • Acute respiratory distress syndrome (ARDS) due to COVID-19 virus (CMS/HCC)   • Elevated LFTs   • Leukocytosis   • Elevated ferritin and CRP   • Obesity (BMI 30-39.9)   • Acute respiratory failure with hypoxia (CMS/HCC)       Plan:  1.  Patient was admitted by Dr. Jean on 12/11 for respiratory failure.  Patient required vapotherm upon admission.  Patient was tested for COVID-19 on 12/2 as she developed \"cold-like symptoms\" and loss of taste on 12/1.  While test was pending she was given IM ceftriaxone and azithromycin PO.  Patient got the positive test result on 12/5 from [facility] and antibiotics were stable and she was started on hydroxychloroquine, dexamethasone, Vitamin D, Vitamin C, and zinc.  2.  Continue enhanced droplet and contact precautions.  Monitor on telemetry and pulse oximetry.  High-quality supportive care with fluid conservative strategy.  3.  Wean O2 to maintain sat >92%, currently on room air   4.  COVID-19 treatment:       - Zinc, atorvastatin, vitamin C, thiamine, initiated upon admission, discontinued on 12/28        - Remdesivir: Initiated on 12/11, received 5-day treatment course       - Dexamethasone: Received 6 mg x 10 days, currently tapering and on 2 mg IV " (12/24), discontinued 12/28  5.  VTE PPx with lovenox 40 mg BID  6.  Symptomatic treatment with: tussinex PRN, acetaminophen PRN, albuterol PRN, Tessalon pearls PRN, dextromethorphan  7.  Patient was treated with prone protocol for several days and reached maximum benefit and has since been supine.  8.  Infectious disease consulted, appreciate assistance.  MRSA nasal screen negative, respiratory culture NGTD.  Empirically started on zosyn on 12/15 and completed a 7-day treatment course.  Patient spiked a fever on 12/30 post-extubation, discussed with Dr. Ayon, respiratory culture from 12/29 shows staph aureus cefazolin has been started.    9.  Pulmonary has been consulted, appreciate assistance.  Continue albuterol and symbicort.    10.  Peripheral smear (12/22) shows moderate peripheral thrombocytopenia, small platelet clump.  We will monitor platelets closely, especially while on lovenox for VTE PPx.  Naidr was 80K (12/24), patient showing improvement.  11.  Intermittent diuresis with IV furosemide.  Hold for today.  12.  Patient able to be extubated on 12/31.  Working with SLP.  Patient likely not awake enough today to pass, will place a dobhoff tube and initiate tube feeds  13.  GI PPx with famotidine IV   14.  UA from last night shows WBCs, UC pending   15.  Furosemide 40 mg IV x 1                                                                                                                                                                                                                                                        COVID LABS:  Results From Last 14 Days   Lab Units 12/30/20  0204 12/25/20  0459 12/22/20  0545 12/19/20  0619   CK TOTAL U/L 28  --   --  172   CRP mg/dL 4.84* 2.49* 1.32* 1.84*   D DIMER QUANT mg/L (FEU) 1.91* 2.24* 0.96*  --    FERRITIN ng/mL 452.50* 586.90* 641.90* 769.50*   LDH U/L 275*  --   --  406*   PROCALCITONIN ng/mL 0.24 0.17 0.23 0.20   PROTIME Seconds 14.8*  --   --   13.7   INR  1.20*  --   --  1.09       COVID19   Date Value Ref Range Status   12/11/2020 Detected (C) Presumptive Negative - Ref. Range Final             Discharge Planning: I expect the patient to be discharged to  in >2 days.    Electronically signed by Samir Teague MD, 01/01/21, 14:40 CST.

## 2021-01-01 NOTE — PROGRESS NOTES
Nutrition Services    Patient Name:  Jonan Finnegan  YOB: 1950  MRN: [id number]  Admit Date:  12/11/2020    Pt has DHT in place for EN. Resume previous TF order of Impact Peptide 1.5 @ 20ml/hr x6hrs, advance by 10ml q 4hrs as tolerated to goal rate of 48ml/hr with 35ml/hr water flush. Pt has previously been on this formula. Monitor for signs/symptoms of intolerance. Will continue to follow for nutrition needs.     Electronically signed by:  Marquita Viramontes RDN, ALEKSANDR  01/01/21 16:16 CST

## 2021-01-01 NOTE — PROGRESS NOTES
INFECTIOUS DISEASES PROGRESS NOTE    Patient:  Joann Finnegan  YOB: 1950  MRN: [id number]   Admit date: 12/11/2020   Admitting Physician: Samir Teague MD  Primary Care Physician: Janak Sherwood APRN      Chief Complaint: Unobtainable from patient.  She is currently getting KUB    Interval History:   Patient extubated December 30 and did well.  She is not able to vocalize well and had a Dobbhoff placed for feeding after evaluation by speech therapy.    Neurologically she is not consistently mouthing words.  She has not been consistent with some answers however she did acknowledge that she felt hung over when asked.    Allergies:   Allergies   Allergen Reactions   • Requip [Ropinirole Hcl] Nausea And Vomiting       Current Meds:     Current Facility-Administered Medications:   •  acetaminophen (TYLENOL) tablet 650 mg, 650 mg, Nasogastric, Q4H PRN **OR** acetaminophen (TYLENOL) suppository 650 mg, 650 mg, Rectal, Q4H PRN, Ellis Wise MD, 650 mg at 12/30/20 1755  •  albuterol sulfate HFA (PROVENTIL HFA;VENTOLIN HFA;PROAIR HFA) inhaler 2 puff, 2 puff, Inhalation, Q6H PRN, Wil Jean MD  •  albuterol sulfate HFA (PROVENTIL HFA;VENTOLIN HFA;PROAIR HFA) inhaler 2 puff, 2 puff, Inhalation, TID - RT, Samir Teague MD, 2 puff at 01/01/21 1406  •  benzonatate (TESSALON) capsule 100 mg, 100 mg, Oral, TID PRN, Wil Jean MD, 100 mg at 12/12/20 2034  •  ceFAZolin in 0.9% normal saline (ANCEF) IVPB solution 2 g, 2 g, Intravenous, Q8H, Mervat Ayon MD, Last Rate: 200 mL/hr at 01/01/21 0815, 2 g at 01/01/21 0815  •  dextromethorphan polistirex ER (DELSYM) 30 MG/5ML oral suspension 60 mg, 60 mg, Oral, Q12H PRN, Wil Jean MD  •  dextrose (D50W) 25 g/ 50mL Intravenous Solution 25 g, 25 g, Intravenous, Q15 Min PRN, Panfilo Wilson MD  •  dextrose (GLUTOSE) oral gel 15 g, 15 g, Oral, Q15 Min PRN, Panfilo Wilson MD  •  enoxaparin (LOVENOX) syringe  "40 mg, 40 mg, Subcutaneous, Q12H, Samir Teague MD  •  famotidine (PEPCID) injection 20 mg, 20 mg, Intravenous, Q12H, Samir Teague MD, 20 mg at 21 0805  •  furosemide (LASIX) injection 40 mg, 40 mg, Intravenous, Once, Samir Teague MD  •  glucagon (human recombinant) (GLUCAGEN DIAGNOSTIC) injection 1 mg, 1 mg, Subcutaneous, Q15 Min PRN, Panfilo Wilson MD  •  insulin lispro (humaLOG) injection 2-7 Units, 2-7 Units, Subcutaneous, Q6H, Panfilo Wilson MD, 2 Units at 20 1715  •  levothyroxine (SYNTHROID, LEVOTHROID) tablet 125 mcg, 125 mcg, Nasogastric, Q AM, Ellis Wise MD, 125 mcg at 20 0529  •  ondansetron (ZOFRAN) injection 4 mg, 4 mg, Intravenous, Q6H PRN, Wil Jean MD  •  polyethylene glycol (MIRALAX) packet 17 g, 17 g, Nasogastric, Daily, Ellis Wise MD, 17 g at 20 0837  •  sennosides-docusate (PERICOLACE) 8.6-50 MG per tablet 1 tablet, 1 tablet, Nasogastric, BID, Ellis Wise MD, 1 tablet at 20 0838  •  sodium chloride 0.9 % flush 10 mL, 10 mL, Intravenous, PRN, Wil Jean MD, 10 mL at 21 0806      Review of Systems   Unable to perform ROS: Acuity of condition       Objective     Vital Signs:  Temp (24hrs), Av.7 °F (37.1 °C), Min:97.9 °F (36.6 °C), Max:99.6 °F (37.6 °C)      /71 (BP Location: Left arm, Patient Position: Lying)   Pulse 110   Temp 97.9 °F (36.6 °C) (Axillary)   Resp 24   Ht 165.1 cm (65\")   Wt 74.8 kg (165 lb)   LMP 10/26/2003 (Exact Date) Comment: Hyst for DUB  SpO2 94%   BMI 27.46 kg/m²         Physical Exam   X-ray techs in room 2 obtain KUB for Dobbhoff placement  Patient was evaluated through the glass door of MICU 14.  Discussed with RAMONA Ellison and RAMONA Holguin  General: 70-year-old female appearing comfortable lying in bed  HEENT: O2 per nasal cannula in place  Respiratory: Effort was even and unlabored.    Neuro: See HPI.  Psych: Patient does not appear " agitated    Results Review:    I reviewed the patient's new clinical results.    Lab Results:  CBC:   Lab Results   Lab 12/26/20  0354 12/28/20 0421 12/30/20 0204 01/01/21  0522   WBC 15.13* 12.86* 9.36 12.25*   HEMOGLOBIN 11.2* 10.8* 11.6* 12.8   HEMATOCRIT 31.5* 30.6* 32.6* 37.4   PLATELETS 112* 110* 134* 170       CMP:   Lab Results   Lab 12/26/20  0354 12/28/20  0421 12/30/20 0204 01/01/21  0523   SODIUM 139 139 138 144   POTASSIUM 4.1 4.0 3.6 3.7   CHLORIDE 104 104 101 106   CO2 28.0 28.0 29.0 26.0   BUN 23 23 25* 22   CREATININE 0.50* 0.43* 0.51* 0.55*   CALCIUM 8.8 9.0 8.9 9.2   BILIRUBIN 0.7 0.6 0.6  --    ALK PHOS 71 71 84  --    ALT (SGPT) 71* 77* 117*  --    AST (SGOT) 51* 47* 68*  --    GLUCOSE 141* 149* 146* 129*       Microscopic urinalysis with 31-50 WBCs, 0-2 RBCs and 3-6 squamous epis with some budding yeast noted    Culture Results:        Microbiology Results Abnormal     Procedure Component Value - Date/Time    Blood Culture - Blood, Arm, Left [[id number]] Collected: 12/30/20 2208    Lab Status: Preliminary result Specimen: Blood from Arm, Left Updated: 12/31/20 2230     Blood Culture No growth at 24 hours    Blood Culture - Blood, Hand, Right [[id number]] Collected: 12/30/20 1935    Lab Status: Preliminary result Specimen: Blood from Hand, Right Updated: 12/31/20 2015     Blood Culture No growth at 24 hours    Respiratory Culture - Sputum, ET Suction [[id number]]  (Abnormal)  (Susceptibility) Collected: 12/29/20 1955    Lab Status: Final result Specimen: Sputum from ET Suction Updated: 12/31/20 1051     Respiratory Culture Heavy growth (4+) Staphylococcus aureus      No Normal Respiratory Hailee     Gram Stain Moderate (3+) WBCs seen      Many (4+) Gram positive cocci    Susceptibility      Staphylococcus aureus     ISRA     Clindamycin Resistant     Oxacillin Susceptible     Penicillin G Resistant     Tetracycline Susceptible     Trimethoprim + Sulfamethoxazole Susceptible     Vancomycin  Susceptible                    Blood Culture - Blood, Arm, Left [[id number]] Collected: 12/19/20 1440    Lab Status: Final result Specimen: Blood from Arm, Left Updated: 12/24/20 1530     Blood Culture No growth at 5 days    Blood Culture - Blood, Blood, Central Line [[id number]] Collected: 12/19/20 1435    Lab Status: Final result Specimen: Blood, Central Line Updated: 12/24/20 1530     Blood Culture No growth at 5 days    Respiratory Culture - Sputum, ET Suction [[id number]]  (Abnormal) Collected: 12/20/20 0046    Lab Status: Final result Specimen: Sputum from ET Suction Updated: 12/22/20 1016     Respiratory Culture Scant growth (1+) Candida albicans      No Normal Respiratory Hailee     Gram Stain Greater than 25 WBCs per low power field      Less than 25 Epithelial cells per low power field      Few (2+) Budding yeast    Respiratory Culture - Sputum, ET Suction [[id number]] Collected: 12/15/20 1148    Lab Status: Final result Specimen: Sputum from ET Suction Updated: 12/17/20 0829     Respiratory Culture Scant growth (1+) Normal Respiratory Hailee: NO S.aureus/MRSA or Pseudomonas aeruginosa     Gram Stain Greater than 25 WBCs per low power field      No Epithelial cells per low power field      No organisms seen    Blood Culture - Blood, Arm, Left [[id number]] Collected: 12/11/20 1220    Lab Status: Final result Specimen: Blood from Arm, Left Updated: 12/16/20 1301     Blood Culture No growth at 5 days    Blood Culture - Blood, Arm, Left [[id number]] Collected: 12/11/20 1221    Lab Status: Final result Specimen: Blood from Arm, Left Updated: 12/16/20 1301     Blood Culture No growth at 5 days    MRSA Screen, PCR (Inpatient) - Swab, Nares [[id number]]  (Normal) Collected: 12/15/20 1148    Lab Status: Final result Specimen: Swab from Nares Updated: 12/15/20 1401     MRSA PCR No MRSA Detected    Legionella Antigen, Urine - Urine, Urine, Clean Catch [[id number]]  (Normal) Collected: 12/11/20 1829    Lab Status: Final result  Specimen: Urine, Clean Catch Updated: 12/11/20 1935     LEGIONELLA ANTIGEN, URINE Negative    S. Pneumo Ag Urine or CSF - Urine, Urine, Clean Catch [[id number]]  (Normal) Collected: 12/11/20 1829    Lab Status: Final result Specimen: Urine, Clean Catch Updated: 12/11/20 1934     Strep Pneumo Ag Negative    Respiratory Panel, PCR (WITHOUT COVID) - Swab, Nasopharynx [[id number]]  (Normal) Collected: 12/11/20 1719    Lab Status: Final result Specimen: Swab from Nasopharynx Updated: 12/11/20 1825     ADENOVIRUS, PCR Not Detected     Coronavirus 229E Not Detected     Coronavirus HKU1 Not Detected     Coronavirus NL63 Not Detected     Coronavirus OC43 Not Detected     Human Metapneumovirus Not Detected     Human Rhinovirus/Enterovirus Not Detected     Influenza B PCR Not Detected     Parainfluenza Virus 1 Not Detected     Parainfluenza Virus 2 Not Detected     Parainfluenza Virus 3 Not Detected     Parainfluenza Virus 4 Not Detected     Bordetella pertussis pcr Not Detected     Influenza A H1 2009 PCR Not Detected     Chlamydophila pneumoniae PCR Not Detected     Mycoplasma pneumo by PCR Not Detected     Influenza A PCR Not Detected     Influenza A H3 Not Detected     Influenza A H1 Not Detected     RSV, PCR Not Detected     Bordetella parapertussis PCR Not Detected    Narrative:      The coronavirus on the RVP is NOT COVID-19 and is NOT indicative of infection with COVID-19.     COVID PRE-OP / PRE-PROCEDURE SCREENING ORDER (NO ISOLATION) - Swab, Nasal Cavity [[id number]]  (Abnormal) Collected: 12/11/20 1346    Lab Status: Final result Specimen: Swab from Nasal Cavity Updated: 12/11/20 1431    Narrative:      The following orders were created for panel order COVID PRE-OP / PRE-PROCEDURE SCREENING ORDER (NO ISOLATION) - Swab, Nasal Cavity.  Procedure                               Abnormality         Status                     ---------                               -----------         ------                     COVID-19,  [facility] IN-HOUS...[[id number]]  Abnormal            Final result                 Please view results for these tests on the individual orders.    COVID-19, [facility] IN-HOUSE,NP Swab (NO TRANSPORT MEDIA) 2 HR TAT - Swab, Nasopharynx [[id number]]  (Abnormal) Collected: 12/11/20 1346    Lab Status: Final result Specimen: Swab from Nasopharynx Updated: 12/11/20 1431     COVID19 Detected    Narrative:      Fact sheet for providers: https://www.fda.gov/media/920521/download     Fact sheet for patients: https://www.fda.gov/media/960722/download    Test performed by PCR.                Radiology:   Imaging Results (Last 72 Hours)     Procedure Component Value Units Date/Time    XR Abdomen KUB [[id number]] Collected: 01/01/21 1639     Updated: 01/01/21 1643    Narrative:      EXAMINATION:  XR ABDOMEN KUB-  1/1/2021 4:22 PM CST     HISTORY: Dobhoff placement; U07.1-COVID-19; J12.82-Pneumonia due to  Coronavirus disease 2019; R09.02-Hypoxemia; J96.01-Acute respiratory  failure with hypoxia; Z74.09-Other reduced mobility; Z74.09-Other  reduced mobility; Z78.9-Other specified health status; R13.12-Dysphagia,  oropharyngeal phase      COMPARISON: No comparison study.     TECHNIQUE: Single view: KUB     FINDINGS:      Feeding tube looped in the distal stomach body region.        This report was finalized on 01/01/2021 16:40 by Dr. Oz Ng MD.    XR Chest 1 View [[id number]] Collected: 12/31/20 1627     Updated: 12/31/20 1632    Narrative:      HISTORY: PICC line placement     CXR: Frontal view the chest obtained.     COMPARISON: 12/26/2020     FINDINGS: A left upper extremity PICC line tip projects over the SVC. A  right IJ central line tip also projects closer to the atrial caval  junction. EKG wires project over the chest.     There are bilateral mixed interstitial alveolar opacities. Stable  mediastinal contours. No pleural effusion pneumothorax. Interval  extubation. Right axillary/chest wall surgical clips.       Impression:    "   1. Appropriate positioning left upper extremity PICC line with tip  projecting over the lower SVC.  2. Right IJ central line tip projects closer to the atrial caval  junction.  3. Removal of endotracheal and enteric tubes.  4. Similar diffuse bilateral pulmonary opacities concerning for  multifocal pneumonia.  This report was finalized on 12/31/2020 16:29 by Dr. Theresa Chapin MD.          Assessment/Plan     Active Hospital Problems    Diagnosis   • **Pneumonia due to COVID-19 virus   • Thrombocytopenia (CMS/HCC)   • Acute respiratory distress syndrome (ARDS) due to COVID-19 virus (CMS/HCC)   • Elevated LFTs   • Leukocytosis   • Elevated ferritin and CRP   • Obesity (BMI 30-39.9)   • Acute respiratory failure with hypoxia (CMS/HCC)       IMPRESSION:  · COVID-19 infection with pneumonia-symptoms start December 1, patient tested + Dec 2 and received antibiotics and steroids early in course of infection per her nurse practitioner-patient completed remdesivir December 15.    · Fever to 101  December 30.  Last received a dose of acetaminophen 650 mg at 1755 on December 30.  Blood cultures obtained.  Sputum cultures positive for 4+ methicillin susceptible Staphylococcus aureus.  Could be colonizing airway but obvious concern given recent fever.  · Acute respiratory failure with hypoxemia-rated from ventilator December 30, 2020 (status post empiric treatment with Zosyn from 12/15-12/22)  · thrombocytopenia---resolved  · leukocytosis -resolved and with increased today to 12,000-no differential  · elevated liver enzymes-  · MRSA screen negative      RECOMMENDATION:     Continue cefazolin pending blood culture results, monitoring of oxygen status given positive sputum culture \"MSSA\", etc.  Follow-up on urine culture-she did have some pyuria but also had increase squamous epithelial cells suggesting poor collection.  Monitor temperature curve closely.  Monitor off dexamethasone-last 8 December 2028  DVT prophylaxis with " Lovenox    Discussed with RAMONA Ellison and RAMONA Holguin MD  01/01/21  16:47 CST

## 2021-01-02 ENCOUNTER — APPOINTMENT (OUTPATIENT)
Dept: GENERAL RADIOLOGY | Facility: HOSPITAL | Age: 71
End: 2021-01-02

## 2021-01-02 LAB
ANION GAP SERPL CALCULATED.3IONS-SCNC: 13 MMOL/L (ref 5–15)
BACTERIA SPEC AEROBE CULT: ABNORMAL
BUN SERPL-MCNC: 33 MG/DL (ref 8–23)
BUN/CREAT SERPL: 47.1 (ref 7–25)
CALCIUM SPEC-SCNC: 9.8 MG/DL (ref 8.6–10.5)
CHLORIDE SERPL-SCNC: 105 MMOL/L (ref 98–107)
CO2 SERPL-SCNC: 25 MMOL/L (ref 22–29)
CREAT SERPL-MCNC: 0.7 MG/DL (ref 0.57–1)
GFR SERPL CREATININE-BSD FRML MDRD: 83 ML/MIN/1.73
GLUCOSE BLDC GLUCOMTR-MCNC: 129 MG/DL (ref 70–130)
GLUCOSE BLDC GLUCOMTR-MCNC: 139 MG/DL (ref 70–130)
GLUCOSE BLDC GLUCOMTR-MCNC: 142 MG/DL (ref 70–130)
GLUCOSE BLDC GLUCOMTR-MCNC: 147 MG/DL (ref 70–130)
GLUCOSE BLDC GLUCOMTR-MCNC: 155 MG/DL (ref 70–130)
GLUCOSE SERPL-MCNC: 135 MG/DL (ref 65–99)
POTASSIUM SERPL-SCNC: 3.5 MMOL/L (ref 3.5–5.2)
SODIUM SERPL-SCNC: 143 MMOL/L (ref 136–145)

## 2021-01-02 PROCEDURE — 94799 UNLISTED PULMONARY SVC/PX: CPT

## 2021-01-02 PROCEDURE — 74018 RADEX ABDOMEN 1 VIEW: CPT

## 2021-01-02 PROCEDURE — 82962 GLUCOSE BLOOD TEST: CPT

## 2021-01-02 PROCEDURE — 25010000002 CEFAZOLIN PER 500 MG: Performed by: INTERNAL MEDICINE

## 2021-01-02 PROCEDURE — 80048 BASIC METABOLIC PNL TOTAL CA: CPT | Performed by: INTERNAL MEDICINE

## 2021-01-02 PROCEDURE — 25010000002 FLUCONAZOLE PER 200 MG: Performed by: INTERNAL MEDICINE

## 2021-01-02 PROCEDURE — 25010000002 ENOXAPARIN PER 10 MG: Performed by: INTERNAL MEDICINE

## 2021-01-02 PROCEDURE — 97110 THERAPEUTIC EXERCISES: CPT

## 2021-01-02 PROCEDURE — 71045 X-RAY EXAM CHEST 1 VIEW: CPT

## 2021-01-02 PROCEDURE — 63710000001 INSULIN LISPRO (HUMAN) PER 5 UNITS: Performed by: FAMILY MEDICINE

## 2021-01-02 RX ORDER — LANOLIN ALCOHOL/MO/W.PET/CERES
3 CREAM (GRAM) TOPICAL NIGHTLY
Status: DISCONTINUED | OUTPATIENT
Start: 2021-01-02 | End: 2021-01-02

## 2021-01-02 RX ORDER — FLUCONAZOLE 2 MG/ML
400 INJECTION, SOLUTION INTRAVENOUS ONCE
Status: COMPLETED | OUTPATIENT
Start: 2021-01-02 | End: 2021-01-02

## 2021-01-02 RX ORDER — FAMOTIDINE 20 MG/1
20 TABLET, FILM COATED ORAL
Status: DISCONTINUED | OUTPATIENT
Start: 2021-01-02 | End: 2021-01-05

## 2021-01-02 RX ADMIN — INSULIN LISPRO 2 UNITS: 100 INJECTION, SOLUTION INTRAVENOUS; SUBCUTANEOUS at 18:28

## 2021-01-02 RX ADMIN — ENOXAPARIN SODIUM 40 MG: 100 INJECTION SUBCUTANEOUS at 08:47

## 2021-01-02 RX ADMIN — ALBUTEROL SULFATE 2 PUFF: 90 AEROSOL, METERED RESPIRATORY (INHALATION) at 14:40

## 2021-01-02 RX ADMIN — FAMOTIDINE 20 MG: 10 INJECTION INTRAVENOUS at 08:47

## 2021-01-02 RX ADMIN — FAMOTIDINE 20 MG: 20 TABLET, FILM COATED ORAL at 18:16

## 2021-01-02 RX ADMIN — DOCUSATE SODIUM 50 MG AND SENNOSIDES 8.6 MG 1 TABLET: 8.6; 5 TABLET, FILM COATED ORAL at 08:47

## 2021-01-02 RX ADMIN — CEFAZOLIN SODIUM 2 G: 10 INJECTION, POWDER, FOR SOLUTION INTRAVENOUS at 18:15

## 2021-01-02 RX ADMIN — FLUCONAZOLE 400 MG: 400 INJECTION, SOLUTION INTRAVENOUS at 13:53

## 2021-01-02 RX ADMIN — ENOXAPARIN SODIUM 40 MG: 100 INJECTION SUBCUTANEOUS at 20:15

## 2021-01-02 RX ADMIN — CEFAZOLIN SODIUM 2 G: 10 INJECTION, POWDER, FOR SOLUTION INTRAVENOUS at 00:25

## 2021-01-02 RX ADMIN — LEVOTHYROXINE SODIUM 125 MCG: 125 TABLET ORAL at 08:46

## 2021-01-02 RX ADMIN — ALBUTEROL SULFATE 2 PUFF: 90 AEROSOL, METERED RESPIRATORY (INHALATION) at 07:39

## 2021-01-02 RX ADMIN — CEFAZOLIN SODIUM 2 G: 10 INJECTION, POWDER, FOR SOLUTION INTRAVENOUS at 08:47

## 2021-01-02 RX ADMIN — DOCUSATE SODIUM 50 MG AND SENNOSIDES 8.6 MG 1 TABLET: 8.6; 5 TABLET, FILM COATED ORAL at 20:15

## 2021-01-02 NOTE — PLAN OF CARE
Problem: Adult Inpatient Plan of Care  Goal: Plan of Care Review  Recent Flowsheet Documentation  Taken 1/2/2021 0840 by Rufina Degroot PTA  Progress: no change  Plan of Care Reviewed With: patient  Outcome Summary: Pt continues to be lethargic and require max cues to attempt to stay awake.  Pt performed AAROM-PROM LLE/LUE and PROM RUE/RLE.

## 2021-01-02 NOTE — PROGRESS NOTES
"    [facility] Medicine Services  INPATIENT PROGRESS NOTE    Patient Name: Joann Finnegan  Date of Admission: 12/11/2020  Today's Date: 01/02/21  Length of Stay: 22  Primary Care Physician: Janak Sherwood APRN    Subjective   Chief Complaint: shortness of breath  HPI     Patient was seen and examined at bedside.      Discussed case with infection control and infectious disease.  We all agree patient can come out of precautions.  She was diagnosed with COVID-19 on 12/2.  She did have a fever on 12/30 but suspect unrelated to COVID-19 and likely secondary bacterial infection.      Patient awake and alert.  Attempts to talk to me today but voice is still very weak and when I asked her if she still feels \"foggy\" she shakes her head yes.  Tube feeds going at goal.       Review of Systems   Unable to perform ROS: Patient nonverbal      Attempted ROS but patient did not answer questions      All pertinent negatives and positives are as above. All other systems have been reviewed and are negative unless otherwise stated.     Objective    Temp:  [97.9 °F (36.6 °C)-99.7 °F (37.6 °C)] 99.7 °F (37.6 °C)  Heart Rate:  [] 110  Resp:  [21-28] 24  BP: ()/() 155/77  Physical Exam  Vitals signs and nursing note reviewed.   Constitutional:       General: She is not in acute distress.     Appearance: She is obese.   HENT:      Head: Normocephalic and atraumatic.      Nose: No congestion or rhinorrhea.      Comments: DHT in place      Mouth/Throat:      Pharynx: No oropharyngeal exudate or posterior oropharyngeal erythema.   Eyes:      General: No scleral icterus.     Conjunctiva/sclera: Conjunctivae normal.   Cardiovascular:      Rate and Rhythm: Normal rate and regular rhythm.      Heart sounds: No murmur.   Pulmonary:      Effort: Pulmonary effort is normal. No respiratory distress.      Breath sounds: No stridor. No wheezing or rales.      Comments: Diminished at bases; "   Abdominal:      General: Abdomen is flat. Bowel sounds are normal. There is no distension.      Palpations: Abdomen is soft. There is no mass.      Tenderness: There is no abdominal tenderness.      Hernia: No hernia is present.   Musculoskeletal: Normal range of motion.         General: No swelling.   Skin:     General: Skin is warm and dry.      Coloration: Skin is not jaundiced or pale.      Findings: Lesion (left jaw line, abrasion/pressure injury) present.   Neurological:      General: No focal deficit present.      Mental Status: She is disoriented.      Comments: Diffuse weakness   Psychiatric:         Behavior: Behavior normal.      Comments: Lethargic         Results Review:  I have reviewed the labs, radiology results, and diagnostic studies.    Laboratory Data:   Results from last 7 days   Lab Units 01/01/21  0522 12/30/20  0204 12/28/20  0421   WBC 10*3/mm3 12.25* 9.36 12.86*   HEMOGLOBIN g/dL 12.8 11.6* 10.8*   HEMATOCRIT % 37.4 32.6* 30.6*   PLATELETS 10*3/mm3 170 134* 110*        Results from last 7 days   Lab Units 01/01/21  0523 12/30/20  0204 12/28/20  0421   SODIUM mmol/L 144 138 139   POTASSIUM mmol/L 3.7 3.6 4.0   CHLORIDE mmol/L 106 101 104   CO2 mmol/L 26.0 29.0 28.0   BUN mg/dL 22 25* 23   CREATININE mg/dL 0.55* 0.51* 0.43*   CALCIUM mg/dL 9.2 8.9 9.0   BILIRUBIN mg/dL  --  0.6 0.6   ALK PHOS U/L  --  84 71   ALT (SGPT) U/L  --  117* 77*   AST (SGOT) U/L  --  68* 47*   GLUCOSE mg/dL 129* 146* 149*       Culture Data:   Blood Culture   Date Value Ref Range Status   12/11/2020 No growth at 24 hours  Preliminary   12/11/2020 No growth at 24 hours  Preliminary       Radiology Data:   Imaging Results (Last 24 Hours)     Procedure Component Value Units Date/Time    XR Abdomen KUB [[id number]] Collected: 01/01/21 1639     Updated: 01/01/21 1643    Narrative:      EXAMINATION:  XR ABDOMEN KUB-  1/1/2021 4:22 PM CST     HISTORY: Dobhoff placement; U07.1-COVID-19; J12.82-Pneumonia due to  Coronavirus  "disease 2019; R09.02-Hypoxemia; J96.01-Acute respiratory  failure with hypoxia; Z74.09-Other reduced mobility; Z74.09-Other  reduced mobility; Z78.9-Other specified health status; R13.12-Dysphagia,  oropharyngeal phase      COMPARISON: No comparison study.     TECHNIQUE: Single view: KUB     FINDINGS:      Feeding tube looped in the distal stomach body region.        This report was finalized on 01/01/2021 16:40 by Dr. Oz Ng MD.          I have reviewed the patient's current medications.     Assessment/Plan     Active Hospital Problems    Diagnosis   • **Pneumonia due to COVID-19 virus   • Thrombocytopenia (CMS/HCC)   • Acute respiratory distress syndrome (ARDS) due to COVID-19 virus (CMS/HCC)   • Elevated LFTs   • Leukocytosis   • Elevated ferritin and CRP   • Obesity (BMI 30-39.9)   • Acute respiratory failure with hypoxia (CMS/HCC)       Plan:  1.  Patient was admitted by Dr. Jean on 12/11 for respiratory failure.  Patient required vapotherm upon admission.  Patient was tested for COVID-19 on 12/2 as she developed \"cold-like symptoms\" and loss of taste on 12/1.  While test was pending she was given IM ceftriaxone and azithromycin PO.  Patient got the positive test result on 12/5 from [facility] and antibiotics were stable and she was started on hydroxychloroquine, dexamethasone, Vitamin D, Vitamin C, and zinc.    2.  Patient was admitted and required vapotherm initially and she was subsequently intubated on 12/13 for respiratory failure.  Patient was treated with prone protocol for several days after intubation and reached maximum benefit and has since been supine.  Patient was weaned and successfully extubated on 12/30.  Patient did struggle with anxiety during weaning trials, but decision was made to extubate and watch closely.  Pulmonary was consulted and assisted with ventilator management, they have since signed off.  Wean O2 to maintain sat >92%, currently on nasal cannula.  3.  Patient " was placed in enhanced droplet and contact precautions from the time of admission.  These precautions will be discontinued 1/2 after discussion with infectious disease and infection control.  4.  COVID-19 treatment consistent of Zinc, atorvastatin, vitamin C, thiamine, initiated upon admission, discontinued on 12/28.  Patient also received remdesivir initiated on 12/11, received 5-day treatment course.  She was treated with a tapering dose of dexamethasone she reeceived 6 mg x 10 days, tapered down and discontinued 12/28.   Symptomatic treatment with: tussinex PRN, acetaminophen PRN, albuterol PRN, Tessalon pearls PRN.   5.  Course was complicated by thrombocytopenia.   Peripheral smear (12/22) shows moderate peripheral thrombocytopenia, small platelet clump.  We will monitor platelets closely, especially while on lovenox for VTE PPx.  Naidr was 80K (12/24), patient showing improvement.  6.  VTE PPx with lovenox 40 mg BID; GI PPx with famotidine PO  7.  Infectious disease consulted, appreciate assistance.  MRSA nasal screen negative, respiratory culture NGTD.  Empirically started on zosyn on 12/15 and completed a 7-day treatment course.  Patient spiked a fever on 12/30 post-extubation, discussed with Dr. Ayon, respiratory culture from 12/29 shows staph aureus (not MRSA) cefazolin has been started.  UA (1/1) shows WBCs, UC showing yeast.  8.  Intermittent diuresis with IV furosemide.  Hold for today.  9.  Patient has difficulty with speech and dysphagia, continue working with SLP  10.  Will give a single dose of diflucan 400 mg IV   11.  AM labs, ordered BMP for today as patient did not have labs this AM    12.  Now that patient is out of precautions, will really focus on stimulation, PT/OT, day/night cycle maintenance.  Consider starting melatonin.   13.  KUB and CXR as patient DHT is appearing kinked                                                                                                                                        Would like to continue ICU care until patient is more alert and with less tachypnea.                                                                                                                   COVID LABS:  Results From Last 14 Days   Lab Units 12/30/20  0204 12/25/20  0459 12/22/20  0545   CK TOTAL U/L 28  --   --    CRP mg/dL 4.84* 2.49* 1.32*   D DIMER QUANT mg/L (FEU) 1.91* 2.24* 0.96*   FERRITIN ng/mL 452.50* 586.90* 641.90*   LDH U/L 275*  --   --    PROCALCITONIN ng/mL 0.24 0.17 0.23   PROTIME Seconds 14.8*  --   --    INR  1.20*  --   --        COVID19   Date Value Ref Range Status   12/11/2020 Detected (C) Presumptive Negative - Ref. Range Final             Discharge Planning: I expect the patient to be discharged to ? in >2 days.    Electronically signed by Samir Teague MD, 01/02/21, 10:17 CST.

## 2021-01-02 NOTE — THERAPY TREATMENT NOTE
Acute Care - Physical Therapy Treatment Note  [facility]     Patient Name: Joann Finnegan  : 1950  MRN: [id number]  Today's Date: 2021           PT Assessment (last 12 hours)      PT Evaluation and Treatment     Row Name 21 0840          Physical Therapy Time and Intention    Subjective Information  no complaints  -WK     Document Type  therapy note (daily note)  -WK     Mode of Treatment  physical therapy  -WK     Comment  pt had difficulty keeping eyes open.   -WK     Row Name 21 0840          General Information    Existing Precautions/Restrictions  fall;oxygen therapy device and L/min  -WK     Row Name 21 0840          Bed Mobility    Rolling Left [place] (Bed Mobility)  dependent (less than 25% patient effort)  -WK     Row Name 21 0840          Aerobic Exercise    Comment, Aerobic Exercise (Therapeutic Exercise)  PROM RUE/RLE, AAROM-PROM LLE/LUE.  Max cues to stay awake and attempt exercises. 20 reps   -WK     Row Name             Wound 20 0957 Right lower chin    Wound - Properties Group Placement Date: 20  -KM Placement Time: 0957  -KM Side: Right  -KM Orientation: lower  -KM Location: chin  -KM Stage, Pressure Injury : deep tissue injury  -KM    Retired Wound - Properties Group Date first assessed: 20  -KM Time first assessed: 0957  -KM Side: Right  -KM Location: chin  -KM    Row Name             Wound 20 0959 Right upper lip    Wound - Properties Group Placement Date: 20  -KM Placement Time: 0959  -KM Present on Hospital Admission: N  -KM Side: Right  -KM Orientation: upper  -KM Location: lip  -KM Stage, Pressure Injury : deep tissue injury  -KM    Retired Wound - Properties Group Date first assessed: 20  -KM Time first assessed: 0959  -KM Present on Hospital Admission: N  -KM Side: Right  -KM Location: lip  -KM    Row Name             Wound 20 1425 Left cheek Pressure Injury    Wound - Properties Group Placement Date:  12/18/20  -RM Placement Time: 1425  -RM Present on Hospital Admission: N  -RM Side: Left  -RM Location: cheek  -RM Primary Wound Type: Pressure inj  -RM    Retired Wound - Properties Group Date first assessed: 12/18/20  -RM Time first assessed: 1425  -RM Present on Hospital Admission: N  -RM Side: Left  -RM Location: cheek  -RM Primary Wound Type: Pressure inj  -RM    Row Name             Wound 12/21/20 1118 Left nose Pressure Injury    Wound - Properties Group Placement Date: 12/21/20  -CH Placement Time: 1118  -CH Present on Hospital Admission: N  -CH Side: Left  -CH Location: nose  -CH Primary Wound Type: Pressure inj  -CH    Retired Wound - Properties Group Date first assessed: 12/21/20  -CH Time first assessed: 1118  -CH Present on Hospital Admission: N  -CH Side: Left  -CH Location: nose  -CH Primary Wound Type: Pressure inj  -CH    Row Name 01/02/21 0840          Plan of Care Review    Plan of Care Reviewed With  patient  -WK     Progress  no change  -WK     Outcome Summary  Pt continues to be lethargic and require max cues to attempt to stay awake.  Pt performed AAROM-PROM LLE/LUE and PROM RUE/RLE.  -WK     Row Name 01/02/21 0840          Positioning and Restraints    Pre-Treatment Position  in bed  -WK     Post Treatment Position  bed  -WK     In Bed  side lying right;with nsg  -WK       User Key  (r) = Recorded By, (t) = Taken By, (c) = Cosigned By    Initials Name Provider Type    Maine Jaramillo RN Registered Nurse    Ya Dodson RN Registered Nurse    WK Rufina Degroot PTA Physical Therapy Assistant    Gisselle Buchanan RN Registered Nurse        Physical Therapy Education                 Title: PT OT SLP Therapies (In Progress)     Topic: Physical Therapy (In Progress)     Point: Mobility training (In Progress)     Learning Progress Summary           Patient Acceptance, E, NR by WK at 1/2/2021 0911    Comment: BOA    Acceptance, E, NL by WK at 1/1/2021 0935    Comment: BOA     IRINEO Talamantes, NL by MS at 12/28/2020 0900    Comment: role of PT in her care                   Point: Home exercise program (Not Started)     Learner Progress:  Not documented in this visit.          Point: Body mechanics (Not Started)     Learner Progress:  Not documented in this visit.          Point: Precautions (Not Started)     Learner Progress:  Not documented in this visit.                      User Key     Initials Effective Dates Name Provider Type Discipline    MS 06/19/18 -  Yennifer May, PT, DPT, NCS Physical Therapist PT    WK 08/02/16 -  Rufina Degroot, PTA Physical Therapy Assistant PT              PT Recommendation and Plan     Plan of Care Reviewed With: patient  Progress: no change  Outcome Summary: Pt continues to be lethargic and require max cues to attempt to stay awake.  Pt performed AAROM-PROM LLE/LUE and PROM RUE/RLE.  Outcome Measures     Row Name 12/31/20 1100             How much help from another is currently needed...    Putting on and taking off regular lower body clothing?  1  -JJ      Bathing (including washing, rinsing, and drying)  1  -JJ      Toileting (which includes using toilet bed pan or urinal)  1  -JJ      Putting on and taking off regular upper body clothing  1  -JJ      Taking care of personal grooming (such as brushing teeth)  1  -JJ      Eating meals  1  -JJ      AM-PAC 6 Clicks Score (OT)  6  -JJ         Functional Assessment    Outcome Measure Options  AM-PAC 6 Clicks Daily Activity (OT)  -JJ        User Key  (r) = Recorded By, (t) = Taken By, (c) = Cosigned By    Initials Name Provider Type    Theresa Booker, OTR/L Occupational Therapist           Time Calculation:   PT Charges     Row Name 01/02/21 0911             Time Calculation    Start Time  0840  -WK      Stop Time  0905  -WK      Time Calculation (min)  25 min  -WK      PT Received On  01/02/21  -WK         Time Calculation- PT    Total Timed Code Minutes- PT  25 minute(s)  -WK        User Key   (r) = Recorded By, (t) = Taken By, (c) = Cosigned By    Initials Name Provider Type    WK Rufina Degroot PTA Physical Therapy Assistant        Therapy Charges for Today     Code Description Service Date Service Provider Modifiers Qty    85836528276 HC PT THER PROC EA 15 MIN 1/1/2021 Rufina Degroot PTA GP 2    83874402596 HC PT THER PROC EA 15 MIN 1/2/2021 Rufina Degroot PTA GP 2          PT G-Codes  Outcome Measure Options: AM-PAC 6 Clicks Daily Activity (OT)  AM-PAC 6 Clicks Score (PT): 6  AM-PAC 6 Clicks Score (OT): 6    Rufina Degroot PTA  1/2/2021

## 2021-01-02 NOTE — PROGRESS NOTES
"INFECTIOUS DISEASES PROGRESS NOTE    Patient:  Joann Finnegan  YOB: 1950  MRN: [id number]   Admit date: 12/11/2020   Admitting Physician: Samir Teague MD  Primary Care Physician: Janak Sherwood APRN      Chief Complaint: \"Food?\"    Interval History:   Patient extubated December 30 and did well.  Reviewed with Dr. Teague and Michelle Diaz RN infection preventionist and all in agreement to remove from isolation.    Patient remains weak.  She nods when asked if she still feels the effects of her sedating medications.    When I asked her if she needed anything she mouthed \"food\" when I asked if she said food she nodded her head.  Did explain to her evaluation by speech therapy and the fact that she was getting nutrition through tube feeding    allergies:   Allergies   Allergen Reactions   • Requip [Ropinirole Hcl] Nausea And Vomiting       Current Meds:     Current Facility-Administered Medications:   •  acetaminophen (TYLENOL) tablet 650 mg, 650 mg, Nasogastric, Q4H PRN **OR** acetaminophen (TYLENOL) suppository 650 mg, 650 mg, Rectal, Q4H PRN, Ellis Wise MD, 650 mg at 12/30/20 1755  •  albuterol sulfate HFA (PROVENTIL HFA;VENTOLIN HFA;PROAIR HFA) inhaler 2 puff, 2 puff, Inhalation, Q6H PRN, Wil Jean MD  •  albuterol sulfate HFA (PROVENTIL HFA;VENTOLIN HFA;PROAIR HFA) inhaler 2 puff, 2 puff, Inhalation, TID - RT, Samir Teague MD, 2 puff at 01/02/21 0739  •  benzonatate (TESSALON) capsule 100 mg, 100 mg, Oral, TID PRN, Wil Jean MD, 100 mg at 12/12/20 2034  •  ceFAZolin in 0.9% normal saline (ANCEF) IVPB solution 2 g, 2 g, Intravenous, Q8H, Mervat Ayon MD, Last Rate: 200 mL/hr at 01/02/21 0847, 2 g at 01/02/21 0847  •  dextrose (D50W) 25 g/ 50mL Intravenous Solution 25 g, 25 g, Intravenous, Q15 Min PRN, Panfilo Wilson MD  •  dextrose (GLUTOSE) oral gel 15 g, 15 g, Oral, Q15 Min PRN, Panfilo Wilson MD  •  enoxaparin (LOVENOX) " "syringe 40 mg, 40 mg, Subcutaneous, Q12H, Samir Teague MD, 40 mg at 21 0847  •  famotidine (PEPCID) tablet 20 mg, 20 mg, Oral, BID AC, Samir Teague MD  •  fluconazole (DIFLUCAN) IVPB 400 mg, 400 mg, Intravenous, Once, Samir Teague MD  •  glucagon (human recombinant) (GLUCAGEN DIAGNOSTIC) injection 1 mg, 1 mg, Subcutaneous, Q15 Min PRN, Panfilo Wilson MD  •  insulin lispro (humaLOG) injection 2-7 Units, 2-7 Units, Subcutaneous, Q6H, Panfilo Wilson MD, 2 Units at 20 1715  •  levothyroxine (SYNTHROID, LEVOTHROID) tablet 125 mcg, 125 mcg, Nasogastric, Q AM, Ellis Wise MD, 125 mcg at 21 0846  •  ondansetron (ZOFRAN) injection 4 mg, 4 mg, Intravenous, Q6H PRN, Wil Jean MD  •  polyethylene glycol (MIRALAX) packet 17 g, 17 g, Nasogastric, Daily, Ellis Wise MD, 17 g at 20 0837  •  sennosides-docusate (PERICOLACE) 8.6-50 MG per tablet 1 tablet, 1 tablet, Nasogastric, BID, Ellis Wise MD, 1 tablet at 21 0847  •  sodium chloride 0.9 % flush 10 mL, 10 mL, Intravenous, PRN, Wil Jean MD, 10 mL at 21 0806      Review of Systems   Constitutional: Positive for fever (Low-grade).   Neurological: Positive for weakness.       Objective     Vital Signs:  Temp (24hrs), Av.1 °F (37.3 °C), Min:97.9 °F (36.6 °C), Max:99.7 °F (37.6 °C)      /69   Pulse 102   Temp 98.6 °F (37 °C) (Axillary)   Resp 24   Ht 165.1 cm (65\")   Wt 78.7 kg (173 lb 6.4 oz)   LMP 10/26/2003 (Exact Date) Comment: Hyst for DUB  SpO2 94%   BMI 28.86 kg/m²         Physical Exam   General: Patient appears weak but is lying in bed in no acute distress  HEENT: Dobbhoff tube in right nare, O2 per nasal cannula in place.  Respiratory: Effort even and unlabored.  Poor effort with inspiration and expiration but a few scattered rhonchi without wheezing  Abdomen: Soft, bowel sounds positive  Neuro: Alert, oriented, seems to nod appropriately " and mouth words appropriately  Psych: Pleasant cooperative    Results Review:    I reviewed the patient's new clinical results.    Lab Results:  CBC:   Lab Results   Lab 12/28/20  0421 12/30/20  0204 01/01/21  0522   WBC 12.86* 9.36 12.25*   HEMOGLOBIN 10.8* 11.6* 12.8   HEMATOCRIT 30.6* 32.6* 37.4   PLATELETS 110* 134* 170       CMP:   Lab Results   Lab 12/28/20  0421 12/30/20  0204 01/01/21  0523 01/02/21  1033   SODIUM 139 138 144 143   POTASSIUM 4.0 3.6 3.7 3.5   CHLORIDE 104 101 106 105   CO2 28.0 29.0 26.0 25.0   BUN 23 25* 22 33*   CREATININE 0.43* 0.51* 0.55* 0.70   CALCIUM 9.0 8.9 9.2 9.8   BILIRUBIN 0.6 0.6  --   --    ALK PHOS 71 84  --   --    ALT (SGPT) 77* 117*  --   --    AST (SGOT) 47* 68*  --   --    GLUCOSE 149* 146* 129* 135*       Microscopic urinalysis with 31-50 WBCs, 0-2 RBCs and 3-6 squamous epis with some budding yeast noted    Culture Results:        Microbiology Results Abnormal     Procedure Component Value - Date/Time    Urine Culture - Urine, Urine, Catheter In/Out [[id number]]  (Abnormal) Collected: 01/01/21 0301    Lab Status: Final result Specimen: Urine, Catheter In/Out Updated: 01/02/21 1026     Urine Culture Yeast isolated    Blood Culture - Blood, Arm, Left [[id number]] Collected: 12/30/20 2208    Lab Status: Preliminary result Specimen: Blood from Arm, Left Updated: 01/01/21 2230     Blood Culture No growth at 2 days    Blood Culture - Blood, Hand, Right [[id number]] Collected: 12/30/20 1935    Lab Status: Preliminary result Specimen: Blood from Hand, Right Updated: 01/01/21 2015     Blood Culture No growth at 2 days    Respiratory Culture - Sputum, ET Suction [[id number]]  (Abnormal)  (Susceptibility) Collected: 12/29/20 1955    Lab Status: Final result Specimen: Sputum from ET Suction Updated: 12/31/20 1051     Respiratory Culture Heavy growth (4+) Staphylococcus aureus      No Normal Respiratory Hailee     Gram Stain Moderate (3+) WBCs seen      Many (4+) Gram positive cocci     Susceptibility      Staphylococcus aureus     ISRA     Clindamycin Resistant     Oxacillin Susceptible     Penicillin G Resistant     Tetracycline Susceptible     Trimethoprim + Sulfamethoxazole Susceptible     Vancomycin Susceptible                    Blood Culture - Blood, Arm, Left [[id number]] Collected: 12/19/20 1440    Lab Status: Final result Specimen: Blood from Arm, Left Updated: 12/24/20 1530     Blood Culture No growth at 5 days    Blood Culture - Blood, Blood, Central Line [[id number]] Collected: 12/19/20 1435    Lab Status: Final result Specimen: Blood, Central Line Updated: 12/24/20 1530     Blood Culture No growth at 5 days    Respiratory Culture - Sputum, ET Suction [[id number]]  (Abnormal) Collected: 12/20/20 0046    Lab Status: Final result Specimen: Sputum from ET Suction Updated: 12/22/20 1016     Respiratory Culture Scant growth (1+) Candida albicans      No Normal Respiratory Hailee     Gram Stain Greater than 25 WBCs per low power field      Less than 25 Epithelial cells per low power field      Few (2+) Budding yeast    Respiratory Culture - Sputum, ET Suction [[id number]] Collected: 12/15/20 1148    Lab Status: Final result Specimen: Sputum from ET Suction Updated: 12/17/20 0829     Respiratory Culture Scant growth (1+) Normal Respiratory Hailee: NO S.aureus/MRSA or Pseudomonas aeruginosa     Gram Stain Greater than 25 WBCs per low power field      No Epithelial cells per low power field      No organisms seen    Blood Culture - Blood, Arm, Left [[id number]] Collected: 12/11/20 1220    Lab Status: Final result Specimen: Blood from Arm, Left Updated: 12/16/20 1301     Blood Culture No growth at 5 days    Blood Culture - Blood, Arm, Left [[id number]] Collected: 12/11/20 1221    Lab Status: Final result Specimen: Blood from Arm, Left Updated: 12/16/20 1301     Blood Culture No growth at 5 days    MRSA Screen, PCR (Inpatient) - Swab, Nares [[id number]]  (Normal) Collected: 12/15/20 1148    Lab Status:  Final result Specimen: Swab from Nares Updated: 12/15/20 1401     MRSA PCR No MRSA Detected    Legionella Antigen, Urine - Urine, Urine, Clean Catch [[id number]]  (Normal) Collected: 12/11/20 1829    Lab Status: Final result Specimen: Urine, Clean Catch Updated: 12/11/20 1935     LEGIONELLA ANTIGEN, URINE Negative    S. Pneumo Ag Urine or CSF - Urine, Urine, Clean Catch [[id number]]  (Normal) Collected: 12/11/20 1829    Lab Status: Final result Specimen: Urine, Clean Catch Updated: 12/11/20 1934     Strep Pneumo Ag Negative    Respiratory Panel, PCR (WITHOUT COVID) - Swab, Nasopharynx [[id number]]  (Normal) Collected: 12/11/20 1719    Lab Status: Final result Specimen: Swab from Nasopharynx Updated: 12/11/20 1825     ADENOVIRUS, PCR Not Detected     Coronavirus 229E Not Detected     Coronavirus HKU1 Not Detected     Coronavirus NL63 Not Detected     Coronavirus OC43 Not Detected     Human Metapneumovirus Not Detected     Human Rhinovirus/Enterovirus Not Detected     Influenza B PCR Not Detected     Parainfluenza Virus 1 Not Detected     Parainfluenza Virus 2 Not Detected     Parainfluenza Virus 3 Not Detected     Parainfluenza Virus 4 Not Detected     Bordetella pertussis pcr Not Detected     Influenza A H1 2009 PCR Not Detected     Chlamydophila pneumoniae PCR Not Detected     Mycoplasma pneumo by PCR Not Detected     Influenza A PCR Not Detected     Influenza A H3 Not Detected     Influenza A H1 Not Detected     RSV, PCR Not Detected     Bordetella parapertussis PCR Not Detected    Narrative:      The coronavirus on the RVP is NOT COVID-19 and is NOT indicative of infection with COVID-19.     COVID PRE-OP / PRE-PROCEDURE SCREENING ORDER (NO ISOLATION) - Swab, Nasal Cavity [[id number]]  (Abnormal) Collected: 12/11/20 1346    Lab Status: Final result Specimen: Swab from Nasal Cavity Updated: 12/11/20 1431    Narrative:      The following orders were created for panel order COVID PRE-OP / PRE-PROCEDURE SCREENING ORDER  (NO ISOLATION) - Swab, Nasal Cavity.  Procedure                               Abnormality         Status                     ---------                               -----------         ------                     COVID-19, [facility] IN-HOUS...[[id number]]  Abnormal            Final result                 Please view results for these tests on the individual orders.    COVID-19, [facility] IN-HOUSE,NP Swab (NO TRANSPORT MEDIA) 2 HR TAT - Swab, Nasopharynx [[id number]]  (Abnormal) Collected: 12/11/20 1346    Lab Status: Final result Specimen: Swab from Nasopharynx Updated: 12/11/20 1431     COVID19 Detected    Narrative:      Fact sheet for providers: https://www.fda.gov/media/645934/download     Fact sheet for patients: https://www.fda.gov/media/945519/download    Test performed by PCR.                Radiology:   Imaging Results (Last 72 Hours)     Procedure Component Value Units Date/Time    XR Abdomen KUB [[id number]] Collected: 01/02/21 1125     Updated: 01/02/21 1129    Narrative:      Exam: XR ABDOMEN KUB-     Indication: Feeding tube placement     Comparison: 1/01/2021     Findings:     Feeding tube coils in the proximal stomach, with tip pointed superiorly.  Visualized bowel gas pattern is nonobstructive. No mass effect or  suspicious calcifications. Surgical clips in the RIGHT pelvis. No acute  osseous finding.       Impression:      Impression:     Feeding tube coils in the proximal stomach with tip pointing superiorly.  This report was finalized on 01/02/2021 11:26 by Dr. Lalo aMza MD.    XR Chest 1 View [[id number]] Collected: 01/02/21 1123     Updated: 01/02/21 1127    Narrative:      Exam: XR CHEST 1 VW-     Indication: Hypoxia, pneumonia     Comparison: 12/31/2020     Findings:     Right-sided CVL has been removed. Left-sided PICC tip overlies the low  SVC. Cardiac silhouette is stable. No change in patchy bilateral  parenchymal opacity. No large pleural effusion or visible pneumothorax.  Lung volumes appear  improved. A feeding tube terminates below the  diaphragm, in the proximal stomach.       Impression:      Impression:     1.  No significant change in bilateral parenchymal pulmonary opacity.  2.  Interval placement of feeding tube tip in the proximal stomach.  This report was finalized on 01/02/2021 11:24 by Dr. Lalo Maza MD.    XR Abdomen KUB [[id number]] Collected: 01/01/21 1639     Updated: 01/01/21 1643    Narrative:      EXAMINATION:  XR ABDOMEN KUB-  1/1/2021 4:22 PM CST     HISTORY: Dobhoff placement; U07.1-COVID-19; J12.82-Pneumonia due to  Coronavirus disease 2019; R09.02-Hypoxemia; J96.01-Acute respiratory  failure with hypoxia; Z74.09-Other reduced mobility; Z74.09-Other  reduced mobility; Z78.9-Other specified health status; R13.12-Dysphagia,  oropharyngeal phase      COMPARISON: No comparison study.     TECHNIQUE: Single view: KUB     FINDINGS:      Feeding tube looped in the distal stomach body region.        This report was finalized on 01/01/2021 16:40 by Dr. Oz Ng MD.    XR Chest 1 View [[id number]] Collected: 12/31/20 1627     Updated: 12/31/20 1632    Narrative:      HISTORY: PICC line placement     CXR: Frontal view the chest obtained.     COMPARISON: 12/26/2020     FINDINGS: A left upper extremity PICC line tip projects over the SVC. A  right IJ central line tip also projects closer to the atrial caval  junction. EKG wires project over the chest.     There are bilateral mixed interstitial alveolar opacities. Stable  mediastinal contours. No pleural effusion pneumothorax. Interval  extubation. Right axillary/chest wall surgical clips.       Impression:      1. Appropriate positioning left upper extremity PICC line with tip  projecting over the lower SVC.  2. Right IJ central line tip projects closer to the atrial caval  junction.  3. Removal of endotracheal and enteric tubes.  4. Similar diffuse bilateral pulmonary opacities concerning for  multifocal pneumonia.  This report was  "finalized on 12/31/2020 16:29 by Dr. Theresa Chapin MD.          Assessment/Plan     Active Hospital Problems    Diagnosis   • **Pneumonia due to COVID-19 virus   • Thrombocytopenia (CMS/HCC)   • Acute respiratory distress syndrome (ARDS) due to COVID-19 virus (CMS/HCC)   • Elevated LFTs   • Leukocytosis   • Elevated ferritin and CRP   • Obesity (BMI 30-39.9)   • Acute respiratory failure with hypoxia (CMS/HCC)       IMPRESSION:  · COVID-19 infection with pneumonia-symptoms start December 1, patient tested + Dec 2 and received antibiotics and steroids early in course of infection per her nurse practitioner-patient completed remdesivir December 15.    · Fever to 101  December 30.  Last received a dose of acetaminophen 650 mg at 1755 on December 30.  Blood cultures obtained.  Sputum cultures positive for 4+ methicillin susceptible Staphylococcus aureus.  Could be colonizing airway but obvious concern given recent fever.  · Acute respiratory failure with hypoxemia-rated from ventilator December 30, 2020 (status post empiric treatment with Zosyn from 12/15-12/22)  · thrombocytopenia---resolved  · leukocytosis -resolved and with increased January 1 to 12,000-  · elevated liver enzymes-  · MRSA screen negative      RECOMMENDATION:     Continue cefazolin pending blood culture results, monitoring of oxygen status given positive sputum culture \"MSSA\", etc.  Agree with discontinue of enhanced droplet/contact precautions  Follow-up on urine culture-she did have some pyuria but also had increase squamous epithelial cells suggesting poor collection-yeast present await identification..  Monitor temperature curve closely.  Monitor off dexamethasone-  DVT prophylaxis with Lovenox  Check CBC with manual differential and liver function tomorrow morning.  Add fluconazole for yeast in urine.    Discussed with RAMONA Lange MD  01/02/21  12:05 CST      "

## 2021-01-03 LAB
ALBUMIN SERPL-MCNC: 3 G/DL (ref 3.5–5.2)
ALBUMIN/GLOB SERPL: 0.6 G/DL
ALP SERPL-CCNC: 90 U/L (ref 39–117)
ALT SERPL W P-5'-P-CCNC: 51 U/L (ref 1–33)
ANION GAP SERPL CALCULATED.3IONS-SCNC: 7 MMOL/L (ref 5–15)
AST SERPL-CCNC: 56 U/L (ref 1–32)
BILIRUB SERPL-MCNC: 0.4 MG/DL (ref 0–1.2)
BUN SERPL-MCNC: 37 MG/DL (ref 8–23)
BUN/CREAT SERPL: 63.8 (ref 7–25)
CALCIUM SPEC-SCNC: 8.9 MG/DL (ref 8.6–10.5)
CHLORIDE SERPL-SCNC: 110 MMOL/L (ref 98–107)
CO2 SERPL-SCNC: 27 MMOL/L (ref 22–29)
CREAT SERPL-MCNC: 0.58 MG/DL (ref 0.57–1)
GFR SERPL CREATININE-BSD FRML MDRD: 103 ML/MIN/1.73
GLOBULIN UR ELPH-MCNC: 4.8 GM/DL
GLUCOSE BLDC GLUCOMTR-MCNC: 127 MG/DL (ref 70–130)
GLUCOSE BLDC GLUCOMTR-MCNC: 130 MG/DL (ref 70–130)
GLUCOSE BLDC GLUCOMTR-MCNC: 138 MG/DL (ref 70–130)
GLUCOSE BLDC GLUCOMTR-MCNC: 156 MG/DL (ref 70–130)
GLUCOSE SERPL-MCNC: 142 MG/DL (ref 65–99)
POTASSIUM SERPL-SCNC: 3.5 MMOL/L (ref 3.5–5.2)
PROT SERPL-MCNC: 7.8 G/DL (ref 6–8.5)
SODIUM SERPL-SCNC: 144 MMOL/L (ref 136–145)

## 2021-01-03 PROCEDURE — 92526 ORAL FUNCTION THERAPY: CPT | Performed by: SPEECH-LANGUAGE PATHOLOGIST

## 2021-01-03 PROCEDURE — 25010000002 CEFAZOLIN PER 500 MG: Performed by: INTERNAL MEDICINE

## 2021-01-03 PROCEDURE — 80053 COMPREHEN METABOLIC PANEL: CPT | Performed by: INTERNAL MEDICINE

## 2021-01-03 PROCEDURE — 63710000001 INSULIN LISPRO (HUMAN) PER 5 UNITS: Performed by: FAMILY MEDICINE

## 2021-01-03 PROCEDURE — 25010000002 ENOXAPARIN PER 10 MG: Performed by: INTERNAL MEDICINE

## 2021-01-03 PROCEDURE — 94799 UNLISTED PULMONARY SVC/PX: CPT

## 2021-01-03 PROCEDURE — 82962 GLUCOSE BLOOD TEST: CPT

## 2021-01-03 PROCEDURE — 25010000002 FUROSEMIDE PER 20 MG: Performed by: INTERNAL MEDICINE

## 2021-01-03 RX ORDER — FUROSEMIDE 10 MG/ML
40 INJECTION INTRAMUSCULAR; INTRAVENOUS ONCE
Status: COMPLETED | OUTPATIENT
Start: 2021-01-03 | End: 2021-01-03

## 2021-01-03 RX ADMIN — POLYETHYLENE GLYCOL (3350) 17 G: 17 POWDER, FOR SOLUTION ORAL at 08:40

## 2021-01-03 RX ADMIN — FAMOTIDINE 20 MG: 20 TABLET, FILM COATED ORAL at 08:39

## 2021-01-03 RX ADMIN — CEFAZOLIN SODIUM 2 G: 10 INJECTION, POWDER, FOR SOLUTION INTRAVENOUS at 00:36

## 2021-01-03 RX ADMIN — CEFAZOLIN SODIUM 2 G: 10 INJECTION, POWDER, FOR SOLUTION INTRAVENOUS at 18:26

## 2021-01-03 RX ADMIN — DOCUSATE SODIUM 50 MG AND SENNOSIDES 8.6 MG 1 TABLET: 8.6; 5 TABLET, FILM COATED ORAL at 08:40

## 2021-01-03 RX ADMIN — FAMOTIDINE 20 MG: 20 TABLET, FILM COATED ORAL at 18:26

## 2021-01-03 RX ADMIN — ALBUTEROL SULFATE 2 PUFF: 90 AEROSOL, METERED RESPIRATORY (INHALATION) at 14:19

## 2021-01-03 RX ADMIN — LEVOTHYROXINE SODIUM 125 MCG: 125 TABLET ORAL at 05:25

## 2021-01-03 RX ADMIN — ENOXAPARIN SODIUM 40 MG: 100 INJECTION SUBCUTANEOUS at 08:39

## 2021-01-03 RX ADMIN — CEFAZOLIN SODIUM 2 G: 10 INJECTION, POWDER, FOR SOLUTION INTRAVENOUS at 08:39

## 2021-01-03 RX ADMIN — ALBUTEROL SULFATE 2 PUFF: 90 AEROSOL, METERED RESPIRATORY (INHALATION) at 20:36

## 2021-01-03 RX ADMIN — ALBUTEROL SULFATE 2 PUFF: 90 AEROSOL, METERED RESPIRATORY (INHALATION) at 07:25

## 2021-01-03 RX ADMIN — INSULIN LISPRO 2 UNITS: 100 INJECTION, SOLUTION INTRAVENOUS; SUBCUTANEOUS at 12:39

## 2021-01-03 RX ADMIN — ENOXAPARIN SODIUM 40 MG: 100 INJECTION SUBCUTANEOUS at 22:11

## 2021-01-03 RX ADMIN — FUROSEMIDE 40 MG: 10 INJECTION, SOLUTION INTRAMUSCULAR; INTRAVENOUS at 12:39

## 2021-01-03 NOTE — PLAN OF CARE
Problem: Adult Inpatient Plan of Care  Goal: Plan of Care Review  Outcome: Ongoing, Progressing  Flowsheets (Taken 1/3/2021 1100)  Progress: improving  Plan of Care Reviewed With:   patient   spouse  Outcome Summary: ST tx completed. Pt alert and cooperative. Pt is confused at times with difficulty following commands for oral motor exercises. Pt with dried secretions on lips and in oral cavity. Aphonic vocal quality. MD stated he would consult ENT. Pt did well participating in oral motor exercises with moistened toothette. Pt cued to move toothette laterally to each buccal cavity and anteriorly out of oral cavity. Pt did have difficulty following directions to fully complete despite frequent cueing but continued to attempt. Weak throat clears noted 2x, which were likely from residue from moistened toothette. By completing oral motor exercises with toothette, it helped to clear dried secretions on lips and intraorally. Pt also was able to complete swallows with cold bolus x5. Pt noted to have difficulty following commands to protrude tongue anteriorly past lips. Pt does fatigue easily and but continues to try. Pt is more alert and doing better but very weak overall. ST to continue to follow and treat to assess PO readiness.   Goal Outcome Evaluation:  Plan of Care Reviewed With: patient, spouse  Progress: improving  Outcome Summary: ST tx completed. Pt alert and cooperative. Pt is confused at times with difficulty following commands for oral motor exercises. Pt with dried secretions on lips and in oral cavity. Aphonic vocal quality. MD stated he would consult ENT. Pt did well participating in oral motor exercises with moistened toothette. Pt cued to move toothette laterally to each buccal cavity and anteriorly out of oral cavity. Pt did have difficulty following directions to fully complete despite frequent cueing but continued to attempt. Weak throat clears noted 2x, which were likely from residue from moistened  toothette. By completing oral motor exercises with toothette, it helped to clear dried secretions on lips and intraorally. Pt also was able to complete swallows with cold bolus x5. Pt noted to have difficulty following commands to protrude tongue anteriorly past lips. Pt does fatigue easily and but continues to try. Pt is more alert and doing better but very weak overall. ST to continue to follow and treat to assess PO readiness.

## 2021-01-03 NOTE — PLAN OF CARE
Goal Outcome Evaluation:  Plan of Care Reviewed With: patient  Progress: no change   Pt more alert throughout night but did not sleep. Increasingly tired this AM. O2 currently on 4L NC sats WNL. Output good. Tube feeding cont at max goal rate. VSS.

## 2021-01-03 NOTE — PROGRESS NOTES
INFECTIOUS DISEASES PROGRESS NOTE    Patient:  Joann Finnegan  YOB: 1950  MRN: [id number]   Admit date: 12/11/2020   Admitting Physician: Samir Teague MD  Primary Care Physician: Janak Sherwood APRN      Chief Complaint: Weakness    Interval History:   Patient extubated December 30.  She was moved from MICU Covid unit to CCU.    Her  is at bedside.  Marquita, speech language pathologist at bedside as well.  She has used a swab and gotten some debris out of the patient's mouth and cleaned off her lips well.      allergies:   Allergies   Allergen Reactions   • Requip [Ropinirole Hcl] Nausea And Vomiting       Current Meds:     Current Facility-Administered Medications:   •  acetaminophen (TYLENOL) tablet 650 mg, 650 mg, Nasogastric, Q4H PRN **OR** acetaminophen (TYLENOL) suppository 650 mg, 650 mg, Rectal, Q4H PRN, Ellis Wise MD, 650 mg at 12/30/20 1755  •  albuterol sulfate HFA (PROVENTIL HFA;VENTOLIN HFA;PROAIR HFA) inhaler 2 puff, 2 puff, Inhalation, Q6H PRN, Wil Jean MD  •  albuterol sulfate HFA (PROVENTIL HFA;VENTOLIN HFA;PROAIR HFA) inhaler 2 puff, 2 puff, Inhalation, TID - RT, Samir Teague MD, 2 puff at 01/03/21 0725  •  benzonatate (TESSALON) capsule 100 mg, 100 mg, Oral, TID PRN, Wil Jean MD, 100 mg at 12/12/20 2034  •  ceFAZolin in 0.9% normal saline (ANCEF) IVPB solution 2 g, 2 g, Intravenous, Q8H, Mervat Ayon MD, Last Rate: 200 mL/hr at 01/03/21 0839, 2 g at 01/03/21 0839  •  dextrose (D50W) 25 g/ 50mL Intravenous Solution 25 g, 25 g, Intravenous, Q15 Min PRN, Panfilo Wilson MD  •  dextrose (GLUTOSE) oral gel 15 g, 15 g, Oral, Q15 Min PRN, Panfilo Wilson MD  •  enoxaparin (LOVENOX) syringe 40 mg, 40 mg, Subcutaneous, Q12H, Samir Teague MD, 40 mg at 01/03/21 0839  •  famotidine (PEPCID) tablet 20 mg, 20 mg, Oral, BID AC, Samir Teague MD, 20 mg at 01/03/21 0839  •  furosemide (LASIX) injection  "40 mg, 40 mg, Intravenous, Once, Samir Teague MD  •  glucagon (human recombinant) (GLUCAGEN DIAGNOSTIC) injection 1 mg, 1 mg, Subcutaneous, Q15 Min PRN, Panfilo Wilson MD  •  insulin lispro (humaLOG) injection 2-7 Units, 2-7 Units, Subcutaneous, Q6H, Panfilo Wilson MD, 2 Units at 21 1828  •  levothyroxine (SYNTHROID, LEVOTHROID) tablet 125 mcg, 125 mcg, Nasogastric, Q AM, Ellis Wise MD, 125 mcg at 21 0525  •  ondansetron (ZOFRAN) injection 4 mg, 4 mg, Intravenous, Q6H PRN, Wil Jean MD  •  polyethylene glycol (MIRALAX) packet 17 g, 17 g, Nasogastric, Daily, Ellis Wise MD, 17 g at 21 0840  •  sennosides-docusate (PERICOLACE) 8.6-50 MG per tablet 1 tablet, 1 tablet, Nasogastric, BID, Ellis Wise MD, 1 tablet at 21 0840  •  sodium chloride 0.9 % flush 10 mL, 10 mL, Intravenous, PRN, Wil Jean MD, 10 mL at 21 0806      Review of Systems   Constitutional: Positive for fever (Low-grade).   Neurological: Positive for weakness.       Objective     Vital Signs:  Temp (24hrs), Av.8 °F (37.1 °C), Min:98.6 °F (37 °C), Max:99 °F (37.2 °C)      /75 (BP Location: Left arm, Patient Position: Lying)   Pulse 98   Temp 99 °F (37.2 °C) (Axillary)   Resp 25   Ht 165.1 cm (65\")   Wt 79.2 kg (174 lb 9.6 oz)   LMP 10/26/2003 (Exact Date) Comment: Hyst for DUB  SpO2 92%   BMI 29.05 kg/m²         Physical Exam   General: Patient appears very weak but is sitting up in bed having just finished working with speech therapist  HEENT: O2 per nasal cannula slightly outside of nose.  This was replaced.  Patient has several areas with eschar from pressure injury when proned  Respiratory: Effort even and unlabored.  Diminished breath sounds bilaterally.  Abdomen: Soft, bowel sounds positive  Neuro: Alert, seems to nod appropriately and mouth words appropriately at times.  I apologize for my hands being cold even with gloves on and she " mouth but it felt good to her  Psych: Pleasant cooperative    Results Review:    I reviewed the patient's new clinical results.    Lab Results:  CBC:   Lab Results   Lab 12/28/20  0421 12/30/20  0204 01/01/21  0522   WBC 12.86* 9.36 12.25*   HEMOGLOBIN 10.8* 11.6* 12.8   HEMATOCRIT 30.6* 32.6* 37.4   PLATELETS 110* 134* 170       CMP:   Lab Results   Lab 12/28/20  0421 12/30/20  0204 01/01/21  0523 01/02/21  1033 01/03/21  0626   SODIUM 139 138 144 143 144   POTASSIUM 4.0 3.6 3.7 3.5 3.5   CHLORIDE 104 101 106 105 110*   CO2 28.0 29.0 26.0 25.0 27.0   BUN 23 25* 22 33* 37*   CREATININE 0.43* 0.51* 0.55* 0.70 0.58   CALCIUM 9.0 8.9 9.2 9.8 8.9   BILIRUBIN 0.6 0.6  --   --  0.4   ALK PHOS 71 84  --   --  90   ALT (SGPT) 77* 117*  --   --  51*   AST (SGOT) 47* 68*  --   --  56*   GLUCOSE 149* 146* 129* 135* 142*       Microscopic urinalysis with 31-50 WBCs, 0-2 RBCs and 3-6 squamous epis with some budding yeast noted    Culture Results:        Microbiology Results Abnormal     Procedure Component Value - Date/Time    Blood Culture - Blood, Arm, Left [[id number]] Collected: 12/30/20 2208    Lab Status: Preliminary result Specimen: Blood from Arm, Left Updated: 01/02/21 2230     Blood Culture No growth at 3 days    Blood Culture - Blood, Hand, Right [[id number]] Collected: 12/30/20 1935    Lab Status: Preliminary result Specimen: Blood from Hand, Right Updated: 01/02/21 2015     Blood Culture No growth at 3 days    Urine Culture - Urine, Urine, Catheter In/Out [[id number]]  (Abnormal) Collected: 01/01/21 0301    Lab Status: Final result Specimen: Urine, Catheter In/Out Updated: 01/02/21 1026     Urine Culture Yeast isolated    Respiratory Culture - Sputum, ET Suction [[id number]]  (Abnormal)  (Susceptibility) Collected: 12/29/20 1955    Lab Status: Final result Specimen: Sputum from ET Suction Updated: 12/31/20 1051     Respiratory Culture Heavy growth (4+) Staphylococcus aureus      No Normal Respiratory Hailee     Gram  Stain Moderate (3+) WBCs seen      Many (4+) Gram positive cocci    Susceptibility      Staphylococcus aureus     ISRA     Clindamycin Resistant     Oxacillin Susceptible     Penicillin G Resistant     Tetracycline Susceptible     Trimethoprim + Sulfamethoxazole Susceptible     Vancomycin Susceptible                    Blood Culture - Blood, Arm, Left [[id number]] Collected: 12/19/20 1440    Lab Status: Final result Specimen: Blood from Arm, Left Updated: 12/24/20 1530     Blood Culture No growth at 5 days    Blood Culture - Blood, Blood, Central Line [[id number]] Collected: 12/19/20 1435    Lab Status: Final result Specimen: Blood, Central Line Updated: 12/24/20 1530     Blood Culture No growth at 5 days    Respiratory Culture - Sputum, ET Suction [[id number]]  (Abnormal) Collected: 12/20/20 0046    Lab Status: Final result Specimen: Sputum from ET Suction Updated: 12/22/20 1016     Respiratory Culture Scant growth (1+) Candida albicans      No Normal Respiratory Hailee     Gram Stain Greater than 25 WBCs per low power field      Less than 25 Epithelial cells per low power field      Few (2+) Budding yeast    Respiratory Culture - Sputum, ET Suction [[id number]] Collected: 12/15/20 1148    Lab Status: Final result Specimen: Sputum from ET Suction Updated: 12/17/20 0829     Respiratory Culture Scant growth (1+) Normal Respiratory Hailee: NO S.aureus/MRSA or Pseudomonas aeruginosa     Gram Stain Greater than 25 WBCs per low power field      No Epithelial cells per low power field      No organisms seen    Blood Culture - Blood, Arm, Left [[id number]] Collected: 12/11/20 1220    Lab Status: Final result Specimen: Blood from Arm, Left Updated: 12/16/20 1301     Blood Culture No growth at 5 days    Blood Culture - Blood, Arm, Left [[id number]] Collected: 12/11/20 1221    Lab Status: Final result Specimen: Blood from Arm, Left Updated: 12/16/20 1301     Blood Culture No growth at 5 days    MRSA Screen, PCR (Inpatient) - Swab,  Nares [[id number]]  (Normal) Collected: 12/15/20 1148    Lab Status: Final result Specimen: Swab from Nares Updated: 12/15/20 1401     MRSA PCR No MRSA Detected    Legionella Antigen, Urine - Urine, Urine, Clean Catch [[id number]]  (Normal) Collected: 12/11/20 1829    Lab Status: Final result Specimen: Urine, Clean Catch Updated: 12/11/20 1935     LEGIONELLA ANTIGEN, URINE Negative    S. Pneumo Ag Urine or CSF - Urine, Urine, Clean Catch [[id number]]  (Normal) Collected: 12/11/20 1829    Lab Status: Final result Specimen: Urine, Clean Catch Updated: 12/11/20 1934     Strep Pneumo Ag Negative    Respiratory Panel, PCR (WITHOUT COVID) - Swab, Nasopharynx [[id number]]  (Normal) Collected: 12/11/20 1719    Lab Status: Final result Specimen: Swab from Nasopharynx Updated: 12/11/20 1825     ADENOVIRUS, PCR Not Detected     Coronavirus 229E Not Detected     Coronavirus HKU1 Not Detected     Coronavirus NL63 Not Detected     Coronavirus OC43 Not Detected     Human Metapneumovirus Not Detected     Human Rhinovirus/Enterovirus Not Detected     Influenza B PCR Not Detected     Parainfluenza Virus 1 Not Detected     Parainfluenza Virus 2 Not Detected     Parainfluenza Virus 3 Not Detected     Parainfluenza Virus 4 Not Detected     Bordetella pertussis pcr Not Detected     Influenza A H1 2009 PCR Not Detected     Chlamydophila pneumoniae PCR Not Detected     Mycoplasma pneumo by PCR Not Detected     Influenza A PCR Not Detected     Influenza A H3 Not Detected     Influenza A H1 Not Detected     RSV, PCR Not Detected     Bordetella parapertussis PCR Not Detected    Narrative:      The coronavirus on the RVP is NOT COVID-19 and is NOT indicative of infection with COVID-19.     COVID PRE-OP / PRE-PROCEDURE SCREENING ORDER (NO ISOLATION) - Swab, Nasal Cavity [[id number]]  (Abnormal) Collected: 12/11/20 1346    Lab Status: Final result Specimen: Swab from Nasal Cavity Updated: 12/11/20 1431    Narrative:      The following orders were  created for panel order COVID PRE-OP / PRE-PROCEDURE SCREENING ORDER (NO ISOLATION) - Swab, Nasal Cavity.  Procedure                               Abnormality         Status                     ---------                               -----------         ------                     COVID-19, [facility] IN-HOUS...[[id number]]  Abnormal            Final result                 Please view results for these tests on the individual orders.    COVID-19, [facility] IN-HOUSE,NP Swab (NO TRANSPORT MEDIA) 2 HR TAT - Swab, Nasopharynx [[id number]]  (Abnormal) Collected: 12/11/20 1346    Lab Status: Final result Specimen: Swab from Nasopharynx Updated: 12/11/20 1431     COVID19 Detected    Narrative:      Fact sheet for providers: https://www.fda.gov/media/460677/download     Fact sheet for patients: https://www.fda.gov/media/530880/download    Test performed by PCR.                Radiology:   Imaging Results (Last 72 Hours)     Procedure Component Value Units Date/Time    XR Abdomen KUB [[id number]] Collected: 01/02/21 1125     Updated: 01/02/21 1129    Narrative:      Exam: XR ABDOMEN KUB-     Indication: Feeding tube placement     Comparison: 1/01/2021     Findings:     Feeding tube coils in the proximal stomach, with tip pointed superiorly.  Visualized bowel gas pattern is nonobstructive. No mass effect or  suspicious calcifications. Surgical clips in the RIGHT pelvis. No acute  osseous finding.       Impression:      Impression:     Feeding tube coils in the proximal stomach with tip pointing superiorly.  This report was finalized on 01/02/2021 11:26 by Dr. Lalo Maza MD.    XR Chest 1 View [[id number]] Collected: 01/02/21 1123     Updated: 01/02/21 1127    Narrative:      Exam: XR CHEST 1 VW-     Indication: Hypoxia, pneumonia     Comparison: 12/31/2020     Findings:     Right-sided CVL has been removed. Left-sided PICC tip overlies the low  SVC. Cardiac silhouette is stable. No change in patchy bilateral  parenchymal opacity. No  large pleural effusion or visible pneumothorax.  Lung volumes appear improved. A feeding tube terminates below the  diaphragm, in the proximal stomach.       Impression:      Impression:     1.  No significant change in bilateral parenchymal pulmonary opacity.  2.  Interval placement of feeding tube tip in the proximal stomach.  This report was finalized on 01/02/2021 11:24 by Dr. Lalo Maza MD.    XR Abdomen KUB [[id number]] Collected: 01/01/21 1639     Updated: 01/01/21 1643    Narrative:      EXAMINATION:  XR ABDOMEN KUB-  1/1/2021 4:22 PM CST     HISTORY: Dobhoff placement; U07.1-COVID-19; J12.82-Pneumonia due to  Coronavirus disease 2019; R09.02-Hypoxemia; J96.01-Acute respiratory  failure with hypoxia; Z74.09-Other reduced mobility; Z74.09-Other  reduced mobility; Z78.9-Other specified health status; R13.12-Dysphagia,  oropharyngeal phase      COMPARISON: No comparison study.     TECHNIQUE: Single view: KUB     FINDINGS:      Feeding tube looped in the distal stomach body region.        This report was finalized on 01/01/2021 16:40 by Dr. Oz Ng MD.    XR Chest 1 View [[id number]] Collected: 12/31/20 1627     Updated: 12/31/20 1632    Narrative:      HISTORY: PICC line placement     CXR: Frontal view the chest obtained.     COMPARISON: 12/26/2020     FINDINGS: A left upper extremity PICC line tip projects over the SVC. A  right IJ central line tip also projects closer to the atrial caval  junction. EKG wires project over the chest.     There are bilateral mixed interstitial alveolar opacities. Stable  mediastinal contours. No pleural effusion pneumothorax. Interval  extubation. Right axillary/chest wall surgical clips.       Impression:      1. Appropriate positioning left upper extremity PICC line with tip  projecting over the lower SVC.  2. Right IJ central line tip projects closer to the atrial caval  junction.  3. Removal of endotracheal and enteric tubes.  4. Similar diffuse bilateral pulmonary  "opacities concerning for  multifocal pneumonia.  This report was finalized on 12/31/2020 16:29 by Dr. Theresa Chapin MD.          Assessment/Plan     Active Hospital Problems    Diagnosis   • **Pneumonia due to COVID-19 virus   • Thrombocytopenia (CMS/HCC)   • Acute respiratory distress syndrome (ARDS) due to COVID-19 virus (CMS/HCC)   • Elevated LFTs   • Leukocytosis   • Elevated ferritin and CRP   • Obesity (BMI 30-39.9)   • Acute respiratory failure with hypoxia (CMS/HCC)       IMPRESSION:  · COVID-19 infection with pneumonia-symptoms start December 1, patient tested + Dec 2 and received antibiotics and steroids early in course of infection per her nurse practitioner-patient completed remdesivir December 15.    · Fever to 101  December 30.  Last received a dose of acetaminophen 650 mg at 1755 on December 30.  Blood cultures obtained.  Sputum cultures positive for 4+ methicillin susceptible Staphylococcus aureus.  Could be colonizing airway but obvious concern given recent fever.  Urine culture with yeast.  Dosed with fluconazole x1  · Acute respiratory failure with hypoxemia-extubated December 30, 2020 (status post empiric treatment with Zosyn from 12/15-12/22)  · thrombocytopenia---resolved  · leukocytosis -resolved and with increased January 1 to 12,000-  · elevated liver enzymes-improved  · MRSA screen negative      RECOMMENDATION:     Continue cefazolin day #3, monitoring of oxygen status given positive sputum culture \"MSSA\", etc.  Monitor temperature curve closely.  Monitor off dexamethasone-  DVT prophylaxis with Lovenox  Check CBC in a.m.    Mervat Ayon MD  01/03/21  11:12 CST      "

## 2021-01-03 NOTE — THERAPY TREATMENT NOTE
Acute Care - Speech Language Pathology   Swallow Treatment Note [facility]     Patient Name: Joann Finnegan  : 1950  MRN: [id number]  Today's Date: 1/3/2021               Admit Date: 2020  ST tx completed. Pt alert and cooperative. Pt is confused at times with difficulty following commands for oral motor exercises. Pt with dried secretions on lips and in oral cavity. Aphonic vocal quality. MD stated he would consult ENT. Pt did well participating in oral motor exercises with moistened toothette. Pt cued to move toothette laterally to each buccal cavity and anteriorly out of oral cavity. Pt did have difficulty following directions to fully complete despite frequent cueing but continued to attempt. Weak throat clears noted 2x, which were likely from residue from moistened toothette. By completing oral motor exercises with toothette, it helped to clear dried secretions on lips and intraorally. Pt also was able to complete swallows with cold bolus x5. Pt noted to have difficulty following commands to protrude tongue anteriorly past lips. Pt does fatigue easily but continues to try. Pt is more alert and doing better but very weak overall. ST to continue to follow and treat to assess PO readiness.  Marquita Morales, CCC-SLP 1/3/2021 11:09 CST      Visit Dx:     ICD-10-CM ICD-9-CM   1. Pneumonia due to COVID-19 virus  U07.1 480.8    J12.89    2. Hypoxic  R09.02 799.02   3. Acute respiratory failure with hypoxia (CMS/HCC)  J96.01 518.81   4. Impaired mobility  Z74.09 799.89   5. Impaired mobility and ADLs  Z74.09 V49.89    Z78.9    6. Oropharyngeal dysphagia  R13.12 787.22     Patient Active Problem List   Diagnosis   • Dysphagia   • Heartburn   • Family hx colonic polyps   • Pneumonia due to COVID-19 virus   • Acute respiratory failure with hypoxia (CMS/HCC)   • Obesity (BMI 30-39.9)   • Elevated ferritin and CRP   • Acute respiratory distress syndrome (ARDS) due to COVID-19 virus (CMS/HCC)   •  Elevated LFTs   • Leukocytosis   • Thrombocytopenia (CMS/HCC)     Past Medical History:   Diagnosis Date   • Abnormal weight gain    • Arthritis    • Breast cancer (CMS/HCC)     right.    • Fatigue    • H/O melanoma excision     CLARKS  LEVEL III      BACK OF UPPER LEFT ARM   • Hypertension    • Hypothyroidism    • Melanoma (CMS/HCC)    • Restless leg syndrome    • Shortness of breath      Past Surgical History:   Procedure Laterality Date   • APPENDECTOMY     • BREAST BIOPSY Right    • BREAST BIOPSY     • BREAST LUMPECTOMY      right.    • BROW LIFT     • COLONOSCOPY     • COLONOSCOPY N/A 2/14/2020    Procedure: COLONOSCOPY WITH ANESTHESIA;  Surgeon: Donovan Hernandez MD;  Location: [facility] ENDOSCOPY;  Service: Gastroenterology;  Laterality: N/A;  pre: family hx colon polyps  post: polyps  Janak Sherwood APRN   • ENDOSCOPY N/A 2/14/2020    Procedure: ESOPHAGOGASTRODUODENOSCOPY WITH ANESTHESIA;  Surgeon: Donovan Hernandez MD;  Location: [facility] ENDOSCOPY;  Service: Gastroenterology;  Laterality: N/A;  pre: dysphagia; heartburn  post: dilated  Janak Sherwood APRN   • ENDOSCOPY AND COLONOSCOPY  12/22/2011    very minimal distal esophagitis, incomplete esophagus ring dilated   • LUMBAR SPINAL TUMOR REMOVAL      Spinal Cyst removed from Spinal Canal   • MASTECTOMY, PARTIAL     • PARTIAL HIP ARTHROPLASTY Left    • SKIN CANCER EXCISION      Melanoma    • SQUAMOUS CELL CARCINOMA EXCISION     • TEMPOROMANDIBULAR JOINT ARTHROPLASTY     • TOTAL ABDOMINAL HYSTERECTOMY WITH SALPINGO OOPHORECTOMY Bilateral         SWALLOW EVALUATION (last 72 hours)      SLP Adult Swallow Evaluation     Row Name 01/03/21 1025 01/01/21 1406 12/31/20 1257             Rehab Evaluation    Document Type  therapy note (daily note)  -BN  therapy note (daily note)  -MB  evaluation  -MB      Subjective Information  no complaints  -BN  -- unable to respond  -MB  fatigue  -MB      Patient Observations  alert;cooperative  -BN   lethargic;cooperative  -MB  lethargic  -MB      Patient/Family/Caregiver Comments/Observations  pt  present  -BN  No family present, enhanced isolation precautions  -MB  No family present, enhanced isolation precautions  -MB      Care Plan Review  care plan/treatment goals reviewed;risks/benefits reviewed;current/potential barriers reviewed;patient/other agree to care plan  -BN  --  --      Care Plan Review, Other Participant(s)  significant other  -BN  --  --      Patient Effort  good  -BN  --  --         General Information    Patient Profile Reviewed  --  --  yes  -MB      Pertinent History Of Current Problem  --  --  COVID, pneumonia, acute respiratory distress syndrome, thrombocytopenia, leukocytosis, mechanical ventilation 12/13/2020 - 12/30/2020  -MB      Current Method of Nutrition  --  --  NPO  -MB      Precautions/Limitations, Vision  --  --  WFL with corrective lenses  -MB      Precautions/Limitations, Hearing  --  --  WFL;for purposes of eval  -MB      Prior Level of Function-Communication  --  --  WFL  -MB      Prior Level of Function-Swallowing  --  --  no diet consistency restrictions  -MB      Plans/Goals Discussed with  --  --  patient  -MB      Barriers to Rehab  --  --  medically complex;cognitive status  -MB      Patient's Goals for Discharge  --  --  patient did not state  -MB         Pain    Additional Documentation  --  --  Pain Scale: FACES Pre/Post-Treatment (Group)  -MB         Pain Scale: Numbers Pre/Post-Treatment    Pretreatment Pain Rating  0/10 - no pain  -  --  --      Posttreatment Pain Rating  0/10 - no pain  -  --  --         Pain Scale: FACES Pre/Post-Treatment    Pain: FACES Scale, Pretreatment  --  2-->hurts little bit  -MB  2-->hurts little bit  -MB         Oral Motor Structure and Function    Secretion Management  --  --  dried secretions in oral cavity  -MB      Mucosal Quality  --  --  cracked;dry  -MB         Oral Musculature and Cranial Nerve Assessment    Oral  Motor General Assessment  --  --  unable to assess  -MB         General Eating/Swallowing Observations    Respiratory Support Currently in Use  --  --  nasal cannula  -MB      Positioning During Eating  --  --  upright in bed  -MB      Utensils Used  --  --  spoon  -MB      Consistencies Trialed  --  --  ice chips  -MB         Clinical Swallow Eval    Oral Prep Phase  --  --  impaired  -MB      Oral Transit  --  --  impaired  -MB      Oral Residue  --  --  impaired  -MB      Pharyngeal Phase  --  --  suspected pharyngeal impairment  -MB      Clinical Swallow Evaluation Summary  --  --  Pt was lethargic and weak. She was unable to complete oral motor movements on command. Her oral cavity appears dry with dried secretions observed on her tongue. SLP presented ice to pt's lips. She did not protrude her tongue or attempt to obtain water residue from her lips. Presented a small ice chip via spoon to pt. She had little to no response the first time and did not obtain the ice. The second time she had minimal, weak lip closure which removed the ice from the spoon. She had no oral manipulation of it and displayed a cough prior to initiating a swallow. No further trials were presented due to the pt's high risk for aspiration in her current state.   -MB         Oral Prep Concerns    Oral Prep Concerns  --  --  incomplete or weak lip closure around spoon  -MB      Incomplete or Weak Lip Closure Around Spoon  --  --  other (see comments) ice  -MB         Oral Transit Concerns    Oral Transit Concerns  --  --  unable to initiate oral transit  -MB         Oral Residue Concerns    Oral Residue Concerns  --  --  diffuse residue throughout oral cavity  -MB      Diffuse Residue Throughout Oral Cavity  --  --  other (see comments) ice/water residue  -MB         Pharyngeal Phase Concerns    Pharyngeal Phase Concerns  --  --  cough  -MB      Cough  --  --  other (see comments) ice  -MB         Clinical Impression    Daily Summary of  Progress (SLP)  progress toward functional goals is gradual  -  progress toward functional goals is gradual  -MB  --      Barriers to Overall Progress (SLP)  medically complex  -BN  Medically complex, weak, lethargic  -MB  --      SLP Swallowing Diagnosis  --  --  severe;oral dysphagia;R/O pharyngeal dysphagia  -MB      Functional Impact  --  --  risk of aspiration/pneumonia;risk of malnutrition;risk of dehydration  -MB      Rehab Potential/Prognosis, Swallowing  --  --  re-evaluate goals as necessary  -MB      Swallow Criteria for Skilled Therapeutic Interventions Met  --  --  demonstrates skilled criteria  -MB      Plan for Continued Treatment (SLP)  continue to follow  -BN  Continue NPO, consider DHT  -MB  --         Recommendations    Therapy Frequency (Swallow)  --  --  at least;3 days per week  -MB      Predicted Duration Therapy Intervention (Days)  --  --  until discharge  -MB      SLP Diet Recommendation  --  --  NPO;temporary alternate methods of nutrition/hydration  -MB      Recommended Diagnostics  --  --  reassess via clinical swallow evaluation  -MB      Oral Care Recommendations  --  --  Oral Care BID/PRN  -MB      SLP Rec. for Method of Medication Administration  --  --  meds via alternate route  -MB      Anticipated Discharge Disposition (SLP)  --  --  unknown  -MB         Swallow Goals (SLP)    Oral Nutrition/Hydration Goal Selection (SLP)  oral nutrition/hydration, SLP goal 1  -BN  --  oral nutrition/hydration, SLP goal 1  -MB      Labial Strengthening Goal Selection (SLP)  labial strengthening, SLP goal 1  -BN  --  labial strengthening, SLP goal 1  -MB      Lingual Strengthening Goal Selection (SLP)  lingual strengthening, SLP goal 1  -BN  --  lingual strengthening, SLP goal 1  -MB      Pharyngeal Strengthening Exercise Goal Selection (SLP)  pharyngeal strengthening exercise, SLP goal 1  -BN  --  --      Additional Documentation  labial strengthening goal selection (SLP);lingual strengthening  goal selection (SLP);pharyngeal strengthening exercise goal selection (SLP)  -BN  --  labial strengthening goal selection (SLP);lingual strengthening goal selection (SLP)  -MB         Oral Nutrition/Hydration Goal 1 (SLP)    Oral Nutrition/Hydration Goal 1, SLP  Pt will tolerate least restrictive diet with no overt s/s of aspiration.   -BN  Pt will tolerate least restrictive diet with no overt s/s of aspiration.   -MB  Pt will tolerate least restrictive diet with no overt s/s of aspiration.   -MB      Time Frame (Oral Nutrition/Hydration Goal 1, SLP)  by discharge  -BN  by discharge  -MB  by discharge  -MB      Barriers (Oral Nutrition/Hydration Goal 1, SLP)  Medically complex, weak, lethargic  -BN  Medically complex, weak, lethargic  -MB  n/a  -MB      Progress/Outcomes (Oral Nutrition/Hydration Goal 1, SLP)  progress slower than expected  -BN  progress slower than expected  -MB  goal ongoing  -MB         Labial Strengthening Goal 1 (SLP)    Activity (Labial Strengthening Goal 1, SLP)  increase labial tone  -BN  increase labial tone  -MB  increase labial tone  -MB      Increase Labial Tone  labial resistance exercises  -BN  labial resistance exercises  -MB  labial resistance exercises  -MB      [place]/Accuracy (Labial Strengthening Goal 1, SLP)  with minimal cues (75-90% accuracy)  -BN  with minimal cues (75-90% accuracy)  -MB  with minimal cues (75-90% accuracy)  -MB      Time Frame (Labial Strengthening Goal 1, SLP)  short term goal (STG);by discharge  -BN  short term goal (STG);by discharge  -MB  short term goal (STG);by discharge  -MB      Barriers (Labial Strengthening Goal 1, SLP)  n/a  -BN  --  n/a  -MB      Progress/Outcomes (Labial Strengthening Goal 1, SLP)  continuing progress toward goal  -BN  --  goal ongoing  -MB         Lingual Strengthening Goal 1 (SLP)    Activity (Lingual Strengthening Goal 1, SLP)  increase lingual tone/sensation/control/coordination/movement  -BN  increase lingual  tone/sensation/control/coordination/movement  -MB  increase lingual tone/sensation/control/coordination/movement  -MB      Increase Lingual Tone/Sensation/Control/Coordination/Movement  lingual movement exercises  -BN  lingual movement exercises  -MB  lingual movement exercises  -MB      [place]/Accuracy (Lingual Strengthening Goal 1, SLP)  with minimal cues (75-90% accuracy)  -BN  with minimal cues (75-90% accuracy)  -MB  with minimal cues (75-90% accuracy)  -MB      Time Frame (Lingual Strengthening Goal 1, SLP)  short term goal (STG);by discharge  -BN  short term goal (STG);by discharge  -MB  short term goal (STG);by discharge  -MB      Barriers (Lingual Strengthening Goal 1, SLP)  n/a  -BN  --  n/a  -MB      Progress/Outcomes (Lingual Strengthening Goal 1, SLP)  continuing progress toward goal  -BN  --  goal ongoing  -MB         Pharyngeal Strengthening Exercise Goal 1 (SLP)    Activity (Pharyngeal Strengthening Goal 1, SLP)  increase timing  -BN  --  --      Increase Timing  gustatory stimulation (sour/cold)  -BN  --  --      [place]/Accuracy (Pharyngeal Strengthening Goal 1, SLP)  independently (over 90% accuracy)  -BN  --  --      Time Frame (Pharyngeal Strengthening Goal 1, SLP)  short term goal (STG);by discharge  -BN  --  --      Barriers (Pharyngeal Strengthening Goal 1, SLP)  n/a  -BN  --  --      Progress/Outcomes (Pharyngeal Strengthening Goal 1, SLP)  goal ongoing  -BN  --  --        User Key  (r) = Recorded By, (t) = Taken By, (c) = Cosigned By    Initials Name Effective Dates    Litzy Pimentel CCC-SLP 08/02/16 -     Marquita Olvera CCC-SLP 02/11/20 -           EDUCATION  The patient has been educated in the following areas:   Dysphagia (Swallowing Impairment) Oral Care/Hydration NPO rationale.    SLP Recommendation and Plan                                         Daily Summary of Progress (SLP): progress toward functional goals is gradual    Plan for Continued Treatment  (SLP): continue to follow              Plan of Care Reviewed With: patient, spouse  Progress: improving  Outcome Summary: ST tx completed. Pt alert and cooperative. Pt is confused at times with difficulty following commands for oral motor exercises. Pt with dried secretions on lips and in oral cavity. Aphonic vocal quality. MD stated he would consult ENT. Pt did well participating in oral motor exercises with moistened toothette. Pt cued to move toothette laterally to each buccal cavity and anteriorly out of oral cavity. Pt did have difficulty following directions to fully complete despite frequent cueing but continued to attempt. Weak throat clears noted 2x, which were likely from residue from moistened toothette. By completing oral motor exercises with toothette, it helped to clear dried secretions on lips and intraorally. Pt also was able to complete swallows with cold bolus x5. Pt noted to have difficulty following commands to protrude tongue anteriorly past lips. Pt does fatigue easily and but continues to try. Pt is more alert and doing better but very weak overall. ST to continue to follow and treat to assess PO readiness.    SLP GOALS     Row Name 01/03/21 1025 01/01/21 1406 12/31/20 1257       Oral Nutrition/Hydration Goal 1 (SLP)    Oral Nutrition/Hydration Goal 1, SLP  Pt will tolerate least restrictive diet with no overt s/s of aspiration.   -BN  Pt will tolerate least restrictive diet with no overt s/s of aspiration.   -MB  Pt will tolerate least restrictive diet with no overt s/s of aspiration.   -MB    Time Frame (Oral Nutrition/Hydration Goal 1, SLP)  by discharge  -BN  by discharge  -MB  by discharge  -MB    Barriers (Oral Nutrition/Hydration Goal 1, SLP)  Medically complex, weak, lethargic  -BN  Medically complex, weak, lethargic  -MB  n/a  -MB    Progress/Outcomes (Oral Nutrition/Hydration Goal 1, SLP)  progress slower than expected  -BN  progress slower than expected  -MB  goal ongoing  -MB        Labial Strengthening Goal 1 (SLP)    Activity (Labial Strengthening Goal 1, SLP)  increase labial tone  -BN  increase labial tone  -MB  increase labial tone  -MB    Increase Labial Tone  labial resistance exercises  -BN  labial resistance exercises  -MB  labial resistance exercises  -MB    [place]/Accuracy (Labial Strengthening Goal 1, SLP)  with minimal cues (75-90% accuracy)  -BN  with minimal cues (75-90% accuracy)  -MB  with minimal cues (75-90% accuracy)  -MB    Time Frame (Labial Strengthening Goal 1, SLP)  short term goal (STG);by discharge  -BN  short term goal (STG);by discharge  -MB  short term goal (STG);by discharge  -MB    Barriers (Labial Strengthening Goal 1, SLP)  n/a  -BN  --  n/a  -MB    Progress/Outcomes (Labial Strengthening Goal 1, SLP)  continuing progress toward goal  -BN  --  goal ongoing  -MB       Lingual Strengthening Goal 1 (SLP)    Activity (Lingual Strengthening Goal 1, SLP)  increase lingual tone/sensation/control/coordination/movement  -BN  increase lingual tone/sensation/control/coordination/movement  -MB  increase lingual tone/sensation/control/coordination/movement  -MB    Increase Lingual Tone/Sensation/Control/Coordination/Movement  lingual movement exercises  -BN  lingual movement exercises  -MB  lingual movement exercises  -MB    [place]/Accuracy (Lingual Strengthening Goal 1, SLP)  with minimal cues (75-90% accuracy)  -BN  with minimal cues (75-90% accuracy)  -MB  with minimal cues (75-90% accuracy)  -MB    Time Frame (Lingual Strengthening Goal 1, SLP)  short term goal (STG);by discharge  -BN  short term goal (STG);by discharge  -MB  short term goal (STG);by discharge  -MB    Barriers (Lingual Strengthening Goal 1, SLP)  n/a  -BN  --  n/a  -MB    Progress/Outcomes (Lingual Strengthening Goal 1, SLP)  continuing progress toward goal  -BN  --  goal ongoing  -MB       Pharyngeal Strengthening Exercise Goal 1 (SLP)    Activity (Pharyngeal Strengthening Goal 1, SLP)   increase timing  -BN  --  --    Increase Timing  gustatory stimulation (sour/cold)  -BN  --  --    [place]/Accuracy (Pharyngeal Strengthening Goal 1, SLP)  independently (over 90% accuracy)  -BN  --  --    Time Frame (Pharyngeal Strengthening Goal 1, SLP)  short term goal (STG);by discharge  -BN  --  --    Barriers (Pharyngeal Strengthening Goal 1, SLP)  n/a  -BN  --  --    Progress/Outcomes (Pharyngeal Strengthening Goal 1, SLP)  goal ongoing  -BN  --  --      User Key  (r) = Recorded By, (t) = Taken By, (c) = Cosigned By    Initials Name Provider Type    Litzy Pimentel CCC-SLP Speech and Language Pathologist    Marquita Olvera CCC-SLP Speech and Language Pathologist             Time Calculation:   Time Calculation- SLP     Row Name 01/03/21 1109             Time Calculation- SLP    SLP Start Time  1025  -      SLP Stop Time  1109  -      SLP Time Calculation (min)  44 min  -      SLP Received On  01/03/21  -        User Key  (r) = Recorded By, (t) = Taken By, (c) = Cosigned By    Initials Name Provider Type    Marquita Olvera CCC-SLP Speech and Language Pathologist          Therapy Charges for Today     Code Description Service Date Service Provider Modifiers Qty    [id number] HC ST TREATMENT SWALLOW 3 1/3/2021 Marquita Morales CCC-SLP GN 1               SILVINA Parikh  1/3/2021

## 2021-01-04 ENCOUNTER — APPOINTMENT (OUTPATIENT)
Dept: GENERAL RADIOLOGY | Facility: HOSPITAL | Age: 71
End: 2021-01-04

## 2021-01-04 PROBLEM — K11.7 XEROSTOMIA: Status: ACTIVE | Noted: 2021-01-04

## 2021-01-04 PROBLEM — B94.8 POST VIRAL DEBILITY: Status: ACTIVE | Noted: 2021-01-04

## 2021-01-04 PROBLEM — R53.81 POST VIRAL DEBILITY: Status: ACTIVE | Noted: 2021-01-04

## 2021-01-04 PROBLEM — R49.1 APHONIA: Status: ACTIVE | Noted: 2021-01-04

## 2021-01-04 PROBLEM — R13.12 DYSPHAGIA, OROPHARYNGEAL: Status: ACTIVE | Noted: 2020-02-10

## 2021-01-04 LAB
BACTERIA SPEC AEROBE CULT: NORMAL
BACTERIA SPEC AEROBE CULT: NORMAL
CLUMPED PLATELETS: PRESENT
DEPRECATED RDW RBC AUTO: 49.2 FL (ref 37–54)
EOSINOPHIL # BLD MANUAL: 0.11 10*3/MM3 (ref 0–0.4)
EOSINOPHIL NFR BLD MANUAL: 1 % (ref 0.3–6.2)
ERYTHROCYTE [DISTWIDTH] IN BLOOD BY AUTOMATED COUNT: 14.4 % (ref 12.3–15.4)
GLUCOSE BLDC GLUCOMTR-MCNC: 132 MG/DL (ref 70–130)
GLUCOSE BLDC GLUCOMTR-MCNC: 138 MG/DL (ref 70–130)
GLUCOSE BLDC GLUCOMTR-MCNC: 139 MG/DL (ref 70–130)
GLUCOSE BLDC GLUCOMTR-MCNC: 142 MG/DL (ref 70–130)
GLUCOSE BLDC GLUCOMTR-MCNC: 171 MG/DL (ref 70–130)
HCT VFR BLD AUTO: 36.5 % (ref 34–46.6)
HGB BLD-MCNC: 12.4 G/DL (ref 12–15.9)
LYMPHOCYTES # BLD MANUAL: 0.89 10*3/MM3 (ref 0.7–3.1)
LYMPHOCYTES NFR BLD MANUAL: 5.2 % (ref 5–12)
LYMPHOCYTES NFR BLD MANUAL: 8.2 % (ref 19.6–45.3)
MCH RBC QN AUTO: 32.8 PG (ref 26.6–33)
MCHC RBC AUTO-ENTMCNC: 34 G/DL (ref 31.5–35.7)
MCV RBC AUTO: 96.6 FL (ref 79–97)
MONOCYTES # BLD AUTO: 0.56 10*3/MM3 (ref 0.1–0.9)
NEUTROPHILS # BLD AUTO: 8.25 10*3/MM3 (ref 1.7–7)
NEUTROPHILS NFR BLD MANUAL: 75.3 % (ref 42.7–76)
NEUTS BAND NFR BLD MANUAL: 1 % (ref 0–5)
PLATELET # BLD AUTO: 153 10*3/MM3 (ref 140–450)
PMV BLD AUTO: 12 FL (ref 6–12)
POLYCHROMASIA BLD QL SMEAR: ABNORMAL
PROMYELOCYTES NFR BLD MANUAL: 1 % (ref 0–0)
RBC # BLD AUTO: 3.78 10*6/MM3 (ref 3.77–5.28)
VARIANT LYMPHS NFR BLD MANUAL: 8.2 % (ref 0–5)
WBC # BLD AUTO: 10.82 10*3/MM3 (ref 3.4–10.8)
WBC MORPH BLD: NORMAL

## 2021-01-04 PROCEDURE — 85007 BL SMEAR W/DIFF WBC COUNT: CPT | Performed by: INTERNAL MEDICINE

## 2021-01-04 PROCEDURE — 85027 COMPLETE CBC AUTOMATED: CPT | Performed by: INTERNAL MEDICINE

## 2021-01-04 PROCEDURE — 63710000001 INSULIN LISPRO (HUMAN) PER 5 UNITS: Performed by: INTERNAL MEDICINE

## 2021-01-04 PROCEDURE — 97110 THERAPEUTIC EXERCISES: CPT | Performed by: OCCUPATIONAL THERAPIST

## 2021-01-04 PROCEDURE — 31575 DIAGNOSTIC LARYNGOSCOPY: CPT | Performed by: OTOLARYNGOLOGY

## 2021-01-04 PROCEDURE — 94799 UNLISTED PULMONARY SVC/PX: CPT

## 2021-01-04 PROCEDURE — 97530 THERAPEUTIC ACTIVITIES: CPT

## 2021-01-04 PROCEDURE — 99222 1ST HOSP IP/OBS MODERATE 55: CPT | Performed by: OTOLARYNGOLOGY

## 2021-01-04 PROCEDURE — 74018 RADEX ABDOMEN 1 VIEW: CPT

## 2021-01-04 PROCEDURE — 25010000002 ENOXAPARIN PER 10 MG: Performed by: INTERNAL MEDICINE

## 2021-01-04 PROCEDURE — 25010000002 CEFAZOLIN PER 500 MG: Performed by: INTERNAL MEDICINE

## 2021-01-04 PROCEDURE — 92526 ORAL FUNCTION THERAPY: CPT

## 2021-01-04 PROCEDURE — 97535 SELF CARE MNGMENT TRAINING: CPT | Performed by: OCCUPATIONAL THERAPIST

## 2021-01-04 PROCEDURE — 0CJS8ZZ INSPECTION OF LARYNX, VIA NATURAL OR ARTIFICIAL OPENING ENDOSCOPIC: ICD-10-PCS | Performed by: OTOLARYNGOLOGY

## 2021-01-04 PROCEDURE — 82962 GLUCOSE BLOOD TEST: CPT

## 2021-01-04 RX ORDER — ECHINACEA PURPUREA EXTRACT 125 MG
2 TABLET ORAL CONTINUOUS PRN
Status: DISCONTINUED | OUTPATIENT
Start: 2021-01-04 | End: 2021-01-11 | Stop reason: HOSPADM

## 2021-01-04 RX ORDER — OXYMETAZOLINE HYDROCHLORIDE 0.05 G/100ML
2 SPRAY NASAL 2 TIMES DAILY
Status: DISCONTINUED | OUTPATIENT
Start: 2021-01-04 | End: 2021-01-04

## 2021-01-04 RX ORDER — OXYMETAZOLINE HYDROCHLORIDE 0.05 G/100ML
2 SPRAY NASAL 3 TIMES DAILY
Status: DISCONTINUED | OUTPATIENT
Start: 2021-01-04 | End: 2021-01-04

## 2021-01-04 RX ORDER — OXYMETAZOLINE HYDROCHLORIDE 0.05 G/100ML
2 SPRAY NASAL 3 TIMES DAILY
Status: ACTIVE | OUTPATIENT
Start: 2021-01-04 | End: 2021-01-07

## 2021-01-04 RX ORDER — LIDOCAINE HYDROCHLORIDE 40 MG/ML
1 SOLUTION TOPICAL ONCE
Status: DISCONTINUED | OUTPATIENT
Start: 2021-01-04 | End: 2021-01-11

## 2021-01-04 RX ORDER — ECHINACEA PURPUREA EXTRACT 125 MG
2 TABLET ORAL
Status: DISCONTINUED | OUTPATIENT
Start: 2021-01-04 | End: 2021-01-08

## 2021-01-04 RX ORDER — LABETALOL HYDROCHLORIDE 5 MG/ML
10 INJECTION, SOLUTION INTRAVENOUS EVERY 4 HOURS PRN
Status: DISCONTINUED | OUTPATIENT
Start: 2021-01-04 | End: 2021-01-07

## 2021-01-04 RX ADMIN — ENOXAPARIN SODIUM 40 MG: 100 INJECTION SUBCUTANEOUS at 20:57

## 2021-01-04 RX ADMIN — FAMOTIDINE 20 MG: 20 TABLET, FILM COATED ORAL at 09:08

## 2021-01-04 RX ADMIN — CEFAZOLIN SODIUM 2 G: 10 INJECTION, POWDER, FOR SOLUTION INTRAVENOUS at 04:36

## 2021-01-04 RX ADMIN — ALBUTEROL SULFATE 2 PUFF: 90 AEROSOL, METERED RESPIRATORY (INHALATION) at 14:49

## 2021-01-04 RX ADMIN — OXYMETAZOLINE HYDROCHLORIDE 2 SPRAY: 0.05 SPRAY NASAL at 20:57

## 2021-01-04 RX ADMIN — ENOXAPARIN SODIUM 40 MG: 100 INJECTION SUBCUTANEOUS at 10:07

## 2021-01-04 RX ADMIN — LEVOTHYROXINE SODIUM 125 MCG: 125 TABLET ORAL at 09:08

## 2021-01-04 RX ADMIN — SALINE NASAL SPRAY 2 SPRAY: 1.5 SOLUTION NASAL at 15:12

## 2021-01-04 RX ADMIN — CEFAZOLIN SODIUM 2 G: 10 INJECTION, POWDER, FOR SOLUTION INTRAVENOUS at 17:32

## 2021-01-04 RX ADMIN — LABETALOL HYDROCHLORIDE 10 MG: 5 INJECTION, SOLUTION INTRAVENOUS at 12:53

## 2021-01-04 RX ADMIN — FAMOTIDINE 20 MG: 20 TABLET, FILM COATED ORAL at 17:32

## 2021-01-04 RX ADMIN — INSULIN LISPRO 2 UNITS: 100 INJECTION, SOLUTION INTRAVENOUS; SUBCUTANEOUS at 11:56

## 2021-01-04 RX ADMIN — SALINE NASAL SPRAY 2 SPRAY: 1.5 SOLUTION NASAL at 20:58

## 2021-01-04 RX ADMIN — CEFAZOLIN SODIUM 2 G: 10 INJECTION, POWDER, FOR SOLUTION INTRAVENOUS at 09:08

## 2021-01-04 RX ADMIN — SALINE NASAL SPRAY 2 SPRAY: 1.5 SOLUTION NASAL at 17:32

## 2021-01-04 RX ADMIN — Medication 2 SPRAY: at 15:12

## 2021-01-04 RX ADMIN — ALBUTEROL SULFATE 2 PUFF: 90 AEROSOL, METERED RESPIRATORY (INHALATION) at 07:06

## 2021-01-04 NOTE — THERAPY TREATMENT NOTE
Acute Care - Occupational Therapy Treatment Note  [facility]     Patient Name: Joann Finnegan  : 1950  MRN: [id number]  Today's Date: 2021             Admit Date: 2020   Therapist utilized gait belt, applied non-slipped socks, provided fall risk education/prevention, & facilitated muscle strengthening PRN to reduce patient falls risk during this session.      ICD-10-CM ICD-9-CM   1. Pneumonia due to COVID-19 virus  U07.1 480.8    J12.89    2. Hypoxic  R09.02 799.02   3. Acute respiratory failure with hypoxia (CMS/HCC)  J96.01 518.81   4. Impaired mobility  Z74.09 799.89   5. Impaired mobility and ADLs  Z74.09 V49.89    Z78.9    6. Oropharyngeal dysphagia  R13.12 787.22     Patient Active Problem List   Diagnosis   • Dysphagia, oropharyngeal   • Heartburn   • Family hx colonic polyps   • Pneumonia due to COVID-19 virus   • Acute respiratory failure with hypoxia (CMS/HCC)   • Obesity (BMI 30-39.9)   • Elevated ferritin and CRP   • Acute respiratory distress syndrome (ARDS) due to COVID-19 virus (CMS/HCC)   • Elevated LFTs   • Leukocytosis   • Thrombocytopenia (CMS/HCC)   • Aphonia   • Xerostomia   • Post viral debility     Past Medical History:   Diagnosis Date   • Abnormal weight gain    • Arthritis    • Breast cancer (CMS/HCC)     right.    • Fatigue    • H/O melanoma excision     CLARKS  LEVEL III      BACK OF UPPER LEFT ARM   • Hypertension    • Hypothyroidism    • Melanoma (CMS/HCC)    • Restless leg syndrome    • Shortness of breath      Past Surgical History:   Procedure Laterality Date   • APPENDECTOMY     • BREAST BIOPSY Right    • BREAST BIOPSY     • BREAST LUMPECTOMY      right.    • BROW LIFT     • COLONOSCOPY     • COLONOSCOPY N/A 2020    Procedure: COLONOSCOPY WITH ANESTHESIA;  Surgeon: Donovan Hernandez MD;  Location: [facility];  Service: Gastroenterology;  Laterality: N/A;  pre: family hx colon polyps  post: polyps  Janak Sherwood APRN   • ENDOSCOPY N/A  2/14/2020    Procedure: ESOPHAGOGASTRODUODENOSCOPY WITH ANESTHESIA;  Surgeon: Donovan Hernandez MD;  Location: [facility] ENDOSCOPY;  Service: Gastroenterology;  Laterality: N/A;  pre: dysphagia; heartburn  post: dilated  Janak Sherwood APRN   • ENDOSCOPY AND COLONOSCOPY  12/22/2011    very minimal distal esophagitis, incomplete esophagus ring dilated   • LUMBAR SPINAL TUMOR REMOVAL      Spinal Cyst removed from Spinal Canal   • MASTECTOMY, PARTIAL     • PARTIAL HIP ARTHROPLASTY Left    • SKIN CANCER EXCISION      Melanoma    • SQUAMOUS CELL CARCINOMA EXCISION     • TEMPOROMANDIBULAR JOINT ARTHROPLASTY     • TOTAL ABDOMINAL HYSTERECTOMY WITH SALPINGO OOPHORECTOMY Bilateral             OT ASSESSMENT FLOWSHEET (last 12 hours)      OT Evaluation and Treatment     Row Name 01/04/21 0950                   OT Time and Intention    Subjective Information  no complaints  -JJ (r) EM (t) JJ (c)        Document Type  therapy note (daily note)  -JJ (r) EM (t) JJ (c)        Mode of Treatment  occupational therapy  -JJ (r) EM (t) JJ (c)        Patient Effort  good  -JJ (r) EM (t) JJ (c)        Symptoms Noted During/After Treatment  fatigue;shortness of breath;dizziness  -JJ (r) EM (t) JJ (c)           General Information    Patient Profile Reviewed  yes  -JJ (r) EM (t) JJ (c)        Existing Precautions/Restrictions  fall;oxygen therapy device and L/min  -JJ (r) EM (t) JJ (c)           Pain Scale: Numbers Pre/Post-Treatment    Pretreatment Pain Rating  0/10 - no pain  -JJ (r) EM (t) JJ (c)        Posttreatment Pain Rating  0/10 - no pain  -JJ (r) EM (t) JJ (c)        Pain Intervention(s)  Repositioned;Ambulation/increased activity;Medication (See MAR)  -JJ (r) EM (t) JJ (c)           Bed Mobility    Bed Mobility  rolling left;rolling right;scooting/bridging;sit-supine;supine-sit  -JJ (r) EM (t) JJ (c)        Rolling Left [place] (Bed Mobility)  maximum assist (25% patient effort);verbal cues;nonverbal cues (demo/gesture)   -JJ (r) EM (t) JJ (c)        Rolling Right [place] (Bed Mobility)  maximum assist (25% patient effort);verbal cues;nonverbal cues (demo/gesture)  -JJ (r) EM (t) JJ (c)        Scooting/Bridging [place] (Bed Mobility)  dependent (less than 25% patient effort);2 person assist  -JJ (r) EM (t) JJ (c)        Supine-Sit [place] (Bed Mobility)  moderate assist (50% patient effort);verbal cues;nonverbal cues (demo/gesture)  -JJ (r) EM (t) JJ (c)        Sit-Supine [place] (Bed Mobility)  maximum assist (25% patient effort);nonverbal cues (demo/gesture);verbal cues  -JJ (r) EM (t) JJ (c)        Bed Mobility, Safety Issues  decreased use of arms for pushing/pulling;decreased use of legs for bridging/pushing;impaired trunk control for bed mobility  -JJ (r) EM (t) JJ (c)        Assistive Device (Bed Mobility)  bed rails;draw sheet;head of bed elevated  -JJ (r) EM (t) JJ (c)           Motor Skills    Therapeutic Exercise  shoulder;elbow/forearm  -JJ (r) EM (t) JJ (c)           Shoulder (Therapeutic Exercise)    Shoulder (Therapeutic Exercise)  AAROM (active assistive range of motion)  -JJ (r) EM (t) JJ (c)        Shoulder AAROM (Therapeutic Exercise)  bilateral;flexion;supine;10 repetitions  -JJ (r) EM (t) JJ (c)           Elbow/Forearm (Therapeutic Exercise)    Elbow/Forearm (Therapeutic Exercise)  AAROM (active assistive range of motion)  -JJ (r) EM (t) JJ (c)        Elbow/Forearm AAROM (Therapeutic Exercise)  bilateral;flexion;extension;supine;10 repetitions  -JJ (r) EM (t) JJ (c)           Balance    Balance Assessment  sitting static balance  -JJ (r) EM (t) JJ (c)        Static Sitting Balance  mild impairment;supported;other (see comments) Logan due to R lateral lean   -JJ (r) EM (t) JJ (c)        Comment, Balance  sat EOB ~8 minutes. Logan to maintain static sitting balance due to R lateral lean.   -JJ (r) EM (t) AVELINA (c)           Activities of Daily Living    BADL Assessment/Intervention  grooming  -AVELINA (r)  EM (t) JJ (c)           Grooming Assessment/Training    [place] Level (Grooming)  hair care, combing/brushing;dependent (less than 25% patient effort)  -JJ (r) EM (t) JJ (c)        Assistive Devices (Grooming)  other (see comments) shampoo cap  -JJ (r) EM (t) JJ (c)        Position (Grooming)  edge of bed sitting;supported sitting  -JJ (r) EM (t) JJ (c)           BADL Safety/Performance    Impairments, BADL Safety/Performance  balance;endurance/activity tolerance;strength;shortness of breath;trunk/postural control  -JJ (r) EM (t) JJ (c)        Skilled BADL Treatment/Intervention  BADL process/adaptation training  -JJ (r) EM (t) JJ (c)           Wound 12/16/20 0957 Right lower chin    Wound - Properties Group Placement Date: 12/16/20  -KM Placement Time: 0957  -KM Side: Right  -KM Orientation: lower  -KM Location: chin  -KM Stage, Pressure Injury : deep tissue injury  -KM    Retired Wound - Properties Group Date first assessed: 12/16/20  -KM Time first assessed: 0957  -KM Side: Right  -KM Location: chin  -KM       Wound 12/16/20 0959 Right upper lip    Wound - Properties Group Placement Date: 12/16/20  -KM Placement Time: 0959  -KM Present on Hospital Admission: N  -KM Side: Right  -KM Orientation: upper  -KM Location: lip  -KM Stage, Pressure Injury : deep tissue injury  -KM    Retired Wound - Properties Group Date first assessed: 12/16/20  -KM Time first assessed: 0959  -KM Present on Hospital Admission: N  -KM Side: Right  -KM Location: lip  -KM       Wound 12/18/20 1425 Left cheek Pressure Injury    Wound - Properties Group Placement Date: 12/18/20  -RM Placement Time: 1425  -RM Present on Hospital Admission: N  -RM Side: Left  -RM Location: cheek  -RM Primary Wound Type: Pressure inj  -RM    Retired Wound - Properties Group Date first assessed: 12/18/20  -RM Time first assessed: 1425  -RM Present on Hospital Admission: N  -RM Side: Left  -RM Location: cheek  -RM Primary Wound Type: Pressure inj  -RM        Wound 12/21/20 1118 Left nose Pressure Injury    Wound - Properties Group Placement Date: 12/21/20  - Placement Time: 1118  -CH Present on Hospital Admission: N  -CH Side: Left  -CH Location: nose  -CH Primary Wound Type: Pressure inj  -CH    Retired Wound - Properties Group Date first assessed: 12/21/20  - Time first assessed: 1118  -CH Present on Hospital Admission: N  -CH Side: Left  -CH Location: nose  -CH Primary Wound Type: Pressure inj  -CH       Plan of Care Review    Plan of Care Reviewed With  patient;spouse  -JJ (r) EM (t) JJ (c)        Progress  improving  -JJ (r) EM (t) JJ (c)        Outcome Summary  OT treatment completed. Pt alert and orientedx4 with yes/no questions. Pt performed bed mobility to roll right <>left with maxA and verbal cues due to weakness. Pt performed AAROM B shoulder flexion x10 and bilateral elbow flexion/extension x10. Pt performed supine to sit transfer with modA and HOB elevate for support. Pt dependent to perform hair care to use shampoo cap and comb hair. Pt able to maintain sitting balance ~8 minutes with Logan and verbal cues to correct R lateral lean during grooming tasks to increase activity tolerance.   Pt O2 stats maintained 85%-90% during activity on 5L. Pt required maxA to perform sit to supine transfer. Pt demonstrated improvement in overall activity tolerance this date. [place] still limited by fatigue and weakness. OT will continue to follow to address these deficits. Recommendation to discharge to SNF to continue skilled therapy services.   -JJ (r) EM (t) JJ (c)           Positioning and Restraints    Pre-Treatment Position  in bed  -JJ (r) EM (t) JJ (c)        Post Treatment Position  bed  -JJ (r) EM (t) JJ (c)        In Bed  fowlers;call light within reach;encouraged to call for assist;with family/caregiver;side rails up x3  -JJ (r) EM (t) JJ (c)           Progress Summary (OT)    Progress Toward Functional Goals (OT)  progress toward functional goals is  good  -JJ (r) EM (t) JJ (c)        Daily Progress Summary (OT)  continue OT POC  -JJ (r) EM (t) JJ (c)        Barriers to Overall Progress (OT)  fall/weakness/O2  -JJ (r) EM (t) JJ (c)           Therapy Plan Review/Discharge Plan (OT)    Anticipated Discharge Disposition (OT)  skilled nursing facility  -JJ (r) EM (t) JJ (c)          User Key  (r) = Recorded By, (t) = Taken By, (c) = Cosigned By    Initials Name Effective Dates    Maine Jaramillo, RN 12/29/17 -     Ya Dodson RN 08/02/16 -     Gisselle Buchanan RN 08/02/16 -     Theresa Booker, OTR/L 12/04/20 -     Linad Zazueta OT Student 11/24/20 -          Occupational Therapy Education                 Title: PT OT SLP Therapies (In Progress)     Topic: Occupational Therapy (In Progress)     Point: ADL training (In Progress)     Description:   Instruct learner(s) on proper safety adaptation and remediation techniques during self care or transfers.   Instruct in proper use of assistive devices.              Learning Progress Summary           Patient Acceptance, E, NR by AVELINA at 12/28/2020 1311                   Point: Home exercise program (Not Started)     Description:   Instruct learner(s) on appropriate technique for monitoring, assisting and/or progressing therapeutic exercises/activities.              Learner Progress:  Not documented in this visit.          Point: Precautions (Not Started)     Description:   Instruct learner(s) on prescribed precautions during self-care and functional transfers.              Learner Progress:  Not documented in this visit.          Point: Body mechanics (In Progress)     Description:   Instruct learner(s) on proper positioning and spine alignment during self-care, functional mobility activities and/or exercises.              Learning Progress Summary           Patient Acceptance, E, NR by AVELINA at 12/28/2020 1311                               User Key     Initials Effective Dates Name Provider Type  Discipline    JJ 12/04/20 -  Theresa Nicholas OTR/L Occupational Therapist OT                  OT Recommendation and Plan     Progress Toward Functional Goals (OT): progress toward functional goals is good  Plan of Care Review  Plan of Care Reviewed With: patient, spouse  Progress: improving  Outcome Summary: OT treatment completed. Pt alert and orientedx4 with yes/no questions. Pt performed bed mobility to roll right <>left with maxA and verbal cues due to weakness. Pt performed AAROM B shoulder flexion x10 and bilateral elbow flexion/extension x10. Pt performed supine to sit transfer with modA and HOB elevate for support. Pt dependent to perform hair care to use shampoo cap and comb hair. Pt able to maintain sitting balance ~8 minutes with Logan and verbal cues to correct R lateral lean during grooming tasks to increase activity tolerance.   Pt O2 stats maintained 85%-90% during activity on 5L. Pt required maxA to perform sit to supine transfer. Pt demonstrated improvement in overall activity tolerance this date. [place] still limited by fatigue and weakness. OT will continue to follow to address these deficits. Recommendation to discharge to SNF to continue skilled therapy services.   Plan of Care Reviewed With: patient, spouse  Outcome Summary: OT treatment completed. Pt alert and orientedx4 with yes/no questions. Pt performed bed mobility to roll right <>left with maxA and verbal cues due to weakness. Pt performed AAROM B shoulder flexion x10 and bilateral elbow flexion/extension x10. Pt performed supine to sit transfer with modA and HOB elevate for support. Pt dependent to perform hair care to use shampoo cap and comb hair. Pt able to maintain sitting balance ~8 minutes with Logan and verbal cues to correct R lateral lean during grooming tasks to increase activity tolerance.   Pt O2 stats maintained 85%-90% during activity on 5L. Pt required maxA to perform sit to supine transfer. Pt demonstrated  improvement in overall activity tolerance this date. [place] still limited by fatigue and weakness. OT will continue to follow to address these deficits. Recommendation to discharge to SNF to continue skilled therapy services.         Time Calculation:   Time Calculation- OT     Row Name 01/04/21 0950             Time Calculation- OT    OT Start Time  0910  -JJ (r) EM (t) JJ (c)      OT Stop Time  0950  -JJ (r) EM (t) JJ (c)      OT Time Calculation (min)  40 min  -JJ (r) EM (t)      Total Timed Code Minutes- OT  40 minute(s)  -JJ (r) EM (t) JJ (c)      OT Received On  01/04/21  -JJ (r) EM (t) JJ (c)        User Key  (r) = Recorded By, (t) = Taken By, (c) = Cosigned By    Initials Name Provider Type    Theresa Booker, WESLYR/L Occupational Therapist    Linda Zazueta, OT Student OT Student                 Linda Coleman OT Student  1/4/2021

## 2021-01-04 NOTE — PROGRESS NOTES
INFECTIOUS DISEASES PROGRESS NOTE    Patient:  Joann Finnegan  YOB: 1950  MRN: [id number]   Admit date: 12/11/2020   Admitting Physician: Ellis Wise MD  Primary Care Physician: Janak Sherwood APRN      Chief Complaint: Weakness    Interval History:   Patient extubated December 30.  Her daughter is at bedside. Patient continues to be weak. Notes she is hoarse but able to mouth words    allergies:   Allergies   Allergen Reactions   • Requip [Ropinirole Hcl] Nausea And Vomiting       Current Meds:     Current Facility-Administered Medications:   •  acetaminophen (TYLENOL) tablet 650 mg, 650 mg, Nasogastric, Q4H PRN **OR** acetaminophen (TYLENOL) suppository 650 mg, 650 mg, Rectal, Q4H PRN, Samir Teague MD, 650 mg at 12/30/20 1755  •  albuterol sulfate HFA (PROVENTIL HFA;VENTOLIN HFA;PROAIR HFA) inhaler 2 puff, 2 puff, Inhalation, Q6H PRN, Samir Teague MD  •  albuterol sulfate HFA (PROVENTIL HFA;VENTOLIN HFA;PROAIR HFA) inhaler 2 puff, 2 puff, Inhalation, TID - RT, Samir Teague MD, 2 puff at 01/04/21 1449  •  benzonatate (TESSALON) capsule 100 mg, 100 mg, Oral, TID PRN, Samir Teague MD, 100 mg at 12/12/20 2034  •  ceFAZolin in 0.9% normal saline (ANCEF) IVPB solution 2 g, 2 g, Intravenous, Q8H, Samir Teague MD, Last Rate: 200 mL/hr at 01/04/21 0908, 2 g at 01/04/21 0908  •  dextrose (D50W) 25 g/ 50mL Intravenous Solution 25 g, 25 g, Intravenous, Q15 Min PRN, Samir Teague MD  •  dextrose (GLUTOSE) oral gel 15 g, 15 g, Oral, Q15 Min PRN, Samir Teague MD  •  enoxaparin (LOVENOX) syringe 40 mg, 40 mg, Subcutaneous, Q12H, Samir Teague MD, 40 mg at 01/04/21 1007  •  famotidine (PEPCID) tablet 20 mg, 20 mg, Oral, BID AC, Samir Teague MD, 20 mg at 01/04/21 0908  •  glucagon (human recombinant) (GLUCAGEN DIAGNOSTIC) injection 1 mg, 1 mg, Subcutaneous, Q15 Min PRN, Samir Teague MD  •  insulin lispro (humaLOG) injection 2-7  "Units, 2-7 Units, Subcutaneous, Q6H, Samir Teague MD, 2 Units at 21 1156  •  labetalol (NORMODYNE,TRANDATE) injection 10 mg, 10 mg, Intravenous, Q4H PRN, Ellis Wise MD, 10 mg at 21 1253  •  levothyroxine (SYNTHROID, LEVOTHROID) tablet 125 mcg, 125 mcg, Nasogastric, Q AM, Samir Teague MD, 125 mcg at 21 0908  •  lidocaine (XYLOCAINE) 4 % external solution 1 application, 1 application, Topical, Once, Taurus Eastman Jr., MD  •  ondansetron (ZOFRAN) injection 4 mg, 4 mg, Intravenous, Q6H PRN, Samir Teague MD  •  oxymetazoline (AFRIN) nasal spray 2 spray, 2 spray, Each Nare, TID, Taurus Eastman Jr., MD  •  polyethylene glycol (MIRALAX) packet 17 g, 17 g, Nasogastric, Daily, Samir Teague MD, 17 g at 21 0840  •  sennosides-docusate (PERICOLACE) 8.6-50 MG per tablet 1 tablet, 1 tablet, Nasogastric, BID, Samir Teague MD, 1 tablet at 21 0840  •  sodium chloride 0.9 % flush 10 mL, 10 mL, Intravenous, PRN, Samir Teague MD, 10 mL at 21 0806  •  sodium chloride nasal spray 2 spray, 2 spray, Each Nare, 5x Daily, Taurus Eastman Jr., MD, 2 spray at 21 1512  •  sodium chloride nasal spray 2 spray, 2 spray, Each Nare, Continuous PRN, Taurus Eastman Jr., MD      Review of Systems   Constitutional: Negative for fever.   Neurological: Positive for weakness.       Objective     Vital Signs:  Temp (24hrs), Av.3 °F (36.8 °C), Min:97.5 °F (36.4 °C), Max:98.9 °F (37.2 °C)      /62 (BP Location: Left arm, Patient Position: Lying)   Pulse 81   Temp 98.7 °F (37.1 °C) (Oral)   Resp 18   Ht 165.1 cm (65\")   Wt 79.2 kg (174 lb 9.6 oz)   LMP 10/26/2003 (Exact Date) Comment: Hyst for DUB  SpO2 97%   BMI 29.05 kg/m²         Physical Exam   General: Patient appears very weak but appears a bit better today  HEENT: O2 per nasal cannula in place.  Patient has several areas with eschar  On face from pressure injury " "when proned  Respiratory: Effort even and unlabored.  Diminished breath sounds bilaterally.  Abdomen: Soft, bowel sounds positive  Neuro: Alert, answers questions appropriately mouths words with barely a whisper but able to understand. She told me, \" your  is my skin doctor\" she had melanoma more than once.  Psych: Pleasant cooperative    Results Review:    I reviewed the patient's new clinical results.    Lab Results:  CBC:   Lab Results   Lab 12/30/20  0204 01/01/21  0522 01/04/21  0306   WBC 9.36 12.25* 10.82*   HEMOGLOBIN 11.6* 12.8 12.4   HEMATOCRIT 32.6* 37.4 36.5   PLATELETS 134* 170 153   LYMPHOCYTE %  --   --  8.2*   MONOCYTES %  --   --  5.2       CMP:   Lab Results   Lab 12/30/20  0204 01/01/21  0523 01/02/21  1033 01/03/21  0626   SODIUM 138 144 143 144   POTASSIUM 3.6 3.7 3.5 3.5   CHLORIDE 101 106 105 110*   CO2 29.0 26.0 25.0 27.0   BUN 25* 22 33* 37*   CREATININE 0.51* 0.55* 0.70 0.58   CALCIUM 8.9 9.2 9.8 8.9   BILIRUBIN 0.6  --   --  0.4   ALK PHOS 84  --   --  90   ALT (SGPT) 117*  --   --  51*   AST (SGOT) 68*  --   --  56*   GLUCOSE 146* 129* 135* 142*       Microscopic urinalysis with 31-50 WBCs, 0-2 RBCs and 3-6 squamous epis with some budding yeast noted    Culture Results:        Microbiology Results Abnormal     Procedure Component Value - Date/Time    Blood Culture - Blood, Arm, Left [[id number]] Collected: 12/30/20 2208    Lab Status: Preliminary result Specimen: Blood from Arm, Left Updated: 01/03/21 2230     Blood Culture No growth at 4 days    Blood Culture - Blood, Hand, Right [[id number]] Collected: 12/30/20 1935    Lab Status: Preliminary result Specimen: Blood from Hand, Right Updated: 01/03/21 2015     Blood Culture No growth at 4 days    Urine Culture - Urine, Urine, Catheter In/Out [[id number]]  (Abnormal) Collected: 01/01/21 0301    Lab Status: Final result Specimen: Urine, Catheter In/Out Updated: 01/02/21 1026     Urine Culture Yeast isolated    Respiratory Culture - " Sputum, ET Suction [[id number]]  (Abnormal)  (Susceptibility) Collected: 12/29/20 1955    Lab Status: Final result Specimen: Sputum from ET Suction Updated: 12/31/20 1051     Respiratory Culture Heavy growth (4+) Staphylococcus aureus      No Normal Respiratory Hailee     Gram Stain Moderate (3+) WBCs seen      Many (4+) Gram positive cocci    Susceptibility      Staphylococcus aureus     ISRA     Clindamycin Resistant     Oxacillin Susceptible     Penicillin G Resistant     Tetracycline Susceptible     Trimethoprim + Sulfamethoxazole Susceptible     Vancomycin Susceptible                    Blood Culture - Blood, Arm, Left [[id number]] Collected: 12/19/20 1440    Lab Status: Final result Specimen: Blood from Arm, Left Updated: 12/24/20 1530     Blood Culture No growth at 5 days    Blood Culture - Blood, Blood, Central Line [[id number]] Collected: 12/19/20 1435    Lab Status: Final result Specimen: Blood, Central Line Updated: 12/24/20 1530     Blood Culture No growth at 5 days    Respiratory Culture - Sputum, ET Suction [[id number]]  (Abnormal) Collected: 12/20/20 0046    Lab Status: Final result Specimen: Sputum from ET Suction Updated: 12/22/20 1016     Respiratory Culture Scant growth (1+) Candida albicans      No Normal Respiratory Hailee     Gram Stain Greater than 25 WBCs per low power field      Less than 25 Epithelial cells per low power field      Few (2+) Budding yeast    Respiratory Culture - Sputum, ET Suction [[id number]] Collected: 12/15/20 1148    Lab Status: Final result Specimen: Sputum from ET Suction Updated: 12/17/20 0829     Respiratory Culture Scant growth (1+) Normal Respiratory Hailee: NO S.aureus/MRSA or Pseudomonas aeruginosa     Gram Stain Greater than 25 WBCs per low power field      No Epithelial cells per low power field      No organisms seen    Blood Culture - Blood, Arm, Left [[id number]] Collected: 12/11/20 1220    Lab Status: Final result Specimen: Blood from Arm, Left Updated: 12/16/20  1301     Blood Culture No growth at 5 days    Blood Culture - Blood, Arm, Left [[id number]] Collected: 12/11/20 1221    Lab Status: Final result Specimen: Blood from Arm, Left Updated: 12/16/20 1301     Blood Culture No growth at 5 days    MRSA Screen, PCR (Inpatient) - Swab, Nares [[id number]]  (Normal) Collected: 12/15/20 1148    Lab Status: Final result Specimen: Swab from Nares Updated: 12/15/20 1401     MRSA PCR No MRSA Detected    Legionella Antigen, Urine - Urine, Urine, Clean Catch [[id number]]  (Normal) Collected: 12/11/20 1829    Lab Status: Final result Specimen: Urine, Clean Catch Updated: 12/11/20 1935     LEGIONELLA ANTIGEN, URINE Negative    S. Pneumo Ag Urine or CSF - Urine, Urine, Clean Catch [[id number]]  (Normal) Collected: 12/11/20 1829    Lab Status: Final result Specimen: Urine, Clean Catch Updated: 12/11/20 1934     Strep Pneumo Ag Negative    Respiratory Panel, PCR (WITHOUT COVID) - Swab, Nasopharynx [[id number]]  (Normal) Collected: 12/11/20 1719    Lab Status: Final result Specimen: Swab from Nasopharynx Updated: 12/11/20 1825     ADENOVIRUS, PCR Not Detected     Coronavirus 229E Not Detected     Coronavirus HKU1 Not Detected     Coronavirus NL63 Not Detected     Coronavirus OC43 Not Detected     Human Metapneumovirus Not Detected     Human Rhinovirus/Enterovirus Not Detected     Influenza B PCR Not Detected     Parainfluenza Virus 1 Not Detected     Parainfluenza Virus 2 Not Detected     Parainfluenza Virus 3 Not Detected     Parainfluenza Virus 4 Not Detected     Bordetella pertussis pcr Not Detected     Influenza A H1 2009 PCR Not Detected     Chlamydophila pneumoniae PCR Not Detected     Mycoplasma pneumo by PCR Not Detected     Influenza A PCR Not Detected     Influenza A H3 Not Detected     Influenza A H1 Not Detected     RSV, PCR Not Detected     Bordetella parapertussis PCR Not Detected    Narrative:      The coronavirus on the RVP is NOT COVID-19 and is NOT indicative of infection with  COVID-19.     COVID PRE-OP / PRE-PROCEDURE SCREENING ORDER (NO ISOLATION) - Swab, Nasal Cavity [[id number]]  (Abnormal) Collected: 12/11/20 1346    Lab Status: Final result Specimen: Swab from Nasal Cavity Updated: 12/11/20 1431    Narrative:      The following orders were created for panel order COVID PRE-OP / PRE-PROCEDURE SCREENING ORDER (NO ISOLATION) - Swab, Nasal Cavity.  Procedure                               Abnormality         Status                     ---------                               -----------         ------                     COVID-19, [facility] IN-HOUS...[[id number]]  Abnormal            Final result                 Please view results for these tests on the individual orders.    COVID-19, [facility] IN-HOUSE,NP Swab (NO TRANSPORT MEDIA) 2 HR TAT - Swab, Nasopharynx [[id number]]  (Abnormal) Collected: 12/11/20 1346    Lab Status: Final result Specimen: Swab from Nasopharynx Updated: 12/11/20 1431     COVID19 Detected    Narrative:      Fact sheet for providers: https://www.fda.gov/media/397977/download     Fact sheet for patients: https://www.fda.gov/media/344701/download    Test performed by PCR.                Radiology:   Imaging Results (Last 72 Hours)     Procedure Component Value Units Date/Time    XR Abdomen KUB [[id number]] Collected: 01/04/21 0734     Updated: 01/04/21 0738    Narrative:      EXAMINATION: XR ABDOMEN KUB- 1/4/2021 7:34 AM CST     HISTORY: Dobbhoff tube placement     COMPARISON: 1/2/2021     FINDINGS:  Dobbhoff tube is in place with tip extending below the diaphragm and  projecting over the left upper quadrant of the abdomen, presumably in  the gastric fundus. The visualized bowel gas pattern is nonobstructive.  Bilateral airspace opacities persist. In the right axilla.       Impression:      Dobbhoff tube tip projects over and is presumably within the  gastric fundus.        This report was finalized on 01/04/2021 07:34 by Dr. Dilshad Bray MD.    XR Abdomen KUB [[id number]]  Collected: 01/04/21 0731     Updated: 01/04/21 0735    Narrative:      EXAMINATION: XR ABDOMEN KUB- 1/4/2021 7:31 AM CST     HISTORY: Evaluate Dobbhoff tube position     COMPARISON: 1/4/2021 at 1:05 AM     FINDINGS:  Dobbhoff tube is in place with tip curled and projecting in the left  upper quadrant of the abdomen, presumably in the fundus of the stomach.  Bilateral airspace opacities are again noted. No intraperitoneal free  air is identified. Visualized bowel gas pattern is nonobstructive.       Impression:      Dobbhoff tube tip projects over and is presumably within the  gastric fundus.  This report was finalized on 01/04/2021 07:32 by Dr. Dilshad Bray MD.    XR Abdomen KUB [[id number]] Collected: 01/02/21 1125     Updated: 01/02/21 1129    Narrative:      Exam: XR ABDOMEN KUB-     Indication: Feeding tube placement     Comparison: 1/01/2021     Findings:     Feeding tube coils in the proximal stomach, with tip pointed superiorly.  Visualized bowel gas pattern is nonobstructive. No mass effect or  suspicious calcifications. Surgical clips in the RIGHT pelvis. No acute  osseous finding.       Impression:      Impression:     Feeding tube coils in the proximal stomach with tip pointing superiorly.  This report was finalized on 01/02/2021 11:26 by Dr. Lalo Maza MD.    XR Chest 1 View [[id number]] Collected: 01/02/21 1123     Updated: 01/02/21 1127    Narrative:      Exam: XR CHEST 1 VW-     Indication: Hypoxia, pneumonia     Comparison: 12/31/2020     Findings:     Right-sided CVL has been removed. Left-sided PICC tip overlies the low  SVC. Cardiac silhouette is stable. No change in patchy bilateral  parenchymal opacity. No large pleural effusion or visible pneumothorax.  Lung volumes appear improved. A feeding tube terminates below the  diaphragm, in the proximal stomach.       Impression:      Impression:     1.  No significant change in bilateral parenchymal pulmonary opacity.  2.  Interval placement of  "feeding tube tip in the proximal stomach.  This report was finalized on 01/02/2021 11:24 by Dr. Lalo Maza MD.          Assessment/Plan     Active Hospital Problems    Diagnosis   • **Pneumonia due to COVID-19 virus   • Aphonia   • Xerostomia   • Post viral debility   • Thrombocytopenia (CMS/HCC)   • Acute respiratory distress syndrome (ARDS) due to COVID-19 virus (CMS/HCC)   • Elevated LFTs   • Leukocytosis   • Elevated ferritin and CRP   • Obesity (BMI 30-39.9)   • Acute respiratory failure with hypoxia (CMS/HCC)   • Dysphagia, oropharyngeal       IMPRESSION:  · COVID-19 infection with pneumonia-symptoms start December 1, patient tested + Dec 2 and received antibiotics and steroids early in course of infection per her nurse practitioner-patient completed remdesivir December 15.    · Fever resolved.  Sputum cultures positive for 4+ methicillin susceptible Staphylococcus aureus.  Could be colonizing airway but obvious concern given recent fever.  Urine culture with yeast.  Dosed with fluconazole x1  · Acute respiratory failure with hypoxemia-extubated December 30, 2020 (status post empiric treatment with Zosyn from 12/15-12/22) ON 2 LITERS!  · thrombocytopenia---resolved  · leukocytosis -resolved and with increased January 1 to 12,000-and declined today  · elevated liver enzymes-improved  · MRSA screen negative      RECOMMENDATION:     Continue cefazolin day #4, monitoring of oxygen status given positive sputum culture \"MSSA\", etc.  Monitor temperature curve closely.  Monitor off dexamethasone-  DVT prophylaxis with Lovenox  Get home meds list  - d/w patient and daughter re: home meds -     Mervat Ayon MD  01/04/21  17:28 CST      "

## 2021-01-04 NOTE — THERAPY TREATMENT NOTE
Acute Care - Speech Language Pathology   Swallow Treatment Note [facility]     Patient Name: Joann Finnegan  : 1950  MRN: [id number]  Today's Date: 2021               Admit Date: 2020  Pt participated well with swallow therapy. She is still aphonic. She was able to move a damp toothette around within her oral cavity minimally, but still has weak tongue movement and decreased ROM. She took ice chip trials with no overt s/s of aspiration, but there is still concern for silent aspiration considering pt continues with aphonia. SLP assisted with oral care via damp and dry toothette. Pt was eventually able to expel a large piece of dried phlegm from the back of her soft palate. Pt would benefit from instrumental swallow evaluation prior to considering initiation of a PO diet. May complete FEES this week if she continues to be appropriate.   Litzy Mane, CCC-SLP 2021 14:45 CST    Visit Dx:     ICD-10-CM ICD-9-CM   1. Pneumonia due to COVID-19 virus  U07.1 480.8    J12.89    2. Hypoxic  R09.02 799.02   3. Acute respiratory failure with hypoxia (CMS/HCC)  J96.01 518.81   4. Impaired mobility  Z74.09 799.89   5. Impaired mobility and ADLs  Z74.09 V49.89    Z78.9    6. Oropharyngeal dysphagia  R13.12 787.22     Patient Active Problem List   Diagnosis   • Dysphagia, oropharyngeal   • Heartburn   • Family hx colonic polyps   • Pneumonia due to COVID-19 virus   • Acute respiratory failure with hypoxia (CMS/HCC)   • Obesity (BMI 30-39.9)   • Elevated ferritin and CRP   • Acute respiratory distress syndrome (ARDS) due to COVID-19 virus (CMS/HCC)   • Elevated LFTs   • Leukocytosis   • Thrombocytopenia (CMS/HCC)   • Aphonia   • Xerostomia   • Post viral debility     Past Medical History:   Diagnosis Date   • Abnormal weight gain    • Arthritis    • Breast cancer (CMS/HCC)     right.    • Fatigue    • H/O melanoma excision     CLARKS  LEVEL III      BACK OF UPPER LEFT ARM   • Hypertension    •  Hypothyroidism    • Melanoma (CMS/HCC)    • Restless leg syndrome    • Shortness of breath      Past Surgical History:   Procedure Laterality Date   • APPENDECTOMY     • BREAST BIOPSY Right    • BREAST BIOPSY     • BREAST LUMPECTOMY      right.    • BROW LIFT     • COLONOSCOPY     • COLONOSCOPY N/A 2/14/2020    Procedure: COLONOSCOPY WITH ANESTHESIA;  Surgeon: Donovan Hernandez MD;  Location: [facility] ENDOSCOPY;  Service: Gastroenterology;  Laterality: N/A;  pre: family hx colon polyps  post: polyps  Janak Sherwood APRN   • ENDOSCOPY N/A 2/14/2020    Procedure: ESOPHAGOGASTRODUODENOSCOPY WITH ANESTHESIA;  Surgeon: Donovan Hernandez MD;  Location: [facility] ENDOSCOPY;  Service: Gastroenterology;  Laterality: N/A;  pre: dysphagia; heartburn  post: dilated  Janka Sherwood APRN   • ENDOSCOPY AND COLONOSCOPY  12/22/2011    very minimal distal esophagitis, incomplete esophagus ring dilated   • LUMBAR SPINAL TUMOR REMOVAL      Spinal Cyst removed from Spinal Canal   • MASTECTOMY, PARTIAL     • PARTIAL HIP ARTHROPLASTY Left    • SKIN CANCER EXCISION      Melanoma    • SQUAMOUS CELL CARCINOMA EXCISION     • TEMPOROMANDIBULAR JOINT ARTHROPLASTY     • TOTAL ABDOMINAL HYSTERECTOMY WITH SALPINGO OOPHORECTOMY Bilateral         SWALLOW EVALUATION (last 72 hours)      SLP Adult Swallow Evaluation     Row Name 01/04/21 1231 01/03/21 1025                Rehab Evaluation    Document Type  therapy note (daily note)  -MB  therapy note (daily note)  -BN       Subjective Information  no complaints  -MB  no complaints  -BN       Patient Observations  alert;cooperative  -MB  alert;cooperative  -BN       Patient/Family/Caregiver Comments/Observations  No family present  -MB  pt  present  -BN       Care Plan Review  --  care plan/treatment goals reviewed;risks/benefits reviewed;current/potential barriers reviewed;patient/other agree to care plan  -BN       Care Plan Review, Other Participant(s)  --  significant other  -BN        Patient Effort  good  -MB  good  -BN          Pain    Additional Documentation  Pain Scale: FACES Pre/Post-Treatment (Group)  -MB  --          Pain Scale: Numbers Pre/Post-Treatment    Pretreatment Pain Rating  --  0/10 - no pain  -BN       Posttreatment Pain Rating  --  0/10 - no pain  -BN          Pain Scale: FACES Pre/Post-Treatment    Pain: FACES Scale, Pretreatment  0-->no hurt  -MB  --          Clinical Impression    Daily Summary of Progress (SLP)  progress towards functional goals is fair  -MB  progress toward functional goals is gradual  -BN       Barriers to Overall Progress (SLP)  Weakness  -MB  medically complex  -BN       Plan for Continued Treatment (SLP)  Continue to follow  -MB  continue to follow  -BN          Swallow Goals (SLP)    Oral Nutrition/Hydration Goal Selection (SLP)  --  oral nutrition/hydration, SLP goal 1  -BN       Labial Strengthening Goal Selection (SLP)  --  labial strengthening, SLP goal 1  -BN       Lingual Strengthening Goal Selection (SLP)  --  lingual strengthening, SLP goal 1  -BN       Pharyngeal Strengthening Exercise Goal Selection (SLP)  --  pharyngeal strengthening exercise, SLP goal 1  -BN       Additional Documentation  --  labial strengthening goal selection (SLP);lingual strengthening goal selection (SLP);pharyngeal strengthening exercise goal selection (SLP)  -BN          Oral Nutrition/Hydration Goal 1 (SLP)    Oral Nutrition/Hydration Goal 1, SLP  Pt will tolerate least restrictive diet with no overt s/s of aspiration.   -MB  Pt will tolerate least restrictive diet with no overt s/s of aspiration.   -BN       Time Frame (Oral Nutrition/Hydration Goal 1, SLP)  by discharge  -MB  by discharge  -BN       Barriers (Oral Nutrition/Hydration Goal 1, SLP)  Aphonic  -MB  Medically complex, weak, lethargic  -BN       Progress/Outcomes (Oral Nutrition/Hydration Goal 1, SLP)  continuing progress toward goal  -MB  progress slower than expected  -BN          Labial  Strengthening Goal 1 (SLP)    Activity (Labial Strengthening Goal 1, SLP)  increase labial tone  -MB  increase labial tone  -BN       Increase Labial Tone  labial resistance exercises  -MB  labial resistance exercises  -BN       [place]/Accuracy (Labial Strengthening Goal 1, SLP)  with minimal cues (75-90% accuracy)  -MB  with minimal cues (75-90% accuracy)  -BN       Time Frame (Labial Strengthening Goal 1, SLP)  short term goal (STG);by discharge  -MB  short term goal (STG);by discharge  -BN       Barriers (Labial Strengthening Goal 1, SLP)  --  n/a  -BN       Progress/Outcomes (Labial Strengthening Goal 1, SLP)  --  continuing progress toward goal  -BN          Lingual Strengthening Goal 1 (SLP)    Activity (Lingual Strengthening Goal 1, SLP)  increase lingual tone/sensation/control/coordination/movement  -MB  increase lingual tone/sensation/control/coordination/movement  -BN       Increase Lingual Tone/Sensation/Control/Coordination/Movement  lingual movement exercises  -MB  lingual movement exercises  -BN       [place]/Accuracy (Lingual Strengthening Goal 1, SLP)  with minimal cues (75-90% accuracy)  -MB  with minimal cues (75-90% accuracy)  -BN       Time Frame (Lingual Strengthening Goal 1, SLP)  short term goal (STG);by discharge  -MB  short term goal (STG);by discharge  -BN       Barriers (Lingual Strengthening Goal 1, SLP)  Weakness  -MB  n/a  -BN       Progress/Outcomes (Lingual Strengthening Goal 1, SLP)  continuing progress toward goal  -MB  continuing progress toward goal  -BN          Pharyngeal Strengthening Exercise Goal 1 (SLP)    Activity (Pharyngeal Strengthening Goal 1, SLP)  increase timing  -MB  increase timing  -BN       Increase Timing  gustatory stimulation (sour/cold)  -MB  gustatory stimulation (sour/cold)  -BN       [place]/Accuracy (Pharyngeal Strengthening Goal 1, SLP)  independently (over 90% accuracy)  -MB  independently (over 90% accuracy)  -BN       Time Frame  (Pharyngeal Strengthening Goal 1, SLP)  short term goal (STG);by discharge  -MB  short term goal (STG);by discharge  -BN       Barriers (Pharyngeal Strengthening Goal 1, SLP)  --  n/a  -BN       Progress/Outcomes (Pharyngeal Strengthening Goal 1, SLP)  --  goal ongoing  -BN         User Key  (r) = Recorded By, (t) = Taken By, (c) = Cosigned By    Initials Name Effective Dates    MB Litzy Mane, CCC-SLP 08/02/16 -     Marquita Olvera CCC-SLP 02/11/20 -           EDUCATION  The patient has been educated in the following areas:   Dysphagia (Swallowing Impairment).    SLP Recommendation and Plan                                         Daily Summary of Progress (SLP): progress towards functional goals is fair    Plan for Continued Treatment (SLP): Continue to follow              Plan of Care Reviewed With: patient  Progress: improving  Outcome Summary: See note    SLP GOALS     Row Name 01/04/21 1231 01/03/21 1025          Oral Nutrition/Hydration Goal 1 (SLP)    Oral Nutrition/Hydration Goal 1, SLP  Pt will tolerate least restrictive diet with no overt s/s of aspiration.   -MB  Pt will tolerate least restrictive diet with no overt s/s of aspiration.   -BN     Time Frame (Oral Nutrition/Hydration Goal 1, SLP)  by discharge  -MB  by discharge  -BN     Barriers (Oral Nutrition/Hydration Goal 1, SLP)  Aphonic  -MB  Medically complex, weak, lethargic  -BN     Progress/Outcomes (Oral Nutrition/Hydration Goal 1, SLP)  continuing progress toward goal  -MB  progress slower than expected  -BN        Labial Strengthening Goal 1 (SLP)    Activity (Labial Strengthening Goal 1, SLP)  increase labial tone  -MB  increase labial tone  -BN     Increase Labial Tone  labial resistance exercises  -MB  labial resistance exercises  -BN     [place]/Accuracy (Labial Strengthening Goal 1, SLP)  with minimal cues (75-90% accuracy)  -MB  with minimal cues (75-90% accuracy)  -BN     Time Frame (Labial Strengthening Goal 1,  SLP)  short term goal (STG);by discharge  -MB  short term goal (STG);by discharge  -BN     Barriers (Labial Strengthening Goal 1, SLP)  --  n/a  -BN     Progress/Outcomes (Labial Strengthening Goal 1, SLP)  --  continuing progress toward goal  -BN        Lingual Strengthening Goal 1 (SLP)    Activity (Lingual Strengthening Goal 1, SLP)  increase lingual tone/sensation/control/coordination/movement  -MB  increase lingual tone/sensation/control/coordination/movement  -BN     Increase Lingual Tone/Sensation/Control/Coordination/Movement  lingual movement exercises  -MB  lingual movement exercises  -BN     [place]/Accuracy (Lingual Strengthening Goal 1, SLP)  with minimal cues (75-90% accuracy)  -MB  with minimal cues (75-90% accuracy)  -BN     Time Frame (Lingual Strengthening Goal 1, SLP)  short term goal (STG);by discharge  -MB  short term goal (STG);by discharge  -BN     Barriers (Lingual Strengthening Goal 1, SLP)  Weakness  -MB  n/a  -BN     Progress/Outcomes (Lingual Strengthening Goal 1, SLP)  continuing progress toward goal  -MB  continuing progress toward goal  -BN        Pharyngeal Strengthening Exercise Goal 1 (SLP)    Activity (Pharyngeal Strengthening Goal 1, SLP)  increase timing  -MB  increase timing  -BN     Increase Timing  gustatory stimulation (sour/cold)  -MB  gustatory stimulation (sour/cold)  -BN     [place]/Accuracy (Pharyngeal Strengthening Goal 1, SLP)  independently (over 90% accuracy)  -MB  independently (over 90% accuracy)  -BN     Time Frame (Pharyngeal Strengthening Goal 1, SLP)  short term goal (STG);by discharge  -MB  short term goal (STG);by discharge  -BN     Barriers (Pharyngeal Strengthening Goal 1, SLP)  --  n/a  -BN     Progress/Outcomes (Pharyngeal Strengthening Goal 1, SLP)  --  goal ongoing  -BN       User Key  (r) = Recorded By, (t) = Taken By, (c) = Cosigned By    Initials Name Provider Type    Litzy Pimentel CCC-SLP Speech and Language Pathologist    BN  Marquita Morales, CCC-SLP Speech and Language Pathologist             Time Calculation:   Time Calculation- SLP     Row Name 01/04/21 1435             Time Calculation- SLP    SLP Start Time  1231  -MB      SLP Stop Time  1302  -MB      SLP Time Calculation (min)  31 min  -MB      SLP Received On  01/04/21  -MB        User Key  (r) = Recorded By, (t) = Taken By, (c) = Cosigned By    Initials Name Provider Type    Litzy Pimentel CCC-SLP Speech and Language Pathologist          Therapy Charges for Today     Code Description Service Date Service Provider Modifiers Qty    [id number]  ST TREATMENT SWALLOW 2 1/4/2021 Litzy Mane CCC-SLP GN 1               Litzy CULVER. SILVINA Mane  1/4/2021

## 2021-01-04 NOTE — PLAN OF CARE
Goal Outcome Evaluation:  Plan of Care Reviewed With: patient  Progress: no change  Outcome Summary: Difficulty using NG tube for medication and water.  Unable to push water or meds.  MD notified, KUB done.  Resposistion 4 cm deeper, ordered new KUB.  Tube feeding at 48 was infusing fine, but the flush would bulge out the syringe ports.  Stopped tube feeding about 9 pm.  Patient did not talk, but would subtly shake her head yea or nay. Oral hygeine done throughout night.  No falls noted.  ENT consulted.  IV ABX continued.

## 2021-01-04 NOTE — CONSULTS
Taurus Eastman Jr, MD     OTOLARYNGOLOGY, HEAD AND NECK SURGERY CONSULTATION NOTE   Referring Provider: Ellis Wise MD  Reason for Consultation: Aphonia  Location: 442/1  Accompanied by:   Chief complaint:   Chief Complaint   Patient presents with   • Shortness of Breath   • Exposure To Known Illness         HISTORY OF PRESENT ILLNESS:  Joann Finnegan is a  70 y.o. female with aphonia following extubation.  Patient apparently was intubated multiple times.  Since that time she has had a almost no voice.  She has been evaluated by speech.  She cannot give any history because of her voice.  Her  has difficulty hearing and does not give good history either.  Most of history is gleaned from the medical record.  Patient seen. Chart reviewed  Review of Systems  Review of Systems  ROS master CMN: Unobtainable because:   Patient unable to speak    History  Past Medical History:   Diagnosis Date   • Abnormal weight gain    • Arthritis    • Breast cancer (CMS/HCC)     right.    • Fatigue    • H/O melanoma excision     CLARKS  LEVEL III      BACK OF UPPER LEFT ARM   • Hypertension    • Hypothyroidism    • Melanoma (CMS/HCC)    • Restless leg syndrome    • Shortness of breath      Past Surgical History:   Procedure Laterality Date   • APPENDECTOMY     • BREAST BIOPSY Right    • BREAST BIOPSY     • BREAST LUMPECTOMY      right.    • BROW LIFT     • COLONOSCOPY     • COLONOSCOPY N/A 2/14/2020    Procedure: COLONOSCOPY WITH ANESTHESIA;  Surgeon: Donovan Hernandez MD;  Location: [facility] ENDOSCOPY;  Service: Gastroenterology;  Laterality: N/A;  pre: family hx colon polyps  post: polyps  Janak Sherwood APRN   • ENDOSCOPY N/A 2/14/2020    Procedure: ESOPHAGOGASTRODUODENOSCOPY WITH ANESTHESIA;  Surgeon: Donovan Hernandez MD;  Location: [facility] ENDOSCOPY;  Service: Gastroenterology;  Laterality: N/A;  pre: dysphagia; heartburn  post: dilated  Janak Sherwood APRN   • ENDOSCOPY AND COLONOSCOPY   12/22/2011    very minimal distal esophagitis, incomplete esophagus ring dilated   • LUMBAR SPINAL TUMOR REMOVAL      Spinal Cyst removed from Spinal Canal   • MASTECTOMY, PARTIAL     • PARTIAL HIP ARTHROPLASTY Left    • SKIN CANCER EXCISION      Melanoma    • SQUAMOUS CELL CARCINOMA EXCISION     • TEMPOROMANDIBULAR JOINT ARTHROPLASTY     • TOTAL ABDOMINAL HYSTERECTOMY WITH SALPINGO OOPHORECTOMY Bilateral      Family History   Problem Relation Age of Onset   • Arthritis Mother    • Seizures Mother    • Diabetes Father    • Skin cancer Father    • Heart attack Father    • Heart disease Father    • Colon polyps Father    • Arthritis Sister    • Ulcers Brother    • No Known Problems Maternal Grandmother    • No Known Problems Maternal Grandfather    • No Known Problems Paternal Grandmother    • No Known Problems Paternal Grandfather    • Ulcers Sister    • Colon cancer Neg Hx      Social History     Tobacco Use   • Smoking status: Former Smoker     Types: Cigarettes   • Smokeless tobacco: Never Used   Substance Use Topics   • Alcohol use: Yes     Comment: rare   • Drug use: No     Past Medical History:    reviewed intake note    reviewed in chart  Past Surgical History:    reviewd intake note    reviewed in chart  Social History:    reviewed intake note    reviewed in chart  Family History:    reviewed intake note    reviewed in chart  Tobacco Use:    reviewed intake note    reviewed in chart      Current Medicines: Reviewed    Current Facility-Administered Medications:   •  acetaminophen (TYLENOL) tablet 650 mg, 650 mg, Nasogastric, Q4H PRN **OR** acetaminophen (TYLENOL) suppository 650 mg, 650 mg, Rectal, Q4H PRN, Samir Teague MD, 650 mg at 12/30/20 1755  •  albuterol sulfate HFA (PROVENTIL HFA;VENTOLIN HFA;PROAIR HFA) inhaler 2 puff, 2 puff, Inhalation, Q6H PRN, Samir Teague MD  •  albuterol sulfate HFA (PROVENTIL HFA;VENTOLIN HFA;PROAIR HFA) inhaler 2 puff, 2 puff, Inhalation, TID - RT, Samir Teague  MD Brandyn, 2 puff at 01/04/21 0706  •  benzonatate (TESSALON) capsule 100 mg, 100 mg, Oral, TID PRN, Samir Teague MD, 100 mg at 12/12/20 2034  •  ceFAZolin in 0.9% normal saline (ANCEF) IVPB solution 2 g, 2 g, Intravenous, Q8H, Samir Teague MD, Last Rate: 200 mL/hr at 01/04/21 0436, 2 g at 01/04/21 0436  •  dextrose (D50W) 25 g/ 50mL Intravenous Solution 25 g, 25 g, Intravenous, Q15 Min PRN, Samir Teague MD  •  dextrose (GLUTOSE) oral gel 15 g, 15 g, Oral, Q15 Min PRN, Samir Teague MD  •  enoxaparin (LOVENOX) syringe 40 mg, 40 mg, Subcutaneous, Q12H, Samir Teague MD, 40 mg at 01/03/21 2211  •  famotidine (PEPCID) tablet 20 mg, 20 mg, Oral, BID AC, Samir Teague MD, 20 mg at 01/03/21 1826  •  glucagon (human recombinant) (GLUCAGEN DIAGNOSTIC) injection 1 mg, 1 mg, Subcutaneous, Q15 Min PRN, Samir Teague MD  •  insulin lispro (humaLOG) injection 2-7 Units, 2-7 Units, Subcutaneous, Q6H, Samir Teague MD, 2 Units at 01/03/21 1239  •  levothyroxine (SYNTHROID, LEVOTHROID) tablet 125 mcg, 125 mcg, Nasogastric, Q AM, Samir Teague MD, 125 mcg at 01/03/21 0525  •  lidocaine (XYLOCAINE) 4 % external solution 1 application, 1 application, Topical, PRN, Taurus Eastman Jr., MD  •  ondansetron (ZOFRAN) injection 4 mg, 4 mg, Intravenous, Q6H PRN, Samir Teague MD  •  oxymetazoline (AFRIN) nasal spray 2 spray, 2 spray, Each Nare, TID, Taurus Eastman Jr., MD  •  oxymetazoline (AFRIN) nasal spray 2 spray, 2 spray, Each Nare, BID, Taurus Eastman Jr., MD  •  polyethylene glycol (MIRALAX) packet 17 g, 17 g, Nasogastric, Daily, Samir Teague MD, 17 g at 01/03/21 0840  •  sennosides-docusate (PERICOLACE) 8.6-50 MG per tablet 1 tablet, 1 tablet, Nasogastric, BID, Samir Teague MD, 1 tablet at 01/03/21 0840  •  sodium chloride 0.9 % flush 10 mL, 10 mL, Intravenous, PRN, Samir Teague MD, 10 mL at 01/01/21 0806  •  sodium chloride nasal  spray 2 spray, 2 spray, Each Nare, 5x Daily, Taurus Eastman Jr., MD  •  sodium chloride nasal spray 2 spray, 2 spray, Each Nare, Continuous PRN, Taurus Eastman Jr., MD    Allergies:  Requip [ropinirole hcl]      Vital Signs   Temp:  [97.5 °F (36.4 °C)-98.4 °F (36.9 °C)] 98 °F (36.7 °C)  Heart Rate:  [] 92  Resp:  [16-25] 16  BP: (143-159)/(63-87) 152/75  EXAMINATION:  CONSTITUTIONAL:    well developed  well nourished  Lying in bed, looks slightly confused, with obvious facial scabbing    BODY HABITUS:    obese  severe    COMMUNICATION:    She is essentially aphonic    HEAD:     Normocephalic, without obvious abnormality, atraumatic    FACE:    adiposis- Mild to moderate  skin- Eschars present to along the left cheek and one on the left lateral mental area, dry, healing well    SALIVARY GLANDS:    parotid glands with no tenderness, no swelling, no masses, submandibular glands with normal size, nontender     EYE:    ocular motility normal, eyelids normal, orbits normal, no proptosis, conjunctiva clear, sclera non-icteric, pupils equal, round, reactive to light and accomodation  Color:   green    HEARING:      response to conversational voice decreased  Bilateral    EARS:     PINNA:     normal, non-tender, skin intact without lesions      NOSE EXTERNAL:    APPEARANCE: normal, straight, with good projection, no tenderness, no lesions, no tenderness, good nasal support, patent nares    NOSE INTERNAL:    Anterior rhinoscopy   NASALMUCOSA:    abnormal:        Bilateral- Moderate crusting    NASAL PASSAGES:     abnormal   Left- Feeding tube present, Right- Narrowed   NASAL VALVE:     abnormal  Bilateral- Week    SEPTUM:     Not clearly visualized   INFERIOR TURBINATES:     Not clearly visualized    ORAL CAVITY:      LIPS:      structure normal, no tenderness on palpation, no lesions, no evidence of trauma, normal mobility and oral competence    TEETH:       edentulous:  upper and lower    GUMS:       Intact    ORAL MUCOSA:       abnormal: Dried with a layer secretions over the buccal and the other mucosal surfaces    FLOOR OF MOUTH:       Warthin's duct patent, mucosa normal  TONGUE:       abnormal:          Dried secretions on the tongue itself      hypertrophy  Severe, prolapse   Severe    PALATE:       hard palate Dry with crusting adherent      soft palate- Low, thick, with dried secretions adherent    OROPHARYNX:    Direct examination  OROPHARYNGEAL MUCOSA:     abnormal-        lateral walls-          Bilateral- Dry, redundant       posterior wall-  Dry, pale  TONSIL:     Tonsil pillars-  Atrophic    NECK:    short, thick  moderate   LARYNGEAL POSITION: Low    LYMPH NODES :    no adenopathy    THYROID:    no overt thyromegaly, no tenderness, nodules or mass present on palpation, position midline    CHEST/RESPIRATORY:    Mildly labored, wearing nasal O2    CARDIOVASCULAR:    regular rate and rhythm, no murmurs, gallups, no peripheral edema    NEURO/PSYCHIATRIC :      Difficult to assess because of dysphonia, cranial nerves appear intact       I have reviewed the patients old records in the chart.  I reviewed the patient's new clinical results.  I reviewed the patient's new imaging results and agree with the interpretation.   Basic Metabolic Panel (01/02/2021 10:33)   Progress Notes by Samir Teague MD (01/02/2021 10:14)   Progress Notes by Mervat Ayon MD (01/02/2021 12:05)   Progress Notes by Mervat Ayon MD (01/03/2021 10:55)   Current labs not pertinent to this consultation.           Assessment    ASSESSMENT:      Pneumonia due to COVID-19 virus    Acute respiratory failure with hypoxia (CMS/HCC)    Obesity (BMI 30-39.9)    Elevated ferritin and CRP    Acute respiratory distress syndrome (ARDS) due to COVID-19 virus (CMS/HCC)    Elevated LFTs    Leukocytosis    Thrombocytopenia (CMS/HCC)         Plan    PLAN:      Patient has near aphonia.  After assessment on flexible  laryngoscopy, she has severely excoriated true vocal cords, with severe weakness of the vocal cords.  I see no sign of subglottic stenosis.  She does have some degree of macroglossia and tongue prolapse.  Patient needs significant moisturization of the nasal O2.  She will need continued speech evaluation.  I feel she should improve with time.  Following patient as in-patient. Further recommendations will be made based on serial examinations.    Medications/Orders for this encounter: Medications ordered- Afrin and lidocaine   No New orders written.        Taurus Eastman Jr, MD  01/04/21  08:39 CST

## 2021-01-04 NOTE — PLAN OF CARE
Goal Outcome Evaluation:  Plan of Care Reviewed With: patient  Progress: improving  Outcome Summary: See note

## 2021-01-04 NOTE — PROCEDURES
Taurus Eastman Jr, MD   ENT PROCEDURE NOTE:  Anesthesia:   topical lidocaine 4% and oxymetazoline mix    Endoscopy Type:   Flexible Laryngoscopy    Indications for Procedure:   1. Pneumonia due to COVID-19 virus    2. Hypoxic    3. Acute respiratory failure with hypoxia (CMS/HCC)    4. Impaired mobility    5. Impaired mobility and ADLs    6. Oropharyngeal dysphagia         Procedure Details:    The patient was placed in an upright position.  The laryngoscope was passed right nasal passge.  The nasal cavities, nasopharynx, oropharynx, hypopharynx, and larynx were all examined.  Vocal cords were examined during respiration and phonation.  The following findings were noted:    Findings:   LARYNGOSCOPY FINDINGS :    NASOPHARYNX:     Adenoids: Flat, crusted    Eustachian tubes:         BILATERAL: Very dry, crusted     Palate:        Dry, crease mobility    Vault size: Very small  OROPHARYNX:     Lateral walls:         Redundant          BILATERAL: With dried secretions present    Posterior walls:        Dry, pale    BASEOF TONGUE:          prolapse  severe    LINGUAL TONSILS:          BILATERAL: Moderately enlarged    VALLECULA:         normal, no lesions, no pooling  Bilateral  EPIGLOTTIS:    Position: Mildly retroverted with poor closure    Color: Pale    Mucosa: Intact    Shape: Normal  HYPOPHARYNX:    Lateral walls:         BILATERAL: Dried, crusted    Posterior wall:        Dried, crusted  ARYEPIGLOTTIC FOLDS:     Abnormal:        BILATERAL: Thickened, weak with poor mobility  ARYTENOIDS:     Mobility:       Abnormal:  Bilateral- Very weak closure     [place] size:         BILATERAL: Moderately enlarged  FALSE CORDS:     mucosa:         BILATERAL: Red, thickened    mobility:         BILATERAL: Very weak  TRUE VOCAL FOLDS:      appearance:         BILATERAL: Very excoriated    mobility        BILATERAL: Very weak with poor adduction    mucosa:        BILATERAL: Free edge is eroded  GLOTTIS:     abnormal: Poor  complete closure, almost no forced closure  SUBGLOTTIS:     Mildly red, no obstructing masses or granulation    Condition:  Stable.  Patient tolerated procedure well.    Complications:  None    Post-procedure instructions reviewed with No one present at end of surgery  Instructions documented in AVS for patient to review    Taurus Eastman Jr, MD  01/04/21  6:37 PM CST

## 2021-01-04 NOTE — THERAPY TREATMENT NOTE
Patient Name: Joann Finnegan  : 1950    MRN: [id number]                              Today's Date: 2021       Admit Date: 2020    Visit Dx:     ICD-10-CM ICD-9-CM   1. Pneumonia due to COVID-19 virus  U07.1 480.8    J12.89    2. Hypoxic  R09.02 799.02   3. Acute respiratory failure with hypoxia (CMS/HCC)  J96.01 518.81   4. Impaired mobility  Z74.09 799.89   5. Impaired mobility and ADLs  Z74.09 V49.89    Z78.9    6. Oropharyngeal dysphagia  R13.12 787.22     Patient Active Problem List   Diagnosis   • Dysphagia, oropharyngeal   • Heartburn   • Family hx colonic polyps   • Pneumonia due to COVID-19 virus   • Acute respiratory failure with hypoxia (CMS/HCC)   • Obesity (BMI 30-39.9)   • Elevated ferritin and CRP   • Acute respiratory distress syndrome (ARDS) due to COVID-19 virus (CMS/HCC)   • Elevated LFTs   • Leukocytosis   • Thrombocytopenia (CMS/HCC)   • Aphonia   • Xerostomia   • Post viral debility     Past Medical History:   Diagnosis Date   • Abnormal weight gain    • Arthritis    • Breast cancer (CMS/HCC)     right.    • Fatigue    • H/O melanoma excision     CLARKS  LEVEL III      BACK OF UPPER LEFT ARM   • Hypertension    • Hypothyroidism    • Melanoma (CMS/HCC)    • Restless leg syndrome    • Shortness of breath      Past Surgical History:   Procedure Laterality Date   • APPENDECTOMY     • BREAST BIOPSY Right    • BREAST BIOPSY     • BREAST LUMPECTOMY      right.    • BROW LIFT     • COLONOSCOPY     • COLONOSCOPY N/A 2020    Procedure: COLONOSCOPY WITH ANESTHESIA;  Surgeon: Donovan Hernandez MD;  Location: [facility] ENDOSCOPY;  Service: Gastroenterology;  Laterality: N/A;  pre: family hx colon polyps  post: polyps  Janak Sherwood APRN   • ENDOSCOPY N/A 2020    Procedure: ESOPHAGOGASTRODUODENOSCOPY WITH ANESTHESIA;  Surgeon: Donovan Hernandez MD;  Location: [facility] ENDOSCOPY;  Service: Gastroenterology;  Laterality: N/A;  pre: dysphagia; heartburn  post: dilated  Kaela  Janak Ortiz, NITO   • ENDOSCOPY AND COLONOSCOPY  12/22/2011    very minimal distal esophagitis, incomplete esophagus ring dilated   • LUMBAR SPINAL TUMOR REMOVAL      Spinal Cyst removed from Spinal Canal   • MASTECTOMY, PARTIAL     • PARTIAL HIP ARTHROPLASTY Left    • SKIN CANCER EXCISION      Melanoma    • SQUAMOUS CELL CARCINOMA EXCISION     • TEMPOROMANDIBULAR JOINT ARTHROPLASTY     • TOTAL ABDOMINAL HYSTERECTOMY WITH SALPINGO OOPHORECTOMY Bilateral      General Information     Row Name 01/04/21 1138          Physical Therapy Time and Intention    Document Type  therapy note (daily note);other (see comments) see MAR  -CARMEN     Mode of Treatment  physical therapy  -     Row Name 01/04/21 1138          General Information    Patient Profile Reviewed  yes  -CARMEN     Prior Level of Function  -- pt does not verbalize anything; communicates through head nods only; however agreed w/ hx of R lower leg weakness  -CARMEN     Existing Precautions/Restrictions  fall;oxygen therapy device and L/min;other (see comments) NG tube  -CARMEN     Barriers to Rehab  medically complex;language barrier  -     Row Name 01/04/21 1138          Cognition    Orientation Status (Cognition)  unable/difficult to assess  -     Row Name 01/04/21 1138          Safety Issues, Functional Mobility    Safety Issues Affecting Function (Mobility)  friction/shear risk;safety precaution awareness  -     Impairments Affecting Function (Mobility)  endurance/activity tolerance;shortness of breath;strength  -     Cognitive Impairments, Mobility Safety/Performance  safety precaution awareness  -JE       User Key  (r) = Recorded By, (t) = Taken By, (c) = Cosigned By    Initials Name Provider Type    Terri Fuentes, PT Physical Therapist        Mobility     Row Name 01/04/21 1234          Bed Mobility    Bed Mobility  rolling left;rolling right;scooting/bridging;supine-sit;sit-supine  -CARMEN     Rolling Left [place] (Bed Mobility)  moderate assist (50%  patient effort);1 person assist;verbal cues  -CARMEN     Rolling Right [place] (Bed Mobility)  moderate assist (50% patient effort);1 person assist;verbal cues  -CARMEN     Scooting/Bridging [place] (Bed Mobility)  dependent (less than 25% patient effort);2 person assist;verbal cues  -JE     Supine-Sit [place] (Bed Mobility)  moderate assist (50% patient effort);1 person assist;verbal cues  -CARMEN     Sit-Supine [place] (Bed Mobility)  moderate assist (50% patient effort);maximum assist (25% patient effort);1 person assist;verbal cues  -CARMEN     Assistive Device (Bed Mobility)  bed rails;draw sheet;head of bed elevated  -     Row Name 01/04/21 1234          Gait/Stairs (Locomotion)    Comment (Gait/Stairs)  unable to safely attempt out of bed activity this date; fatigues easily w/ shortness of breath noted w/ SPO2 decreasing to 86% w/ sitting  -       User Key  (r) = Recorded By, (t) = Taken By, (c) = Cosigned By    Initials Name Provider Type    Terri Fuentes, PT Physical Therapist        Obj/Interventions     Row Name 01/04/21 1138          Range of Motion Comprehensive    General Range of Motion  no range of motion deficits identified  -     Row Name 01/04/21 1138          Strength Comprehensive (MMT)    Comment, General Manual Muscle Testing (MMT) Assessment  R shld 2/5, L shld 1/5, R elbow flexor 3+/5, L elbow flexor 2/5,  weak B w/ pt unable to make full  on L; R ankle DF 0/5, L ankle DF 5/5, R ankle PF 4+/5, L ankle PF 5/5, R knee extensor 4-/5, R knee flexor 4-/5, R hip flexor 3+ to 4-/5, L knee extensor 4-/5, L knee flexor 4-/5, L hip flexor 3+ to 4-/5  -     Row Name 01/04/21 1138          Motor Skills    Motor Skills  other (see comments) unable to complete finger opposition B w/ pt demonstrating difficulty opposing fingers #2 to 5 B  -     Row Name 01/04/21 1138          Balance    Balance Assessment  sitting static balance  -     Static Sitting Balance  mild  impairment;sitting, edge of bed CGA throughout  -     Comment, Balance  pt did attempt to use the L UE to support herself in sitting  -     Row Name 01/04/21 1138          Sensory Assessment (Somatosensory)    Sensory Assessment (Somatosensory)  unable/difficult to assess  -       User Key  (r) = Recorded By, (t) = Taken By, (c) = Cosigned By    Initials Name Provider Type    Terri Fuentes, PT Physical Therapist        Goals/Plan     Row Name 01/04/21 1309          Bed Mobility Goal 1 (PT)    Activity/Assistive Device (Bed Mobility Goal 1, PT)  rolling to left;rolling to right;sit to supine/supine to sit  -     [place] Level/Cues Needed (Bed Mobility Goal 1, PT)  minimum assist (75% or more patient effort)  -     Time Frame (Bed Mobility Goal 1, PT)  long term goal (LTG);10 days  -     Progress/Outcomes (Bed Mobility Goal 1, PT)  goal ongoing;goal revised this date  -[facility] Name 01/04/21 1309          ROM Goal 1 (PT)    ROM Goal 1 (PT)  pt will demonstrate full AROM in all extremities except R ankle DF to remain w/out flexor contraction (unclear if pt w/ old/chronic R lower leg injury/weakness)  -     Time Frame (ROM Goal 1, PT)  long-term goal (LTG);other (see comments) 10 days  -     Progress/Outcome (ROM Goal 1, PT)  goal ongoing;goal revised this date  -     Row Name 01/04/21 1309          Patient Education Goal (PT)    Progress/Outcome (Patient Education Goal, PT)  goal ongoing  -       User Key  (r) = Recorded By, (t) = Taken By, (c) = Cosigned By    Initials Name Provider Type    Terri Fuentes, PT Physical Therapist        Clinical Impression     Row Name 01/04/21 1137          Pain    Additional Documentation  Pain Scale: Numbers Pre/Post-Treatment (Group)  -     Row Name 01/04/21 1135          Pain Scale: Numbers Pre/Post-Treatment    Pretreatment Pain Rating  0/10 - no pain  -     Posttreatment Pain Rating  0/10 - no pain  -     Row Name 01/04/21 1137           Plan of Care Review    Plan of Care Reviewed With  patient  -     Progress  improving  -     Outcome Summary  PT visit completed.  Pt agreeable to therapy, however unable to communicate w/ verbal responses.  ENT to evaluate.  Pt demonstrates weakness L UE and R lower leg, however noted scarring R lateral leg w/ pt nodding in agreement w/ chronic injury to the R LE.  Pt rolls w/ assist of 1 and tfers sup to/from sit w/ assist of 1 maintaining sitting balance w/ CGA.  Pt w/ noted shortness of breath and decrease SPO2 w/ sitting to 86%.  Education for proper deep breathing w/ SPO2 remaining in upper 80s.  Pt returned to bed w/ head of bed elevated w/ SPO2 quickly returning to 92%.  Pt w/ decrease activity tolerance, decrease overall strength w/ L UE and R LE w/ greatest weakness, decrease balance, and decrease I w/ functional mobility unable to safely progress to standing at this time.  Pt will benefit from continued PT services to further improve overall strength, balance, activity tolerance, safety awareness, to assist w/ skin protection/pressure reliefe, edema mgmt, and to improve I progress to out of bed activity.  Will follow for progress.  Feel pt will benefit from continued rehab at discharge.  Agree w/ plans for LTACH.  -     Row Name 01/04/21 1138          Therapy Assessment/Plan (PT)    Patient/Family Therapy Goals Statement (PT)  difficult to obtain from pt, however pt agreeable w/ plan to work on strengthening and progressing activity as safety allows  -     Rehab Potential (PT)  fair, will monitor progress closely  -     Criteria for Skilled Interventions Met (PT)  yes;meets criteria;skilled treatment is necessary  -     Predicted Duration of Therapy Intervention (PT)  until discharge or goals achieved  -     Row Name 01/04/21 1138          Vital Signs    Pretreatment Heart Rate (beats/min)  94  -JE     Pre SpO2 (%)  95  -JE     O2 Delivery Pre Treatment  supplemental O2  -JE     Intra SpO2  (%)  86  -JE     O2 Delivery Intra Treatment  supplemental O2  -JE     Post SpO2 (%)  94  -JE     O2 Delivery Post Treatment  supplemental O2  -JE     Pre Patient Position  Supine  -JE     Intra Patient Position  Sitting  -JE     Post Patient Position  Supine  -JE     Row Name 01/04/21 1138          Positioning and Restraints    Pre-Treatment Position  in bed  -JE     Post Treatment Position  bed  -JE     In Bed  fowlers;call light within reach;encouraged to call for assist;with other staff;side rails up x3;LUE elevated;legs elevated heels floating  -       User Key  (r) = Recorded By, (t) = Taken By, (c) = Cosigned By    Initials Name Provider Type    Terri Fuentes, PT Physical Therapist        Outcome Measures     Row Name 01/04/21 1138          How much help from another person do you currently need...    Turning from your back to your side while in flat bed without using bedrails?  2  -JE     Moving from lying on back to sitting on the side of a flat bed without bedrails?  2  -JE     Moving to and from a bed to a chair (including a wheelchair)?  1  -JE     Standing up from a chair using your arms (e.g., wheelchair, bedside chair)?  1  -JE     Climbing 3-5 steps with a railing?  1  -JE     To walk in hospital room?  1  -JE     AM-PAC 6 Clicks Score (PT)  8  -     Row Name 01/04/21 1138          Functional Assessment    Outcome Measure Options  AM-PAC 6 Clicks Basic Mobility (PT)  -       User Key  (r) = Recorded By, (t) = Taken By, (c) = Cosigned By    Initials Name Provider Type    Terri Fuentes, PT Physical Therapist        Physical Therapy Education                 Title: PT OT SLP Therapies (In Progress)     Topic: Physical Therapy (In Progress)     Point: Mobility training (Done)     Learning Progress Summary           Patient Acceptance, E,TB,D, DU,NR by CARMEN at 1/4/2021 4225    Comment: Education to reinforce importance of activity/risks assoc w/ bed rest; education for improved tech w/ bed  mobility; education for proper deep breathing and for positioning to assist w/ edema mgmt and for pressure relief    Acceptance, E, NR by WK at 1/2/2021 0911    Comment: BOA    Acceptance, E, NL by WK at 1/1/2021 0935    Comment: BOA    Acceptance, E, NL by MS at 12/28/2020 0900    Comment: role of PT in her care                   Point: Home exercise program (Not Started)     Learner Progress:  Not documented in this visit.          Point: Body mechanics (Not Started)     Learner Progress:  Not documented in this visit.          Point: Precautions (Done)     Learning Progress Summary           Patient Acceptance, E,TB,D, DU,NR by JE at 1/4/2021 1315    Comment: Education to reinforce importance of activity/risks assoc w/ bed rest; education for improved tech w/ bed mobility; education for proper deep breathing and for positioning to assist w/ edema mgmt and for pressure relief                               User Key     Initials Effective Dates Name Provider Type Discipline    MS 06/19/18 -  Yennifer May, PT, DPT, NCS Physical Therapist PT    WK 08/02/16 -  Rufina Degroot, PTA Physical Therapy Assistant PT     08/02/18 -  Terri Gomez PT Physical Therapist PT              PT Recommendation and Plan  Planned Therapy Interventions (PT): balance training, bed mobility training, patient/family education, ROM (range of motion), strengthening, other (see comments)(re-eval when pt able to safely begin out of bed activity; skin protection/pressure relief; edema mgmt)  Plan of Care Reviewed With: patient  Progress: improving  Outcome Summary: PT visit completed.  Pt agreeable to therapy, however unable to communicate w/ verbal responses.  ENT to evaluate.  Pt demonstrates weakness L UE and R lower leg, however noted scarring R lateral leg w/ pt nodding in agreement w/ chronic injury to the R LE.  Pt rolls w/ assist of 1 and tfers sup to/from sit w/ assist of 1 maintaining sitting balance w/ CGA.  Pt w/ noted  shortness of breath and decrease SPO2 w/ sitting to 86%.  Education for proper deep breathing w/ SPO2 remaining in upper 80s.  Pt returned to bed w/ head of bed elevated w/ SPO2 quickly returning to 92%.  Pt w/ decrease activity tolerance, decrease overall strength w/ L UE and R LE w/ greatest weakness, decrease balance, and decrease I w/ functional mobility unable to safely progress to standing at this time.  Pt will benefit from continued PT services to further improve overall strength, balance, activity tolerance, safety awareness, to assist w/ skin protection/pressure reliefe, edema mgmt, and to improve I progress to out of bed activity.  Will follow for progress.  Feel pt will benefit from continued rehab at discharge.  Agree w/ plans for LTACH.     Time Calculation:   PT Charges     Row Name 01/04/21 1307             Time Calculation    Start Time  1138  -      Stop Time  1231  -      Time Calculation (min)  53 min  -      PT Received On  01/04/21  -CARMEN      PT Goal Re-Cert Due Date  01/07/21  -        User Key  (r) = Recorded By, (t) = Taken By, (c) = Cosigned By    Initials Name Provider Type    Terri Fuentes, PT Physical Therapist        Therapy Charges for Today     Code Description Service Date Service Provider Modifiers Qty    11087758663 HC PT THERAPEUTIC ACT EA 15 MIN 1/4/2021 Terri Gomez PT GP 4          PT G-Codes  Outcome Measure Options: AM-PAC 6 Clicks Basic Mobility (PT)  AM-PAC 6 Clicks Score (PT): 8  AM-PAC 6 Clicks Score (OT): 6    Terri Gomez PT  1/4/2021

## 2021-01-04 NOTE — PROGRESS NOTES
[facility] Medicine Services  INPATIENT PROGRESS NOTE    Patient Name: Joann Finnegan  Date of Admission: 12/11/2020  Today's Date: 01/04/21  Length of Stay: 24  Primary Care Physician: Janak Sherwood APRN    Subjective   Chief Complaint: follow-up COVID  HPI   Patient has a Dobbhoff tube in place and nursing reports that they have been having some troubles administering meds through it.  Tube feeds and free water flush appear to go through it fine.  Patient denies any pain.  She does report some shortness of breath.  She reports some cough.  She is getting ready to work with therapy services.  She has been unable to phonate any words, but does respond to questions appropriately with head nod and thumbs up/down motions.  No acute events from overnight.    Review of Systems     All pertinent negatives and positives are as above. All other systems have been reviewed and are negative unless otherwise stated.     Objective    Temp:  [97.5 °F (36.4 °C)-98.6 °F (37 °C)] 98.6 °F (37 °C)  Heart Rate:  [] 90  Resp:  [16-22] 18  BP: (143-158)/(63-87) 158/75  Physical Exam  Vitals signs reviewed.   Constitutional:       Appearance: She is not toxic-appearing.   HENT:      Nose:      Comments: Dobbhoff tube in place     Mouth/Throat:      Pharynx: No oropharyngeal exudate (wearing facemask).      Comments: Could not get her voice/phonate when attempting to talk - very hoarse/weak voice   Eyes:      Pupils: Pupils are equal, round, and reactive to light.   Cardiovascular:      Rate and Rhythm: Normal rate.   Pulmonary:      Effort: Pulmonary effort is normal. No respiratory distress.      Breath sounds: No rales.      Comments: Diminished at bases; currently on 4LNC  Musculoskeletal:         General: No swelling.   Skin:     Comments: Chin and left cheek appear to be healing   Neurological:      Mental Status: She is alert.      Comments: Responds to commands.  Answers simple  yes/no questions appropriately with head nod and thumbs up/down.   Psychiatric:         Mood and Affect: Mood normal.         Results Review:  I have reviewed the labs, radiology results, and diagnostic studies.    Laboratory Data:   Results from last 7 days   Lab Units 01/04/21  0306 01/01/21  0522 12/30/20  0204   WBC 10*3/mm3 10.82* 12.25* 9.36   HEMOGLOBIN g/dL 12.4 12.8 11.6*   HEMATOCRIT % 36.5 37.4 32.6*   PLATELETS 10*3/mm3 153 170 134*        Results from last 7 days   Lab Units 01/03/21  0626 01/02/21  1033 01/01/21  0523 12/30/20  0204   SODIUM mmol/L 144 143 144 138   POTASSIUM mmol/L 3.5 3.5 3.7 3.6   CHLORIDE mmol/L 110* 105 106 101   CO2 mmol/L 27.0 25.0 26.0 29.0   BUN mg/dL 37* 33* 22 25*   CREATININE mg/dL 0.58 0.70 0.55* 0.51*   CALCIUM mg/dL 8.9 9.8 9.2 8.9   BILIRUBIN mg/dL 0.4  --   --  0.6   ALK PHOS U/L 90  --   --  84   ALT (SGPT) U/L 51*  --   --  117*   AST (SGOT) U/L 56*  --   --  68*   GLUCOSE mg/dL 142* 135* 129* 146*       Culture Data:   Blood Culture   Date Value Ref Range Status   12/30/2020 No growth at 4 days  Preliminary   12/30/2020 No growth at 4 days  Preliminary     Urine Culture   Date Value Ref Range Status   01/01/2021 Yeast isolated (A)  Final     Respiratory Culture   Date Value Ref Range Status   12/29/2020 Heavy growth (4+) Staphylococcus aureus (A)  Final   12/29/2020 No Normal Respiratory [place] (A)  Final       Radiology Data:   Imaging Results (Last 24 Hours)     Procedure Component Value Units Date/Time    XR Abdomen KUB [[id number]] Collected: 01/04/21 0734     Updated: 01/04/21 0738    Narrative:      EXAMINATION: XR ABDOMEN KUB- 1/4/2021 7:34 AM CST     HISTORY: Dobbhoff tube placement     COMPARISON: 1/2/2021     FINDINGS:  Dobbhoff tube is in place with tip extending below the diaphragm and  projecting over the left upper quadrant of the abdomen, presumably in  the gastric fundus. The visualized bowel gas pattern is nonobstructive.  Bilateral airspace opacities  persist. In the right axilla.       Impression:      Dobbhoff tube tip projects over and is presumably within the  gastric fundus.        This report was finalized on 01/04/2021 07:34 by Dr. Dilshad Bray MD.    XR Abdomen KUB [[id number]] Collected: 01/04/21 0731     Updated: 01/04/21 0735    Narrative:      EXAMINATION: XR ABDOMEN KUB- 1/4/2021 7:31 AM CST     HISTORY: Evaluate Dobbhoff tube position     COMPARISON: 1/4/2021 at 1:05 AM     FINDINGS:  Dobbhoff tube is in place with tip curled and projecting in the left  upper quadrant of the abdomen, presumably in the fundus of the stomach.  Bilateral airspace opacities are again noted. No intraperitoneal free  air is identified. Visualized bowel gas pattern is nonobstructive.       Impression:      Dobbhoff tube tip projects over and is presumably within the  gastric fundus.  This report was finalized on 01/04/2021 07:32 by Dr. Dilshad Bray MD.          I have reviewed the patient's current medications.     Assessment/Plan     Active Hospital Problems    Diagnosis   • **Pneumonia due to COVID-19 virus   • Aphonia   • Xerostomia   • Post viral debility   • Thrombocytopenia (CMS/HCC)   • Acute respiratory distress syndrome (ARDS) due to COVID-19 virus (CMS/HCC)   • Elevated LFTs   • Leukocytosis   • Elevated ferritin and CRP   • Obesity (BMI 30-39.9)   • Acute respiratory failure with hypoxia (CMS/HCC)   • Dysphagia, oropharyngeal     Plan:  1.  IV Cefazolin - started on evening of 12/31 for heavy growth of MSSA in sputum culture  2.  ENT evaluation pending  3.  Currently on 4LNC  4.  Currently with Dobbhoff in place; TFs for nutrition  5.  PT, OT, and ST  6.  Monitor BP trend; IV Labetalol PRN per parameters; nursing having some trouble administering PO meds via DHT.  TFs and free H20 flush going through DHT without problem; meds not wanting to pass through as easily  7.  Monitor off of Dexamethasone  8.  Lovenox PPx  9.  Dispo: looking into placement options  (LTACH vs. SNF).  Patient is going to need a lot of therapy services.      Electronically signed by Ellis Wise MD, 01/04/21, 11:46 CST.

## 2021-01-04 NOTE — PLAN OF CARE
Goal Outcome Evaluation:  Plan of Care Reviewed With: patient  Progress: improving  Outcome Summary: PT visit completed.  Pt agreeable to therapy, however unable to communicate w/ verbal responses.  ENT to evaluate.  Pt demonstrates weakness L UE and R lower leg, however noted scarring R lateral leg w/ pt nodding in agreement w/ chronic injury to the R LE.  Pt rolls w/ assist of 1 and tfers sup to/from sit w/ assist of 1 maintaining sitting balance w/ CGA.  Pt w/ noted shortness of breath and decrease SPO2 w/ sitting to 86%.  Education for proper deep breathing w/ SPO2 remaining in upper 80s.  Pt returned to bed w/ head of bed elevated w/ SPO2 quickly returning to 92%.  Pt w/ decrease activity tolerance, decrease overall strength w/ L UE and R LE w/ greatest weakness, decrease balance, and decrease I w/ functional mobility unable to safely progress to standing at this time.  Pt will benefit from continued PT services to further improve overall strength, balance, activity tolerance, safety awareness, to assist w/ skin protection/pressure reliefe, edema mgmt, and to improve I progress to out of bed activity.  Will follow for progress.  Feel pt will benefit from continued rehab at discharge.  Agree w/ plans for LTACH.

## 2021-01-04 NOTE — PROGRESS NOTES
Continued Stay Note   [facility]     Patient Name: Joann Finnegan  MRN: [id number]  Today's Date: 1/4/2021    Admit Date: 12/11/2020    Discharge Plan     Row Name 01/04/21 0951       Plan    Plan  SNF?    Patient/Family in Agreement with Plan  yes    Plan Comments  Chart reviewed; events noted.  ENT has been consulted; pt has NG tube.  SW will follow.  Pt will likely need placement.        Discharge Codes    No documentation.             SAY Mirza

## 2021-01-04 NOTE — PLAN OF CARE
Goal Outcome Evaluation:  Plan of Care Reviewed With: patient, spouse  Progress: improving  Outcome Summary: OT treatment completed. Pt alert and orientedx4 with yes/no questions. Pt performed bed mobility to roll right <>left with maxA and verbal cues due to weakness. Pt performed AAROM B shoulder flexion x10 and bilateral elbow flexion/extension x10. Pt performed supine to sit transfer with modA and HOB elevate for support. Pt dependent to perform hair care to use shampoo cap and comb hair. Pt able to maintain sitting balance ~8 minutes with Logan and verbal cues to correct R lateral lean during grooming tasks to increase activity tolerance.   Pt O2 stats maintained 85%-90% during activity on 5L. Pt required maxA to perform sit to supine transfer. Pt demonstrated improvement in overall activity tolerance this date. Rittman still limited by fatigue and weakness. OT will continue to follow to address these deficits. Recommendation to discharge to SNF to continue skilled therapy services.

## 2021-01-04 NOTE — NURSING NOTE
Discharge Planning Assessment  [facility]     Patient Name: Joann Finnegan  MRN: [id number]  Today's Date: 1/4/2021    Admit Date: 12/11/2020    Discharge Needs Assessment    No documentation.       Discharge Plan     Row Name 01/04/21 1021       Plan    Plan  Spoke with Elaina / [facility]. Will take another look to see if patient would qualify for admission.    Row Name 01/04/21 0923       Plan    Plan  SNF?    Patient/Family in Agreement with Plan  yes    Plan Comments  Chart reviewed; events noted.  ENT has been consulted; pt has NG tube.  SW will follow.  Pt will likely need placement.        Continued Care and Services - Admitted Since 12/11/2020    Coordination has not been started for this encounter.         Demographic Summary    No documentation.       Functional Status    No documentation.       Psychosocial    No documentation.       Abuse/Neglect    No documentation.       Legal    No documentation.       Substance Abuse    No documentation.       Patient Forms    No documentation.           Merlina A Fletcher, RN

## 2021-01-05 LAB
GLUCOSE BLDC GLUCOMTR-MCNC: 120 MG/DL (ref 70–130)
GLUCOSE BLDC GLUCOMTR-MCNC: 129 MG/DL (ref 70–130)
GLUCOSE BLDC GLUCOMTR-MCNC: 133 MG/DL (ref 70–130)
GLUCOSE BLDC GLUCOMTR-MCNC: 139 MG/DL (ref 70–130)
GLUCOSE BLDC GLUCOMTR-MCNC: 145 MG/DL (ref 70–130)
GLUCOSE BLDC GLUCOMTR-MCNC: 167 MG/DL (ref 70–130)

## 2021-01-05 PROCEDURE — 25010000002 ENOXAPARIN PER 10 MG: Performed by: INTERNAL MEDICINE

## 2021-01-05 PROCEDURE — 97530 THERAPEUTIC ACTIVITIES: CPT

## 2021-01-05 PROCEDURE — 94799 UNLISTED PULMONARY SVC/PX: CPT

## 2021-01-05 PROCEDURE — 92526 ORAL FUNCTION THERAPY: CPT

## 2021-01-05 PROCEDURE — 99232 SBSQ HOSP IP/OBS MODERATE 35: CPT | Performed by: OTOLARYNGOLOGY

## 2021-01-05 PROCEDURE — 25010000002 CEFAZOLIN PER 500 MG: Performed by: INTERNAL MEDICINE

## 2021-01-05 PROCEDURE — 97110 THERAPEUTIC EXERCISES: CPT

## 2021-01-05 PROCEDURE — 82962 GLUCOSE BLOOD TEST: CPT

## 2021-01-05 RX ORDER — FAMOTIDINE 10 MG/ML
20 INJECTION, SOLUTION INTRAVENOUS EVERY 12 HOURS SCHEDULED
Status: DISCONTINUED | OUTPATIENT
Start: 2021-01-05 | End: 2021-01-07

## 2021-01-05 RX ADMIN — ENOXAPARIN SODIUM 40 MG: 100 INJECTION SUBCUTANEOUS at 09:49

## 2021-01-05 RX ADMIN — CEFAZOLIN SODIUM 2 G: 10 INJECTION, POWDER, FOR SOLUTION INTRAVENOUS at 17:26

## 2021-01-05 RX ADMIN — OXYMETAZOLINE HYDROCHLORIDE 2 SPRAY: 0.05 SPRAY NASAL at 09:49

## 2021-01-05 RX ADMIN — SALINE NASAL SPRAY 2 SPRAY: 1.5 SOLUTION NASAL at 22:06

## 2021-01-05 RX ADMIN — ALBUTEROL SULFATE 2 PUFF: 90 AEROSOL, METERED RESPIRATORY (INHALATION) at 19:31

## 2021-01-05 RX ADMIN — ENOXAPARIN SODIUM 40 MG: 100 INJECTION SUBCUTANEOUS at 22:06

## 2021-01-05 RX ADMIN — CEFAZOLIN SODIUM 2 G: 10 INJECTION, POWDER, FOR SOLUTION INTRAVENOUS at 09:49

## 2021-01-05 RX ADMIN — SALINE NASAL SPRAY 2 SPRAY: 1.5 SOLUTION NASAL at 17:26

## 2021-01-05 RX ADMIN — POLYETHYLENE GLYCOL (3350) 17 G: 17 POWDER, FOR SOLUTION ORAL at 09:49

## 2021-01-05 RX ADMIN — CEFAZOLIN SODIUM 2 G: 10 INJECTION, POWDER, FOR SOLUTION INTRAVENOUS at 01:02

## 2021-01-05 RX ADMIN — ALBUTEROL SULFATE 2 PUFF: 90 AEROSOL, METERED RESPIRATORY (INHALATION) at 15:51

## 2021-01-05 RX ADMIN — DOCUSATE SODIUM 50 MG AND SENNOSIDES 8.6 MG 1 TABLET: 8.6; 5 TABLET, FILM COATED ORAL at 09:49

## 2021-01-05 RX ADMIN — FAMOTIDINE 20 MG: 10 INJECTION INTRAVENOUS at 09:50

## 2021-01-05 RX ADMIN — LEVOTHYROXINE SODIUM 125 MCG: 125 TABLET ORAL at 05:53

## 2021-01-05 RX ADMIN — SALINE NASAL SPRAY 2 SPRAY: 1.5 SOLUTION NASAL at 09:49

## 2021-01-05 RX ADMIN — FAMOTIDINE 20 MG: 10 INJECTION INTRAVENOUS at 22:06

## 2021-01-05 RX ADMIN — ALBUTEROL SULFATE 2 PUFF: 90 AEROSOL, METERED RESPIRATORY (INHALATION) at 08:19

## 2021-01-05 RX ADMIN — OXYMETAZOLINE HYDROCHLORIDE 2 SPRAY: 0.05 SPRAY NASAL at 22:07

## 2021-01-05 NOTE — PROGRESS NOTES
"INFECTIOUS DISEASES PROGRESS NOTE    Patient:  Joann Finnegan  YOB: 1950  MRN: [id number]   Admit date: 12/11/2020   Admitting Physician: Ellis Wise MD  Primary Care Physician: Janak Sherwood APRN      Chief Complaint: tired    Interval History:   Patient extubated December 30.  No family at bedside. She notes she is \" exhausted \" after working with PT/OT    allergies:   Allergies   Allergen Reactions   • Requip [Ropinirole Hcl] Nausea And Vomiting       Current Meds:     Current Facility-Administered Medications:   •  acetaminophen (TYLENOL) tablet 650 mg, 650 mg, Nasogastric, Q4H PRN **OR** acetaminophen (TYLENOL) suppository 650 mg, 650 mg, Rectal, Q4H PRN, Samir Teague MD, 650 mg at 12/30/20 1755  •  albuterol sulfate HFA (PROVENTIL HFA;VENTOLIN HFA;PROAIR HFA) inhaler 2 puff, 2 puff, Inhalation, Q6H PRN, Samir Teague MD  •  albuterol sulfate HFA (PROVENTIL HFA;VENTOLIN HFA;PROAIR HFA) inhaler 2 puff, 2 puff, Inhalation, TID - RT, Samir Teague MD, 2 puff at 01/05/21 0819  •  benzonatate (TESSALON) capsule 100 mg, 100 mg, Oral, TID PRN, Samir Teague MD, 100 mg at 12/12/20 2034  •  ceFAZolin in 0.9% normal saline (ANCEF) IVPB solution 2 g, 2 g, Intravenous, Q8H, Mervat Ayon MD, Last Rate: 200 mL/hr at 01/05/21 0949, 2 g at 01/05/21 0949  •  dextrose (D50W) 25 g/ 50mL Intravenous Solution 25 g, 25 g, Intravenous, Q15 Min PRN, Samir Teague MD  •  dextrose (GLUTOSE) oral gel 15 g, 15 g, Oral, Q15 Min PRN, Samir Teague MD  •  enoxaparin (LOVENOX) syringe 40 mg, 40 mg, Subcutaneous, Q12H, Samir Teague MD, 40 mg at 01/05/21 0949  •  famotidine (PEPCID) injection 20 mg, 20 mg, Intravenous, Q12H, Ellis Wise MD, 20 mg at 01/05/21 0950  •  glucagon (human recombinant) (GLUCAGEN DIAGNOSTIC) injection 1 mg, 1 mg, Subcutaneous, Q15 Min PRN, Samir Teague MD  •  insulin lispro (humaLOG) injection 2-7 Units, 2-7 " "Units, Subcutaneous, Q6H, Samir Teague MD, 2 Units at 21 1156  •  labetalol (NORMODYNE,TRANDATE) injection 10 mg, 10 mg, Intravenous, Q4H PRN, Ellis Wise MD, 10 mg at 21 1253  •  levothyroxine (SYNTHROID, LEVOTHROID) tablet 125 mcg, 125 mcg, Nasogastric, Q AM, Samir Teague MD, 125 mcg at 21 0553  •  lidocaine (XYLOCAINE) 4 % external solution 1 application, 1 application, Topical, Once, Taurus Eastman Jr., MD  •  ondansetron (ZOFRAN) injection 4 mg, 4 mg, Intravenous, Q6H PRN, Samir Teague MD  •  oxymetazoline (AFRIN) nasal spray 2 spray, 2 spray, Each Nare, TID, Taurus Eastman Jr., MD, 2 spray at 21 0949  •  polyethylene glycol (MIRALAX) packet 17 g, 17 g, Nasogastric, Daily, Samir Teague MD, 17 g at 21 0949  •  sennosides-docusate (PERICOLACE) 8.6-50 MG per tablet 1 tablet, 1 tablet, Nasogastric, BID, Samir Teague MD, 1 tablet at 21 0949  •  sodium chloride 0.9 % flush 10 mL, 10 mL, Intravenous, PRN, Samir Teague MD, 10 mL at 21 0806  •  sodium chloride nasal spray 2 spray, 2 spray, Each Nare, 5x Daily, Taurus Eastman Jr., MD, 2 spray at 21 0949  •  sodium chloride nasal spray 2 spray, 2 spray, Each Nare, Continuous PRN, Taurus Eastman Jr., MD      Review of Systems   Constitutional: Negative for fever.   Neurological: Positive for weakness.       Objective     Vital Signs:  Temp (24hrs), Av.4 °F (36.9 °C), Min:98.1 °F (36.7 °C), Max:98.7 °F (37.1 °C)      /73 (BP Location: Left arm, Patient Position: Lying) Comment: Norm RN notified  Pulse 87   Temp 98.1 °F (36.7 °C) (Oral)   Resp 18   Ht 165.1 cm (65\")   Wt 80.5 kg (177 lb 7.5 oz)   LMP 10/26/2003 (Exact Date) Comment: Hyst for DUB  SpO2 95%   BMI 29.53 kg/m²         Physical Exam   General: Patient appears very weak but appears a bit better today  HEENT: O2 per nasal cannula in place.  Deep tissue injury to face " from proning improving.   Respiratory: Effort even and unlabored.  Diminished breath sounds bilaterally.  (Poor effort)  Abdomen: Soft, bowel sounds positive  Neuro: Alert, still mouthing words but profoundly weak  Psych: Pleasant cooperative    Results Review:    I reviewed the patient's new clinical results.    Lab Results:  CBC:   Lab Results   Lab 12/30/20  0204 01/01/21  0522 01/04/21  0306   WBC 9.36 12.25* 10.82*   HEMOGLOBIN 11.6* 12.8 12.4   HEMATOCRIT 32.6* 37.4 36.5   PLATELETS 134* 170 153   LYMPHOCYTE %  --   --  8.2*   MONOCYTES %  --   --  5.2       CMP:   Lab Results   Lab 12/30/20  0204 01/01/21  0523 01/02/21  1033 01/03/21  0626   SODIUM 138 144 143 144   POTASSIUM 3.6 3.7 3.5 3.5   CHLORIDE 101 106 105 110*   CO2 29.0 26.0 25.0 27.0   BUN 25* 22 33* 37*   CREATININE 0.51* 0.55* 0.70 0.58   CALCIUM 8.9 9.2 9.8 8.9   BILIRUBIN 0.6  --   --  0.4   ALK PHOS 84  --   --  90   ALT (SGPT) 117*  --   --  51*   AST (SGOT) 68*  --   --  56*   GLUCOSE 146* 129* 135* 142*       Microscopic urinalysis with 31-50 WBCs, 0-2 RBCs and 3-6 squamous epis with some budding yeast noted    Culture Results:        Microbiology Results Abnormal     Procedure Component Value - Date/Time    Blood Culture - Blood, Arm, Left [[id number]] Collected: 12/30/20 2208    Lab Status: Final result Specimen: Blood from Arm, Left Updated: 01/04/21 2230     Blood Culture No growth at 5 days    Blood Culture - Blood, Hand, Right [[id number]] Collected: 12/30/20 1935    Lab Status: Final result Specimen: Blood from Hand, Right Updated: 01/04/21 2015     Blood Culture No growth at 5 days    Urine Culture - Urine, Urine, Catheter In/Out [[id number]]  (Abnormal) Collected: 01/01/21 0301    Lab Status: Final result Specimen: Urine, Catheter In/Out Updated: 01/02/21 1026     Urine Culture Yeast isolated    Respiratory Culture - Sputum, ET Suction [[id number]]  (Abnormal)  (Susceptibility) Collected: 12/29/20 1955    Lab Status: Final result  Specimen: Sputum from ET Suction Updated: 12/31/20 1051     Respiratory Culture Heavy growth (4+) Staphylococcus aureus      No Normal Respiratory Hailee     Gram Stain Moderate (3+) WBCs seen      Many (4+) Gram positive cocci    Susceptibility      Staphylococcus aureus     ISRA     Clindamycin Resistant     Oxacillin Susceptible     Penicillin G Resistant     Tetracycline Susceptible     Trimethoprim + Sulfamethoxazole Susceptible     Vancomycin Susceptible                    Blood Culture - Blood, Arm, Left [[id number]] Collected: 12/19/20 1440    Lab Status: Final result Specimen: Blood from Arm, Left Updated: 12/24/20 1530     Blood Culture No growth at 5 days    Blood Culture - Blood, Blood, Central Line [[id number]] Collected: 12/19/20 1435    Lab Status: Final result Specimen: Blood, Central Line Updated: 12/24/20 1530     Blood Culture No growth at 5 days    Respiratory Culture - Sputum, ET Suction [[id number]]  (Abnormal) Collected: 12/20/20 0046    Lab Status: Final result Specimen: Sputum from ET Suction Updated: 12/22/20 1016     Respiratory Culture Scant growth (1+) Candida albicans      No Normal Respiratory Hailee     Gram Stain Greater than 25 WBCs per low power field      Less than 25 Epithelial cells per low power field      Few (2+) Budding yeast    Respiratory Culture - Sputum, ET Suction [[id number]] Collected: 12/15/20 1148    Lab Status: Final result Specimen: Sputum from ET Suction Updated: 12/17/20 0829     Respiratory Culture Scant growth (1+) Normal Respiratory Hailee: NO S.aureus/MRSA or Pseudomonas aeruginosa     Gram Stain Greater than 25 WBCs per low power field      No Epithelial cells per low power field      No organisms seen    Blood Culture - Blood, Arm, Left [[id number]] Collected: 12/11/20 1220    Lab Status: Final result Specimen: Blood from Arm, Left Updated: 12/16/20 1301     Blood Culture No growth at 5 days    Blood Culture - Blood, Arm, Left [[id number]] Collected: 12/11/20 1221     Lab Status: Final result Specimen: Blood from Arm, Left Updated: 12/16/20 1301     Blood Culture No growth at 5 days    MRSA Screen, PCR (Inpatient) - Swab, Nares [[id number]]  (Normal) Collected: 12/15/20 1148    Lab Status: Final result Specimen: Swab from Nares Updated: 12/15/20 1401     MRSA PCR No MRSA Detected    Legionella Antigen, Urine - Urine, Urine, Clean Catch [[id number]]  (Normal) Collected: 12/11/20 1829    Lab Status: Final result Specimen: Urine, Clean Catch Updated: 12/11/20 1935     LEGIONELLA ANTIGEN, URINE Negative    S. Pneumo Ag Urine or CSF - Urine, Urine, Clean Catch [[id number]]  (Normal) Collected: 12/11/20 1829    Lab Status: Final result Specimen: Urine, Clean Catch Updated: 12/11/20 1934     Strep Pneumo Ag Negative    Respiratory Panel, PCR (WITHOUT COVID) - Swab, Nasopharynx [[id number]]  (Normal) Collected: 12/11/20 1719    Lab Status: Final result Specimen: Swab from Nasopharynx Updated: 12/11/20 1825     ADENOVIRUS, PCR Not Detected     Coronavirus 229E Not Detected     Coronavirus HKU1 Not Detected     Coronavirus NL63 Not Detected     Coronavirus OC43 Not Detected     Human Metapneumovirus Not Detected     Human Rhinovirus/Enterovirus Not Detected     Influenza B PCR Not Detected     Parainfluenza Virus 1 Not Detected     Parainfluenza Virus 2 Not Detected     Parainfluenza Virus 3 Not Detected     Parainfluenza Virus 4 Not Detected     Bordetella pertussis pcr Not Detected     Influenza A H1 2009 PCR Not Detected     Chlamydophila pneumoniae PCR Not Detected     Mycoplasma pneumo by PCR Not Detected     Influenza A PCR Not Detected     Influenza A H3 Not Detected     Influenza A H1 Not Detected     RSV, PCR Not Detected     Bordetella parapertussis PCR Not Detected    Narrative:      The coronavirus on the RVP is NOT COVID-19 and is NOT indicative of infection with COVID-19.     COVID PRE-OP / PRE-PROCEDURE SCREENING ORDER (NO ISOLATION) - Swab, Nasal Cavity [[id number]]   (Abnormal) Collected: 12/11/20 1346    Lab Status: Final result Specimen: Swab from Nasal Cavity Updated: 12/11/20 1431    Narrative:      The following orders were created for panel order COVID PRE-OP / PRE-PROCEDURE SCREENING ORDER (NO ISOLATION) - Swab, Nasal Cavity.  Procedure                               Abnormality         Status                     ---------                               -----------         ------                     COVID-19, ABBOTT IN-HOUS...[[id number]]  Abnormal            Final result                 Please view results for these tests on the individual orders.    COVID-19, ABBOTT IN-HOUSE,NP Swab (NO TRANSPORT MEDIA) 2 HR TAT - Swab, Nasopharynx [[id number]]  (Abnormal) Collected: 12/11/20 1346    Lab Status: Final result Specimen: Swab from Nasopharynx Updated: 12/11/20 1431     COVID19 Detected    Narrative:      Fact sheet for providers: https://www.fda.gov/media/414329/download     Fact sheet for patients: https://www.fda.gov/media/673924/download    Test performed by PCR.                Radiology:   Imaging Results (Last 72 Hours)     Procedure Component Value Units Date/Time    XR Abdomen KUB [[id number]] Collected: 01/04/21 0734     Updated: 01/04/21 0738    Narrative:      EXAMINATION: XR ABDOMEN KUB- 1/4/2021 7:34 AM CST     HISTORY: Dobbhoff tube placement     COMPARISON: 1/2/2021     FINDINGS:  Dobbhoff tube is in place with tip extending below the diaphragm and  projecting over the left upper quadrant of the abdomen, presumably in  the gastric fundus. The visualized bowel gas pattern is nonobstructive.  Bilateral airspace opacities persist. In the right axilla.       Impression:      Dobbhoff tube tip projects over and is presumably within the  gastric fundus.        This report was finalized on 01/04/2021 07:34 by Dr. Dilshad Bray MD.    XR Abdomen KUB [[id number]] Collected: 01/04/21 0731     Updated: 01/04/21 0735    Narrative:      EXAMINATION: XR ABDOMEN KUB- 1/4/2021  7:31 AM CST     HISTORY: Evaluate Dobbhoff tube position     COMPARISON: 1/4/2021 at 1:05 AM     FINDINGS:  Dobbhoff tube is in place with tip curled and projecting in the left  upper quadrant of the abdomen, presumably in the fundus of the stomach.  Bilateral airspace opacities are again noted. No intraperitoneal free  air is identified. Visualized bowel gas pattern is nonobstructive.       Impression:      Dobbhoff tube tip projects over and is presumably within the  gastric fundus.  This report was finalized on 01/04/2021 07:32 by Dr. Dilshad Bray MD.          Assessment/Plan     Active Hospital Problems    Diagnosis   • **Pneumonia due to COVID-19 virus   • Aphonia   • Xerostomia   • Post viral debility   • Thrombocytopenia (CMS/HCC)   • Acute respiratory distress syndrome (ARDS) due to COVID-19 virus (CMS/HCC)   • Elevated LFTs   • Leukocytosis   • Elevated ferritin and CRP   • Obesity (BMI 30-39.9)   • Acute respiratory failure with hypoxia (CMS/HCC)   • Dysphagia, oropharyngeal       IMPRESSION:  · COVID-19 infection with pneumonia-symptoms start December 1, patient tested + Dec 2 and received antibiotics and steroids early in course of infection per her nurse practitioner-patient completed remdesivir December 15.    · Fever resolved.  Sputum cultures positive for 4+ methicillin susceptible Staphylococcus aureus.  Could be colonizing airway but obvious concern given recent fever.  Urine culture with yeast.  Dosed with fluconazole x1  · Acute respiratory failure with hypoxemia-extubated December 30, 2020 (status post empiric treatment with Zosyn from 12/15-12/22) nasal cannula on wall is at 2 L although per vital signs they are recording 4 L  · thrombocytopenia---resolved  · leukocytosis -resolved and with increased January 1 to 12,000-and declined January 4  · elevated liver enzymes-improved  · MRSA screen negative      RECOMMENDATION:     Continue cefazolin day #5/7, monitoring of oxygen status given  "positive sputum culture \"MSSA\", etc.  Monitor temperature curve closely.  Continue work with therapy.  DVT prophylaxis with Lovenox  Get home meds list  - d/w patient and daughter re: home meds -previously.  Did not find it located so checked with nursing again about calling [facility] for patient's med list.    Mervat Ayon MD  01/05/21  12:27 CST      "

## 2021-01-05 NOTE — PROGRESS NOTES
[facility] Medicine Services  INPATIENT PROGRESS NOTE    Patient Name: Joann Finnegan  Date of Admission: 12/11/2020  Today's Date: 01/05/21  Length of Stay: 25  Primary Care Physician: Janak Sherwood APRN    Subjective   Chief Complaint: follow-up COVID  HPI   Patient appears to be doing well.  She is visiting with family at bedside.  She requests one of the oral sponges and some thickened liquids.  She denies pain.  She denies any worsening shortness of breath.  No acute events from overnight.  Voices no new complaints.  Plans for video swallow evaluation tomorrow speech therapy.    Review of Systems   Respiratory: Positive for shortness of breath.         All pertinent negatives and positives are as above. All other systems have been reviewed and are negative unless otherwise stated.     Objective    Temp:  [98.1 °F (36.7 °C)-98.6 °F (37 °C)] 98.1 °F (36.7 °C)  Heart Rate:  [78-92] 92  Resp:  [16-18] 16  BP: (141-165)/(61-77) 165/73  Physical Exam  Vitals signs reviewed.   Constitutional:       Appearance: She is not toxic-appearing.   HENT:      Nose:      Comments: Dobbhoff tube in place     Mouth/Throat:      Pharynx: No oropharyngeal exudate.      Comments:  very hoarse/weak voice   Eyes:      Pupils: Pupils are equal, round, and reactive to light.   Cardiovascular:      Rate and Rhythm: Normal rate.   Pulmonary:      Effort: Pulmonary effort is normal. No respiratory distress.      Breath sounds: No rales.      Comments: Diminished at bases; currently on 4LNC  Musculoskeletal:         General: No swelling.   Skin:     Comments: Chin and left cheek appear to be healing   Neurological:      General: No focal deficit present.      Mental Status: She is alert.      Motor: Weakness present.   Psychiatric:         Mood and Affect: Mood normal.         Results Review:  I have reviewed the labs, radiology results, and diagnostic studies.    Laboratory Data:   Results  from last 7 days   Lab Units 01/04/21  0306 01/01/21  0522 12/30/20  0204   WBC 10*3/mm3 10.82* 12.25* 9.36   HEMOGLOBIN g/dL 12.4 12.8 11.6*   HEMATOCRIT % 36.5 37.4 32.6*   PLATELETS 10*3/mm3 153 170 134*        Results from last 7 days   Lab Units 01/03/21  0626 01/02/21  1033 01/01/21  0523 12/30/20  0204   SODIUM mmol/L 144 143 144 138   POTASSIUM mmol/L 3.5 3.5 3.7 3.6   CHLORIDE mmol/L 110* 105 106 101   CO2 mmol/L 27.0 25.0 26.0 29.0   BUN mg/dL 37* 33* 22 25*   CREATININE mg/dL 0.58 0.70 0.55* 0.51*   CALCIUM mg/dL 8.9 9.8 9.2 8.9   BILIRUBIN mg/dL 0.4  --   --  0.6   ALK PHOS U/L 90  --   --  84   ALT (SGPT) U/L 51*  --   --  117*   AST (SGOT) U/L 56*  --   --  68*   GLUCOSE mg/dL 142* 135* 129* 146*       Culture Data:   Blood Culture   Date Value Ref Range Status   12/30/2020 No growth at 4 days  Preliminary   12/30/2020 No growth at 4 days  Preliminary     Urine Culture   Date Value Ref Range Status   01/01/2021 Yeast isolated (A)  Final     Respiratory Culture   Date Value Ref Range Status   12/29/2020 Heavy growth (4+) Staphylococcus aureus (A)  Final   12/29/2020 No Normal Respiratory Hailee (A)  Final       Radiology Data:   Imaging Results (Last 24 Hours)     ** No results found for the last 24 hours. **          I have reviewed the patient's current medications.     Assessment/Plan     Active Hospital Problems    Diagnosis   • **Pneumonia due to COVID-19 virus   • Aphonia   • Xerostomia   • Post viral debility   • Thrombocytopenia (CMS/HCC)   • Acute respiratory distress syndrome (ARDS) due to COVID-19 virus (CMS/HCC)   • Elevated LFTs   • Leukocytosis   • Elevated ferritin and CRP   • Obesity (BMI 30-39.9)   • Acute respiratory failure with hypoxia (CMS/HCC)   • Dysphagia, oropharyngeal     Plan:  1.  IV Cefazolin - started on evening of 12/31 for heavy growth of MSSA in sputum culture  2.  ENT recommendations reviewed  3.  Plan for video swallow evaluation by ST tomorrow  4.  Currently on 4LNC -  wean as tolerated  5.  Currently with Dobbhoff in place; TFs for nutrition.  Pending ST assessment tomorrow will determine diet/nutrition strategy moving forward.  6.  PT, OT  7.  IV Labetalol PRN per parameters; nursing having trouble administering PO meds via DHT at this time.  TFs and free H20 flush going through DHT without problem; meds not wanting to pass through as easily  8.  Monitor off of Dexamethasone  9.  Lovenox PPx  10.  Dispo: continue looking into placement options as patient is going to need a lot of therapy for rehabilitation.  Updated family at bedside this afternoon.      Electronically signed by Ellis Wise MD, 01/05/21, 16:26 CST.

## 2021-01-05 NOTE — THERAPY TREATMENT NOTE
Acute Care - Occupational Therapy Treatment Note  [facility]     Patient Name: Joann Finnegan  : 1950  MRN: [id number]  Today's Date: 2021             Admit Date: 2020       ICD-10-CM ICD-9-CM   1. Pneumonia due to COVID-19 virus  U07.1 480.8    J12.89    2. Hypoxic  R09.02 799.02   3. Acute respiratory failure with hypoxia (CMS/HCC)  J96.01 518.81   4. Impaired mobility  Z74.09 799.89   5. Impaired mobility and ADLs  Z74.09 V49.89    Z78.9    6. Oropharyngeal dysphagia  R13.12 787.22     Patient Active Problem List   Diagnosis   • Dysphagia, oropharyngeal   • Heartburn   • Family hx colonic polyps   • Pneumonia due to COVID-19 virus   • Acute respiratory failure with hypoxia (CMS/HCC)   • Obesity (BMI 30-39.9)   • Elevated ferritin and CRP   • Acute respiratory distress syndrome (ARDS) due to COVID-19 virus (CMS/HCC)   • Elevated LFTs   • Leukocytosis   • Thrombocytopenia (CMS/HCC)   • Aphonia   • Xerostomia   • Post viral debility     Past Medical History:   Diagnosis Date   • Abnormal weight gain    • Arthritis    • Breast cancer (CMS/HCC)     right.    • Fatigue    • H/O melanoma excision     CLARKS  LEVEL III      BACK OF UPPER LEFT ARM   • Hypertension    • Hypothyroidism    • Melanoma (CMS/HCC)    • Restless leg syndrome    • Shortness of breath      Past Surgical History:   Procedure Laterality Date   • APPENDECTOMY     • BREAST BIOPSY Right    • BREAST BIOPSY     • BREAST LUMPECTOMY      right.    • BROW LIFT     • COLONOSCOPY     • COLONOSCOPY N/A 2020    Procedure: COLONOSCOPY WITH ANESTHESIA;  Surgeon: Donovan Hernandez MD;  Location: [facility] ENDOSCOPY;  Service: Gastroenterology;  Laterality: N/A;  pre: family hx colon polyps  post: polyps  Janak Sherwood APRN   • ENDOSCOPY N/A 2020    Procedure: ESOPHAGOGASTRODUODENOSCOPY WITH ANESTHESIA;  Surgeon: Donovan Hernandez MD;  Location: [facility] ENDOSCOPY;  Service: Gastroenterology;  Laterality: N/A;  pre: dysphagia;  heartburn  post: dilated  Janak Sherwood, APRN   • ENDOSCOPY AND COLONOSCOPY  12/22/2011    very minimal distal esophagitis, incomplete esophagus ring dilated   • LUMBAR SPINAL TUMOR REMOVAL      Spinal Cyst removed from Spinal Canal   • MASTECTOMY, PARTIAL     • PARTIAL HIP ARTHROPLASTY Left    • SKIN CANCER EXCISION      Melanoma    • SQUAMOUS CELL CARCINOMA EXCISION     • TEMPOROMANDIBULAR JOINT ARTHROPLASTY     • TOTAL ABDOMINAL HYSTERECTOMY WITH SALPINGO OOPHORECTOMY Bilateral             OT ASSESSMENT FLOWSHEET (last 12 hours)      OT Evaluation and Treatment     Row Name 01/05/21 0728                   OT Time and Intention    Subjective Information  weakness;fatigue;complains of;dyspnea  -        Document Type  therapy note (daily note)  -        Mode of Treatment  occupational therapy  -        Patient Effort  fair  -           General Information    Existing Precautions/Restrictions  fall;oxygen therapy device and L/min NG-tube!  -           Pain Assessment    Additional Documentation  Pain Scale: Word Pre/Post-Treatment (Group)  -           Pain Scale: Numbers Pre/Post-Treatment    Pretreatment Pain Rating  0/10 - no pain  -CJ        Posttreatment Pain Rating  0/10 - no pain  -        Pain Intervention(s)  Rest  -           Range of Motion (ROM)    Range of Motion  bilateral upper extremities Ue rom with prom/aarom!  -           Bed Mobility    Comment (Bed Mobility)  fowlers in bed!  -           Wound 12/16/20 0957 Right lower chin    Wound - Properties Group Placement Date: 12/16/20  -KM Placement Time: 0957  -KM Side: Right  -KM Orientation: lower  -KM Location: chin  -KM Stage, Pressure Injury : deep tissue injury  -KM    Retired Wound - Properties Group Date first assessed: 12/16/20  -KM Time first assessed: 0957  -KM Side: Right  -KM Location: chin  -KM       Wound 12/16/20 0959 Right upper lip    Wound - Properties Group Placement Date: 12/16/20  -KM Placement Time: 0959   -KM Present on Hospital Admission: N  -KM Side: Right  -KM Orientation: upper  -KM Location: lip  -KM Stage, Pressure Injury : deep tissue injury  -KM    Retired Wound - Properties Group Date first assessed: 12/16/20  -KM Time first assessed: 0959  -KM Present on Hospital Admission: N  -KM Side: Right  -KM Location: lip  -KM       Wound 12/18/20 1425 Left cheek Pressure Injury    Wound - Properties Group Placement Date: 12/18/20  -RM Placement Time: 1425  -RM Present on Hospital Admission: N  -RM Side: Left  -RM Location: cheek  -RM Primary Wound Type: Pressure inj  -RM    Retired Wound - Properties Group Date first assessed: 12/18/20  -RM Time first assessed: 1425  -RM Present on Hospital Admission: N  -RM Side: Left  -RM Location: cheek  -RM Primary Wound Type: Pressure inj  -RM       Wound 12/21/20 1118 Left nose Pressure Injury    Wound - Properties Group Placement Date: 12/21/20  -CH Placement Time: 1118  -CH Present on Hospital Admission: N  -CH Side: Left  -CH Location: nose  -CH Primary Wound Type: Pressure inj  -CH    Retired Wound - Properties Group Date first assessed: 12/21/20  -CH Time first assessed: 1118  -CH Present on Hospital Admission: N  -CH Side: Left  -CH Location: nose  -CH Primary Wound Type: Pressure inj  -CH       Positioning and Restraints    Pre-Treatment Position  in bed  -CJ        Post Treatment Position  bed  -CJ        In Bed  fowlers;call light within reach;encouraged to call for assist;with family/caregiver;side rails up x2  -CJ           Progress Summary (OT)    Progress Toward Functional Goals (OT)  progress toward functional goals is gradual  -        Barriers to Overall Progress (OT)  Fall/weakness/NG-tube!  -        Impairments Still Limiting Function (OT)  continue with ot poc!  -CJ          User Key  (r) = Recorded By, (t) = Taken By, (c) = Cosigned By    Initials Name Effective Dates     Favian Ng COTA/L 08/02/16 -     KM Maine Witt RN 12/29/17 -       Ya Busch RN 08/02/16 -      Gisselle Shea RN 08/02/16 -          Occupational Therapy Education                 Title: PT OT SLP Therapies (In Progress)     Topic: Occupational Therapy (In Progress)     Point: ADL training (In Progress)     Description:   Instruct learner(s) on proper safety adaptation and remediation techniques during self care or transfers.   Instruct in proper use of assistive devices.              Learning Progress Summary           Patient Acceptance, E, NR by JLORENZO at 12/28/2020 1311                   Point: Home exercise program (Done)     Description:   Instruct learner(s) on appropriate technique for monitoring, assisting and/or progressing therapeutic exercises/activities.              Learning Progress Summary           Patient Acceptance, TB,E, VU,NR,DU by  at 1/5/2021 0728    Comment: Pt. unable to use wts. for ue exs, Prom/aarom performed!   Significant Other Acceptance, TB,E, VU,NR,DU by  at 1/5/2021 0728    Comment: Pt. unable to use wts. for ue exs, Prom/aarom performed!                   Point: Precautions (Done)     Description:   Instruct learner(s) on prescribed precautions during self-care and functional transfers.              Learning Progress Summary           Patient Acceptance, TB,E, VU,NR,DU by  at 1/5/2021 0728    Comment: Pt. unable to use wts. for ue exs, Prom/aarom performed!   Significant Other Acceptance, TB,E, VU,NR,DU by  at 1/5/2021 0728    Comment: Pt. unable to use wts. for ue exs, Prom/aarom performed!                   Point: Body mechanics (Done)     Description:   Instruct learner(s) on proper positioning and spine alignment during self-care, functional mobility activities and/or exercises.              Learning Progress Summary           Patient Acceptance, TB,E, VU,NR,DU by  at 1/5/2021 0728    Comment: Pt. unable to use wts. for ue exs, Prom/aarom performed!    Acceptance, E, NR by JJ at 12/28/2020 1311   Significant Other  Acceptance, TB,E, VU,NR,DU by  at 1/5/2021 0728    Comment: Pt. unable to use wts. for ue exs, Prom/aarom performed!                               User Key     Initials Effective Dates Name Provider Type [facility] 08/02/16 -  Favian Ng, ORELLANA/L Occupational Therapy Assistant OT    J 12/04/20 -  Theresa Nicholas OTR/L Occupational Therapist OT                  OT Recommendation and Plan     Progress Toward Functional Goals (OT): progress toward functional goals is gradual  Plan of Care Review  Plan of Care Reviewed With: patient, spouse  Progress: improving  Outcome Summary: Pt. fowlers in bed, unable to use wts. for ue exs, Lue weaker than Rue, prom/aarom per vc's with this orellana/l assisting prn!  Plan of Care Reviewed With: patient, spouse  Outcome Summary: Pt. fowlers in bed, unable to use wts. for ue exs, Lue weaker than Rue, prom/aarom per vc's with this orellana/l assisting prn!    Outcome Measures     Row Name 01/05/21 0728             How much help from another is currently needed...    Putting on and taking off regular lower body clothing?  1  -CJ      Bathing (including washing, rinsing, and drying)  1  -CJ      Toileting (which includes using toilet bed pan or urinal)  1  -CJ      Putting on and taking off regular upper body clothing  1  -      Taking care of personal grooming (such as brushing teeth)  1  -CJ      Eating meals  1  -      AM-PAC 6 Clicks Score (OT)  6  -         Functional Assessment    Outcome Measure Options  AM-PAC 6 Clicks Daily Activity (OT)  -        User Key  (r) = Recorded By, (t) = Taken By, (c) = Cosigned By    Initials Name Provider Type     Favian Ng, ORELLANA/L Occupational Therapy Assistant          Time Calculation:   Time Calculation- OT     Row Name 01/05/21 0728             Time Calculation- OT    OT Start Time  0728  -      OT Stop Time  0754  -      OT Time Calculation (min)  26 min  -      Total Timed Code Minutes- OT  26 minute(s)   -      TCU Minutes- OT  26 min  -      OT Received On  01/05/21  -         Timed Charges    69266 - OT Therapeutic Exercise Minutes  26  -        User Key  (r) = Recorded By, (t) = Taken By, (c) = Cosigned By    Initials Name Provider Type     Favian Ng COTA/L Occupational Therapy Assistant        Therapy Charges for Today     Code Description Service Date Service Provider Modifiers Qty    [id number]  OT THER PROC EA 15 MIN 1/5/2021 Favian Ng COTA/L GO 2               ROSLYN Estrada  1/5/2021

## 2021-01-05 NOTE — PROGRESS NOTES
Taurus Eastman Jr, MD     OTOLARYNGOLOGY, HEAD AND NECK SURGERY PROGRESS NOTE   Referring Provider: Ellis Wise MD  Reason for Consultation: Aphonia  Location: 442/1  Accompanied by:   Chief complaint: Hoarseness      Interval History:   Since last examined, Joann Finnegan has remained in bed, with poor voice.  She seems more alert today.  She can barely phonate so history taking is somewhat diminished.  Her  is present at the bedside and has little to say.  Patient was seen earlier today and note is written now.  Patient seen. Chart reviewed.      Review of Systems:   TC master CMN: No change from prior inquiry    Vital Signs  Temp:  [98.1 °F (36.7 °C)-98.6 °F (37 °C)] 98.1 °F (36.7 °C)  Heart Rate:  [78-92] 92  Resp:  [16-18] 16  BP: (141-165)/(61-77) 165/73        EXAMINATION:  CONSTITUTIONAL:    well developed  well nourished  Lying in bed, gives me a thumbs up, seems more alert, facial scabbing still present     BODY HABITUS:    obese  severe     COMMUNICATION:    She is essentially aphonic     HEAD:     Normocephalic, without obvious abnormality, atraumatic     FACE:    adiposis- Mild to moderate  skin- Eschars present to along the left cheek and one on the left lateral mental area, dry, healing well     SALIVARY GLANDS:    parotid glands with no tenderness, no swelling, no masses, submandibular glands with normal size, nontender      EYE:    ocular motility normal, eyelids normal, orbits normal, no proptosis, conjunctiva clear, sclera non-icteric, pupils equal, round, reactive to light and accomodation  Color:   green     HEARING:      response to conversational voice decreased  Bilateral     EARS:     PINNA:     normal, non-tender, skin intact without lesions      NOSE EXTERNAL:    APPEARANCE: normal, straight, with good projection, no tenderness, no lesions, no tenderness, good nasal support, patent nares     NOSE INTERNAL:    Anterior rhinoscopy   NASALMUCOSA:    abnormal:         Bilateral- Moderate crusting    NASAL PASSAGES:     abnormal   Left- Feeding tube present, Right- Narrowed   NASAL VALVE:     abnormal  Bilateral- Week    SEPTUM:     Not clearly visualized   INFERIOR TURBINATES:     Not clearly visualized     ORAL CAVITY:      LIPS:      structure normal, no tenderness on palpation, no lesions, no evidence of trauma, normal mobility and oral competence    TEETH:       edentulous:  upper and lower    GUMS:      Intact    ORAL MUCOSA:       abnormal: Dried with a layer secretions over the buccal and the other mucosal surfaces    FLOOR OF MOUTH:       Warthin's duct patent, mucosa normal  TONGUE:       abnormal:          Dried secretions on the tongue itself      hypertrophy  Severe, prolapse   Severe    PALATE:       hard palate Dry with crusting adherent      soft palate- Low, thick, with dried secretions adherent     OROPHARYNX:    Direct examination  OROPHARYNGEAL MUCOSA:     abnormal-        lateral walls-          Bilateral- Dry, redundant       posterior wall-  Dry, pale  TONSIL:     Tonsil pillars-  Atrophic     NECK:    short, thick  moderate   LARYNGEAL POSITION: Low     LYMPH NODES :    no adenopathy     CHEST/RESPIRATORY:    unlabored, wearing nasal O2     NEURO/PSYCHIATRIC :      Difficult to assess because of dysphonia, appears more alert today, cranial nerves appear intact     Results Review:       I have reviewed the patients old records in the chart.  The following results/records were reviewed:  Plan of Care by Malena Garcia PTA (01/05/2021 09:06)   Therapy Treatment Note by Malena Garcia PTA (01/05/2021 09:08)   Plan of Care by Favian Ng COTA/L (01/05/2021 07:28)   Therapy Treatment Note by Favian Ng COTA/L (01/05/2021 07:28)   Progress Notes by Mervat Ayon MD (01/05/2021 12:26)   Progress Notes by Ellis Wise MD (01/05/2021 16:26)     Medications, Allergies and Past History Reviewed      Assessment       Pneumonia  due to COVID-19 virus    Dysphagia, oropharyngeal    Acute respiratory failure with hypoxia (CMS/HCC)    Obesity (BMI 30-39.9)    Elevated ferritin and CRP    Acute respiratory distress syndrome (ARDS) due to COVID-19 virus (CMS/HCC)    Elevated LFTs    Leukocytosis    Thrombocytopenia (CMS/HCC)    Aphonia    Xerostomia    Post viral debility         Plan      Patient still appears very weak.  She is almost able to get a very breathy word out.  I still feel she is extremely weak and requires strengthening.  She should continue therapy.    Following patient as in-patient. Further recommendations will be made based on serial examinations.    Medications/Orders for this encounter:       Taurus Eastman Jr, MD  01/05/21  17:57 CST

## 2021-01-05 NOTE — PLAN OF CARE
Goal Outcome Evaluation:  Plan of Care Reviewed With: other (see comments)  Progress: improving  Outcome Summary: Pt continues on Impact Peptide 1.5 and has been tolerating at goal rate. SLP to eval tomorrow for possible PO diet. If PO diet is unable to be initiated, pt may benefit from PEG placement. Will continue to monitor TF tolerance and follow for initiation of PO diet.

## 2021-01-05 NOTE — PLAN OF CARE
Problem: Adult Inpatient Plan of Care  Goal: Plan of Care Review  Outcome: Ongoing, Progressing  Flowsheets (Taken 1/5/2021 4628)  Progress: improving  Plan of Care Reviewed With:   patient   spouse  Outcome Summary: Pt. fowlers in bed, unable to use wts. for ue exs, Lue weaker than Rue, prom/aarom per vc's with this orellana/l assisting prn!   Goal Outcome Evaluation:  Plan of Care Reviewed With: patient, spouse  Progress: improving  Outcome Summary: Pt. fowlers in bed, unable to use wts. for ue exs, Lue weaker than Rue, prom/aarom per vc's with this orellana/l assisting prn!

## 2021-01-05 NOTE — PLAN OF CARE
Goal Outcome Evaluation:  Plan of Care Reviewed With: patient  Progress: improving  Outcome Summary: No c/o pain throughout night. NSR 72-85 on tele. Tube feeding held, MD aware and can be restarted when bags arrive. Sliding scale insulin held d/t feedings being paused. Pt is more alert and able to communicate better this shift. Safety maintained, will cont to monitor and contact MD with questions.  No c/o pain throughout night. NSR 72-85 on tele. Tube feeding held, MD aware and can be restarted when bags arrive. Sliding scale insulin held d/t feedings being paused. Pt is more alert and able to communicate better this shift. Safety maintained, will cont to monitor and contact MD with questions.

## 2021-01-05 NOTE — THERAPY TREATMENT NOTE
Acute Care - Speech Language Pathology   Swallow Treatment Note [facility]     Patient Name: Joann Finnegan  : 1950  MRN: [id number]  Today's Date: 2021               Admit Date: 2020  Pt's vocal quality is improved today, although still weak and hoarse with occasional pitch breaks. She completed trials of pureed, honey, and nectar thick consistencies. She had a 1x delayed cough following pureed and more immediate coughs 2x with nectar. Did try pt's top dentures, but they were not staying in place. May have to try using denture adhesive, although she did not typically use it previously. Will plan for potential flexible endoscopic evaluation of the swallow tomorrow if pt continues to improve. Continue NPO at this time. Ok for ice chips with staff or family.   Litzy Mane, CCC-SLP 2021 13:01 CST    Visit Dx:     ICD-10-CM ICD-9-CM   1. Pneumonia due to COVID-19 virus  U07.1 480.8    J12.89    2. Hypoxic  R09.02 799.02   3. Acute respiratory failure with hypoxia (CMS/HCC)  J96.01 518.81   4. Impaired mobility  Z74.09 799.89   5. Impaired mobility and ADLs  Z74.09 V49.89    Z78.9    6. Oropharyngeal dysphagia  R13.12 787.22     Patient Active Problem List   Diagnosis   • Dysphagia, oropharyngeal   • Heartburn   • Family hx colonic polyps   • Pneumonia due to COVID-19 virus   • Acute respiratory failure with hypoxia (CMS/HCC)   • Obesity (BMI 30-39.9)   • Elevated ferritin and CRP   • Acute respiratory distress syndrome (ARDS) due to COVID-19 virus (CMS/HCC)   • Elevated LFTs   • Leukocytosis   • Thrombocytopenia (CMS/HCC)   • Aphonia   • Xerostomia   • Post viral debility     Past Medical History:   Diagnosis Date   • Abnormal weight gain    • Arthritis    • Breast cancer (CMS/HCC)     right.    • Fatigue    • H/O melanoma excision     CLARKS  LEVEL III      BACK OF UPPER LEFT ARM   • Hypertension    • Hypothyroidism    • Melanoma (CMS/HCC)    • Restless leg syndrome    • Shortness of  breath      Past Surgical History:   Procedure Laterality Date   • APPENDECTOMY     • BREAST BIOPSY Right    • BREAST BIOPSY     • BREAST LUMPECTOMY      right.    • BROW LIFT     • COLONOSCOPY     • COLONOSCOPY N/A 2/14/2020    Procedure: COLONOSCOPY WITH ANESTHESIA;  Surgeon: Donovan Hernandez MD;  Location: [facility] ENDOSCOPY;  Service: Gastroenterology;  Laterality: N/A;  pre: family hx colon polyps  post: polyps  Janak Sherwood APRN   • ENDOSCOPY N/A 2/14/2020    Procedure: ESOPHAGOGASTRODUODENOSCOPY WITH ANESTHESIA;  Surgeon: Donovan Hernandez MD;  Location: [facility] ENDOSCOPY;  Service: Gastroenterology;  Laterality: N/A;  pre: dysphagia; heartburn  post: dilated  Janak Sherwood APRN   • ENDOSCOPY AND COLONOSCOPY  12/22/2011    very minimal distal esophagitis, incomplete esophagus ring dilated   • LUMBAR SPINAL TUMOR REMOVAL      Spinal Cyst removed from Spinal Canal   • MASTECTOMY, PARTIAL     • PARTIAL HIP ARTHROPLASTY Left    • SKIN CANCER EXCISION      Melanoma    • SQUAMOUS CELL CARCINOMA EXCISION     • TEMPOROMANDIBULAR JOINT ARTHROPLASTY     • TOTAL ABDOMINAL HYSTERECTOMY WITH SALPINGO OOPHORECTOMY Bilateral         SWALLOW EVALUATION (last 72 hours)      SLP Adult Swallow Evaluation     Row Name 01/05/21 0915 01/04/21 1231 01/03/21 1025             Rehab Evaluation    Document Type  therapy note (daily note)  -MB  therapy note (daily note)  -MB  therapy note (daily note)  -BN      Subjective Information  no complaints  -MB  no complaints  -MB  no complaints  -BN      Patient Observations  alert;cooperative  -MB  alert;cooperative  -MB  alert;cooperative  -BN      Patient/Family/Caregiver Comments/Observations   present  -MB  No family present  -MB  pt  present  -BN      Care Plan Review  --  --  care plan/treatment goals reviewed;risks/benefits reviewed;current/potential barriers reviewed;patient/other agree to care plan  -BN      Care Plan Review, Other Participant(s)  --  --   significant other  -BN      Patient Effort  good  -MB  good  -MB  good  -BN         Pain    Additional Documentation  --  Pain Scale: FACES Pre/Post-Treatment (Group)  -MB  --         Pain Scale: Numbers Pre/Post-Treatment    Pretreatment Pain Rating  --  --  0/10 - no pain  -BN      Posttreatment Pain Rating  --  --  0/10 - no pain  -BN         Pain Scale: FACES Pre/Post-Treatment    Pain: FACES Scale, Pretreatment  0-->no hurt  -MB  0-->no hurt  -MB  --         Clinical Impression    Daily Summary of Progress (SLP)  progress toward functional goals as expected  -MB  progress towards functional goals is fair  -MB  progress toward functional goals is gradual  -BN      Barriers to Overall Progress (SLP)  Weak voice  -MB  Weakness  -MB  medically complex  -BN      Plan for Continued Treatment (SLP)  Plan for potential FEES tomorrow  -MB  Continue to follow  -MB  continue to follow  -BN         Swallow Goals (SLP)    Oral Nutrition/Hydration Goal Selection (SLP)  --  --  oral nutrition/hydration, SLP goal 1  -BN      Labial Strengthening Goal Selection (SLP)  --  --  labial strengthening, SLP goal 1  -BN      Lingual Strengthening Goal Selection (SLP)  --  --  lingual strengthening, SLP goal 1  -BN      Pharyngeal Strengthening Exercise Goal Selection (SLP)  --  --  pharyngeal strengthening exercise, SLP goal 1  -BN      Additional Documentation  --  --  labial strengthening goal selection (SLP);lingual strengthening goal selection (SLP);pharyngeal strengthening exercise goal selection (SLP)  -BN         Oral Nutrition/Hydration Goal 1 (SLP)    Oral Nutrition/Hydration Goal 1, SLP  Pt will tolerate least restrictive diet with no overt s/s of aspiration.   -MB  Pt will tolerate least restrictive diet with no overt s/s of aspiration.   -MB  Pt will tolerate least restrictive diet with no overt s/s of aspiration.   -BN      Time Frame (Oral Nutrition/Hydration Goal 1, SLP)  by discharge  -MB  by discharge  -MB  by  discharge  -BN      Barriers (Oral Nutrition/Hydration Goal 1, SLP)  Weak voice  -MB  Aphonic  -MB  Medically complex, weak, lethargic  -BN      Progress/Outcomes (Oral Nutrition/Hydration Goal 1, SLP)  continuing progress toward goal  -MB  continuing progress toward goal  -MB  progress slower than expected  -BN         Labial Strengthening Goal 1 (SLP)    Activity (Labial Strengthening Goal 1, SLP)  increase labial tone  -MB  increase labial tone  -MB  increase labial tone  -BN      Increase Labial Tone  labial resistance exercises  -MB  labial resistance exercises  -MB  labial resistance exercises  -BN      [place]/Accuracy (Labial Strengthening Goal 1, SLP)  with minimal cues (75-90% accuracy)  -MB  with minimal cues (75-90% accuracy)  -MB  with minimal cues (75-90% accuracy)  -BN      Time Frame (Labial Strengthening Goal 1, SLP)  short term goal (STG);by discharge  -MB  short term goal (STG);by discharge  -MB  short term goal (STG);by discharge  -BN      Barriers (Labial Strengthening Goal 1, SLP)  --  --  n/a  -BN      Progress/Outcomes (Labial Strengthening Goal 1, SLP)  --  --  continuing progress toward goal  -BN         Lingual Strengthening Goal 1 (SLP)    Activity (Lingual Strengthening Goal 1, SLP)  increase lingual tone/sensation/control/coordination/movement  -MB  increase lingual tone/sensation/control/coordination/movement  -MB  increase lingual tone/sensation/control/coordination/movement  -BN      Increase Lingual Tone/Sensation/Control/Coordination/Movement  lingual movement exercises  -MB  lingual movement exercises  -MB  lingual movement exercises  -BN      [place]/Accuracy (Lingual Strengthening Goal 1, SLP)  with minimal cues (75-90% accuracy)  -MB  with minimal cues (75-90% accuracy)  -MB  with minimal cues (75-90% accuracy)  -BN      Time Frame (Lingual Strengthening Goal 1, SLP)  short term goal (STG);by discharge  -MB  short term goal (STG);by discharge  -MB  short term goal  (STG);by discharge  -BN      Barriers (Lingual Strengthening Goal 1, SLP)  Weakness  -MB  Weakness  -MB  n/a  -BN      Progress/Outcomes (Lingual Strengthening Goal 1, SLP)  continuing progress toward goal  -MB  continuing progress toward goal  -MB  continuing progress toward goal  -BN         Pharyngeal Strengthening Exercise Goal 1 (SLP)    Activity (Pharyngeal Strengthening Goal 1, SLP)  increase timing  -MB  increase timing  -MB  increase timing  -BN      Increase Timing  gustatory stimulation (sour/cold)  -MB  gustatory stimulation (sour/cold)  -MB  gustatory stimulation (sour/cold)  -BN      [place]/Accuracy (Pharyngeal Strengthening Goal 1, SLP)  independently (over 90% accuracy)  -MB  independently (over 90% accuracy)  -MB  independently (over 90% accuracy)  -BN      Time Frame (Pharyngeal Strengthening Goal 1, SLP)  short term goal (STG);by discharge  -MB  short term goal (STG);by discharge  -MB  short term goal (STG);by discharge  -BN      Barriers (Pharyngeal Strengthening Goal 1, SLP)  --  --  n/a  -BN      Progress/Outcomes (Pharyngeal Strengthening Goal 1, SLP)  --  --  goal ongoing  -BN        User Key  (r) = Recorded By, (t) = Taken By, (c) = Cosigned By    Initials Name Effective Dates    Litzy Pimentel, CCC-SLP 08/02/16 -     Marquita Olvera CCC-SLP 02/11/20 -           EDUCATION  The patient has been educated in the following areas:   Dysphagia (Swallowing Impairment).    SLP Recommendation and Plan                                         Daily Summary of Progress (SLP): progress toward functional goals as expected    Plan for Continued Treatment (SLP): Plan for potential FEES tomorrow              Plan of Care Reviewed With: patient, spouse  Progress: improving  Outcome Summary: See note    SLP GOALS     Row Name 01/05/21 0915 01/04/21 1231 01/03/21 1025       Oral Nutrition/Hydration Goal 1 (SLP)    Oral Nutrition/Hydration Goal 1, SLP  Pt will tolerate least restrictive  diet with no overt s/s of aspiration.   -MB  Pt will tolerate least restrictive diet with no overt s/s of aspiration.   -MB  Pt will tolerate least restrictive diet with no overt s/s of aspiration.   -BN    Time Frame (Oral Nutrition/Hydration Goal 1, SLP)  by discharge  -MB  by discharge  -MB  by discharge  -BN    Barriers (Oral Nutrition/Hydration Goal 1, SLP)  Weak voice  -MB  Aphonic  -MB  Medically complex, weak, lethargic  -BN    Progress/Outcomes (Oral Nutrition/Hydration Goal 1, SLP)  continuing progress toward goal  -MB  continuing progress toward goal  -MB  progress slower than expected  -BN       Labial Strengthening Goal 1 (SLP)    Activity (Labial Strengthening Goal 1, SLP)  increase labial tone  -MB  increase labial tone  -MB  increase labial tone  -BN    Increase Labial Tone  labial resistance exercises  -MB  labial resistance exercises  -MB  labial resistance exercises  -BN    [place]/Accuracy (Labial Strengthening Goal 1, SLP)  with minimal cues (75-90% accuracy)  -MB  with minimal cues (75-90% accuracy)  -MB  with minimal cues (75-90% accuracy)  -BN    Time Frame (Labial Strengthening Goal 1, SLP)  short term goal (STG);by discharge  -MB  short term goal (STG);by discharge  -MB  short term goal (STG);by discharge  -BN    Barriers (Labial Strengthening Goal 1, SLP)  --  --  n/a  -BN    Progress/Outcomes (Labial Strengthening Goal 1, SLP)  --  --  continuing progress toward goal  -BN       Lingual Strengthening Goal 1 (SLP)    Activity (Lingual Strengthening Goal 1, SLP)  increase lingual tone/sensation/control/coordination/movement  -MB  increase lingual tone/sensation/control/coordination/movement  -MB  increase lingual tone/sensation/control/coordination/movement  -BN    Increase Lingual Tone/Sensation/Control/Coordination/Movement  lingual movement exercises  -MB  lingual movement exercises  -MB  lingual movement exercises  -BN    [place]/Accuracy (Lingual Strengthening Goal 1, SLP)   with minimal cues (75-90% accuracy)  -MB  with minimal cues (75-90% accuracy)  -MB  with minimal cues (75-90% accuracy)  -BN    Time Frame (Lingual Strengthening Goal 1, SLP)  short term goal (STG);by discharge  -MB  short term goal (STG);by discharge  -MB  short term goal (STG);by discharge  -BN    Barriers (Lingual Strengthening Goal 1, SLP)  Weakness  -MB  Weakness  -MB  n/a  -BN    Progress/Outcomes (Lingual Strengthening Goal 1, SLP)  continuing progress toward goal  -MB  continuing progress toward goal  -MB  continuing progress toward goal  -BN       Pharyngeal Strengthening Exercise Goal 1 (SLP)    Activity (Pharyngeal Strengthening Goal 1, SLP)  increase timing  -MB  increase timing  -MB  increase timing  -BN    Increase Timing  gustatory stimulation (sour/cold)  -MB  gustatory stimulation (sour/cold)  -MB  gustatory stimulation (sour/cold)  -BN    [place]/Accuracy (Pharyngeal Strengthening Goal 1, SLP)  independently (over 90% accuracy)  -MB  independently (over 90% accuracy)  -MB  independently (over 90% accuracy)  -BN    Time Frame (Pharyngeal Strengthening Goal 1, SLP)  short term goal (STG);by discharge  -MB  short term goal (STG);by discharge  -MB  short term goal (STG);by discharge  -BN    Barriers (Pharyngeal Strengthening Goal 1, SLP)  --  --  n/a  -BN    Progress/Outcomes (Pharyngeal Strengthening Goal 1, SLP)  --  --  goal ongoing  -BN      User Key  (r) = Recorded By, (t) = Taken By, (c) = Cosigned By    Initials Name Provider Type    Litzy Pimentel CCC-SLP Speech and Language Pathologist    Marquita Olvera CCC-SLP Speech and Language Pathologist             Time Calculation:   Time Calculation- SLP     Row Name 01/05/21 1249             Time Calculation- SLP    SLP Start Time  0915  -MB      SLP Stop Time  0943  -MB      SLP Time Calculation (min)  28 min  -MB      SLP Received On  01/05/21  -MB        User Key  (r) = Recorded By, (t) = Taken By, (c) = Cosigned By     Initials Name Provider Type    Litzy Pimentel CCC-SLP Speech and Language Pathologist          Therapy Charges for Today     Code Description Service Date Service Provider Modifiers Qty    20185656728 HC ST TREATMENT SWALLOW 2 1/4/2021 Litzy Mane CCC-SLP GN 1    14977394881 HC ST TREATMENT SWALLOW 2 1/5/2021 Litzy Mane CCC-SLP GN 1               SILVINA Llanos  1/5/2021

## 2021-01-05 NOTE — THERAPY TREATMENT NOTE
Acute Care - Physical Therapy Treatment Note  [facility]     Patient Name: Joann Finnegan  : 1950  MRN: [id number]  Today's Date: 2021           PT Assessment (last 12 hours)      PT Evaluation and Treatment     Row Name 21 1455 21 0815       Physical Therapy Time and Intention    Subjective Information  complains of;weakness;fatigue  -  complains of;weakness;fatigue  -    Document Type  therapy note (daily note)  -  therapy note (daily note)  -    Mode of Treatment  physical therapy  -  physical therapy  -    Row Name 21 1455 21 0815       General Information    Patient/Family/Caregiver Comments/Observations  daughter present  -  --    Existing Precautions/Restrictions  fall;oxygen therapy device and L/min NG tube  -  fall;oxygen therapy device and L/min NG tube  -    Row Name 21 1455 21 0815       Pain Scale: Numbers Pre/Post-Treatment    Pretreatment Pain Rating  0/10 - no pain  -  0/10 - no pain  -    Posttreatment Pain Rating  0/10 - no pain  -  0/10 - no pain  -    Row Name 21 0815          Range of Motion Comprehensive    Comment, General Range of Motion  BLE HEP x 10 reps, RLE weaker, R foot drop  -     Row Name 21 1455 21 0815       Bed Mobility    Rolling Left [place] (Bed Mobility)  contact guard;minimum assist (75% patient effort);verbal cues  -  contact guard;minimum assist (75% patient effort);verbal cues  -    Rolling Right [place] (Bed Mobility)  minimum assist (75% patient effort);contact guard;verbal cues  -  minimum assist (75% patient effort);contact guard;verbal cues  -    Scooting/Bridging [place] (Bed Mobility)  moderate assist (50% patient effort);verbal cues  -  moderate assist (50% patient effort);verbal cues  -    Supine-Sit [place] (Bed Mobility)  minimum assist (75% patient effort);verbal cues  -  minimum assist (75% patient effort);verbal cues  -     Sit-Supine [place] (Bed Mobility)  minimum assist (75% patient effort);verbal cues  -  minimum assist (75% patient effort);verbal cues  -    Row Name 01/05/21 1455          Transfers    Transfers  sit-stand transfer;stand-sit transfer  -     Comment (Transfers)  stood x 2, knees buckled  -     Sit-Stand [place] (Transfers)  moderate assist (50% patient effort);2 person assist;verbal cues  -     Stand-Sit [place] (Transfers)  minimum assist (75% patient effort);moderate assist (50% patient effort);2 person assist;verbal cues  -     Row Name 01/05/21 1455          Sit-Stand Transfer    Assistive Device (Sit-Stand Transfers)  walker, front-wheeled  -     Row Name             Wound 12/16/20 0957 Right lower chin    Wound - Properties Group Placement Date: 12/16/20  -KM Placement Time: 0957  -KM Side: Right  -KM Orientation: lower  -KM Location: chin  -KM Stage, Pressure Injury : deep tissue injury  -KM    Retired Wound - Properties Group Date first assessed: 12/16/20  -KM Time first assessed: 0957  -KM Side: Right  -KM Location: chin  -KM    Row Name             Wound 12/16/20 0959 Right upper lip    Wound - Properties Group Placement Date: 12/16/20  -KM Placement Time: 0959  -KM Present on Hospital Admission: N  -KM Side: Right  -KM Orientation: upper  -KM Location: lip  -KM Stage, Pressure Injury : deep tissue injury  -KM    Retired Wound - Properties Group Date first assessed: 12/16/20  -KM Time first assessed: 0959  -KM Present on Hospital Admission: N  -KM Side: Right  -KM Location: lip  -KM    Row Name             Wound 12/18/20 1425 Left cheek Pressure Injury    Wound - Properties Group Placement Date: 12/18/20  -RM Placement Time: 1425  -RM Present on Hospital Admission: N  -RM Side: Left  -RM Location: cheek  -RM Primary Wound Type: Pressure inj  -RM    Retired Wound - Properties Group Date first assessed: 12/18/20  -RM Time first assessed: 1425  -RM Present on Hospital Admission: N   -RM Side: Left  -RM Location: cheek  -RM Primary Wound Type: Pressure inj  -RM    Row Name             Wound 12/21/20 1118 Left nose Pressure Injury    Wound - Properties Group Placement Date: 12/21/20  -CH Placement Time: 1118  -CH Present on Hospital Admission: N  -CH Side: Left  -CH Location: nose  -CH Primary Wound Type: Pressure inj  -CH    Retired Wound - Properties Group Date first assessed: 12/21/20  -CH Time first assessed: 1118  -CH Present on Hospital Admission: N  -CH Side: Left  -CH Location: nose  -CH Primary Wound Type: Pressure inj  -CH    Row Name 01/05/21 0815          Plan of Care Review    Plan of Care Reviewed With  patient;spouse  -     Progress  improving  -     Outcome Summary  pt is much stronger today, performed BLE HEP x 10 reps, RLE weaker,R foot drop, pt trans to EOB min assist, sat EOB 10 minutes cga-sba, worked on deep breathing and posture correction, 02 sat dropped to 83% on 4L during activity, recovered to 93% with rest, pt would benefit from cont therapy  -     Row Name 01/05/21 1455 01/05/21 0815       Vital Signs    Pre SpO2 (%)  --  96  -AH    O2 Delivery Pre Treatment  --  nasal cannula 4L  -AH    Intra SpO2 (%)  86  -AH  83  -AH    O2 Delivery Intra Treatment  nasal cannula  -  nasal cannula  -    Post SpO2 (%)  --  93  -AH    O2 Delivery Post Treatment  --  nasal cannula  -    Row Name 01/05/21 1455 01/05/21 0815       Positioning and Restraints    Pre-Treatment Position  in bed  -  in bed  -    Post Treatment Position  bed  -  bed  -    In Bed  side lying left;fowlers;call light within reach;encouraged to call for assist;with family/caregiver  -  fowlers;call light within reach;encouraged to call for assist;with family/caregiver;notified [facility]  -      User Key  (r) = Recorded By, (t) = Taken By, (c) = Cosigned By    Initials Name Provider Type    Malena Adhikari, PTA Physical Therapy Assistant    Maine Jaramillo, RN Registered Nurse      Ya Busch, RN Registered Nurse     Gisselle Shea RN Registered Nurse        Physical Therapy Education                 Title: PT OT SLP Therapies (In Progress)     Topic: Physical Therapy (In Progress)     Point: Mobility training (Done)     Learning Progress Summary           Patient Acceptance, E,TB,D, DU,NR by  at 1/4/2021 1315    Comment: Education to reinforce importance of activity/risks assoc w/ bed rest; education for improved tech w/ bed mobility; education for proper deep breathing and for positioning to assist w/ edema mgmt and for pressure relief    Acceptance, E, NR by WK at 1/2/2021 0911    Comment: BOA    Acceptance, E, NL by WK at 1/1/2021 0935    Comment: BOA    Acceptance, E, NL by MS at 12/28/2020 0900    Comment: role of PT in her care                   Point: Home exercise program (Done)     Learning Progress Summary           Patient Acceptance, E,TB,D,H, VU by  at 1/5/2021 0906    Comment: BLE HEP   Significant Other Acceptance, E,TB,D,H, VU by  at 1/5/2021 0906    Comment: BLE HEP                   Point: Body mechanics (Not Started)     Learner Progress:  Not documented in this visit.          Point: Precautions (Done)     Learning Progress Summary           Patient Acceptance, E,TB,D, DU,NR by  at 1/4/2021 1315    Comment: Education to reinforce importance of activity/risks assoc w/ bed rest; education for improved tech w/ bed mobility; education for proper deep breathing and for positioning to assist w/ edema mgmt and for pressure relief                               User Key     Initials Effective Dates Name Provider Type Discipline     08/02/16 -  Malena Garcia, PTA Physical Therapy Assistant PT    MS 06/19/18 -  Yennifer May, PT, DPT, NCS Physical Therapist PT    WK 08/02/16 -  Rufina Degroot PTA Physical Therapy Assistant PT     08/02/18 -  Terri Gomez, PT Physical Therapist PT              PT Recommendation and Plan     Plan of Care Reviewed  With: patient, spouse  Progress: improving  Outcome Summary: pt is much stronger today, performed BLE HEP x 10 reps, RLE weaker,R foot drop, pt trans to EOB min assist, sat EOB 10 minutes cga-sba, worked on deep breathing and posture correction, 02 sat dropped to 83% on 4L during activity, recovered to 93% with rest, pt would benefit from cont therapy  Outcome Measures     Row Name 01/05/21 0728             How much help from another is currently needed...    Putting on and taking off regular lower body clothing?  1  -CJ      Bathing (including washing, rinsing, and drying)  1  -CJ      Toileting (which includes using toilet bed pan or urinal)  1  -CJ      Putting on and taking off regular upper body clothing  1  -CJ      Taking care of personal grooming (such as brushing teeth)  1  -CJ      Eating meals  1  -CJ      AM-PAC 6 Clicks Score (OT)  6  -         Functional Assessment    Outcome Measure Options  AM-PAC 6 Clicks Daily Activity (OT)  -        User Key  (r) = Recorded By, (t) = Taken By, (c) = Cosigned By    Initials Name Provider Type     Favian Ng COTA/L Occupational Therapy Assistant           Time Calculation:   PT Charges     Row Name 01/05/21 1527 01/05/21 0907          Time Calculation    Start Time  1455  -  0815  -     Stop Time  1522  -  0853  -     Time Calculation (min)  27 min  -  38 min  -     PT Received On  --  01/05/21  -        Time Calculation- PT    Total Timed Code Minutes- PT  27 minute(s)  -  38 minute(s)  -        Timed Charges    58003 - PT Therapeutic Exercise Minutes  --  23  -     82417 - PT Therapeutic Activity Minutes  27  -  15  -       User Key  (r) = Recorded By, (t) = Taken By, (c) = Cosigned By    Initials Name Provider Type     Malena Garcia, PTA Physical Therapy Assistant        Therapy Charges for Today     Code Description Service Date Service Provider Modifiers Qty    78366063875 HC PT THER PROC EA 15 MIN 1/5/2021 Jose  Malena JAMESON, PTA GP 2    49852173067 HC PT THERAPEUTIC ACT EA 15 MIN 1/5/2021 Malena Garcia, PTA GP 1    99279987577 HC PT THERAPEUTIC ACT EA 15 MIN 1/5/2021 Malena Garcia, PTA GP 2          PT G-Codes  Outcome Measure Options: AM-PAC 6 Clicks Daily Activity (OT)  AM-PAC 6 Clicks Score (PT): 8  AM-PAC 6 Clicks Score (OT): 6    Malena Garcia PTA  1/5/2021

## 2021-01-05 NOTE — THERAPY TREATMENT NOTE
Acute Care - Physical Therapy Treatment Note  [facility]     Patient Name: Joann Finnegan  : 1950  MRN: [id number]  Today's Date: 2021           PT Assessment (last 12 hours)      PT Evaluation and Treatment     Row Name 21 0815          Physical Therapy Time and Intention    Subjective Information  complains of;weakness;fatigue  -     Document Type  therapy note (daily note)  -     Mode of Treatment  physical therapy  -     Row Name 2115          General Information    Existing Precautions/Restrictions  fall;oxygen therapy device and L/min NG tube  -     Row Name 2115          Pain Scale: Numbers Pre/Post-Treatment    Pretreatment Pain Rating  0/10 - no pain  -     Posttreatment Pain Rating  0/10 - no pain  -[facility] Name 2115          Range of Motion Comprehensive    Comment, General Range of Motion  BLE HEP x 10 reps, RLE weaker, R foot drop  -     Row Name 21          Bed Mobility    Rolling Left [place] (Bed Mobility)  contact guard;minimum assist (75% patient effort);verbal cues  -     Rolling Right [place] (Bed Mobility)  minimum assist (75% patient effort);contact guard;verbal cues  -     Scooting/Bridging [place] (Bed Mobility)  moderate assist (50% patient effort);verbal cues  -     Supine-Sit [place] (Bed Mobility)  minimum assist (75% patient effort);verbal cues  -     Sit-Supine [place] (Bed Mobility)  minimum assist (75% patient effort);verbal cues  -     Row Name             Wound 20 0957 Right lower chin    Wound - Properties Group Placement Date: 20  -KM Placement Time: 0957  -KM Side: Right  -KM Orientation: lower  -KM Location: chin  -KM Stage, Pressure Injury : deep tissue injury  -    Retired Wound - Properties Group Date first assessed: 20  -KM Time first assessed: 0957  -KM Side: Right  -KM Location: chin  -KM    Row Name             Wound 20 0959 Right upper  lip    Wound - Properties Group Placement Date: 12/16/20  -KM Placement Time: 0959  -KM Present on Hospital Admission: N  -KM Side: Right  -KM Orientation: upper  -KM Location: lip  -KM Stage, Pressure Injury : deep tissue injury  -KM    Retired Wound - Properties Group Date first assessed: 12/16/20  -KM Time first assessed: 0959  -KM Present on Hospital Admission: N  -KM Side: Right  -KM Location: lip  -KM    Row Name             Wound 12/18/20 1425 Left cheek Pressure Injury    Wound - Properties Group Placement Date: 12/18/20  -RM Placement Time: 1425  -RM Present on Hospital Admission: N  -RM Side: Left  -RM Location: cheek  -RM Primary Wound Type: Pressure inj  -RM    Retired Wound - Properties Group Date first assessed: 12/18/20  -RM Time first assessed: 1425  -RM Present on Hospital Admission: N  -RM Side: Left  -RM Location: cheek  -RM Primary Wound Type: Pressure inj  -RM    Row Name             Wound 12/21/20 1118 Left nose Pressure Injury    Wound - Properties Group Placement Date: 12/21/20  -CH Placement Time: 1118  -CH Present on Hospital Admission: N  -CH Side: Left  -CH Location: nose  -CH Primary Wound Type: Pressure inj  -CH    Retired Wound - Properties Group Date first assessed: 12/21/20  -CH Time first assessed: 1118  -CH Present on Hospital Admission: N  -CH Side: Left  -CH Location: nose  -CH Primary Wound Type: Pressure inj  -CH    Row Name 01/05/21 0815          Plan of Care Review    Plan of Care Reviewed With  patient;spouse  -     Progress  improving  -     Outcome Summary  pt is much stronger today, performed BLE HEP x 10 reps, RLE weaker,R foot drop, pt trans to EOB min assist, sat EOB 10 minutes cga-sba, worked on deep breathing and posture correction, 02 sat dropped to 83% on 4L during activity, recovered to 93% with rest, pt would benefit from cont therapy  [facility]     Row Name 01/05/21 0815          Vital Signs    Pre SpO2 (%)  96  -     O2 Delivery Pre Treatment  nasal cannula 4L   -AH     Intra SpO2 (%)  83  -AH     O2 Delivery Intra Treatment  nasal cannula  -AH     Post SpO2 (%)  93  -AH     O2 Delivery Post Treatment  nasal cannula  -AH     Row Name 01/05/21 0815          Positioning and Restraints    Pre-Treatment Position  in bed  -AH     Post Treatment Position  bed  -AH     In Bed  fowlers;call light within reach;encouraged to call for assist;with family/caregiver;notified nsg  -       User Key  (r) = Recorded By, (t) = Taken By, (c) = Cosigned By    Initials Name Provider Type     Malena Garcia, PTA Physical Therapy Assistant    Maine Jaramillo, RN Registered Nurse    Ya Dodson, RN Registered Nurse    Gisselle Buchanan RN Registered Nurse        Physical Therapy Education                 Title: PT OT SLP Therapies (In Progress)     Topic: Physical Therapy (In Progress)     Point: Mobility training (Done)     Learning Progress Summary           Patient Acceptance, E,TB,D, DU,NR by  at 1/4/2021 1315    Comment: Education to reinforce importance of activity/risks assoc w/ bed rest; education for improved tech w/ bed mobility; education for proper deep breathing and for positioning to assist w/ edema mgmt and for pressure relief    Acceptance, E, NR by WK at 1/2/2021 0911    Comment: BOA    Acceptance, E, NL by WK at 1/1/2021 0935    Comment: BOA    Acceptance, E, NL by MS at 12/28/2020 0900    Comment: role of PT in her care                   Point: Home exercise program (Done)     Learning Progress Summary           Patient Acceptance, E,TB,D,H, VU by  at 1/5/2021 0906    Comment: BLE HEP   Significant Other Acceptance, E,TB,D,H, VU by  at 1/5/2021 0906    Comment: BLE HEP                   Point: Body mechanics (Not Started)     Learner Progress:  Not documented in this visit.          Point: Precautions (Done)     Learning Progress Summary           Patient Acceptance, E,TB,D, DU,NR by  at 1/4/2021 1315    Comment: Education to reinforce importance of  activity/risks assoc w/ bed rest; education for improved tech w/ bed mobility; education for proper deep breathing and for positioning to assist w/ edema mgmt and for pressure relief                               User Key     Initials Effective Dates Name Provider Type [facility] 08/02/16 -  Malena Garcia PTA Physical Therapy Assistant PT    MS 06/19/18 -  Yennifer May, PT, DPT, NCS Physical Therapist PT    WK 08/02/16 -  Rufina Degroot PTA Physical Therapy Assistant PT    JE 08/02/18 -  Terri Gomez PT Physical Therapist PT              PT Recommendation and Plan     Plan of Care Reviewed With: patient, spouse  Progress: improving  Outcome Summary: pt is much stronger today, performed BLE HEP x 10 reps, RLE weaker,R foot drop, pt trans to EOB min assist, sat EOB 10 minutes cga-sba, worked on deep breathing and posture correction, 02 sat dropped to 83% on 4L during activity, recovered to 93% with rest, pt would benefit from cont therapy       Time Calculation:   PT Charges     Row Name 01/05/21 0907             Time Calculation    Start Time  0815  -      Stop Time  0853  -      Time Calculation (min)  38 min  -      PT Received On  01/05/21  -         Time Calculation- PT    Total Timed Code Minutes- PT  38 minute(s)  -         Timed Charges    19383 - PT Therapeutic Exercise Minutes  23  -      99166 - PT Therapeutic Activity Minutes  15  -AH        User Key  (r) = Recorded By, (t) = Taken By, (c) = Cosigned By    Initials Name Provider Type     Malena Garcia PTA Physical Therapy Assistant        Therapy Charges for Today     Code Description Service Date Service Provider Modifiers Qty    25570136806 HC PT THER PROC EA 15 MIN 1/5/2021 Malena Garcia PTA GP 2    91317891732 HC PT THERAPEUTIC ACT EA 15 MIN 1/5/2021 Malena Garcia PTA GP 1          PT G-Codes  Outcome Measure Options: AM-PAC 6 Clicks Basic Mobility (PT)  AM-PAC 6 Clicks Score (PT): 8  AM-PAC 6 Clicks Score  (OT): 6    Malena Garcia, PTA  1/5/2021

## 2021-01-05 NOTE — PLAN OF CARE
Goal Outcome Evaluation:  Plan of Care Reviewed With: patient, spouse  Progress: improving  Outcome Summary: pt is much stronger today, performed BLE HEP x 10 reps, RLE weaker,R foot drop, pt trans to EOB min assist, sat EOB 10 minutes cga-sba, worked on deep breathing and posture correction, 02 sat dropped to 83% on 4L during activity, recovered to 93% with rest, pt would benefit from cont therapy

## 2021-01-05 NOTE — PLAN OF CARE
Goal Outcome Evaluation:  Plan of Care Reviewed With: patient, spouse  Progress: improving  Outcome Summary: See note

## 2021-01-06 ENCOUNTER — TELEPHONE (OUTPATIENT)
Dept: SURGERY | Age: 71
End: 2021-01-06

## 2021-01-06 ENCOUNTER — APPOINTMENT (OUTPATIENT)
Dept: GENERAL RADIOLOGY | Facility: HOSPITAL | Age: 71
End: 2021-01-06

## 2021-01-06 LAB
GLUCOSE BLDC GLUCOMTR-MCNC: 119 MG/DL (ref 70–130)
GLUCOSE BLDC GLUCOMTR-MCNC: 122 MG/DL (ref 70–130)
GLUCOSE BLDC GLUCOMTR-MCNC: 123 MG/DL (ref 70–130)
GLUCOSE BLDC GLUCOMTR-MCNC: 147 MG/DL (ref 70–130)
GLUCOSE BLDC GLUCOMTR-MCNC: 160 MG/DL (ref 70–130)

## 2021-01-06 PROCEDURE — 92611 MOTION FLUOROSCOPY/SWALLOW: CPT

## 2021-01-06 PROCEDURE — 25010000002 CEFAZOLIN PER 500 MG: Performed by: INTERNAL MEDICINE

## 2021-01-06 PROCEDURE — 82962 GLUCOSE BLOOD TEST: CPT

## 2021-01-06 PROCEDURE — 97110 THERAPEUTIC EXERCISES: CPT

## 2021-01-06 PROCEDURE — 25010000002 ENOXAPARIN PER 10 MG: Performed by: INTERNAL MEDICINE

## 2021-01-06 PROCEDURE — 97530 THERAPEUTIC ACTIVITIES: CPT

## 2021-01-06 PROCEDURE — 74230 X-RAY XM SWLNG FUNCJ C+: CPT

## 2021-01-06 PROCEDURE — 94799 UNLISTED PULMONARY SVC/PX: CPT

## 2021-01-06 PROCEDURE — 92526 ORAL FUNCTION THERAPY: CPT

## 2021-01-06 RX ORDER — ENALAPRILAT 2.5 MG/2ML
0.62 INJECTION INTRAVENOUS EVERY 6 HOURS
Status: DISCONTINUED | OUTPATIENT
Start: 2021-01-06 | End: 2021-01-07

## 2021-01-06 RX ADMIN — ENOXAPARIN SODIUM 40 MG: 100 INJECTION SUBCUTANEOUS at 08:48

## 2021-01-06 RX ADMIN — ENOXAPARIN SODIUM 40 MG: 100 INJECTION SUBCUTANEOUS at 21:01

## 2021-01-06 RX ADMIN — OXYMETAZOLINE HYDROCHLORIDE 2 SPRAY: 0.05 SPRAY NASAL at 21:03

## 2021-01-06 RX ADMIN — SALINE NASAL SPRAY 2 SPRAY: 1.5 SOLUTION NASAL at 21:03

## 2021-01-06 RX ADMIN — LEVOTHYROXINE SODIUM 125 MCG: 125 TABLET ORAL at 06:50

## 2021-01-06 RX ADMIN — ENALAPRILAT 0.62 MG: 1.25 INJECTION INTRAVENOUS at 21:01

## 2021-01-06 RX ADMIN — CEFAZOLIN SODIUM 2 G: 10 INJECTION, POWDER, FOR SOLUTION INTRAVENOUS at 01:11

## 2021-01-06 RX ADMIN — BARIUM SULFATE 20 ML: 400 SUSPENSION ORAL at 12:45

## 2021-01-06 RX ADMIN — FAMOTIDINE 20 MG: 10 INJECTION INTRAVENOUS at 08:48

## 2021-01-06 RX ADMIN — ENALAPRILAT 0.62 MG: 1.25 INJECTION INTRAVENOUS at 13:35

## 2021-01-06 RX ADMIN — SALINE NASAL SPRAY 2 SPRAY: 1.5 SOLUTION NASAL at 17:33

## 2021-01-06 RX ADMIN — ALBUTEROL SULFATE 2 PUFF: 90 AEROSOL, METERED RESPIRATORY (INHALATION) at 15:05

## 2021-01-06 RX ADMIN — ALBUTEROL SULFATE 2 PUFF: 90 AEROSOL, METERED RESPIRATORY (INHALATION) at 07:04

## 2021-01-06 RX ADMIN — SALINE NASAL SPRAY 2 SPRAY: 1.5 SOLUTION NASAL at 08:49

## 2021-01-06 RX ADMIN — SALINE NASAL SPRAY 2 SPRAY: 1.5 SOLUTION NASAL at 13:35

## 2021-01-06 RX ADMIN — BARIUM SULFATE 20 ML: 400 PASTE ORAL at 12:45

## 2021-01-06 RX ADMIN — FAMOTIDINE 20 MG: 10 INJECTION INTRAVENOUS at 21:01

## 2021-01-06 RX ADMIN — CEFAZOLIN SODIUM 2 G: 10 INJECTION, POWDER, FOR SOLUTION INTRAVENOUS at 17:33

## 2021-01-06 RX ADMIN — CEFAZOLIN SODIUM 2 G: 10 INJECTION, POWDER, FOR SOLUTION INTRAVENOUS at 08:47

## 2021-01-06 RX ADMIN — ALBUTEROL SULFATE 2 PUFF: 90 AEROSOL, METERED RESPIRATORY (INHALATION) at 22:03

## 2021-01-06 RX ADMIN — OXYMETAZOLINE HYDROCHLORIDE 2 SPRAY: 0.05 SPRAY NASAL at 08:49

## 2021-01-06 RX ADMIN — OXYMETAZOLINE HYDROCHLORIDE 2 SPRAY: 0.05 SPRAY NASAL at 17:33

## 2021-01-06 NOTE — PLAN OF CARE
Goal Outcome Evaluation:  Plan of Care Reviewed With: patient, daughter  Progress: improving  Outcome Summary: See note

## 2021-01-06 NOTE — PLAN OF CARE
Goal Outcome Evaluation:  Plan of Care Reviewed With: patient  Progress: improving  Outcome Summary: VFSS complete. Pt noted to have generalized oral motor weakness and is edentulous. Due to this she was not presented with mechanical soft or regular solids. Due to decrease back and base of tognue strenght as well as delay in swallow initiation she she exhibited premature loss to the point of the vallecula with honey thick and nectar thick liquids. Her hylaryngeal excursion, laryngeal elevation, and epiglottic inversion is decreased by a moderate-severe amount with all consistencies. Due to this she had krystle aspiration of nectar thick liquids. She exhibited a reflexive cough; however, some residue remained within the vallecula and along the vocal cords. No significant oropharyngeal residue present post trials. No other definite penetration or aspiration into the airway observed. SLP recommends a pureed diet with honey thick liquids. Ok for meds to be administered crushed in pudding/applesauce. Ok for ice chips between meals 30 minutes following any other PO intake. SLP will continue to follow and treat.

## 2021-01-06 NOTE — THERAPY TREATMENT NOTE
Acute Care - Occupational Therapy Treatment Note  [facility]     Patient Name: Joann Finnegan  : 1950  MRN: [id number]  Today's Date: 2021             Admit Date: 2020       ICD-10-CM ICD-9-CM   1. Pneumonia due to COVID-19 virus  U07.1 480.8    J12.89    2. Hypoxic  R09.02 799.02   3. Acute respiratory failure with hypoxia (CMS/HCC)  J96.01 518.81   4. Impaired mobility  Z74.09 799.89   5. Impaired mobility and ADLs  Z74.09 V49.89    Z78.9    6. Oropharyngeal dysphagia  R13.12 787.22     Patient Active Problem List   Diagnosis   • Dysphagia, oropharyngeal   • Heartburn   • Family hx colonic polyps   • Pneumonia due to COVID-19 virus   • Acute respiratory failure with hypoxia (CMS/HCC)   • Obesity (BMI 30-39.9)   • Elevated ferritin and CRP   • Acute respiratory distress syndrome (ARDS) due to COVID-19 virus (CMS/HCC)   • Elevated LFTs   • Leukocytosis   • Thrombocytopenia (CMS/HCC)   • Aphonia   • Xerostomia   • Post viral debility     Past Medical History:   Diagnosis Date   • Abnormal weight gain    • Arthritis    • Breast cancer (CMS/HCC)     right.    • Fatigue    • H/O melanoma excision     CLARKS  LEVEL III      BACK OF UPPER LEFT ARM   • Hypertension    • Hypothyroidism    • Melanoma (CMS/HCC)    • Restless leg syndrome    • Shortness of breath      Past Surgical History:   Procedure Laterality Date   • APPENDECTOMY     • BREAST BIOPSY Right    • BREAST BIOPSY     • BREAST LUMPECTOMY      right.    • BROW LIFT     • COLONOSCOPY     • COLONOSCOPY N/A 2020    Procedure: COLONOSCOPY WITH ANESTHESIA;  Surgeon: Donovan Hernandez MD;  Location: [facility] ENDOSCOPY;  Service: Gastroenterology;  Laterality: N/A;  pre: family hx colon polyps  post: polyps  Janak Sherwood APRN   • ENDOSCOPY N/A 2020    Procedure: ESOPHAGOGASTRODUODENOSCOPY WITH ANESTHESIA;  Surgeon: Donovan Hernandez MD;  Location: [facility] ENDOSCOPY;  Service: Gastroenterology;  Laterality: N/A;  pre: dysphagia;  heartburn  post: dilated  Janak Sherwood, APRN   • ENDOSCOPY AND COLONOSCOPY  12/22/2011    very minimal distal esophagitis, incomplete esophagus ring dilated   • LUMBAR SPINAL TUMOR REMOVAL      Spinal Cyst removed from Spinal Canal   • MASTECTOMY, PARTIAL     • PARTIAL HIP ARTHROPLASTY Left    • SKIN CANCER EXCISION      Melanoma    • SQUAMOUS CELL CARCINOMA EXCISION     • TEMPOROMANDIBULAR JOINT ARTHROPLASTY     • TOTAL ABDOMINAL HYSTERECTOMY WITH SALPINGO OOPHORECTOMY Bilateral             OT ASSESSMENT FLOWSHEET (last 12 hours)      OT Evaluation and Treatment     Row Name 01/06/21 9601                   OT Time and Intention    Subjective Information  complains of;weakness;fatigue;dyspnea  -        Document Type  therapy note (daily note)  -        Mode of Treatment  occupational therapy  -        Patient Effort  good  -           General Information    Existing Precautions/Restrictions  fall;oxygen therapy device and L/min NG-Tube!  -           Pain Assessment    Additional Documentation  Pain Scale: Word Pre/Post-Treatment (Group)  -           Pain Scale: Numbers Pre/Post-Treatment    Pretreatment Pain Rating  0/10 - no pain  -        Posttreatment Pain Rating  0/10 - no pain  -        Pain Intervention(s)  Rest  -           Range of Motion (ROM)    Range of Motion  bilateral upper extremities Prom/aarom/arom!  -           Bed Mobility    Comment (Bed Mobility)  fowlers in bed!  -           Motor Skills    Motor Skills  therapeutic exercise  -        Therapeutic Exercise  elbow/forearm  -           Shoulder (Therapeutic Exercise)    Shoulder (Therapeutic Exercise)  --  -        Shoulder AROM (Therapeutic Exercise)  --  -        Shoulder Strengthening (Therapeutic Exercise)  --  -           Elbow/Forearm (Therapeutic Exercise)    Elbow/Forearm (Therapeutic Exercise)  AROM (active range of motion);strengthening exercise  -        Elbow/Forearm AROM (Therapeutic Exercise)   bilateral;flexion;extension;sitting;10 repetitions;2 sets  -        Elbow/Forearm AAROM (Therapeutic Exercise)  bilateral;flexion;extension;10 repetitions  -        Elbow/Forearm Strengthening (Therapeutic Exercise)  bilateral;flexion;extension;sitting;2 lb free weight;3 lb free weight  -CJ           Wound 12/16/20 0957 Right lower chin    Wound - Properties Group Placement Date: 12/16/20  -KM Placement Time: 0957  -KM Side: Right  -KM Orientation: lower  -KM Location: chin  -KM Stage, Pressure Injury : deep tissue injury  -KM    Retired Wound - Properties Group Date first assessed: 12/16/20  -KM Time first assessed: 0957  -KM Side: Right  -KM Location: chin  -KM       Wound 12/16/20 0959 Right upper lip    Wound - Properties Group Placement Date: 12/16/20  -KM Placement Time: 0959  -KM Present on Hospital Admission: N  -KM Side: Right  -KM Orientation: upper  -KM Location: lip  -KM Stage, Pressure Injury : deep tissue injury  -KM    Retired Wound - Properties Group Date first assessed: 12/16/20  -KM Time first assessed: 0959  -KM Present on Hospital Admission: N  -KM Side: Right  -KM Location: lip  -KM       Wound 12/18/20 1425 Left cheek Pressure Injury    Wound - Properties Group Placement Date: 12/18/20  -RM Placement Time: 1425  -RM Present on Hospital Admission: N  -RM Side: Left  -RM Location: cheek  -RM Primary Wound Type: Pressure inj  -RM    Retired Wound - Properties Group Date first assessed: 12/18/20  -RM Time first assessed: 1425  -RM Present on Hospital Admission: N  -RM Side: Left  -RM Location: cheek  -RM Primary Wound Type: Pressure inj  -RM       Wound 12/21/20 1118 Left nose Pressure Injury    Wound - Properties Group Placement Date: 12/21/20  -CH Placement Time: 1118  -CH Present on Hospital Admission: N  -CH Side: Left  -CH Location: nose  -CH Primary Wound Type: Pressure inj  -CH    Retired Wound - Properties Group Date first assessed: 12/21/20  -CH Time first assessed: 1118  -CH Present  on Hospital Admission: N  -CH Side: Left  -CH Location: nose  -CH Primary Wound Type: Pressure inj  -CH       Positioning and Restraints    Pre-Treatment Position  in bed  -CJ        Post Treatment Position  bed  -CJ        In Bed  fowlers;call light within reach;encouraged to call for assist;with family/caregiver;side rails up x2  -CJ           Progress Summary (OT)    Progress Toward Functional Goals (OT)  progress toward functional goals is good  -CJ        Barriers to Overall Progress (OT)  Fall/weakness/NG-Tube!  -CJ        Impairments Still Limiting Function (OT)  continue with ot poc!  -CJ          User Key  (r) = Recorded By, (t) = Taken By, (c) = Cosigned By    Initials Name Effective Dates    CJ Favian Ng, ORELLANA/L 08/02/16 -     Maine Jaramillo RN 12/29/17 -     Ya Dodson RN 08/02/16 -     Gisselle Buchanan RN 08/02/16 -          Occupational Therapy Education                 Title: PT OT SLP Therapies (In Progress)     Topic: Occupational Therapy (In Progress)     Point: ADL training (In Progress)     Description:   Instruct learner(s) on proper safety adaptation and remediation techniques during self care or transfers.   Instruct in proper use of assistive devices.              Learning Progress Summary           Patient Acceptance, E, NR by AVELINA at 12/28/2020 1311                   Point: Home exercise program (Done)     Description:   Instruct learner(s) on appropriate technique for monitoring, assisting and/or progressing therapeutic exercises/activities.              Learning Progress Summary           Patient Acceptance, E,TB, VU,DU,NR by  at 1/6/2021 1335    Comment: Pt. fowlers in bed, performed aarom with this orellana/l after prom with bue's performed by this orellana/l!    Acceptance, TB,E, VU,NR,DU by  at 1/5/2021 0728    Comment: Pt. unable to use wts. for ue exs, Prom/aarom performed!   Significant Other Acceptance, TB,E, VU,NR,DU by  at 1/5/2021 0728    Comment:  "Pt. unable to use wts. for ue exs, Prom/aarom performed!                   Point: Precautions (Done)     Description:   Instruct learner(s) on prescribed precautions during self-care and functional transfers.              Learning Progress Summary           Patient Acceptance, E,TB, VU,DU,NR by  at 1/6/2021 1335    Comment: Pt. fowlers in bed, performed aarom with this orellana/l after prom with bue's performed by this orellana/l!    Acceptance, TB,E, VU,NR,DU by  at 1/5/2021 0728    Comment: Pt. unable to use wts. for ue exs, Prom/aarom performed!   Significant Other Acceptance, TB,E, VU,NR,DU by  at 1/5/2021 0728    Comment: Pt. unable to use wts. for ue exs, Prom/aarom performed!                   Point: Body mechanics (Done)     Description:   Instruct learner(s) on proper positioning and spine alignment during self-care, functional mobility activities and/or exercises.              Learning Progress Summary           Patient Acceptance, E,TB, VU,DU,NR by  at 1/6/2021 1335    Comment: Pt. elbertwclara in bed, performed aarom with this orellana/l after prom with bue's performed by this orellana/l!    Acceptance, TB,E, VU,NR,DU by  at 1/5/2021 0728    Comment: Pt. unable to use wts. for ue exs, Prom/aarom performed!    Acceptance, E, NR by AVELINA at 12/28/2020 1311   Significant Other Acceptance, TB,E, VU,NR,DU by  at 1/5/2021 0728    Comment: Pt. unable to use wts. for ue exs, Prom/aarom performed!                               User Key     Initials Effective Dates Name Provider Type Discipline     08/02/16 -  Favian Ng COTA/L Occupational Therapy Assistant OT     12/04/20 -  Theresa Nicholas OTR/L Occupational Therapist OT                  OT Recommendation and Plan     Progress Toward Functional Goals (OT): progress toward functional goals is good  Plan of Care Review  Plan of Care Reviewed With: patient, spouse  Progress: improving  Outcome Summary: Pt. fowlers in bed, pt. says \"I'm wiped out\"! Prom " "performed by this orellana/l sec. pt. not able to flex elbow or shd. with Lue functionally, no issues or restrictions noted with Lue! Aarom with ue's to increase Lue with functional ability! Pt. able to use 2lb. bar for elbow flex/ext exs after Prom/Aarom from this orellana/l! Pt. able to perform 2 sets x 10 reps elbow flex/ext before L. hand() weakened to where pt. couldn't hold bar!  Plan of Care Reviewed With: patient, spouse  Outcome Summary: Pt. fowlers in bed, pt. says \"I'm wiped out\"! Prom performed by this orellana/l sec. pt. not able to flex elbow or shd. with Lue functionally, no issues or restrictions noted with Lue! Aarom with ue's to increase Lue with functional ability! Pt. able to use 2lb. bar for elbow flex/ext exs after Prom/Aarom from this orellana/l! Pt. able to perform 2 sets x 10 reps elbow flex/ext before L. hand() weakened to where pt. couldn't hold bar!    Outcome Measures     Row Name 01/06/21 1335 01/05/21 0728          How much help from another is currently needed...    Putting on and taking off regular lower body clothing?  1  -  1  -     Bathing (including washing, rinsing, and drying)  1  -  1  -CJ     Toileting (which includes using toilet bed pan or urinal)  1  -  1  -     Putting on and taking off regular upper body clothing  1  -  1  -     Taking care of personal grooming (such as brushing teeth)  1  -  1  -CJ     Eating meals  1  -  1  -     AM-PAC 6 Clicks Score (OT)  6  -  6  -        Functional Assessment    Outcome Measure Options  AM-PAC 6 Clicks Daily Activity (OT)  -  AM-PAC 6 Clicks Daily Activity (OT)  -       User Key  (r) = Recorded By, (t) = Taken By, (c) = Cosigned By    Initials Name Provider Type     Favian Ng, ORELLANA/L Occupational Therapy Assistant          Time Calculation:   Time Calculation- OT     Row Name 01/06/21 1335             Time Calculation- OT    OT Start Time  1335  -      OT Stop Time  1403  -      OT Time " Calculation (min)  28 min  -      Total Timed Code Minutes- OT  28 minute(s)  -      TCU Minutes- OT  28 min  -      OT Received On  01/06/21  -         Timed Charges    67108 - OT Therapeutic Exercise Minutes  28  -        User Key  (r) = Recorded By, (t) = Taken By, (c) = Cosigned By    Initials Name Provider Type     Favian Ng COTA/L Occupational Therapy Assistant        Therapy Charges for Today     Code Description Service Date Service Provider Modifiers Qty    42087765807 HC OT THER PROC EA 15 MIN 1/5/2021 Favian Ng COTA/L GO 2    72026462167 HC OT THER PROC EA 15 MIN 1/6/2021 Favian Ng COTA/L GO 2               ROSLYN Estrada  1/6/2021

## 2021-01-06 NOTE — PROGRESS NOTES
"    [facility] Medicine Services  INPATIENT PROGRESS NOTE    Patient Name: Joann Finnegan  Date of Admission: 12/11/2020  Today's Date: 01/06/21  Length of Stay: 26  Primary Care Physician: Janak Sherwood APRN    Subjective   Chief Complaint: follow-up COVID  Shortness of Breath    Patient just returned from her swallow evaluation.  Results unknown at this time.  Dobbhoff tube in place and receiving tube feeds.  She denies any pain.  She denies any shortness of breath.  She reports that she feels very weak.  Even tasks such as getting out of the bed and going to the bedside chair cause her to feel \"wiped out.\"    Review of Systems   Respiratory: Positive for shortness of breath.         All pertinent negatives and positives are as above. All other systems have been reviewed and are negative unless otherwise stated.     Objective    Temp:  [97.2 °F (36.2 °C)-99 °F (37.2 °C)] 99 °F (37.2 °C)  Heart Rate:  [80-92] 84  Resp:  [14-18] 18  BP: (154-175)/(61-78) 168/66  Physical Exam  Vitals signs reviewed.   Constitutional:       Appearance: She is not toxic-appearing.   HENT:      Nose:      Comments: Dobbhoff tube in place     Mouth/Throat:      Pharynx: No oropharyngeal exudate.      Comments: Voice getting stronger  Eyes:      Pupils: Pupils are equal, round, and reactive to light.   Cardiovascular:      Rate and Rhythm: Normal rate.   Pulmonary:      Effort: Pulmonary effort is normal. No respiratory distress.      Breath sounds: No rales.      Comments: Diminished at bases; currently on 4LNC  Musculoskeletal:         General: No swelling.   Skin:     Comments: Chin and left cheek unchanged    Neurological:      General: No focal deficit present.      Mental Status: She is alert.      Motor: Weakness present.   Psychiatric:         Mood and Affect: Mood normal.         Results Review:  I have reviewed the labs, radiology results, and diagnostic studies.    Laboratory Data: "   Results from last 7 days   Lab Units 01/04/21  0306 01/01/21  0522   WBC 10*3/mm3 10.82* 12.25*   HEMOGLOBIN g/dL 12.4 12.8   HEMATOCRIT % 36.5 37.4   PLATELETS 10*3/mm3 153 170        Results from last 7 days   Lab Units 01/03/21  0626 01/02/21  1033 01/01/21  0523   SODIUM mmol/L 144 143 144   POTASSIUM mmol/L 3.5 3.5 3.7   CHLORIDE mmol/L 110* 105 106   CO2 mmol/L 27.0 25.0 26.0   BUN mg/dL 37* 33* 22   CREATININE mg/dL 0.58 0.70 0.55*   CALCIUM mg/dL 8.9 9.8 9.2   BILIRUBIN mg/dL 0.4  --   --    ALK PHOS U/L 90  --   --    ALT (SGPT) U/L 51*  --   --    AST (SGOT) U/L 56*  --   --    GLUCOSE mg/dL 142* 135* 129*       Culture Data:   Blood Culture   Date Value Ref Range Status   12/30/2020 No growth at 4 days  Preliminary   12/30/2020 No growth at 4 days  Preliminary     Urine Culture   Date Value Ref Range Status   01/01/2021 Yeast isolated (A)  Final     Respiratory Culture   Date Value Ref Range Status   12/29/2020 Heavy growth (4+) Staphylococcus aureus (A)  Final   12/29/2020 No Normal Respiratory Hailee (A)  Final       Radiology Data:   Imaging Results (Last 24 Hours)     Procedure Component Value Units Date/Time    FL Video Swallow With Speech Single Contrast [[id number]] Resulted: 01/06/21 1248     Updated: 01/06/21 1254          I have reviewed the patient's current medications.     Assessment/Plan     Active Hospital Problems    Diagnosis   • **Pneumonia due to COVID-19 virus   • Aphonia   • Xerostomia   • Post viral debility   • Thrombocytopenia (CMS/HCC)   • Acute respiratory distress syndrome (ARDS) due to COVID-19 virus (CMS/HCC)   • Elevated LFTs   • Leukocytosis   • Elevated ferritin and CRP   • Obesity (BMI 30-39.9)   • Acute respiratory failure with hypoxia (CMS/HCC)   • Dysphagia, oropharyngeal     Plan:  1.  IV Cefazolin 6/7 - started on evening of 12/31 for heavy growth of MSSA in sputum culture  2.  Follow-up results of video swallow evaluation to determine if PO diet can be started  3.   Continue to wean 02 as tolerated  4.  Currently with Dobbhoff in place; TFs for nutrition.  Pending ST recommendations will determine diet/nutrition strategy moving forward.  5.  PT, OT  6.  IV Labetalol PRN per parameters; nursing having trouble administering PO meds via DHT at this time.  TFs and free H20 flush going through DHT without problem; meds not wanting to pass through as easily; hoping to start PO meds soon pending ST recs.  Start scheduled Vasotec for now  7.  Monitor off of Dexamethasone  8.  Lovenox PPx  9.  Dispo: continue looking into placement options as patient is going to need a lot of therapy for rehabilitation.  This becomes much more clear pending if Dobbhoff tube will be required for nutrition and medication adminstration  10.  Updated family at bedside this afternoon.       Electronically signed by Ellis Wise MD, 01/06/21, 13:00 CST.

## 2021-01-06 NOTE — TELEPHONE ENCOUNTER
Called pt to give Mammogram and appointment information on 1/21/2021 with Doctor Betty Ratliff. I didn't get answer so I left message on vm. Mammogram arrival time 2:15 and apt time 2:20. After mammogram appointment follow up with Doctor Betty Ratliff at 3:15.

## 2021-01-06 NOTE — PROGRESS NOTES
INFECTIOUS DISEASES PROGRESS NOTE    Patient:  Joann Finnegan  YOB: 1950  MRN: [id number]   Admit date: 12/11/2020   Admitting Physician: Ellis Wise MD  Primary Care Physician: Janak Sherwood APRN      Chief Complaint:   Still tired    Interval History: Patient currently working with speech therapy.  She is cleaning out her mouth.  Plans are for swallow study downstairs.    Appears she is still requiring 4 L of oxygen and sats are around 92%      Allergies:   Allergies   Allergen Reactions   • Requip [Ropinirole Hcl] Nausea And Vomiting       Current Meds:     Current Facility-Administered Medications:   •  acetaminophen (TYLENOL) tablet 650 mg, 650 mg, Nasogastric, Q4H PRN **OR** acetaminophen (TYLENOL) suppository 650 mg, 650 mg, Rectal, Q4H PRN, Samir Teague MD, 650 mg at 12/30/20 1755  •  albuterol sulfate HFA (PROVENTIL HFA;VENTOLIN HFA;PROAIR HFA) inhaler 2 puff, 2 puff, Inhalation, Q6H PRN, Samir Teague MD  •  albuterol sulfate HFA (PROVENTIL HFA;VENTOLIN HFA;PROAIR HFA) inhaler 2 puff, 2 puff, Inhalation, TID - RT, Samir Teague MD, 2 puff at 01/06/21 0704  •  benzonatate (TESSALON) capsule 100 mg, 100 mg, Oral, TID PRN, Samir Teague MD, 100 mg at 12/12/20 2034  •  ceFAZolin in 0.9% normal saline (ANCEF) IVPB solution 2 g, 2 g, Intravenous, Q8H, Mervat Ayon MD, Last Rate: 200 mL/hr at 01/06/21 0847, 2 g at 01/06/21 0847  •  dextrose (D50W) 25 g/ 50mL Intravenous Solution 25 g, 25 g, Intravenous, Q15 Min PRN, Samir Teague MD  •  dextrose (GLUTOSE) oral gel 15 g, 15 g, Oral, Q15 Min PRN, Samir Teague MD  •  enoxaparin (LOVENOX) syringe 40 mg, 40 mg, Subcutaneous, Q12H, Samir Teague MD, 40 mg at 01/06/21 0848  •  famotidine (PEPCID) injection 20 mg, 20 mg, Intravenous, Q12H, Ellis Wise MD, 20 mg at 01/06/21 0848  •  glucagon (human recombinant) (GLUCAGEN DIAGNOSTIC) injection 1 mg, 1 mg, Subcutaneous, Q15  "Min PRN, Samir Teageu MD  •  insulin lispro (humaLOG) injection 2-7 Units, 2-7 Units, Subcutaneous, Q6H, Samir Teague MD, 2 Units at 21 1156  •  labetalol (NORMODYNE,TRANDATE) injection 10 mg, 10 mg, Intravenous, Q4H PRN, Ellis Wise MD, 10 mg at 21 1253  •  levothyroxine (SYNTHROID, LEVOTHROID) tablet 125 mcg, 125 mcg, Nasogastric, Q AM, Samir Teague MD, 125 mcg at 21 0650  •  lidocaine (XYLOCAINE) 4 % external solution 1 application, 1 application, Topical, Once, Taurus Eastman Jr., MD  •  ondansetron (ZOFRAN) injection 4 mg, 4 mg, Intravenous, Q6H PRN, Samir Teague MD  •  oxymetazoline (AFRIN) nasal spray 2 spray, 2 spray, Each Nare, TID, Taurus Eastman Jr., MD, 2 spray at 21 0849  •  polyethylene glycol (MIRALAX) packet 17 g, 17 g, Nasogastric, Daily, Samir Teague MD, 17 g at 21 0949  •  sennosides-docusate (PERICOLACE) 8.6-50 MG per tablet 1 tablet, 1 tablet, Nasogastric, BID, Samir Teague MD, 1 tablet at 21 0949  •  sodium chloride 0.9 % flush 10 mL, 10 mL, Intravenous, PRN, Samir Teague MD, 10 mL at 21 0806  •  sodium chloride nasal spray 2 spray, 2 spray, Each Nare, 5x Daily, Taurus Eastman Jr., MD, 2 spray at 21 0849  •  sodium chloride nasal spray 2 spray, 2 spray, Each Nare, Continuous PRN, Taurus Eastman Jr., MD      Review of Systems   Constitutional: Negative for fever.   Neurological: Positive for weakness.       Objective     Vital Signs:  Temp (24hrs), Av.7 °F (36.5 °C), Min:97.2 °F (36.2 °C), Max:98.1 °F (36.7 °C)      /78 (BP Location: Left arm, Patient Position: Lying)   Pulse 86   Temp 97.2 °F (36.2 °C) (Axillary)   Resp 14   Ht 165.1 cm (65\")   Wt 82.1 kg (181 lb)   LMP 10/26/2003 (Exact Date) Comment: Hyst for DUB  SpO2 95%   BMI 30.12 kg/m²         Physical Exam   General: Patient continues to appear quite weak.  Speech therapist at bedside, " her nurse is at bedside and her daughter is at bedside.    HEENT: O2 per nasal cannula in place it is set at 4 L..  Deep tissue injury to face from proning i stable   Respiratory: Effort even and unlabored.  Diminished breath sounds bilaterally.  Abdomen soft, bowel sounds positive  Neuro: Alert, still mouthing words but easier to understand.  Was able to lift up her arms better today perhaps better with the right than the left.   still remains quite weak.  Psych: Pleasant cooperative    Results Review:    I reviewed the patient's new clinical results.    Lab Results:  CBC:   Lab Results   Lab 01/01/21  0522 01/04/21  0306   WBC 12.25* 10.82*   HEMOGLOBIN 12.8 12.4   HEMATOCRIT 37.4 36.5   PLATELETS 170 153   LYMPHOCYTE %  --  8.2*   MONOCYTES %  --  5.2       CMP:   Lab Results   Lab 01/01/21  0523 01/02/21  1033 01/03/21  0626   SODIUM 144 143 144   POTASSIUM 3.7 3.5 3.5   CHLORIDE 106 105 110*   CO2 26.0 25.0 27.0   BUN 22 33* 37*   CREATININE 0.55* 0.70 0.58   CALCIUM 9.2 9.8 8.9   BILIRUBIN  --   --  0.4   ALK PHOS  --   --  90   ALT (SGPT)  --   --  51*   AST (SGOT)  --   --  56*   GLUCOSE 129* 135* 142*       Microscopic urinalysis with 31-50 WBCs, 0-2 RBCs and 3-6 squamous epis with some budding yeast noted    Culture Results:        Microbiology Results Abnormal     Procedure Component Value - Date/Time    Blood Culture - Blood, Arm, Left [[id number]] Collected: 12/30/20 2208    Lab Status: Final result Specimen: Blood from Arm, Left Updated: 01/04/21 2230     Blood Culture No growth at 5 days    Blood Culture - Blood, Hand, Right [[id number]] Collected: 12/30/20 1935    Lab Status: Final result Specimen: Blood from Hand, Right Updated: 01/04/21 2015     Blood Culture No growth at 5 days    Urine Culture - Urine, Urine, Catheter In/Out [[id number]]  (Abnormal) Collected: 01/01/21 0301    Lab Status: Final result Specimen: Urine, Catheter In/Out Updated: 01/02/21 1026     Urine Culture Yeast isolated     Respiratory Culture - Sputum, ET Suction [[id number]]  (Abnormal)  (Susceptibility) Collected: 12/29/20 1955    Lab Status: Final result Specimen: Sputum from ET Suction Updated: 12/31/20 1051     Respiratory Culture Heavy growth (4+) Staphylococcus aureus      No Normal Respiratory Hailee     Gram Stain Moderate (3+) WBCs seen      Many (4+) Gram positive cocci    Susceptibility      Staphylococcus aureus     ISRA     Clindamycin Resistant     Oxacillin Susceptible     Penicillin G Resistant     Tetracycline Susceptible     Trimethoprim + Sulfamethoxazole Susceptible     Vancomycin Susceptible                    Blood Culture - Blood, Arm, Left [[id number]] Collected: 12/19/20 1440    Lab Status: Final result Specimen: Blood from Arm, Left Updated: 12/24/20 1530     Blood Culture No growth at 5 days    Blood Culture - Blood, Blood, Central Line [[id number]] Collected: 12/19/20 1435    Lab Status: Final result Specimen: Blood, Central Line Updated: 12/24/20 1530     Blood Culture No growth at 5 days    Respiratory Culture - Sputum, ET Suction [[id number]]  (Abnormal) Collected: 12/20/20 0046    Lab Status: Final result Specimen: Sputum from ET Suction Updated: 12/22/20 1016     Respiratory Culture Scant growth (1+) Candida albicans      No Normal Respiratory Hailee     Gram Stain Greater than 25 WBCs per low power field      Less than 25 Epithelial cells per low power field      Few (2+) Budding yeast    Respiratory Culture - Sputum, ET Suction [[id number]] Collected: 12/15/20 1148    Lab Status: Final result Specimen: Sputum from ET Suction Updated: 12/17/20 0829     Respiratory Culture Scant growth (1+) Normal Respiratory Hailee: NO S.aureus/MRSA or Pseudomonas aeruginosa     Gram Stain Greater than 25 WBCs per low power field      No Epithelial cells per low power field      No organisms seen    Blood Culture - Blood, Arm, Left [[id number]] Collected: 12/11/20 1220    Lab Status: Final result Specimen: Blood from Arm, Left  Updated: 12/16/20 1301     Blood Culture No growth at 5 days    Blood Culture - Blood, Arm, Left [[id number]] Collected: 12/11/20 1221    Lab Status: Final result Specimen: Blood from Arm, Left Updated: 12/16/20 1301     Blood Culture No growth at 5 days    MRSA Screen, PCR (Inpatient) - Swab, Nares [[id number]]  (Normal) Collected: 12/15/20 1148    Lab Status: Final result Specimen: Swab from Nares Updated: 12/15/20 1401     MRSA PCR No MRSA Detected    Legionella Antigen, Urine - Urine, Urine, Clean Catch [[id number]]  (Normal) Collected: 12/11/20 1829    Lab Status: Final result Specimen: Urine, Clean Catch Updated: 12/11/20 1935     LEGIONELLA ANTIGEN, URINE Negative    S. Pneumo Ag Urine or CSF - Urine, Urine, Clean Catch [[id number]]  (Normal) Collected: 12/11/20 1829    Lab Status: Final result Specimen: Urine, Clean Catch Updated: 12/11/20 1934     Strep Pneumo Ag Negative    Respiratory Panel, PCR (WITHOUT COVID) - Swab, Nasopharynx [[id number]]  (Normal) Collected: 12/11/20 1719    Lab Status: Final result Specimen: Swab from Nasopharynx Updated: 12/11/20 1825     ADENOVIRUS, PCR Not Detected     Coronavirus 229E Not Detected     Coronavirus HKU1 Not Detected     Coronavirus NL63 Not Detected     Coronavirus OC43 Not Detected     Human Metapneumovirus Not Detected     Human Rhinovirus/Enterovirus Not Detected     Influenza B PCR Not Detected     Parainfluenza Virus 1 Not Detected     Parainfluenza Virus 2 Not Detected     Parainfluenza Virus 3 Not Detected     Parainfluenza Virus 4 Not Detected     Bordetella pertussis pcr Not Detected     Influenza A H1 2009 PCR Not Detected     Chlamydophila pneumoniae PCR Not Detected     Mycoplasma pneumo by PCR Not Detected     Influenza A PCR Not Detected     Influenza A H3 Not Detected     Influenza A H1 Not Detected     RSV, PCR Not Detected     Bordetella parapertussis PCR Not Detected    Narrative:      The coronavirus on the RVP is NOT COVID-19 and is NOT indicative  of infection with COVID-19.     COVID PRE-OP / PRE-PROCEDURE SCREENING ORDER (NO ISOLATION) - Swab, Nasal Cavity [[id number]]  (Abnormal) Collected: 12/11/20 1346    Lab Status: Final result Specimen: Swab from Nasal Cavity Updated: 12/11/20 1431    Narrative:      The following orders were created for panel order COVID PRE-OP / PRE-PROCEDURE SCREENING ORDER (NO ISOLATION) - Swab, Nasal Cavity.  Procedure                               Abnormality         Status                     ---------                               -----------         ------                     COVID-19, ABBOTT IN-HOUS...[[id number]]  Abnormal            Final result                 Please view results for these tests on the individual orders.    COVID-19, ABBOTT IN-HOUSE,NP Swab (NO TRANSPORT MEDIA) 2 HR TAT - Swab, Nasopharynx [[id number]]  (Abnormal) Collected: 12/11/20 1346    Lab Status: Final result Specimen: Swab from Nasopharynx Updated: 12/11/20 1431     COVID19 Detected    Narrative:      Fact sheet for providers: https://www.fda.gov/media/483428/download     Fact sheet for patients: https://www.fda.gov/media/669124/download    Test performed by PCR.                Radiology:   Imaging Results (Last 72 Hours)     Procedure Component Value Units Date/Time    XR Abdomen KUB [[id number]] Collected: 01/04/21 0734     Updated: 01/04/21 0738    Narrative:      EXAMINATION: XR ABDOMEN KUB- 1/4/2021 7:34 AM CST     HISTORY: Dobbhoff tube placement     COMPARISON: 1/2/2021     FINDINGS:  Dobbhoff tube is in place with tip extending below the diaphragm and  projecting over the left upper quadrant of the abdomen, presumably in  the gastric fundus. The visualized bowel gas pattern is nonobstructive.  Bilateral airspace opacities persist. In the right axilla.       Impression:      Dobbhoff tube tip projects over and is presumably within the  gastric fundus.        This report was finalized on 01/04/2021 07:34 by Dr. Dilshad Bray MD.    XR Abdomen  KUB [[id number]] Collected: 01/04/21 0731     Updated: 01/04/21 0735    Narrative:      EXAMINATION: XR ABDOMEN KUB- 1/4/2021 7:31 AM CST     HISTORY: Evaluate Dobbhoff tube position     COMPARISON: 1/4/2021 at 1:05 AM     FINDINGS:  Dobbhoff tube is in place with tip curled and projecting in the left  upper quadrant of the abdomen, presumably in the fundus of the stomach.  Bilateral airspace opacities are again noted. No intraperitoneal free  air is identified. Visualized bowel gas pattern is nonobstructive.       Impression:      Dobbhoff tube tip projects over and is presumably within the  gastric fundus.  This report was finalized on 01/04/2021 07:32 by Dr. Dilshad Bray MD.          Assessment/Plan     Active Hospital Problems    Diagnosis   • **Pneumonia due to COVID-19 virus   • Aphonia   • Xerostomia   • Post viral debility   • Thrombocytopenia (CMS/HCC)   • Acute respiratory distress syndrome (ARDS) due to COVID-19 virus (CMS/HCC)   • Elevated LFTs   • Leukocytosis   • Elevated ferritin and CRP   • Obesity (BMI 30-39.9)   • Acute respiratory failure with hypoxia (CMS/HCC)   • Dysphagia, oropharyngeal       IMPRESSION:  · COVID-19 infection with pneumonia-symptoms start December 1, patient tested + Dec 2 and received antibiotics and steroids early in course of infection per her nurse practitioner-patient completed remdesivir December 15.    · Fever resolved.  Sputum cultures positive for 4+ methicillin susceptible Staphylococcus aureus.  Could be colonizing airway but obvious concern given recent fever.  Urine culture with yeast.  Dosed with fluconazole x1  · Acute respiratory failure with hypoxemia-extubated December 30, 2020 (status post empiric treatment with Zosyn from 12/15-12/22) nasal cannula on wall is at 2 L although per vital signs they are recording 4 L  · thrombocytopenia---resolved  · leukocytosis -resolved and with increased January 1 to 12,000-and declined January 4  · elevated liver  "enzymes-improved  · MRSA screen negative      RECOMMENDATION:     Continue cefazolin day #6/7, monitoring of oxygen status given positive sputum culture \"MSSA\", etc.  Monitor temperature curve closely.  Continue work with therapy.  DVT prophylaxis with Lovenox  Continue to increase activity with PT, OT and speech therapy.    Mervat Ayon MD  01/06/21  09:00 CST      "

## 2021-01-06 NOTE — PLAN OF CARE
"  Problem: Adult Inpatient Plan of Care  Goal: Plan of Care Review  Outcome: Ongoing, Progressing  Flowsheets (Taken 1/6/2021 1335)  Progress: improving  Plan of Care Reviewed With:   patient   spouse  Outcome Summary: Pt. fowlers in bed, pt. says \"I'm wiped out\"! Prom performed by this orellana/l sec. pt. not able to flex elbow or shd. with Lue functionally, no issues or restrictions noted with Lue! Aarom with ue's to increase Lue with functional ability! Pt. able to use 2lb. bar for elbow flex/ext exs after Prom/Aarom from this orellana/l! Pt. able to perform 2 sets x 10 reps elbow flex/ext before L. hand() weakened to where pt. couldn't hold bar!   Goal Outcome Evaluation:  Plan of Care Reviewed With: patient, spouse  Progress: improving  Outcome Summary: Pt. fowlers in bed, pt. says \"I'm wiped out\"! Prom performed by this orellana/l sec. pt. not able to flex elbow or shd. with Lue functionally, no issues or restrictions noted with Lue! Aarom with ue's to increase Lue with functional ability! Pt. able to use 2lb. bar for elbow flex/ext exs after Prom/Aarom from this orellana/l! Pt. able to perform 2 sets x 10 reps elbow flex/ext before L. hand() weakened to where pt. couldn't hold bar!  "

## 2021-01-06 NOTE — THERAPY TREATMENT NOTE
Acute Care - Physical Therapy Treatment Note  [facility]     Patient Name: Joann Finnegan  : 1950  MRN: [id number]  Today's Date: 2021           PT Assessment (last 12 hours)      PT Evaluation and Treatment     Row Name 21 0933          Physical Therapy Time and Intention    Subjective Information  complains of;weakness;fatigue  -     Document Type  therapy note (daily note)  -     Mode of Treatment  physical therapy  -     Row Name 2133          General Information    Patient/Family/Caregiver Comments/Observations  daughter  present  -     Existing Precautions/Restrictions  fall;oxygen therapy device and L/min NG tube  -     Barriers to Rehab  medically complex  -     Row Name 2133          Pain Scale: Numbers Pre/Post-Treatment    Pretreatment Pain Rating  0/10 - no pain  -     Posttreatment Pain Rating  0/10 - no pain  -     Row Name 2133          Bed Mobility    Rolling Left Mayo (Bed Mobility)  contact guard;minimum assist (75% patient effort);verbal cues  -     Rolling Right Mayo (Bed Mobility)  minimum assist (75% patient effort);contact guard;verbal cues  -     Scooting/Bridging Mayo (Bed Mobility)  moderate assist (50% patient effort);verbal cues  -     Supine-Sit Mayo (Bed Mobility)  minimum assist (75% patient effort);verbal cues  -     Sit-Supine Mayo (Bed Mobility)  minimum assist (75% patient effort);verbal cues  -     Comment (Bed Mobility)  sat EOB 15 minutes  -     Row Name 2133          Transfers    Transfers  sit-stand transfer;stand-sit transfer  -     Comment (Transfers)  stood x 2  -     Sit-Stand Mayo (Transfers)  moderate assist (50% patient effort);2 person assist;verbal cues  -     Stand-Sit Mayo (Transfers)  minimum assist (75% patient effort);moderate assist (50% patient effort);2 person assist;verbal cues  -[facility] Name 21 0933           Sit-Stand Transfer    Assistive Device (Sit-Stand Transfers)  walker front-wheeldelmy MONTIEL     Row Name 01/06/21 0933          Aerobic Exercise    Comment, Aerobic Exercise (Therapeutic Exercise)  B LAQ  -BART     Row Name             Wound 12/16/20 0957 Right lower chin    Wound - Properties Group Placement Date: 12/16/20  -KM Placement Time: 0957  -KM Side: Right  -KM Orientation: lower  -KM Location: chin  -KM Stage, Pressure Injury : deep tissue injury  -KM    Retired Wound - Properties Group Date first assessed: 12/16/20  -KM Time first assessed: 0957  -KM Side: Right  -KM Location: chin  -KM    Row Name             Wound 12/16/20 0959 Right upper lip    Wound - Properties Group Placement Date: 12/16/20  -KM Placement Time: 0959  -KM Present on Hospital Admission: N  -KM Side: Right  -KM Orientation: upper  -KM Location: lip  -KM Stage, Pressure Injury : deep tissue injury  -KM    Retired Wound - Properties Group Date first assessed: 12/16/20  -KM Time first assessed: 0959  -KM Present on Hospital Admission: N  -KM Side: Right  -KM Location: lip  -KM    Row Name             Wound 12/18/20 1425 Left cheek Pressure Injury    Wound - Properties Group Placement Date: 12/18/20  -RM Placement Time: 1425  -RM Present on Hospital Admission: N  -RM Side: Left  -RM Location: cheek  -RM Primary Wound Type: Pressure inj  -RM    Retired Wound - Properties Group Date first assessed: 12/18/20  -RM Time first assessed: 1425  -RM Present on Hospital Admission: N  -RM Side: Left  -RM Location: cheek  -RM Primary Wound Type: Pressure inj  -RM    Row Name             Wound 12/21/20 1118 Left nose Pressure Injury    Wound - Properties Group Placement Date: 12/21/20  -CH Placement Time: 1118  -CH Present on Hospital Admission: N  -CH Side: Left  -CH Location: nose  -CH Primary Wound Type: Pressure inj  -CH    Retired Wound - Properties Group Date first assessed: 12/21/20  -CH Time first assessed: 1118  -CH Present on Hospital  Admission: N  -CH Side: Left  - Location: nose  -CH Primary Wound Type: Pressure inj  -CH    Row Name 01/06/21 0933          Plan of Care Review    Plan of Care Reviewed With  patient;daughter  -     Progress  improving  -     Outcome Summary  pt trans to EOB min assist, sat EOB 15minutes cga, sit-stand min-mod of 2, pt stood x 2, was able to stand while nsg changed bed linens, trans back to bed min-mod of 2, pt would benefit from cont therapy  -     Row Name 01/06/21 0933          Vital Signs    Pre SpO2 (%)  100  -     O2 Delivery Pre Treatment  nasal cannula 4L  -AH     Post SpO2 (%)  85  -AH     O2 Delivery Post Treatment  nasal cannula 4L  -AH     Row Name 01/06/21 0933          Positioning and Restraints    Pre-Treatment Position  in bed  -     Post Treatment Position  bed  -     In Bed  side lying left;call light within reach;encouraged to call for assist;with family/caregiver;pillow between legs  [facility]       User Key  (r) = Recorded By, (t) = Taken By, (c) = Cosigned By    Initials Name Provider Type     Malena Garcia, KATHY Physical Therapy Assistant    Maine Jaramillo, RN Registered Nurse    Ya Dodson, RN Registered Nurse    Gisselle Buchanan RN Registered Nurse        Physical Therapy Education                 Title: PT OT SLP Therapies (In Progress)     Topic: Physical Therapy (In Progress)     Point: Mobility training (Done)     Learning Progress Summary           Patient Acceptance, E,TB, VU by  at 1/6/2021 1008    Comment: trans    Acceptance, E,TB,D, DU,NR by  at 1/4/2021 1315    Comment: Education to reinforce importance of activity/risks assoc w/ bed rest; education for improved tech w/ bed mobility; education for proper deep breathing and for positioning to assist w/ edema mgmt and for pressure relief    Acceptance, E, NR by WK at 1/2/2021 0911    Comment: BOA    Acceptance, E, NL by WK at 1/1/2021 0935    Comment: BOA    Acceptance, E, NL by MS at 12/28/2020  0900    Comment: role of PT in her care   Family Acceptance, E,TB, VU by  at 1/6/2021 1008    Comment: trans                   Point: Home exercise program (Done)     Learning Progress Summary           Patient Acceptance, E,TB,D,H, VU by  at 1/5/2021 0906    Comment: BLE HEP   Significant Other Acceptance, E,TB,D,H, VU by  at 1/5/2021 0906    Comment: BLE HEP                   Point: Body mechanics (Not Started)     Learner Progress:  Not documented in this visit.          Point: Precautions (Done)     Learning Progress Summary           Patient Acceptance, E,TB,D, DU,NR by  at 1/4/2021 1315    Comment: Education to reinforce importance of activity/risks assoc w/ bed rest; education for improved tech w/ bed mobility; education for proper deep breathing and for positioning to assist w/ edema mgmt and for pressure relief                               User Key     Initials Effective Dates Name Provider Type Discipline     08/02/16 -  Malena Garcia, PTA Physical Therapy Assistant PT    MS 06/19/18 -  Yennifer May, PT, DPT, NCS Physical Therapist PT    WK 08/02/16 -  Rufina Degroot PTA Physical Therapy Assistant PT     08/02/18 -  Terri Gomez, PT Physical Therapist PT              PT Recommendation and Plan     Plan of Care Reviewed With: patient, daughter  Progress: improving  Outcome Summary: pt trans to EOB min assist, sat EOB 15minutes cga, sit-stand min-mod of 2, pt stood x 2, was able to stand while nsg changed bed linens, trans back to bed min-mod of 2, pt would benefit from cont therapy  Outcome Measures     Row Name 01/05/21 0728             How much help from another is currently needed...    Putting on and taking off regular lower body clothing?  1  -CJ      Bathing (including washing, rinsing, and drying)  1  -CJ      Toileting (which includes using toilet bed pan or urinal)  1  -CJ      Putting on and taking off regular upper body clothing  1  -CJ      Taking care of personal  grooming (such as brushing teeth)  1  -      Eating meals  1  -      AM-[facility] 6 Clicks Score (OT)  6  -         Functional Assessment    Outcome Measure Options  AM-[facility] 6 Clicks Daily Activity (OT)  -        User Key  (r) = Recorded By, (t) = Taken By, (c) = Cosigned By    Initials Name Provider Type     Favian Ng COTA/L Occupational Therapy Assistant           Time Calculation:   PT Charges     Row Name 01/06/21 1008             Time Calculation    Start Time  0933  -      Stop Time  1003  -      Time Calculation (min)  30 min  -      PT Received On  01/06/21  -         Time Calculation- PT    Total Timed Code Minutes- PT  30 minute(s)  -         Timed Charges    65034 - PT Therapeutic Activity Minutes  30  -        User Key  (r) = Recorded By, (t) = Taken By, (c) = Cosigned By    Initials Name Provider Type     Malena Garcia PTA Physical Therapy Assistant        Therapy Charges for Today     Code Description Service Date Service Provider Modifiers Qty    85888814917 HC PT THER PROC EA 15 MIN 1/5/2021 Malena Garcia, PTA GP 2    73940075482 HC PT THERAPEUTIC ACT EA 15 MIN 1/5/2021 Malena Garcia, PTA GP 1    99171458317 HC PT THERAPEUTIC ACT EA 15 MIN 1/5/2021 Malena Garcia, PTA GP 2    20107728617 HC PT THERAPEUTIC ACT EA 15 MIN 1/6/2021 Malena Garcia, PTA GP 2          PT G-Codes  Outcome Measure Options: AM-[facility] 6 Clicks Daily Activity (OT)  AM-[facility] 6 Clicks Score (PT): 8  AM-[facility] 6 Clicks Score (OT): 6    Malena Garcia PTA  1/6/2021

## 2021-01-06 NOTE — MBS/VFSS/FEES
Acute Care - Speech Language Pathology   Swallow Initial Evaluation [facility]     Patient Name: Joann Finnegan  : 1950  MRN: [id number]  Today's Date: 2021               Admit Date: 2020  VFSS complete. Pt noted to have generalized oral motor weakness and is edentulous. Due to this she was not presented with mechanical soft or regular solids. Due to decrease back and base of tognue strenght as well as delay in swallow initiation she she exhibited premature loss to the point of the vallecula with honey thick and nectar thick liquids. Her hylaryngeal excursion, laryngeal elevation, and epiglottic inversion is decreased by a moderate-severe amount with all consistencies. Due to this she had krystle aspiration of nectar thick liquids. She exhibited a reflexive cough; however, some residue remained within the vallecula and along the vocal cords. No significant oropharyngeal residue present post trials. No other definite penetration or aspiration into the airway observed.   Recommendations:  1.) Pureed diet with honey thick liquids.   2.) Ok for meds to be administered crushed in pudding/applesauce.   3.)Ok for ice chips between meals 30 minutes following any other PO intake.   4.) SLP will continue to follow and treat.  Davi Benedict, MS CCC-SLP 2021 15:12 CST      Visit Dx:     ICD-10-CM ICD-9-CM   1. Pneumonia due to COVID-19 virus  U07.1 480.8    J12.89    2. Hypoxic  R09.02 799.02   3. Acute respiratory failure with hypoxia (CMS/HCC)  J96.01 518.81   4. Impaired mobility  Z74.09 799.89   5. Impaired mobility and ADLs  Z74.09 V49.89    Z78.9    6. Oropharyngeal dysphagia  R13.12 787.22     Patient Active Problem List   Diagnosis   • Dysphagia, oropharyngeal   • Heartburn   • Family hx colonic polyps   • Pneumonia due to COVID-19 virus   • Acute respiratory failure with hypoxia (CMS/HCC)   • Obesity (BMI 30-39.9)   • Elevated ferritin and CRP   • Acute respiratory distress syndrome (ARDS) due  to COVID-19 virus (CMS/HCC)   • Elevated LFTs   • Leukocytosis   • Thrombocytopenia (CMS/HCC)   • Aphonia   • Xerostomia   • Post viral debility     Past Medical History:   Diagnosis Date   • Abnormal weight gain    • Arthritis    • Breast cancer (CMS/HCC)     right.    • Fatigue    • H/O melanoma excision     CLARKS  LEVEL III      BACK OF UPPER LEFT ARM   • Hypertension    • Hypothyroidism    • Melanoma (CMS/HCC)    • Restless leg syndrome    • Shortness of breath      Past Surgical History:   Procedure Laterality Date   • APPENDECTOMY     • BREAST BIOPSY Right    • BREAST BIOPSY     • BREAST LUMPECTOMY      right.    • BROW LIFT     • COLONOSCOPY     • COLONOSCOPY N/A 2/14/2020    Procedure: COLONOSCOPY WITH ANESTHESIA;  Surgeon: Donovan Hernandez MD;  Location: [facility] ENDOSCOPY;  Service: Gastroenterology;  Laterality: N/A;  pre: family hx colon polyps  post: polyps  Janak Sherwood APRN   • ENDOSCOPY N/A 2/14/2020    Procedure: ESOPHAGOGASTRODUODENOSCOPY WITH ANESTHESIA;  Surgeon: Donovan Hernandez MD;  Location: [facility] ENDOSCOPY;  Service: Gastroenterology;  Laterality: N/A;  pre: dysphagia; heartburn  post: dilated  Janak Sherwood APRN   • ENDOSCOPY AND COLONOSCOPY  12/22/2011    very minimal distal esophagitis, incomplete esophagus ring dilated   • LUMBAR SPINAL TUMOR REMOVAL      Spinal Cyst removed from Spinal Canal   • MASTECTOMY, PARTIAL     • PARTIAL HIP ARTHROPLASTY Left    • SKIN CANCER EXCISION      Melanoma    • SQUAMOUS CELL CARCINOMA EXCISION     • TEMPOROMANDIBULAR JOINT ARTHROPLASTY     • TOTAL ABDOMINAL HYSTERECTOMY WITH SALPINGO OOPHORECTOMY Bilateral         SWALLOW EVALUATION (last 72 hours)      SLP Adult Swallow Evaluation     Row Name 01/06/21 1300 01/06/21 0817 01/05/21 0915 01/04/21 1231          Rehab Evaluation    Document Type  evaluation  -CS  therapy note (daily note)  -MB  therapy note (daily note)  -MB  therapy note (daily note)  -MB     Subjective Information   complains of;weakness  -CS  complains of;fatigue  -MB  no complaints  -MB  no complaints  -MB     Patient Observations  alert;cooperative;agree to therapy  -CS  alert;cooperative  -MB  alert;cooperative  -MB  alert;cooperative  -MB     Patient/Family/Caregiver Comments/Observations  No family present  -CS  Daughter present  -MB   present  -MB  No family present  -MB     Care Plan Review  care plan/treatment goals reviewed  -CS  --  --  --     Care Plan Review, Other Participant(s)  family  -CS  --  --  --     Patient Effort  good  -CS  good  -MB  good  -MB  good  -MB        General Information    Patient Profile Reviewed  yes  -CS  --  --  --     Pertinent History Of Current Problem  Covid, pneumonia, acute respiratory distress syndrome, prolonged intubation  -CS  --  --  --     Current Method of Nutrition  NPO  -CS  --  --  --     Precautions/Limitations, Vision  WFL with corrective lenses  -CS  --  --  --     Precautions/Limitations, Hearing  WFL;for purposes of eval  -CS  --  --  --     Prior Level of Function-Communication  WFL  -CS  --  --  --     Prior Level of Function-Swallowing  no diet consistency restrictions  -CS  --  --  --     Plans/Goals Discussed with  patient  -CS  --  --  --     Barriers to Rehab  medically complex;cognitive status  -CS  --  --  --     Patient's Goals for Discharge  patient did not state  -CS  --  --  --        Pain    Additional Documentation  Pain Scale: FACES Pre/Post-Treatment (Group)  -CS  --  --  Pain Scale: FACES Pre/Post-Treatment (Group)  -MB        Pain Scale: FACES Pre/Post-Treatment    Pain: FACES Scale, Pretreatment  0-->no hurt  -CS  0-->no hurt  -MB  0-->no hurt  -MB  0-->no hurt  -MB     Posttreatment Pain Rating  0-->no hurt  -CS  --  --  --        Oral Motor Structure and Function    Secretion Management  dried secretions in oral cavity  -CS  --  --  --     Mucosal Quality  dry;cracked  -CS  --  --  --        Oral Musculature and Cranial Nerve Assessment     Oral Motor General Assessment  generalized oral motor weakness  -CS  --  --  --        General Eating/Swallowing Observations    Respiratory Support Currently in Use  nasal cannula  -CS  --  --  --        MBS/VFSS    Utensils Used  spoon;straw  -CS  --  --  --     Consistencies Trialed  pudding thick;honey-thick liquids;nectar/syrup-thick liquids  -CS  --  --  --        MBS/VFSS Interpretation    Oral Prep Phase  WFL  -CS  --  --  --     Oral Transit Phase  impaired  -CS  --  --  --     Oral Residue  WFL  -CS  --  --  --     VFSS Summary  See VFSS note  -CS  --  --  --        Oral Transit Phase    Impaired Oral Transit Phase  premature spillage of liquids into pharynx  -CS  --  --  --     Premature Spillage of Liquids into Pharynx  honey-thick liquids;nectar-thick liquids  -CS  --  --  --        Initiation of Pharyngeal Swallow    Initiation of Pharyngeal Swallow  bolus in valleculae  -CS  --  --  --     Pharyngeal Phase  impaired pharyngeal phase of swallowing  -CS  --  --  --     Aspiration During the Swallow  nectar-thick liquids  -CS  --  --  --     Penetration After the Swallow  nectar-thick liquids  -CS  --  --  --     Aspiration After the Swallow  nectar-thick liquids  -CS  --  --  --     Response to Aspiration  cough  -CS  --  --  --        Clinical Impression    Daily Summary of Progress (SLP)  progress toward functional goals is good  -CS  progress toward functional goals is good  -MB  progress toward functional goals as expected  -MB  progress towards functional goals is fair  -MB     Barriers to Overall Progress (SLP)  --  Ill-fitting dentures  -MB  Weak voice  -MB  Weakness  -MB     SLP Swallowing Diagnosis  mod-severe;oral dysphagia;suspected pharyngeal dysphagia  -CS  --  --  --     Functional Impact  risk of aspiration/pneumonia  -CS  --  --  --     Rehab Potential/Prognosis, Swallowing  re-evaluate goals as necessary  -CS  --  --  --     Plan for Continued Treatment (SLP)  --  MBS today  -MB  Plan  for potential FEES tomorrow  -MB  Continue to follow  -MB        Recommendations    Therapy Frequency (Swallow)  at least;3 days per week  -CS  --  --  --     Predicted Duration Therapy Intervention (Days)  until discharge  -CS  --  --  --     SLP Diet Recommendation  NPO;temporary alternate methods of nutrition/hydration  -CS  --  --  --     Recommended Diagnostics  reassess via clinical swallow evaluation  -CS  --  --  --     Oral Care Recommendations  Oral Care BID/PRN  -CS  --  --  --     SLP Rec. for Method of Medication Administration  meds via alternate route  -CS  --  --  --     Anticipated Discharge Disposition (SLP)  unknown  -CS  --  --  --        Swallow Goals (SLP)    Oral Nutrition/Hydration Goal Selection (SLP)  oral nutrition/hydration, SLP goal 1  -CS  --  --  --     Labial Strengthening Goal Selection (SLP)  labial strengthening, SLP goal 1  -CS  --  --  --     Lingual Strengthening Goal Selection (SLP)  lingual strengthening, SLP goal 1  -CS  --  --  --     Pharyngeal Strengthening Exercise Goal Selection (SLP)  pharyngeal strengthening exercise, SLP goal 1  -CS  --  --  --     Additional Documentation  labial strengthening goal selection (SLP);lingual strengthening goal selection (SLP);pharyngeal strengthening exercise goal selection (SLP)  -CS  --  --  --        Oral Nutrition/Hydration Goal 1 (SLP)    Oral Nutrition/Hydration Goal 1, SLP  Pt will tolerate least restrictive diet with no overt s/s of aspiration.   -CS  Pt will tolerate least restrictive diet with no overt s/s of aspiration.   -MB  Pt will tolerate least restrictive diet with no overt s/s of aspiration.   -MB  Pt will tolerate least restrictive diet with no overt s/s of aspiration.   -MB     Time Frame (Oral Nutrition/Hydration Goal 1, SLP)  by discharge  -CS  by discharge  -MB  by discharge  -MB  by discharge  -MB     Barriers (Oral Nutrition/Hydration Goal 1, SLP)  Ill-fitting dentures  -CS  Ill-fitting dentures  -MB  Weak voice   -MB  Aphonic  -MB     Progress/Outcomes (Oral Nutrition/Hydration Goal 1, SLP)  goal ongoing  -CS  continuing progress toward goal  -MB  continuing progress toward goal  -MB  continuing progress toward goal  -MB        Labial Strengthening Goal 1 (SLP)    Activity (Labial Strengthening Goal 1, SLP)  increase labial tone  -CS  increase labial tone  -MB  increase labial tone  -MB  increase labial tone  -MB     Increase Labial Tone  labial resistance exercises  -CS  labial resistance exercises  -MB  labial resistance exercises  -MB  labial resistance exercises  -MB     [place]/Accuracy (Labial Strengthening Goal 1, SLP)  with minimal cues (75-90% accuracy)  -CS  with minimal cues (75-90% accuracy)  -MB  with minimal cues (75-90% accuracy)  -MB  with minimal cues (75-90% accuracy)  -MB     Time Frame (Labial Strengthening Goal 1, SLP)  short term goal (STG);by discharge  -CS  short term goal (STG);by discharge  -MB  short term goal (STG);by discharge  -MB  short term goal (STG);by discharge  -MB     Barriers (Labial Strengthening Goal 1, SLP)  n/a  -CS  --  --  --     Progress/Outcomes (Labial Strengthening Goal 1, SLP)  goal ongoing  -CS  --  --  --        Lingual Strengthening Goal 1 (SLP)    Activity (Lingual Strengthening Goal 1, SLP)  increase lingual tone/sensation/control/coordination/movement  -CS  increase lingual tone/sensation/control/coordination/movement  -MB  increase lingual tone/sensation/control/coordination/movement  -MB  increase lingual tone/sensation/control/coordination/movement  -MB     Increase Lingual Tone/Sensation/Control/Coordination/Movement  lingual movement exercises  -CS  lingual movement exercises  -MB  lingual movement exercises  -MB  lingual movement exercises  -MB     [place]/Accuracy (Lingual Strengthening Goal 1, SLP)  with minimal cues (75-90% accuracy)  -CS  with minimal cues (75-90% accuracy)  -MB  with minimal cues (75-90% accuracy)  -MB  with minimal cues (75-90%  accuracy)  -MB     Time Frame (Lingual Strengthening Goal 1, SLP)  short term goal (STG);by discharge  -CS  short term goal (STG);by discharge  -MB  short term goal (STG);by discharge  -MB  short term goal (STG);by discharge  -MB     Barriers (Lingual Strengthening Goal 1, SLP)  Weakness  -CS  --  Weakness  -MB  Weakness  -MB     Progress/Outcomes (Lingual Strengthening Goal 1, SLP)  goal ongoing  -CS  --  continuing progress toward goal  -MB  continuing progress toward goal  -MB        Pharyngeal Strengthening Exercise Goal 1 (SLP)    Activity (Pharyngeal Strengthening Goal 1, SLP)  increase timing  -CS  increase timing  -MB  increase timing  -MB  increase timing  -MB     Increase Timing  gustatory stimulation (sour/cold)  -CS  gustatory stimulation (sour/cold)  -MB  gustatory stimulation (sour/cold)  -MB  gustatory stimulation (sour/cold)  -MB     [place]/Accuracy (Pharyngeal Strengthening Goal 1, SLP)  independently (over 90% accuracy)  -CS  independently (over 90% accuracy)  -MB  independently (over 90% accuracy)  -MB  independently (over 90% accuracy)  -MB     Time Frame (Pharyngeal Strengthening Goal 1, SLP)  short term goal (STG);by discharge  -CS  short term goal (STG);by discharge  -MB  short term goal (STG);by discharge  -MB  short term goal (STG);by discharge  -MB     Barriers (Pharyngeal Strengthening Goal 1, SLP)  n/a  -CS  --  --  --     Progress/Outcomes (Pharyngeal Strengthening Goal 1, SLP)  goal ongoing  -CS  --  --  --       User Key  (r) = Recorded By, (t) = Taken By, (c) = Cosigned By    Initials Name Effective Dates    Litzy Pimentel, CCC-SLP 08/02/16 -     Daiv Gill, MS CCC-SLP 04/03/18 -           EDUCATION  The patient has been educated in the following areas:   Dysphagia (Swallowing Impairment).    SLP Recommendation and Plan  SLP Swallowing Diagnosis: mod-severe, oral dysphagia, suspected pharyngeal dysphagia  SLP Diet Recommendation: NPO, temporary alternate methods of  nutrition/hydration     SLP Rec. for Method of Medication Administration: meds via alternate route        Recommended Diagnostics: reassess via clinical swallow evaluation     Anticipated Discharge Disposition (SLP): unknown  Rehab Potential/Prognosis, Swallowing: re-evaluate goals as necessary  Therapy Frequency (Swallow): at least, 3 days per week  Predicted Duration Therapy Intervention (Days): until discharge     Daily Summary of Progress (SLP): progress toward functional goals is good                   Plan of Care Reviewed With: patient  Progress: improving  Outcome Summary: VFSS complete. Pt noted to have generalized oral motor weakness and is edentulous. Due to this she was not presented with mechanical soft or regular solids. Due to decrease back and base of tognue strenght as well as delay in swallow initiation she she exhibited premature loss to the point of the vallecula with honey thick and nectar thick liquids. Her hylaryngeal excursion, laryngeal elevation, and epiglottic inversion is decreased by a moderate-severe amount with all consistencies. Due to this she had krystle aspiration of nectar thick liquids. She exhibited a reflexive cough; however, some residue remained within the vallecula and along the vocal cords. No significant oropharyngeal residue present post trials. No other definite penetration or aspiration into the airway observed. SLP recommends a pureed diet with honey thick liquids. Ok for meds to be administered crushed in pudding/applesauce. Ok for ice chips between meals 30 minutes following any other PO intake. SLP will continue to follow and treat.    SLP GOALS     Row Name 01/06/21 1300 01/06/21 0817 01/05/21 0915       Oral Nutrition/Hydration Goal 1 (SLP)    Oral Nutrition/Hydration Goal 1, SLP  Pt will tolerate least restrictive diet with no overt s/s of aspiration.   -CS  Pt will tolerate least restrictive diet with no overt s/s of aspiration.   -MB  Pt will tolerate least  restrictive diet with no overt s/s of aspiration.   -MB    Time Frame (Oral Nutrition/Hydration Goal 1, SLP)  by discharge  -CS  by discharge  -MB  by discharge  -MB    Barriers (Oral Nutrition/Hydration Goal 1, SLP)  Ill-fitting dentures  -CS  Ill-fitting dentures  -MB  Weak voice  -MB    Progress/Outcomes (Oral Nutrition/Hydration Goal 1, SLP)  goal ongoing  -CS  continuing progress toward goal  -MB  continuing progress toward goal  -MB       Labial Strengthening Goal 1 (SLP)    Activity (Labial Strengthening Goal 1, SLP)  increase labial tone  -CS  increase labial tone  -MB  increase labial tone  -MB    Increase Labial Tone  labial resistance exercises  -CS  labial resistance exercises  -MB  labial resistance exercises  -MB    [place]/Accuracy (Labial Strengthening Goal 1, SLP)  with minimal cues (75-90% accuracy)  -CS  with minimal cues (75-90% accuracy)  -MB  with minimal cues (75-90% accuracy)  -MB    Time Frame (Labial Strengthening Goal 1, SLP)  short term goal (STG);by discharge  -CS  short term goal (STG);by discharge  -MB  short term goal (STG);by discharge  -MB    Barriers (Labial Strengthening Goal 1, SLP)  n/a  -CS  --  --    Progress/Outcomes (Labial Strengthening Goal 1, SLP)  goal ongoing  -CS  --  --       Lingual Strengthening Goal 1 (SLP)    Activity (Lingual Strengthening Goal 1, SLP)  increase lingual tone/sensation/control/coordination/movement  -CS  increase lingual tone/sensation/control/coordination/movement  -MB  increase lingual tone/sensation/control/coordination/movement  -MB    Increase Lingual Tone/Sensation/Control/Coordination/Movement  lingual movement exercises  -CS  lingual movement exercises  -MB  lingual movement exercises  -MB    [place]/Accuracy (Lingual Strengthening Goal 1, SLP)  with minimal cues (75-90% accuracy)  -CS  with minimal cues (75-90% accuracy)  -MB  with minimal cues (75-90% accuracy)  -MB    Time Frame (Lingual Strengthening Goal 1, SLP)  short term  goal (STG);by discharge  -CS  short term goal (STG);by discharge  -MB  short term goal (STG);by discharge  -MB    Barriers (Lingual Strengthening Goal 1, SLP)  Weakness  -CS  --  Weakness  -MB    Progress/Outcomes (Lingual Strengthening Goal 1, SLP)  goal ongoing  -CS  --  continuing progress toward goal  -MB       Pharyngeal Strengthening Exercise Goal 1 (SLP)    Activity (Pharyngeal Strengthening Goal 1, SLP)  increase timing  -CS  increase timing  -MB  increase timing  -MB    Increase Timing  gustatory stimulation (sour/cold)  -CS  gustatory stimulation (sour/cold)  -MB  gustatory stimulation (sour/cold)  -MB    [place]/Accuracy (Pharyngeal Strengthening Goal 1, SLP)  independently (over 90% accuracy)  -CS  independently (over 90% accuracy)  -MB  independently (over 90% accuracy)  -MB    Time Frame (Pharyngeal Strengthening Goal 1, SLP)  short term goal (STG);by discharge  -CS  short term goal (STG);by discharge  -MB  short term goal (STG);by discharge  -MB    Barriers (Pharyngeal Strengthening Goal 1, SLP)  n/a  -CS  --  --    Progress/Outcomes (Pharyngeal Strengthening Goal 1, SLP)  goal ongoing  -CS  --  --    Row Name 01/04/21 1231             Oral Nutrition/Hydration Goal 1 (SLP)    Oral Nutrition/Hydration Goal 1, SLP  Pt will tolerate least restrictive diet with no overt s/s of aspiration.   -MB      Time Frame (Oral Nutrition/Hydration Goal 1, SLP)  by discharge  -MB      Barriers (Oral Nutrition/Hydration Goal 1, SLP)  Aphonic  -MB      Progress/Outcomes (Oral Nutrition/Hydration Goal 1, SLP)  continuing progress toward goal  -MB         Labial Strengthening Goal 1 (SLP)    Activity (Labial Strengthening Goal 1, SLP)  increase labial tone  -MB      Increase Labial Tone  labial resistance exercises  -MB      [place]/Accuracy (Labial Strengthening Goal 1, SLP)  with minimal cues (75-90% accuracy)  -MB      Time Frame (Labial Strengthening Goal 1, SLP)  short term goal (STG);by discharge  -MB          Lingual Strengthening Goal 1 (SLP)    Activity (Lingual Strengthening Goal 1, SLP)  increase lingual tone/sensation/control/coordination/movement  -MB      Increase Lingual Tone/Sensation/Control/Coordination/Movement  lingual movement exercises  -MB      [place]/Accuracy (Lingual Strengthening Goal 1, SLP)  with minimal cues (75-90% accuracy)  -MB      Time Frame (Lingual Strengthening Goal 1, SLP)  short term goal (STG);by discharge  -MB      Barriers (Lingual Strengthening Goal 1, SLP)  Weakness  -MB      Progress/Outcomes (Lingual Strengthening Goal 1, SLP)  continuing progress toward goal  -MB         Pharyngeal Strengthening Exercise Goal 1 (SLP)    Activity (Pharyngeal Strengthening Goal 1, SLP)  increase timing  -MB      Increase Timing  gustatory stimulation (sour/cold)  -MB      [place]/Accuracy (Pharyngeal Strengthening Goal 1, SLP)  independently (over 90% accuracy)  -MB      Time Frame (Pharyngeal Strengthening Goal 1, SLP)  short term goal (STG);by discharge  -MB        User Key  (r) = Recorded By, (t) = Taken By, (c) = Cosigned By    Initials Name Provider Type    Litzy Pimentel CCC-SLP Speech and Language Pathologist    Davi Gill, MS CCC-SLP Speech and Language Pathologist             Time Calculation:   Time Calculation- SLP     Row Name 01/06/21 1509 01/06/21 0914          Time Calculation- SLP    SLP Start Time  1300  -  0817  -MB     SLP Stop Time  1450  -  0911  -MB     SLP Time Calculation (min)  110 min  -  54 min  -MB     SLP Received On  01/06/21  -  01/06/21  -MB     SLP Goal Re-Cert Due Date  01/16/21  -  --       User Key  (r) = Recorded By, (t) = Taken By, (c) = Cosigned By    Initials Name Provider Type    Litzy Pimentel CCC-SLP Speech and Language Pathologist    Davi Gill, MS CCC-SLP Speech and Language Pathologist          Therapy Charges for Today     Code Description Service Date Service Provider Modifiers Qty    [id number] [facility]  MOTION FLUORO EVAL SWALLOW 7 1/6/2021 Davi Benedict, MS CCC-SLP GN 1               Davi Benedict MS CCC-SLP  1/6/2021

## 2021-01-06 NOTE — THERAPY TREATMENT NOTE
Acute Care - Physical Therapy Treatment Note  [facility]     Patient Name: Joann Finnegan  : 1950  MRN: [id number]  Today's Date: 2021           PT Assessment (last 12 hours)      PT Evaluation and Treatment     Row Name 21 1430 21 0933       Physical Therapy Time and Intention    Subjective Information  complains of;weakness;fatigue;pain  -  complains of;weakness;fatigue  -    Document Type  therapy note (daily note)  -  therapy note (daily note)  -    Mode of Treatment  physical therapy  -  physical therapy  -    Row Name 21 1430 21 0933       General Information    Patient/Family/Caregiver Comments/Observations  --  daughter  present  -    Existing Precautions/Restrictions  fall;oxygen therapy device and L/min NG tube  -  fall;oxygen therapy device and L/min NG tube  -    Barriers to Rehab  medically complex  -  medically complex  -    Row Name 21 0933          Pain Scale: Numbers Pre/Post-Treatment    Pretreatment Pain Rating  0/10 - no pain  -     Posttreatment Pain Rating  0/10 - no pain  -     Row Name 21 1430          Pain Scale: Word Pre/Post-Treatment    Pain: Word Scale, Pretreatment  2 - mild pain  -     Posttreatment Pain Rating  2 - mild pain  -     Pain Location  abdomen  -     Pain Intervention(s)  Repositioned  -     Row Name 21 1430          Range of Motion Comprehensive    Comment, General Range of Motion  BLE HEP 10 X 2 REPS, A-AAROM  -     Row Name 21 1430 21 0933       Bed Mobility    Rolling Left [place] (Bed Mobility)  minimum assist (75% patient effort);verbal cues  -  contact guard;minimum assist (75% patient effort);verbal cues  -    Rolling Right [place] (Bed Mobility)  minimum assist (75% patient effort);verbal cues  -  minimum assist (75% patient effort);contact guard;verbal cues  -    Scooting/Bridging [place] (Bed Mobility)  maximum assist (25% patient  effort);2 person assist;verbal cues  -AH  moderate assist (50% patient effort);verbal cues  -AH    Supine-Sit [place] (Bed Mobility)  --  minimum assist (75% patient effort);verbal cues  -AH    Sit-Supine [place] (Bed Mobility)  --  minimum assist (75% patient effort);verbal cues  -    Comment (Bed Mobility)  --  sat EOB 15 minutes  -    Row Name 01/06/21 0933          Transfers    Transfers  sit-stand transfer;stand-sit transfer  -     Comment (Transfers)  stood x 2  -AH     Sit-Stand [place] (Transfers)  moderate assist (50% patient effort);2 person assist;verbal cues  -     Stand-Sit [place] (Transfers)  minimum assist (75% patient effort);moderate assist (50% patient effort);2 person assist;verbal cues  -     Row Name 01/06/21 0933          Sit-Stand Transfer    Assistive Device (Sit-Stand Transfers)  walker, front-wheeled  -     Row Name 01/06/21 0933          Aerobic Exercise    Comment, Aerobic Exercise (Therapeutic Exercise)  B LAQ  -     Row Name             Wound 12/16/20 0957 Right lower chin    Wound - Properties Group Placement Date: 12/16/20  -KM Placement Time: 0957  -KM Side: Right  -KM Orientation: lower  -KM Location: chin  -KM Stage, Pressure Injury : deep tissue injury  -KM    Retired Wound - Properties Group Date first assessed: 12/16/20  -KM Time first assessed: 0957  -KM Side: Right  -KM Location: chin  -KM    Row Name             Wound 12/16/20 0959 Right upper lip    Wound - Properties Group Placement Date: 12/16/20  -KM Placement Time: 0959  -KM Present on Hospital Admission: N  -KM Side: Right  -KM Orientation: upper  -KM Location: lip  -KM Stage, Pressure Injury : deep tissue injury  -KM    Retired Wound - Properties Group Date first assessed: 12/16/20  -KM Time first assessed: 0959  -KM Present on Hospital Admission: N  -KM Side: Right  -KM Location: lip  -KM    Row Name             Wound 12/18/20 1425 Left cheek Pressure Injury    Wound - Properties  Group Placement Date: 12/18/20  -RM Placement Time: 1425  -RM Present on Hospital Admission: N  -RM Side: Left  -RM Location: cheek  -RM Primary Wound Type: Pressure inj  -RM    Retired Wound - Properties Group Date first assessed: 12/18/20  -RM Time first assessed: 1425  -RM Present on Hospital Admission: N  -RM Side: Left  -RM Location: cheek  -RM Primary Wound Type: Pressure inj  -RM    Row Name             Wound 12/21/20 1118 Left nose Pressure Injury    Wound - Properties Group Placement Date: 12/21/20  -CH Placement Time: 1118  -CH Present on Hospital Admission: N  -CH Side: Left  -CH Location: nose  -CH Primary Wound Type: Pressure inj  -CH    Retired Wound - Properties Group Date first assessed: 12/21/20  -CH Time first assessed: 1118  -CH Present on Hospital Admission: N  -CH Side: Left  -CH Location: nose  -CH Primary Wound Type: Pressure inj  -CH    Row Name 01/06/21 0933          Plan of Care Review    Plan of Care Reviewed With  patient;daughter  -     Progress  improving  -     Outcome Summary  pt trans to EOB min assist, sat EOB 15minutes cga, sit-stand min-mod of 2, pt stood x 2, was able to stand while nsg changed bed linens, trans back to bed min-mod of 2, pt would benefit from cont therapy  -     Row Name 01/06/21 0933          Vital Signs    Pre SpO2 (%)  100  -AH     O2 Delivery Pre Treatment  nasal cannula 4L  -     Post SpO2 (%)  85  -AH     O2 Delivery Post Treatment  nasal cannula 4L  -     Row Name 01/06/21 1430 01/06/21 0933       Positioning and Restraints    Pre-Treatment Position  in bed  -  in bed  -    Post Treatment Position  bed  -  bed  -AH    In Bed  side lying right;call light within reach;encouraged to call for assist;fowlers;with family/caregiver;notified ns  -  side lying left;call light within reach;encouraged to call for assist;with family/caregiver;pillow between legs  -      User Key  (r) = Recorded By, (t) = Taken By, (c) = Cosigned By    Initials  Name Provider Type     Malena Garcia PTA Physical Therapy Assistant    Maine Jaramillo RN Registered Nurse    Ya Dodson RN Registered Nurse    Gisselle Buchanan RN Registered Nurse        Physical Therapy Education                 Title: PT OT SLP Therapies (In Progress)     Topic: Physical Therapy (In Progress)     Point: Mobility training (Done)     Learning Progress Summary           Patient Acceptance, E,TB, VU by  at 1/6/2021 1008    Comment: trans    Acceptance, E,TB,D, DU,NR by  at 1/4/2021 1315    Comment: Education to reinforce importance of activity/risks assoc w/ bed rest; education for improved tech w/ bed mobility; education for proper deep breathing and for positioning to assist w/ edema mgmt and for pressure relief    Acceptance, E, NR by WK at 1/2/2021 0911    Comment: BOA    Acceptance, E, NL by WK at 1/1/2021 0935    Comment: BOA    Acceptance, E, NL by MS at 12/28/2020 0900    Comment: role of PT in her care   Family Acceptance, E,TB, VU by  at 1/6/2021 1008    Comment: trans                   Point: Home exercise program (Done)     Learning Progress Summary           Patient Acceptance, E,TB,D,H, VU by  at 1/5/2021 0906    Comment: BLE HEP   Significant Other Acceptance, E,TB,D,H, VU by  at 1/5/2021 0906    Comment: BLE HEP                   Point: Body mechanics (Not Started)     Learner Progress:  Not documented in this visit.          Point: Precautions (Done)     Learning Progress Summary           Patient Acceptance, E,TB,D, DU,NR by  at 1/4/2021 1315    Comment: Education to reinforce importance of activity/risks assoc w/ bed rest; education for improved tech w/ bed mobility; education for proper deep breathing and for positioning to assist w/ edema mgmt and for pressure relief                               User Key     Initials Effective Dates Name Provider Type [facility] 08/02/16 -  Malena Garcia PTA Physical Therapy Assistant PT    MS  06/19/18 -  Yennifer May, PT, DPT, NCS Physical Therapist PT    WK 08/02/16 -  Rufina Degroot, PTA Physical Therapy Assistant PT    JE 08/02/18 -  Terri Gomez, PT Physical Therapist PT              PT Recommendation and Plan     Plan of Care Reviewed With: patient, daughter  Progress: improving  Outcome Summary: pt trans to EOB min assist, sat EOB 15minutes cga, sit-stand min-mod of 2, pt stood x 2, was able to stand while nsg changed bed linens, trans back to bed min-mod of 2, pt would benefit from cont therapy  Outcome Measures     Row Name 01/06/21 1335 01/05/21 0728          How much help from another is currently needed...    Putting on and taking off regular lower body clothing?  1  -  1  -CJ     Bathing (including washing, rinsing, and drying)  1  -  1  -CJ     Toileting (which includes using toilet bed pan or urinal)  1  -  1  -CJ     Putting on and taking off regular upper body clothing  1  -  1  -CJ     Taking care of personal grooming (such as brushing teeth)  1  -  1  -CJ     Eating meals  1  -CJ  1  -CJ     AM-PAC 6 Clicks Score (OT)  6  -  6  -CJ        Functional Assessment    Outcome Measure Options  AM-PAC 6 Clicks Daily Activity (OT)  -  AM-PAC 6 Clicks Daily Activity (OT)  -       User Key  (r) = Recorded By, (t) = Taken By, (c) = Cosigned By    Initials Name Provider Type    CJ Favian Ng, KORTNEY/L Occupational Therapy Assistant           Time Calculation:   PT Charges     Row Name 01/06/21 1503 01/06/21 1008          Time Calculation    Start Time  1430  -AH  0933  -AH     Stop Time  1500  -AH  1003  -AH     Time Calculation (min)  30 min  -AH  30 min  -AH     PT Received On  --  01/06/21  -        Time Calculation- PT    Total Timed Code Minutes- PT  30 minute(s)  -AH  30 minute(s)  -AH        Timed Charges    79326 - PT Therapeutic Exercise Minutes  30  -AH  --     24043 - PT Therapeutic Activity Minutes  --  30  -AH       User Key  (r) = Recorded By, (t) =  Taken By, (c) = Cosigned By    Initials Name Provider Type    Malena Adhikari, KATHY Physical Therapy Assistant        Therapy Charges for Today     Code Description Service Date Service Provider Modifiers Qty    12531864443 HC PT THER PROC EA 15 MIN 1/5/2021 Malena Garcia, PTA GP 2    62424315341 HC PT THERAPEUTIC ACT EA 15 MIN 1/5/2021 Malena Garcia, PTA GP 1    89983539848 HC PT THERAPEUTIC ACT EA 15 MIN 1/5/2021 Malena Garcia, PTA GP 2    24868169695 HC PT THERAPEUTIC ACT EA 15 MIN 1/6/2021 Malena Garcia, PTA GP 2    51267236263 HC PT THER PROC EA 15 MIN 1/6/2021 Malena Garcia, PTA GP 2          PT G-Codes  Outcome Measure Options: AM-PAC 6 Clicks Daily Activity (OT)  AM-PAC 6 Clicks Score (PT): 8  AM-PAC 6 Clicks Score (OT): 6    Malena Garcia PTA  1/6/2021

## 2021-01-06 NOTE — PLAN OF CARE
Problem: Adult Inpatient Plan of Care  Goal: Plan of Care Review  1/6/2021 1645 by Rufina Arevalo, RN  Flowsheets (Taken 1/6/2021 1645)  Progress: improving  Plan of Care Reviewed With: patient  Outcome Summary: PATIENT SHOWING IMPROVEMENT. ALERT AND ORIENTED, VOICE HOARSE. NO C/O PAIN. HAS WORKED WITH PHYSICAL THERAPY TODAY. SWALLOW STUDY DONE TODAY, AND PATIENT IS NOW ALLOWED PUREED, HONEY THICK LIQUIDS, MEDS CRUSHED IN APPLE SAUCE. DOBHOFF D/C'D. SBP RUNNING IN 160s, STARTED ON VASOTEC IV TODAY. PUREWIC IN PLACE. TURN PATIENT, ORAL AND WOUND CARE AS ORDERED. CONT TO MONITOR.

## 2021-01-06 NOTE — PLAN OF CARE
Goal Outcome Evaluation:  Plan of Care Reviewed With: patient  Progress: improving  Outcome Summary: VSS. Denies pain this shift. NSR 77-89 on tele. Michael protocol in place, however pt wished to lie on back this shift. Wound care done to facial wounds. Pt did have a BM this morning. TF currently at goal rate. Poss swallow study to upgrade diet tomorrow by speech therapy. IV abx cont. Safety maintained. Will cont to monitor and call MD with any changes.

## 2021-01-06 NOTE — PLAN OF CARE
Goal Outcome Evaluation:  Plan of Care Reviewed With: patient  Progress: improving  Outcome Summary: No c/o pain throughout night. NSR 72-85 on tele. Tube feeding held, MD aware and can be restarted when bags arrive. Sliding scale insulin held d/t feedings being paused. Pt is more alert and able to communicate better this shift. Safety maintained, will cont to monitor and contact MD with questions.  VSS. Denies pain this shift. NSR 72-89 on tele. Michael protocol in place, pt was willing to turn on occasion. Wound care completed. TF still cont at goal rate. IV abx cont. Safety maintained. Cont to monitor and contact MD with changes.

## 2021-01-06 NOTE — PLAN OF CARE
Goal Outcome Evaluation:  Plan of Care Reviewed With: patient, daughter  Progress: improving  Outcome Summary: pt trans to EOB min assist, sat EOB 15minutes cga, sit-stand min-mod of 2, pt stood x 2, was able to stand while nsg changed bed linens, trans back to bed min-mod of 2, pt would benefit from cont therapy

## 2021-01-07 LAB
GLUCOSE BLDC GLUCOMTR-MCNC: 100 MG/DL (ref 70–130)
GLUCOSE BLDC GLUCOMTR-MCNC: 112 MG/DL (ref 70–130)
GLUCOSE BLDC GLUCOMTR-MCNC: 115 MG/DL (ref 70–130)

## 2021-01-07 PROCEDURE — 25010000002 ENOXAPARIN PER 10 MG: Performed by: INTERNAL MEDICINE

## 2021-01-07 PROCEDURE — 94799 UNLISTED PULMONARY SVC/PX: CPT

## 2021-01-07 PROCEDURE — 97164 PT RE-EVAL EST PLAN CARE: CPT

## 2021-01-07 PROCEDURE — 97168 OT RE-EVAL EST PLAN CARE: CPT | Performed by: OCCUPATIONAL THERAPIST

## 2021-01-07 PROCEDURE — 93005 ELECTROCARDIOGRAM TRACING: CPT | Performed by: NURSE PRACTITIONER

## 2021-01-07 PROCEDURE — 25010000002 CEFAZOLIN PER 500 MG: Performed by: INTERNAL MEDICINE

## 2021-01-07 PROCEDURE — 92526 ORAL FUNCTION THERAPY: CPT

## 2021-01-07 PROCEDURE — 82962 GLUCOSE BLOOD TEST: CPT

## 2021-01-07 PROCEDURE — 93010 ELECTROCARDIOGRAM REPORT: CPT | Performed by: EMERGENCY MEDICINE

## 2021-01-07 PROCEDURE — 99222 1ST HOSP IP/OBS MODERATE 55: CPT | Performed by: INTERNAL MEDICINE

## 2021-01-07 RX ORDER — AMLODIPINE BESYLATE 5 MG/1
5 TABLET ORAL
Status: DISCONTINUED | OUTPATIENT
Start: 2021-01-07 | End: 2021-01-08

## 2021-01-07 RX ADMIN — AMLODIPINE BESYLATE 5 MG: 5 TABLET ORAL at 18:18

## 2021-01-07 RX ADMIN — SALINE NASAL SPRAY 2 SPRAY: 1.5 SOLUTION NASAL at 16:06

## 2021-01-07 RX ADMIN — ENALAPRILAT 0.62 MG: 1.25 INJECTION INTRAVENOUS at 16:05

## 2021-01-07 RX ADMIN — CEFAZOLIN SODIUM 2 G: 10 INJECTION, POWDER, FOR SOLUTION INTRAVENOUS at 02:15

## 2021-01-07 RX ADMIN — SALINE NASAL SPRAY 2 SPRAY: 1.5 SOLUTION NASAL at 06:14

## 2021-01-07 RX ADMIN — ALBUTEROL SULFATE 2 PUFF: 90 AEROSOL, METERED RESPIRATORY (INHALATION) at 07:29

## 2021-01-07 RX ADMIN — FAMOTIDINE 20 MG: 10 INJECTION INTRAVENOUS at 09:17

## 2021-01-07 RX ADMIN — ALBUTEROL SULFATE 2 PUFF: 90 AEROSOL, METERED RESPIRATORY (INHALATION) at 21:00

## 2021-01-07 RX ADMIN — CEFAZOLIN SODIUM 2 G: 10 INJECTION, POWDER, FOR SOLUTION INTRAVENOUS at 09:17

## 2021-01-07 RX ADMIN — LEVOTHYROXINE SODIUM 125 MCG: 125 TABLET ORAL at 06:12

## 2021-01-07 RX ADMIN — ENOXAPARIN SODIUM 40 MG: 100 INJECTION SUBCUTANEOUS at 09:17

## 2021-01-07 RX ADMIN — SALINE NASAL SPRAY 2 SPRAY: 1.5 SOLUTION NASAL at 18:18

## 2021-01-07 RX ADMIN — OXYMETAZOLINE HYDROCHLORIDE 2 SPRAY: 0.05 SPRAY NASAL at 09:17

## 2021-01-07 RX ADMIN — ALBUTEROL SULFATE 2 PUFF: 90 AEROSOL, METERED RESPIRATORY (INHALATION) at 14:33

## 2021-01-07 RX ADMIN — OXYMETAZOLINE HYDROCHLORIDE 2 SPRAY: 0.05 SPRAY NASAL at 16:05

## 2021-01-07 RX ADMIN — ENALAPRILAT 0.62 MG: 1.25 INJECTION INTRAVENOUS at 02:15

## 2021-01-07 RX ADMIN — ENALAPRILAT 0.62 MG: 1.25 INJECTION INTRAVENOUS at 09:17

## 2021-01-07 NOTE — PLAN OF CARE
Goal Outcome Evaluation:  Plan of Care Reviewed With: patient  Progress: no change  Outcome Summary: VSS. No c/o pain. Pt. did have some slight intermittent confusion through night, when asked where she was she stated [place] and wasn't sure of the time. Pt. was also found with legs hanging out of bed earlier in night so bed alarm was put in place. Around 0200 bed alarm began alarming, upon arrival to room pt. was found kneeling at her bedside. She states she was hot and wanted to turn her air off so she tried to get up without help. She also states she did not fall just simply tried to get up and realized she was too weak so she kneeled to her knees. Dr. Dugan was notified with no new orders placed. Bed alarm remains in place and frequent encouragement to use call light for assistance. Cardiology consult also placed per Dr. Dugan d/t to pt. having questionable intermittent second degree heart block that started this AM. NSR on tele. Will continue to monitor.

## 2021-01-07 NOTE — PROGRESS NOTES
Continued Stay Note   [place]     Patient Name: Joann Finnegan  MRN: [id number]  Today's Date: 1/7/2021    Admit Date: 12/11/2020    Discharge Plan     Row Name 01/07/21 1013       Plan    Plan  UNSURE AT PRESENT; PENDING PT'S PROGESS    Patient/Family in Agreement with Plan  yes    Plan Comments  REVIEWED CHART; EVENTS NOTED.  WILL CONT TO FOLLOW FOR PT'S PROGRESS TO BETTER DETERMINE D/C DISPOSITION/NEEDS WHEN MORE STABLE.        Discharge Codes    No documentation.             MOSHE Rodriguez

## 2021-01-07 NOTE — CONSULTS
Referring Provider: Wil Jean MD    Reason for Consultation: Questionable heart block    Chief complaint:   Chief Complaint   Patient presents with   • Shortness of Breath   • Exposure To Known Illness       Subjective .     History of present illness:  Joann Finnegan is a 70 y.o. female with history of hypertension and breast cancer who was admitted on 12/11/2020 for COVID pneumonia. Patient was originally tested for COVID on 12/2/2020 she was started on zithromax, and received a rocephin and steriod shot the day she was tested. Patient received her positive result on 12/5/2020; her zithromax was discontinued and she was started on hydroxychloroquine and decadron as well as she had started zinc, vitamin C and vitamin D. Patient continued to have progressively worsening shortness of breath and was admitted on 12/11/2020. Patient did require intubation for 18 days and was extubated on 12/30/2020. Patient has been improving but is still very weak and little activity wipes her out. She had an echocardiogram on 12/11/2020 that showed an EF of 70 %; normal right ventricle size and function and no significant valvular dysfunction. Patient and  report that she has not been sleeping well at night. Patient had an episode of confusion and was found at the bedside kneeled in the floor about 0200. She reports that she got really hot and tried to get up but realized she was too weak so she knelt to the floor. We have been consulted for some questionable heart block that occurred around 0400 this am. Patient unable to correlate any symptoms at that time. Patient is sitting up trying some honey thick liquids with  at bedside. She denies any dizziness or lightheaded-ness this am. Patient denies any chest pain or palpitations. Patient denies any leg swelling.     History  Past Medical History:   Diagnosis Date   • Abnormal weight gain    • Arthritis    • Breast cancer (CMS/HCC)     right.    • Fatigue    •  H/O melanoma excision     CLARKS  LEVEL III      BACK OF UPPER LEFT ARM   • Hypertension    • Hypothyroidism    • Melanoma (CMS/HCC)    • Restless leg syndrome    • Shortness of breath    ,   Past Surgical History:   Procedure Laterality Date   • APPENDECTOMY     • BREAST BIOPSY Right    • BREAST BIOPSY     • BREAST LUMPECTOMY      right.    • BROW LIFT     • COLONOSCOPY     • COLONOSCOPY N/A 2/14/2020    Procedure: COLONOSCOPY WITH ANESTHESIA;  Surgeon: Donovan Hernandez MD;  Location: [facility] ENDOSCOPY;  Service: Gastroenterology;  Laterality: N/A;  pre: family hx colon polyps  post: polyps  Janak Sherwood APRN   • ENDOSCOPY N/A 2/14/2020    Procedure: ESOPHAGOGASTRODUODENOSCOPY WITH ANESTHESIA;  Surgeon: Donovan Hernandez MD;  Location: [facility] ENDOSCOPY;  Service: Gastroenterology;  Laterality: N/A;  pre: dysphagia; heartburn  post: dilated  Janak Sherwood APRN   • ENDOSCOPY AND COLONOSCOPY  12/22/2011    very minimal distal esophagitis, incomplete esophagus ring dilated   • LUMBAR SPINAL TUMOR REMOVAL      Spinal Cyst removed from Spinal Canal   • MASTECTOMY, PARTIAL     • PARTIAL HIP ARTHROPLASTY Left    • SKIN CANCER EXCISION      Melanoma    • SQUAMOUS CELL CARCINOMA EXCISION     • TEMPOROMANDIBULAR JOINT ARTHROPLASTY     • TOTAL ABDOMINAL HYSTERECTOMY WITH SALPINGO OOPHORECTOMY Bilateral    ,   Family History   Problem Relation Age of Onset   • Arthritis Mother    • Seizures Mother    • Diabetes Father    • Skin cancer Father    • Heart attack Father    • Heart disease Father    • Colon polyps Father    • Arthritis Sister    • Ulcers Brother    • No Known Problems Maternal Grandmother    • No Known Problems Maternal Grandfather    • No Known Problems Paternal Grandmother    • No Known Problems Paternal Grandfather    • Ulcers Sister    • Colon cancer Neg Hx    ,   Social History     Tobacco Use   • Smoking status: Former Smoker     Types: Cigarettes   • Smokeless tobacco: Never Used   Substance  Use Topics   • Alcohol use: Yes     Comment: rare   • Drug use: No   ,     Medications    Prior to Admission medications    Medication Sig Start Date End Date Taking? Authorizing Provider   amitriptyline (ELAVIL) 25 MG tablet Take 25 mg by mouth Every Night.   Yes El Mckeon MD   budesonide-formoterol (SYMBICORT) 160-4.5 MCG/ACT inhaler Inhale 2 puffs 2 (Two) Times a Day.   Yes El Mckeon MD   celecoxib (CeleBREX) 200 MG capsule Take 200 mg by mouth Daily. 3/2/17  Yes El Mckeon MD   Cholecalciferol (VITAMIN D3) 5000 UNITS tablet Take 5,000 Units by mouth Daily.   Yes El Mckeon MD   gabapentin (NEURONTIN) 300 MG capsule Take 300 mg by mouth 3 (Three) Times a Day As Needed (nerve pain).   Yes El Mckeon MD   levothyroxine (SYNTHROID, LEVOTHROID) 125 MCG tablet Take 125 mcg by mouth Daily. NAME BRAND   Yes El Mckeon MD   oxybutynin (DITROPAN) 5 MG tablet Take 5 mg by mouth 2 (Two) Times a Day.   Yes El Mckeon MD   tamoxifen (NOLVADEX) 20 MG chemo tablet Take 20 mg by mouth Daily.   Yes El Mckeon MD   zinc gluconate 50 MG tablet Take 50 mg by mouth Daily.   Yes El Mckeon MD       Current Facility-Administered Medications   Medication Dose Route Frequency Provider Last Rate Last Admin   • acetaminophen (TYLENOL) tablet 650 mg  650 mg Nasogastric Q4H PRN Samir Teague MD        Or   • acetaminophen (TYLENOL) suppository 650 mg  650 mg Rectal Q4H PRN Samir Teague MD   650 mg at 12/30/20 1755   • albuterol sulfate HFA (PROVENTIL HFA;VENTOLIN HFA;PROAIR HFA) inhaler 2 puff  2 puff Inhalation Q6H PRN Samir Teague MD       • albuterol sulfate HFA (PROVENTIL HFA;VENTOLIN HFA;PROAIR HFA) inhaler 2 puff  2 puff Inhalation TID - RT Samir Teague MD   2 puff at 01/07/21 0729   • benzonatate (TESSALON) capsule 100 mg  100 mg Oral TID PRN Samir Teague MD   100 mg at 12/12/20 2034   • ceFAZolin in  0.9% normal saline (ANCEF) IVPB solution 2 g  2 g Intravenous Q8H Mervat Ayon MD   Stopped at 01/07/21 0255   • dextrose (D50W) 25 g/ 50mL Intravenous Solution 25 g  25 g Intravenous Q15 Min PRN Samir Teague MD       • dextrose (GLUTOSE) oral gel 15 g  15 g Oral Q15 Min PRN Samir Teague MD       • enalaprilat (VASOTEC) injection 0.625 mg  0.625 mg Intravenous Q6H Ellis Wise MD   0.625 mg at 01/07/21 0215   • enoxaparin (LOVENOX) syringe 40 mg  40 mg Subcutaneous Q12H Samir Teague MD   40 mg at 01/06/21 2101   • famotidine (PEPCID) injection 20 mg  20 mg Intravenous Q12H Ellis Wise MD   20 mg at 01/06/21 2101   • glucagon (human recombinant) (GLUCAGEN DIAGNOSTIC) injection 1 mg  1 mg Subcutaneous Q15 Min PRN Samir Teague MD       • insulin lispro (humaLOG) injection 2-7 Units  2-7 Units Subcutaneous Q6H Samir Teague MD   2 Units at 01/04/21 1156   • labetalol (NORMODYNE,TRANDATE) injection 10 mg  10 mg Intravenous Q4H PRN Ellis Wise MD   10 mg at 01/04/21 1253   • levothyroxine (SYNTHROID, LEVOTHROID) tablet 125 mcg  125 mcg Nasogastric Q AM Samir Teague MD   125 mcg at 01/07/21 0612   • lidocaine (XYLOCAINE) 4 % external solution 1 application  1 application Topical Once Taurus Eastman Jr., MD       • ondansetron (ZOFRAN) injection 4 mg  4 mg Intravenous Q6H PRN Samir Teague MD       • oxymetazoline (AFRIN) nasal spray 2 spray  2 spray Each Nare TID Taurus Eastman Jr., MD   2 spray at 01/06/21 2103   • polyethylene glycol (MIRALAX) packet 17 g  17 g Nasogastric Daily Samir Teague MD   17 g at 01/05/21 0949   • sennosides-docusate (PERICOLACE) 8.6-50 MG per tablet 1 tablet  1 tablet Nasogastric BID Samir Teague MD   1 tablet at 01/05/21 0949   • sodium chloride 0.9 % flush 10 mL  10 mL Intravenous PRN Samir Teague MD   10 mL at 01/01/21 0806   • sodium chloride nasal spray 2 spray  2 spray  Each Nare 5x Daily Taurus Eastman Jr., MD   2 spray at 01/07/21 0614   • sodium chloride nasal spray 2 spray  2 spray Each Nare Continuous PRN Taurus Eastman Jr., MD           Allergies:  Requip [ropinirole hcl]    Review of Systems  Review of Systems   Constitution: Positive for malaise/fatigue.   Cardiovascular: Negative for chest pain, leg swelling and palpitations.   Respiratory: Positive for shortness of breath.    Musculoskeletal: Positive for muscle weakness.       Objective     Physical Exam:  Patient Vitals for the past 24 hrs:   BP Temp Temp src Pulse Resp SpO2 Weight   01/07/21 0729 -- -- -- 76 18 97 % --   01/07/21 0718 142/56 98.4 °F (36.9 °C) Oral 75 18 96 % --   01/07/21 0341 138/58 98.6 °F (37 °C) Oral 89 20 90 % --   01/07/21 0154 121/49 98.4 °F (36.9 °C) Oral 92 20 92 % --   01/06/21 2303 151/70 97.7 °F (36.5 °C) Oral 84 18 97 % --   01/06/21 2203 -- -- -- 86 -- 95 % --   01/06/21 2018 144/62 98.6 °F (37 °C) Oral 82 18 98 % 84.3 kg (185 lb 13.6 oz)   01/06/21 1625 142/59 98.8 °F (37.1 °C) Oral 91 18 95 % --   01/06/21 1105 168/66 99 °F (37.2 °C) Axillary 84 18 97 % --     Telemetry: NSR rates 70-90 currently 81. Patient had some questionable heart block this am around 0400. Reviewed Telemetry and appears to possibly be Type I Wenckebach.     Vitals signs reviewed.   Constitutional:       General: Not in acute distress.     Appearance: Normal appearance.      Interventions: Nasal cannula in place.   Eyes:      Pupils: Pupils are equal, round, and reactive to light.   HENT:      Head: Normocephalic and atraumatic.      Comments: Deep tissue injury to chin and left cheek     Nose: Nose normal.   Neck:      Musculoskeletal: Normal range of motion and neck supple.      Vascular: No carotid bruit.   Pulmonary:      Effort: Pulmonary effort is normal. No respiratory distress.      Breath sounds: Normal breath sounds. No wheezing. No rales.   Cardiovascular:      Normal rate. Regular rhythm.       Murmurs: There is no murmur.   Edema:     Peripheral edema absent.   Abdominal:      General: There is no distension.      Palpations: Abdomen is soft.   Musculoskeletal: Normal range of motion.   Skin:     General: Skin is warm.      Findings: No erythema or rash.   Neurological:      Mental Status: Alert and oriented to person, place, and time.   Psychiatric:         Attention and Perception: Attention normal.         Mood and Affect: Mood normal.         Behavior: Behavior normal.         Thought Content: Thought content normal.         Judgment: Judgment normal.      Comments: Weak speech       Results Review:   I reviewed the patient's new clinical results.    Lab Results (last 72 hours)     Procedure Component Value Units Date/Time    POC Glucose Once [[id number]]  (Normal) Collected: 01/07/21 0720    Specimen: Blood Updated: 01/07/21 0734     Glucose 115 mg/dL      Comment: : [id number] Gomez EuraisaMeter ID: [id number]       POC Glucose Once [[id number]]  (Normal) Collected: 01/06/21 2024    Specimen: Blood Updated: 01/06/21 2036     Glucose 119 mg/dL      Comment: : [id number] Juanis AlexisMeter ID: [id number]       POC Glucose Once [[id number]]  (Normal) Collected: 01/06/21 1627    Specimen: Blood Updated: 01/06/21 1651     Glucose 123 mg/dL      Comment: : [id number] Kvng LisaMeter ID: [id number]       POC Glucose Once [[id number]]  (Normal) Collected: 01/06/21 1059    Specimen: Blood Updated: 01/06/21 1132     Glucose 122 mg/dL      Comment: : [id number] Kvng LisaMeter ID: [id number]       POC Glucose Once [[id number]]  (Abnormal) Collected: 01/06/21 0740    Specimen: Blood Updated: 01/06/21 0812     Glucose 160 mg/dL      Comment: : [id number] [place] LisaMeter ID: [id number]       POC Glucose Once [[id number]]  (Abnormal) Collected: 01/06/21 0540    Specimen: Blood Updated: 01/06/21 0552     Glucose 147 mg/dL      Comment: : [id number] Nicholas AddisonMeter ID: [id number]       POC  Glucose Once [[id number]]  (Abnormal) Collected: 01/05/21 2341    Specimen: Blood Updated: 01/05/21 2354     Glucose 139 mg/dL      Comment: : [id number] Yu AddisonMeter ID: [id number]       POC Glucose Once [[id number]]  (Normal) Collected: 01/05/21 2009    Specimen: Blood Updated: 01/05/21 2046     Glucose 129 mg/dL      Comment: : [id number] Nicholas AddisonMeter ID: [id number]       POC Glucose Once [[id number]]  (Normal) Collected: 01/05/21 1731    Specimen: Blood Updated: 01/05/21 1742     Glucose 120 mg/dL      Comment: : [id number] Yaw DelgadoPleasant [place]) Mariano Mateusz ID: [id number]       POC Glucose Once [[id number]]  (Abnormal) Collected: 01/05/21 1128    Specimen: Blood Updated: 01/05/21 1139     Glucose 145 mg/dL      Comment: : [id number] [place]ost[place] ToriMeter ID: [id number]       POC Glucose Once [[id number]]  (Abnormal) Collected: 01/05/21 0526    Specimen: Blood Updated: 01/05/21 0540     Glucose 133 mg/dL      Comment: : [id number] Nicholas AddisonMeter ID: [id number]       POC Glucose Once [[id number]]  (Abnormal) Collected: 01/05/21 0001    Specimen: Blood Updated: 01/05/21 0013     Glucose 167 mg/dL      Comment: : [id number] Nicholas AddisonMeter ID: [id number]       Blood Culture - Blood, Arm, Left [[id number]] Collected: 12/30/20 2208    Specimen: Blood from Arm, Left Updated: 01/04/21 2230     Blood Culture No growth at 5 days    POC Glucose Once [[id number]]  (Abnormal) Collected: 01/04/21 1958    Specimen: Blood Updated: 01/04/21 2045     Glucose 132 mg/dL      Comment: : [id number] Nicholas AddisonMeter ID: [id number]       Blood Culture - Blood, Hand, Right [[id number]] Collected: 12/30/20 1935    Specimen: Blood from Hand, Right Updated: 01/04/21 2015     Blood Culture No growth at 5 days    POC Glucose Once [[id number]]  (Abnormal) Collected: 01/04/21 1636    Specimen: Blood Updated: 01/04/21 1647     Glucose 138 mg/dL      Comment: : [id number] Martín ToriMeter ID:  [id number]       POC Glucose Once [[id number]]  (Abnormal) Collected: 01/04/21 1129    Specimen: Blood Updated: 01/04/21 1140     Glucose 171 mg/dL      Comment: : [id number] Helshashank ToriMeter ID: [id number]             No results found for: ECHOEFEST    Imaging Results (Last 72 Hours)     Procedure Component Value Units Date/Time    FL Video Swallow With Speech Single Contrast [[id number]] Collected: 01/06/21 1401     Updated: 01/06/21 1406    Narrative:      EXAMINATION: FL VIDEO SWALLOW W SPEECH SINGLE-CONTRAST- 1/6/2021 2:01 PM  CST     HISTORY: dysphagia, prolonged intubation; U07.1-COVID-19;  J12.82-Pneumonia due to Coronavirus disease 2019; R09.02-Hypoxemia;  J96.01-Acute respiratory failure with hypoxia; Z74.09-Other reduced  mobility; Z74.09-Other reduced mobility; Z78.9-Other specified health  status; R13.12-Dysphagia, oropharyngeal phase.     Fluoroscopy time: 1 minute.     Radiation dose: 18 mGy     Number of fluoroscopic images acquired: 1.     Report: The speech pathologist was present and supervised the  administration of multiple consistencies of barium to the patient  orally, during intermittent lateral fluoroscopy and digital video  capture. With nectar consistency, there is deep penetration and trace  aspiration, with a slightly delayed cough reflex. The patient is able to  swallow the other attempted consistencies without penetration or  aspiration.       Impression:      Deep penetration and trace aspiration of nectar consistency  of barium. Please review the speech pathologist's report for more  information.     This report was finalized on 01/06/2021 14:03 by Dr. Andrew Tobar MD.        Assessment/Plan   -Pneumonia due to COVID-19    -Dysphagia: patient had swallow study yesterday. She is now trying honey thickened liquids and medication crushed in applesauce    -Acute respiratory failure with hypoxia: Patient was intubated for 18 days; extubated on 12/30/2020.     -Questionable Heart Block:  Patient had some questionable heart block this am around 0400. Reviewed Telemetry and appears to possibly be Type I Wenckebach. At this point it appears that patient was asymptomatic to this. Ordered EKG to be done now. Patient had an echocardiogram done on 12/11/2020 that revealed structurally normal heart.   Will continue to monitor      -Hypertension: Has had difficulty taking oral medications so has been receiving IV Labetalol as needed-last dose was 1254 on 1/4/2021. Scheduled vasotec was started yesterday.     Plan:   -EKG ordered now  -Continue to monitor Telemetry  -Patient had echo on 12/11/2020 that revealed structurally normal heart  -would recommend to avoid AV chang blocking drugs in treating blood pressure  -Patient had some questionable heart block this am around 0400. Reviewed Telemetry and appears to possibly be Type I Wenckebach. At this point it appears that patient was asymptomatic to this. Will continue to monitor      Further orders per Dr Silver    Thank you for asking us to follow this patient with you.     Please note this cardiology consultation note is the result of a face to face consultation with the patient, in addition to reviewing medical records at length by myself, NITO Blair.    Electronically signed by NITO Blair, 01/07/21, 8:17 AM CST.

## 2021-01-07 NOTE — PLAN OF CARE
Goal Outcome Evaluation:  Plan of Care Reviewed With: patient, spouse  Progress: improving  Outcome Summary: OT re-eval completed for time. Pt is A&Ox3. She was to come to sitting at EOB with Min A. Max A for adjusting socks d/t weakness. Mod Ax2 for sit <> stand t/f from EOB, upon standing able to maintain with CGA for ~ 10 seconds. Pt fatigues quickly with activity and required multiple recovery periods throughout session. She would benefit from skilled OT services to address these deficits. Recommend d/c to SNF for continue skilled therapy services.

## 2021-01-07 NOTE — THERAPY TREATMENT NOTE
Acute Care - Speech Language Pathology   Swallow Treatment Note [facility]     Patient Name: Joann Finnegan  : 1950  MRN: [id number]  Today's Date: 2021               Admit Date: 2020  Pt participated with neuromuscular electrical stimulation for swallow function. Initially sEMG was utilized to obtain a beginning measurement of swallow function. Pt completed two dry swallows over 3 trials with ratings of 325, 200, 175 for an average of 233. NMES was then complete for 15 minutes on A1 setting at 16.5mA. She swallowed during contractions approximately 60% of the time. She ate a cup of applesauce during NMES. She had delayed coughs 2x and an immediate cough 1x. Nursing reports increased coughing with ice chip trials. Educated pt to wait 30 minutes after other intake to take ice chips, to limit intake, and to discontinue if significant coughing observed. Continue pureed diet and honey thick liquids. SLP will continue to follow and treat.   Litzy Mane, JAY JAY-SLP 2021 11:13 CST    Visit Dx:     ICD-10-CM ICD-9-CM   1. Pneumonia due to COVID-19 virus  U07.1 480.8    J12.89    2. Hypoxic  R09.02 799.02   3. Acute respiratory failure with hypoxia (CMS/HCC)  J96.01 518.81   4. Impaired mobility  Z74.09 799.89   5. Impaired mobility and ADLs  Z74.09 V49.89    Z78.9    6. Oropharyngeal dysphagia  R13.12 787.22     Patient Active Problem List   Diagnosis   • Dysphagia, oropharyngeal   • Heartburn   • Family hx colonic polyps   • Pneumonia due to COVID-19 virus   • Acute respiratory failure with hypoxia (CMS/HCC)   • Obesity (BMI 30-39.9)   • Elevated ferritin and CRP   • Acute respiratory distress syndrome (ARDS) due to COVID-19 virus (CMS/HCC)   • Elevated LFTs   • Leukocytosis   • Thrombocytopenia (CMS/HCC)   • Aphonia   • Xerostomia   • Post viral debility     Past Medical History:   Diagnosis Date   • Abnormal weight gain    • Arthritis    • Breast cancer (CMS/HCC)     right.    • Fatigue    •  H/O melanoma excision     CLARKS  LEVEL III      BACK OF UPPER LEFT ARM   • Hypertension    • Hypothyroidism    • Melanoma (CMS/HCC)    • Restless leg syndrome    • Shortness of breath      Past Surgical History:   Procedure Laterality Date   • APPENDECTOMY     • BREAST BIOPSY Right    • BREAST BIOPSY     • BREAST LUMPECTOMY      right.    • BROW LIFT     • COLONOSCOPY     • COLONOSCOPY N/A 2/14/2020    Procedure: COLONOSCOPY WITH ANESTHESIA;  Surgeon: Donovan Hernandez MD;  Location: [facility] ENDOSCOPY;  Service: Gastroenterology;  Laterality: N/A;  pre: family hx colon polyps  post: polyps  Janak Sherwood APRN   • ENDOSCOPY N/A 2/14/2020    Procedure: ESOPHAGOGASTRODUODENOSCOPY WITH ANESTHESIA;  Surgeon: Donovan Hernandez MD;  Location: [facility] ENDOSCOPY;  Service: Gastroenterology;  Laterality: N/A;  pre: dysphagia; heartburn  post: dilated  Janak Sherwood APRN   • ENDOSCOPY AND COLONOSCOPY  12/22/2011    very minimal distal esophagitis, incomplete esophagus ring dilated   • LUMBAR SPINAL TUMOR REMOVAL      Spinal Cyst removed from Spinal Canal   • MASTECTOMY, PARTIAL     • PARTIAL HIP ARTHROPLASTY Left    • SKIN CANCER EXCISION      Melanoma    • SQUAMOUS CELL CARCINOMA EXCISION     • TEMPOROMANDIBULAR JOINT ARTHROPLASTY     • TOTAL ABDOMINAL HYSTERECTOMY WITH SALPINGO OOPHORECTOMY Bilateral         SWALLOW EVALUATION (last 72 hours)      SLP Adult Swallow Evaluation     Row Name 01/07/21 0947 01/06/21 1300 01/06/21 0817 01/05/21 0915 01/04/21 1231       Rehab Evaluation    Document Type  therapy note (daily note)  -MB  evaluation  -CS  therapy note (daily note)  -MB  therapy note (daily note)  -MB  therapy note (daily note)  -MB    Subjective Information  no complaints  -MB  complains of;weakness  -CS  complains of;fatigue  -MB  no complaints  -MB  no complaints  -MB    Patient Observations  alert;cooperative  -MB  alert;cooperative;agree to therapy  -CS  alert;cooperative  -MB  alert;cooperative   -MB  alert;cooperative  -MB    Patient/Family/Caregiver Comments/Observations   present  -MB  No family present  -CS  Daughter present  -MB   present  -MB  No family present  -MB    Care Plan Review  --  care plan/treatment goals reviewed  -CS  --  --  --    Care Plan Review, Other Participant(s)  --  family  -CS  --  --  --    Patient Effort  good  -MB  good  -CS  good  -MB  good  -MB  good  -MB       General Information    Patient Profile Reviewed  --  yes  -CS  --  --  --    Pertinent History Of Current Problem  --  Covid, pneumonia, acute respiratory distress syndrome, prolonged intubation  -CS  --  --  --    Current Method of Nutrition  --  NPO  -CS  --  --  --    Precautions/Limitations, Vision  --  WFL with corrective lenses  -CS  --  --  --    Precautions/Limitations, Hearing  --  WFL;for purposes of eval  -CS  --  --  --    Prior Level of Function-Communication  --  WFL  -CS  --  --  --    Prior Level of Function-Swallowing  --  no diet consistency restrictions  -CS  --  --  --    Plans/Goals Discussed with  --  patient  -CS  --  --  --    Barriers to Rehab  --  medically complex;cognitive status  -CS  --  --  --    Patient's Goals for Discharge  --  patient did not state  -CS  --  --  --       Pain    Additional Documentation  Pain Scale: FACES Pre/Post-Treatment (Group)  -MB  Pain Scale: FACES Pre/Post-Treatment (Group)  -CS  --  --  Pain Scale: FACES Pre/Post-Treatment (Group)  -MB       Pain Scale: FACES Pre/Post-Treatment    Pain: FACES Scale, Pretreatment  0-->no hurt  -MB  0-->no hurt  -CS  0-->no hurt  -MB  0-->no hurt  -MB  0-->no hurt  -MB    Posttreatment Pain Rating  --  0-->no hurt  -CS  --  --  --       Oral Motor Structure and Function    Secretion Management  --  dried secretions in oral cavity  -CS  --  --  --    Mucosal Quality  --  dry;cracked  -CS  --  --  --       Oral Musculature and Cranial Nerve Assessment    Oral Motor General Assessment  --  generalized oral motor  weakness  -CS  --  --  --       General Eating/Swallowing Observations    Respiratory Support Currently in Use  --  nasal cannula  -CS  --  --  --       MBS/VFSS    Utensils Used  --  spoon;straw  -CS  --  --  --    Consistencies Trialed  --  pudding thick;honey-thick liquids;nectar/syrup-thick liquids  -CS  --  --  --       MBS/VFSS Interpretation    Oral Prep Phase  --  WFL  -CS  --  --  --    Oral Transit Phase  --  impaired  -CS  --  --  --    Oral Residue  --  WFL  -CS  --  --  --    VFSS Summary  --  See VFSS note  -CS  --  --  --       Oral Transit Phase    Impaired Oral Transit Phase  --  premature spillage of liquids into pharynx  -CS  --  --  --    Premature Spillage of Liquids into Pharynx  --  honey-thick liquids;nectar-thick liquids  -CS  --  --  --       Initiation of Pharyngeal Swallow    Initiation of Pharyngeal Swallow  --  bolus in valleculae  -CS  --  --  --    Pharyngeal Phase  --  impaired pharyngeal phase of swallowing  -CS  --  --  --    Aspiration During the Swallow  --  nectar-thick liquids  -CS  --  --  --    Penetration After the Swallow  --  nectar-thick liquids  -CS  --  --  --    Aspiration After the Swallow  --  nectar-thick liquids  -CS  --  --  --    Response to Aspiration  --  cough  -CS  --  --  --       Clinical Impression    Daily Summary of Progress (SLP)  progress towards functional goals is fair  -MB  progress toward functional goals is good  -CS  progress toward functional goals is good  -MB  progress toward functional goals as expected  -MB  progress towards functional goals is fair  -MB    Barriers to Overall Progress (SLP)  Medically complex, weak  -MB  --  Ill-fitting dentures  -MB  Weak voice  -MB  Weakness  -MB    SLP Swallowing Diagnosis  --  mod-severe;oral dysphagia;suspected pharyngeal dysphagia  -CS  --  --  --    Functional Impact  --  risk of aspiration/pneumonia  -CS  --  --  --    Rehab Potential/Prognosis, Swallowing  --  re-evaluate goals as necessary  -CS   --  --  --    Plan for Continued Treatment (SLP)  Continue to follow  -MB  --  MBS today  -MB  Plan for potential FEES tomorrow  -MB  Continue to follow  -MB       Recommendations    Therapy Frequency (Swallow)  --  at least;3 days per week  -CS  --  --  --    Predicted Duration Therapy Intervention (Days)  --  until discharge  -CS  --  --  --    SLP Diet Recommendation  --  NPO;temporary alternate methods of nutrition/hydration  -CS  --  --  --    Recommended Diagnostics  --  reassess via clinical swallow evaluation  -CS  --  --  --    Oral Care Recommendations  --  Oral Care BID/PRN  -CS  --  --  --    SLP Rec. for Method of Medication Administration  --  meds via alternate route  -CS  --  --  --    Anticipated Discharge Disposition (SLP)  --  unknown  -CS  --  --  --       Swallow Goals (SLP)    Oral Nutrition/Hydration Goal Selection (SLP)  --  oral nutrition/hydration, SLP goal 1  -CS  --  --  --    Labial Strengthening Goal Selection (SLP)  --  labial strengthening, SLP goal 1  -CS  --  --  --    Lingual Strengthening Goal Selection (SLP)  --  lingual strengthening, SLP goal 1  -CS  --  --  --    Pharyngeal Strengthening Exercise Goal Selection (SLP)  --  pharyngeal strengthening exercise, SLP goal 1  -CS  --  --  --    Additional Documentation  --  labial strengthening goal selection (SLP);lingual strengthening goal selection (SLP);pharyngeal strengthening exercise goal selection (SLP)  -CS  --  --  --       Oral Nutrition/Hydration Goal 1 (SLP)    Oral Nutrition/Hydration Goal 1, SLP  Pt will tolerate least restrictive diet with no overt s/s of aspiration.   -MB  Pt will tolerate least restrictive diet with no overt s/s of aspiration.   -CS  Pt will tolerate least restrictive diet with no overt s/s of aspiration.   -MB  Pt will tolerate least restrictive diet with no overt s/s of aspiration.   -MB  Pt will tolerate least restrictive diet with no overt s/s of aspiration.   -MB    Time Frame (Oral  Nutrition/Hydration Goal 1, SLP)  by discharge  -MB  by discharge  -CS  by discharge  -MB  by discharge  -MB  by discharge  -MB    Barriers (Oral Nutrition/Hydration Goal 1, SLP)  none  -MB  Ill-fitting dentures  -CS  Ill-fitting dentures  -MB  Weak voice  -MB  Aphonic  -MB    Progress/Outcomes (Oral Nutrition/Hydration Goal 1, SLP)  continuing progress toward goal  -MB  goal ongoing  -CS  continuing progress toward goal  -MB  continuing progress toward goal  -MB  continuing progress toward goal  -MB       Labial Strengthening Goal 1 (SLP)    Activity (Labial Strengthening Goal 1, SLP)  increase labial tone  -MB  increase labial tone  -CS  increase labial tone  -MB  increase labial tone  -MB  increase labial tone  -MB    Increase Labial Tone  labial resistance exercises  -MB  labial resistance exercises  -CS  labial resistance exercises  -MB  labial resistance exercises  -MB  labial resistance exercises  -MB    [place]/Accuracy (Labial Strengthening Goal 1, SLP)  with minimal cues (75-90% accuracy)  -MB  with minimal cues (75-90% accuracy)  -CS  with minimal cues (75-90% accuracy)  -MB  with minimal cues (75-90% accuracy)  -MB  with minimal cues (75-90% accuracy)  -MB    Time Frame (Labial Strengthening Goal 1, SLP)  short term goal (STG);by discharge  -MB  short term goal (STG);by discharge  -CS  short term goal (STG);by discharge  -MB  short term goal (STG);by discharge  -MB  short term goal (STG);by discharge  -MB    Barriers (Labial Strengthening Goal 1, SLP)  --  n/a  -CS  --  --  --    Progress/Outcomes (Labial Strengthening Goal 1, SLP)  --  goal ongoing  -CS  --  --  --       Lingual Strengthening Goal 1 (SLP)    Activity (Lingual Strengthening Goal 1, SLP)  increase lingual tone/sensation/control/coordination/movement  -MB  increase lingual tone/sensation/control/coordination/movement  -CS  increase lingual tone/sensation/control/coordination/movement  -MB  increase lingual  tone/sensation/control/coordination/movement  -MB  increase lingual tone/sensation/control/coordination/movement  -MB    Increase Lingual Tone/Sensation/Control/Coordination/Movement  lingual movement exercises  -MB  lingual movement exercises  -CS  lingual movement exercises  -MB  lingual movement exercises  -MB  lingual movement exercises  -MB    [place]/Accuracy (Lingual Strengthening Goal 1, SLP)  with minimal cues (75-90% accuracy)  -MB  with minimal cues (75-90% accuracy)  -CS  with minimal cues (75-90% accuracy)  -MB  with minimal cues (75-90% accuracy)  -MB  with minimal cues (75-90% accuracy)  -MB    Time Frame (Lingual Strengthening Goal 1, SLP)  short term goal (STG);by discharge  -MB  short term goal (STG);by discharge  -CS  short term goal (STG);by discharge  -MB  short term goal (STG);by discharge  -MB  short term goal (STG);by discharge  -MB    Barriers (Lingual Strengthening Goal 1, SLP)  --  Weakness  -CS  --  Weakness  -MB  Weakness  -MB    Progress/Outcomes (Lingual Strengthening Goal 1, SLP)  --  goal ongoing  -CS  --  continuing progress toward goal  -MB  continuing progress toward goal  -MB       Pharyngeal Strengthening Exercise Goal 1 (SLP)    Activity (Pharyngeal Strengthening Goal 1, SLP)  increase timing;increase superior movement of the hyolaryngeal complex;increase anterior movement of the hyolaryngeal complex  -MB  increase timing  -CS  increase timing  -MB  increase timing  -MB  increase timing  -MB    Increase Timing  gustatory stimulation (sour/cold)  -MB  gustatory stimulation (sour/cold)  -CS  gustatory stimulation (sour/cold)  -MB  gustatory stimulation (sour/cold)  -MB  gustatory stimulation (sour/cold)  -MB    Increase Superior Movement of the Hyolaryngeal Complex  Mendelsohn;hard effortful swallow NMES  -MB  --  --  --  --    Increase Anterior Movement of the Hyolaryngeal Complex  shaker  -MB  --  --  --  --    [place]/Accuracy (Pharyngeal Strengthening Goal 1, SLP)   independently (over 90% accuracy)  -MB  independently (over 90% accuracy)  -CS  independently (over 90% accuracy)  -MB  independently (over 90% accuracy)  -MB  independently (over 90% accuracy)  -MB    Time Frame (Pharyngeal Strengthening Goal 1, SLP)  short term goal (STG);by discharge  -MB  short term goal (STG);by discharge  -CS  short term goal (STG);by discharge  -MB  short term goal (STG);by discharge  -MB  short term goal (STG);by discharge  -MB    Barriers (Pharyngeal Strengthening Goal 1, SLP)  none  -MB  n/a  -CS  --  --  --    Progress/Outcomes (Pharyngeal Strengthening Goal 1, SLP)  continuing progress toward goal  -MB  goal ongoing  -CS  --  --  --      User Key  (r) = Recorded By, (t) = Taken By, (c) = Cosigned By    Initials Name Effective Dates    Litzy Pimentel, CCC-SLP 08/02/16 -     CS Davi Benedict, MS CCC-SLP 04/03/18 -           EDUCATION  The patient has been educated in the following areas:   Dysphagia (Swallowing Impairment).    SLP Recommendation and Plan                                         Daily Summary of Progress (SLP): progress towards functional goals is fair    Plan for Continued Treatment (SLP): Continue to follow              Plan of Care Reviewed With: patient, spouse  Progress: improving  Outcome Summary: See note    SLP GOALS     Row Name 01/07/21 0947 01/06/21 1300 01/06/21 0817       Oral Nutrition/Hydration Goal 1 (SLP)    Oral Nutrition/Hydration Goal 1, SLP  Pt will tolerate least restrictive diet with no overt s/s of aspiration.   -MB  Pt will tolerate least restrictive diet with no overt s/s of aspiration.   -CS  Pt will tolerate least restrictive diet with no overt s/s of aspiration.   -MB    Time Frame (Oral Nutrition/Hydration Goal 1, SLP)  by discharge  -MB  by discharge  -CS  by discharge  -MB    Barriers (Oral Nutrition/Hydration Goal 1, SLP)  none  -MB  Ill-fitting dentures  -CS  Ill-fitting dentures  -MB    Progress/Outcomes (Oral Nutrition/Hydration  Goal 1, SLP)  continuing progress toward goal  -MB  goal ongoing  -CS  continuing progress toward goal  -MB       Labial Strengthening Goal 1 (SLP)    Activity (Labial Strengthening Goal 1, SLP)  increase labial tone  -MB  increase labial tone  -CS  increase labial tone  -MB    Increase Labial Tone  labial resistance exercises  -MB  labial resistance exercises  -CS  labial resistance exercises  -MB    [place]/Accuracy (Labial Strengthening Goal 1, SLP)  with minimal cues (75-90% accuracy)  -MB  with minimal cues (75-90% accuracy)  -CS  with minimal cues (75-90% accuracy)  -MB    Time Frame (Labial Strengthening Goal 1, SLP)  short term goal (STG);by discharge  -MB  short term goal (STG);by discharge  -CS  short term goal (STG);by discharge  -MB    Barriers (Labial Strengthening Goal 1, SLP)  --  n/a  -CS  --    Progress/Outcomes (Labial Strengthening Goal 1, SLP)  --  goal ongoing  -CS  --       Lingual Strengthening Goal 1 (SLP)    Activity (Lingual Strengthening Goal 1, SLP)  increase lingual tone/sensation/control/coordination/movement  -MB  increase lingual tone/sensation/control/coordination/movement  -CS  increase lingual tone/sensation/control/coordination/movement  -MB    Increase Lingual Tone/Sensation/Control/Coordination/Movement  lingual movement exercises  -MB  lingual movement exercises  -CS  lingual movement exercises  -MB    [place]/Accuracy (Lingual Strengthening Goal 1, SLP)  with minimal cues (75-90% accuracy)  -MB  with minimal cues (75-90% accuracy)  -CS  with minimal cues (75-90% accuracy)  -MB    Time Frame (Lingual Strengthening Goal 1, SLP)  short term goal (STG);by discharge  -MB  short term goal (STG);by discharge  -CS  short term goal (STG);by discharge  -MB    Barriers (Lingual Strengthening Goal 1, SLP)  --  Weakness  -CS  --    Progress/Outcomes (Lingual Strengthening Goal 1, SLP)  --  goal ongoing  -CS  --       Pharyngeal Strengthening Exercise Goal 1 (SLP)    Activity  (Pharyngeal Strengthening Goal 1, SLP)  increase timing;increase superior movement of the hyolaryngeal complex;increase anterior movement of the hyolaryngeal complex  -MB  increase timing  -CS  increase timing  -MB    Increase Timing  gustatory stimulation (sour/cold)  -MB  gustatory stimulation (sour/cold)  -CS  gustatory stimulation (sour/cold)  -MB    Increase Superior Movement of the Hyolaryngeal Complex  Mendelsohn;hard effortful swallow NMES  -MB  --  --    Increase Anterior Movement of the Hyolaryngeal Complex  shaker  -MB  --  --    [place]/Accuracy (Pharyngeal Strengthening Goal 1, SLP)  independently (over 90% accuracy)  -MB  independently (over 90% accuracy)  -CS  independently (over 90% accuracy)  -MB    Time Frame (Pharyngeal Strengthening Goal 1, SLP)  short term goal (STG);by discharge  -MB  short term goal (STG);by discharge  -CS  short term goal (STG);by discharge  -MB    Barriers (Pharyngeal Strengthening Goal 1, SLP)  none  -MB  n/a  -CS  --    Progress/Outcomes (Pharyngeal Strengthening Goal 1, SLP)  continuing progress toward goal  -MB  goal ongoing  -CS  --    Row Name 01/05/21 0915 01/04/21 1231          Oral Nutrition/Hydration Goal 1 (SLP)    Oral Nutrition/Hydration Goal 1, SLP  Pt will tolerate least restrictive diet with no overt s/s of aspiration.   -MB  Pt will tolerate least restrictive diet with no overt s/s of aspiration.   -MB     Time Frame (Oral Nutrition/Hydration Goal 1, SLP)  by discharge  -MB  by discharge  -MB     Barriers (Oral Nutrition/Hydration Goal 1, SLP)  Weak voice  -MB  Aphonic  -MB     Progress/Outcomes (Oral Nutrition/Hydration Goal 1, SLP)  continuing progress toward goal  -MB  continuing progress toward goal  -MB        Labial Strengthening Goal 1 (SLP)    Activity (Labial Strengthening Goal 1, SLP)  increase labial tone  -MB  increase labial tone  -MB     Increase Labial Tone  labial resistance exercises  -MB  labial resistance exercises  -MB      [place]/Accuracy (Labial Strengthening Goal 1, SLP)  with minimal cues (75-90% accuracy)  -MB  with minimal cues (75-90% accuracy)  -MB     Time Frame (Labial Strengthening Goal 1, SLP)  short term goal (STG);by discharge  -MB  short term goal (STG);by discharge  -MB        Lingual Strengthening Goal 1 (SLP)    Activity (Lingual Strengthening Goal 1, SLP)  increase lingual tone/sensation/control/coordination/movement  -MB  increase lingual tone/sensation/control/coordination/movement  -MB     Increase Lingual Tone/Sensation/Control/Coordination/Movement  lingual movement exercises  -MB  lingual movement exercises  -MB     [place]/Accuracy (Lingual Strengthening Goal 1, SLP)  with minimal cues (75-90% accuracy)  -MB  with minimal cues (75-90% accuracy)  -MB     Time Frame (Lingual Strengthening Goal 1, SLP)  short term goal (STG);by discharge  -MB  short term goal (STG);by discharge  -MB     Barriers (Lingual Strengthening Goal 1, SLP)  Weakness  -MB  Weakness  -MB     Progress/Outcomes (Lingual Strengthening Goal 1, SLP)  continuing progress toward goal  -MB  continuing progress toward goal  -MB        Pharyngeal Strengthening Exercise Goal 1 (SLP)    Activity (Pharyngeal Strengthening Goal 1, SLP)  increase timing  -MB  increase timing  -MB     Increase Timing  gustatory stimulation (sour/cold)  -MB  gustatory stimulation (sour/cold)  -MB     [place]/Accuracy (Pharyngeal Strengthening Goal 1, SLP)  independently (over 90% accuracy)  -MB  independently (over 90% accuracy)  -MB     Time Frame (Pharyngeal Strengthening Goal 1, SLP)  short term goal (STG);by discharge  -MB  short term goal (STG);by discharge  -MB       User Key  (r) = Recorded By, (t) = Taken By, (c) = Cosigned By    Initials Name Provider Type    Litzy Pimentel, CCC-SLP Speech and Language Pathologist    Davi Gill, MS CCC-SLP Speech and Language Pathologist             Time Calculation:   Time Calculation- SLP     Row Name  01/07/21 1109             Time Calculation- SLP    SLP Start Time  0947  -MB      SLP Stop Time  1025  -MB      SLP Time Calculation (min)  38 min  -MB      SLP Received On  01/07/21  -MB        User Key  (r) = Recorded By, (t) = Taken By, (c) = Cosigned By    Initials Name Provider Type    Litzy Pimentel CCC-SLP Speech and Language Pathologist          Therapy Charges for Today     Code Description Service Date Service Provider Modifiers Qty    13270812874 HC ST TREATMENT SWALLOW 4 1/6/2021 Litzy Mane CCC-SLP GN 1    [id number] HC ST TREATMENT SWALLOW 3 1/7/2021 Litzy Mane CCC-SLP GN 1               SILVINA Llanos  1/7/2021

## 2021-01-07 NOTE — THERAPY RE-EVALUATION
Patient Name: Joann Finnegan  : 1950    MRN: [id number]                              Today's Date: 2021       Admit Date: 2020    Visit Dx:     ICD-10-CM ICD-9-CM   1. Pneumonia due to COVID-19 virus  U07.1 480.8    J12.89    2. Hypoxic  R09.02 799.02   3. Acute respiratory failure with hypoxia (CMS/HCC)  J96.01 518.81   4. Impaired mobility  Z74.09 799.89   5. Impaired mobility and ADLs  Z74.09 V49.89    Z78.9    6. Oropharyngeal dysphagia  R13.12 787.22     Patient Active Problem List   Diagnosis   • Dysphagia, oropharyngeal   • Heartburn   • Family hx colonic polyps   • Pneumonia due to COVID-19 virus   • Acute respiratory failure with hypoxia (CMS/HCC)   • Obesity (BMI 30-39.9)   • Elevated ferritin and CRP   • Acute respiratory distress syndrome (ARDS) due to COVID-19 virus (CMS/HCC)   • Elevated LFTs   • Leukocytosis   • Thrombocytopenia (CMS/HCC)   • Aphonia   • Xerostomia   • Post viral debility     Past Medical History:   Diagnosis Date   • Abnormal weight gain    • Arthritis    • Breast cancer (CMS/HCC)     right.    • Fatigue    • H/O melanoma excision     CLARKS  LEVEL III      BACK OF UPPER LEFT ARM   • Hypertension    • Hypothyroidism    • Melanoma (CMS/HCC)    • Restless leg syndrome    • Shortness of breath      Past Surgical History:   Procedure Laterality Date   • APPENDECTOMY     • BREAST BIOPSY Right    • BREAST BIOPSY     • BREAST LUMPECTOMY      right.    • BROW LIFT     • COLONOSCOPY     • COLONOSCOPY N/A 2020    Procedure: COLONOSCOPY WITH ANESTHESIA;  Surgeon: Donovan Hernandez MD;  Location: [facility] ENDOSCOPY;  Service: Gastroenterology;  Laterality: N/A;  pre: family hx colon polyps  post: polyps  Janak Sherwood APRN   • ENDOSCOPY N/A 2020    Procedure: ESOPHAGOGASTRODUODENOSCOPY WITH ANESTHESIA;  Surgeon: Donovan Hernandez MD;  Location: [facility] ENDOSCOPY;  Service: Gastroenterology;  Laterality: N/A;  pre: dysphagia; heartburn  post: dilated  Kaela  Janak Ortiz, APRN   • ENDOSCOPY AND COLONOSCOPY  12/22/2011    very minimal distal esophagitis, incomplete esophagus ring dilated   • LUMBAR SPINAL TUMOR REMOVAL      Spinal Cyst removed from Spinal Canal   • MASTECTOMY, PARTIAL     • PARTIAL HIP ARTHROPLASTY Left    • SKIN CANCER EXCISION      Melanoma    • SQUAMOUS CELL CARCINOMA EXCISION     • TEMPOROMANDIBULAR JOINT ARTHROPLASTY     • TOTAL ABDOMINAL HYSTERECTOMY WITH SALPINGO OOPHORECTOMY Bilateral      General Information     Row Name 01/07/21 1016          Physical Therapy Time and Intention    Document Type  re-evaluation  (Pended)  CC: SOB due to COVID. Ordered by Dr. Wise. Dx: Pnuemonia sec to COVID.  -J     Mode of Treatment  physical therapy;occupational therapy;co-treatment  (Pended)   -     Row Name 01/07/21 1016          General Information    Patient Profile Reviewed  yes  (Pended)   -     Prior Level of Function  independent:;all household mobility;ADL's  (Pended)   -     Existing Precautions/Restrictions  fall;oxygen therapy device and L/min  (Pended)   -     Barriers to Rehab  medically complex  (Pended)   -[employer]     Row Name 01/07/21 1016          Living Environment    Lives With  spouse  (Pended)   [facility]          Home Main Entrance    Number of Stairs, Main Entrance  one  (Pended)   -     Stair Railings, Main Entrance  none  (Pended)   [facility]          Stairs Within Home, Primary    Number of Stairs, Within Home, Primary  six  (Pended)   -     Stair Railings, Within Home, Primary  railing on left side (ascending)  (Pended)   -[employer]     Row Name 01/07/21 1016          Cognition    Orientation Status (Cognition)  oriented x 3  (Pended)  Patient was disoriented thinking it was December of 2021.  -[employer]     Row Name 01/07/21 1016          Safety Issues, Functional Mobility    Safety Issues Affecting Function (Mobility)  awareness of need for assistance  (Pended)   -     Impairments  Affecting Function (Mobility)  balance;shortness of breath;strength;endurance/activity tolerance;pain  (Pended)   -JJ       User Key  (r) = Recorded By, (t) = Taken By, (c) = Cosigned By    Initials Name Provider Type    Ad Mckoy, PT Student PT Student        Mobility     Row Name 01/07/21 1016          Bed Mobility    Bed Mobility  scooting/bridging;supine-sit;sit-supine  (Pended)   -JJ     Scooting/Bridging [place] (Bed Mobility)  minimum assist (75% patient effort)  (Pended)   -JJ     Supine-Sit [place] (Bed Mobility)  moderate assist (50% patient effort);2 person assist  (Pended)   -JJ     Sit-Supine [place] (Bed Mobility)  moderate assist (50% patient effort);2 person assist  (Pended)   -JJ     Row Name 01/07/21 1016          Sit-Stand Transfer    Sit-Stand [place] (Transfers)  moderate assist (50% patient effort);2 person assist  (Pended)  Patient was mod assist x 2 to stand from EOB. She had poor eccentric control descending back to the bed which required max assist.  -JJ     Assistive Device (Sit-Stand Transfers)  walker, front-wheeled  (Pended)   -JJ       User Key  (r) = Recorded By, (t) = Taken By, (c) = Cosigned By    Initials Name Provider Type    Ad Mckoy, PT Student PT Student        Obj/Interventions     Row Name 01/07/21 1016          Range of Motion Comprehensive    General Range of Motion  lower extremity range of motion deficits identified  (Pended)   -J     Comment, General Range of Motion  R LE AROM decreased 90% into DF at ankle  (Pended)   -     Row Name 01/07/21 1016          Strength Comprehensive (MMT)    General Manual Muscle Testing (MMT) Assessment  lower extremity strength deficits identified  (Pended)   -     Comment, General Manual Muscle Testing (MMT) Assessment  B LE grossly 3/5 with poor eccentric control  (Pended)   -     Row Name 01/07/21 1016          Balance    Balance Assessment  sitting dynamic balance;standing dynamic balance   (Pended)   -JJ     Dynamic Sitting Balance  mild impairment;supported;sitting, edge of bed  (Pended)   -JJ     Dynamic Standing Balance  severe impairment;supported  (Pended)   -     Row Name 01/07/21 1016          Sensory Assessment (Somatosensory)    Sensory Assessment (Somatosensory)  LE sensation intact  (Pended)   -     Row Name 01/07/21 1016          Lower Extremity (Manual Muscle Testing)    Comment, MMT: Lower Extremity  B LE grossly 3/5  (Pended)   -J       User Key  (r) = Recorded By, (t) = Taken By, (c) = Cosigned By    Initials Name Provider Type    Ad Mckoy, PT Student PT Student        Goals/Plan     Row Name 01/07/21 1016          Bed Mobility Goal 1 (PT)    Activity/Assistive Device (Bed Mobility Goal 1, PT)  rolling to left;rolling to right;sit to supine;supine to sit  (Pended)   -JJ     [place] Level/Cues Needed (Bed Mobility Goal 1, PT)  contact guard assist  (Pended)   -JJ     Time Frame (Bed Mobility Goal 1, PT)  long term goal (LTG);10 days  (Pended)   -JJ     Progress/Outcomes (Bed Mobility Goal 1, PT)  goal ongoing;goal revised this date  (Pended)   -     Row Name 01/07/21 1016          Transfer Goal 1 (PT)    Activity/Assistive Device (Transfer Goal 1, PT)  sit-to-stand/stand-to-sit;bed-to-chair/chair-to-bed;walker, rolling  (Pended)   -JJ     [place] Level/Cues Needed (Transfer Goal 1, PT)  minimum assist (75% or more patient effort)  (Pended)   -JJ     Time Frame (Transfer Goal 1, PT)  long term goal (LTG);10 days  (Pended)   -JJ     Progress/Outcome (Transfer Goal 1, PT)  goal ongoing;continuing progress toward goal;goal not met  (Pended)   -     Row Name 01/07/21 1016          Gait Training Goal 1 (PT)    Activity/Assistive Device (Gait Training Goal 1, PT)  assistive device use;gait (walking locomotion);decrease fall risk;walker, rolling  (Pended)   -JJ     [place] Level (Gait Training Goal 1, PT)  moderate assist (50-74% patient effort);1 person assist   (Pended)   -JJ     Distance (Gait Training Goal 1, PT)  10  (Pended)   -JJ     Time Frame (Gait Training Goal 1, PT)  long term goal (LTG);10 days  (Pended)   -JJ     Progress/Outcome (Gait Training Goal 1, PT)  goal ongoing  (Pended)   -JJ     Row Name 01/07/21 1016          ROM Goal 1 (PT)    ROM Goal 1 (PT)  pt will demonstrate full AROM in all extremities except R ankle DF to remain w/out flexor contraction (unclear if pt w/ old/chronic R lower leg injury/weakness)  (Pended)   -JJ     Time Frame (ROM Goal 1, PT)  long-term goal (LTG)  (Pended)   -JJ     Progress/Outcome (ROM Goal 1, PT)  goal ongoing  (Pended)   -JJ     Row Name 01/07/21 1016          Patient Education Goal (PT)    Activity (Patient Education Goal, PT)  Pt will tolerate 10 min of continuous activity while maintaining her O2 sat at or greater than 92%.  (Pended)   -JJ     [place]/Cues/Accuracy (Memory Goal 2, PT)  demonstrates adequately  (Pended)   -JJ     Time Frame (Patient Education Goal, PT)  long term goal (LTG)  (Pended)   -JJ     Progress/Outcome (Patient Education Goal, PT)  goal ongoing  (Pended)   -JJ       User Key  (r) = Recorded By, (t) = Taken By, (c) = Cosigned By    Initials Name Provider Type    Ad Mckoy, PT Student PT Student        Clinical Impression     Row Name 01/07/21 1016          Pain    Additional Documentation  Pain Scale: FACES Pre/Post-Treatment (Group)  (Pended)   -     Row Name 01/07/21 1016          Pain Scale: Numbers Pre/Post-Treatment    Pain Intervention(s)  Repositioned  (Pended)   -     Row Name 01/07/21 1016          Pain Scale: FACES Pre/Post-Treatment    Pain: FACES Scale, Pretreatment  2-->hurts little bit  (Pended)   -JJ     Posttreatment Pain Rating  2-->hurts little bit  (Pended)   -J     Pain Location - Orientation  generalized  (Pended)   -     Row Name 01/07/21 1016          Plan of Care Review    Plan of Care Reviewed With  patient  (Pended)   -     Outcome Summary  Patient  is a 70 YOF with pneumonia sec to COVID. She is accompanied by her  in the room where she is supine in bed. She was on 3L of O2 and her sat was 93% upon PT arrival. She required min assist to get to EOB where she was dizzy. She required mod assist x 2 to stand where she stood for ~10 seconds and she lowered herself poorly back to the EOB. She desat to 87% after sitting. She was then min assist to help get her back in bed. She shows weakness in both LEs and balance deficits. PT plan is to continue PT 1-2x/day to address her balance, strength and mobility deficits. PT reccomends D/C to SNF upon completion of POC.  (Pended)   -AVELINA     Row Name 01/07/21 1016          Therapy Assessment/Plan (PT)    Patient/Family Therapy Goals Statement (PT)  Improve balance, strength and mobility.  (Pended)   -JJ     Rehab Potential (PT)  fair, will monitor progress closely  (Pended)   -JJ     Criteria for Skilled Interventions Met (PT)  skilled treatment is necessary  (Pended)   -JJ     Predicted Duration of Therapy Intervention (PT)  Until D/C  (Pended)   -AVELINA     Row Name 01/07/21 1016          Vital Signs    Pre SpO2 (%)  93  (Pended)   -JJ     Intra SpO2 (%)  87  (Pended)   -JJ     Post SpO2 (%)  95  (Pended)   -JJ     O2 Delivery Post Treatment  supplemental O2  (Pended)   -JJ     Pre Patient Position  Supine  (Pended)   -JJ     Intra Patient Position  Standing  (Pended)   -JJ     Post Patient Position  Supine  (Pended)   -JLORENZO     Row Name 01/07/21 1016          Positioning and Restraints    Pre-Treatment Position  in bed  (Pended)   -JJ     Post Treatment Position  bed  (Pended)   -JJ     In Bed  fowlers;call light within reach;with family/caregiver  (Pended)   -JJ       User Key  (r) = Recorded By, (t) = Taken By, (c) = Cosigned By    Initials Name Provider Type    Ad Mckoy, PT Student PT Student        Outcome Measures     Row Name 01/07/21 1016          How much help from another person do you currently need...     Turning from your back to your side while in flat bed without using bedrails?  2  (Pended)   -JJ     Moving from lying on back to sitting on the side of a flat bed without bedrails?  2  (Pended)   -JJ     Moving to and from a bed to a chair (including a wheelchair)?  1  (Pended)   -JJ     Standing up from a chair using your arms (e.g., wheelchair, bedside chair)?  1  (Pended)   -JJ     Climbing 3-5 steps with a railing?  1  (Pended)   -JJ     To walk in hospital room?  1  (Pended)   -JJ     AM-PAC 6 Clicks Score (PT)  8  (Pended)   -TE (r) JLORENZO (t)       User Key  (r) = Recorded By, (t) = Taken By, (c) = Cosigned By    Initials Name Provider Type    Trinity Flores, RN Registered Nurse    Ad Mckoy, PT Student PT Student        Physical Therapy Education                 Title: PT OT SLP Therapies (In Progress)     Topic: Physical Therapy (In Progress)     Point: Mobility training (Done)     Learning Progress Summary           Patient Acceptance, E, VU by AVELINA at 1/7/2021 1201    Comment: Pt educated on proper AD technique.    Acceptance, E,TB, VU by  at 1/6/2021 1008    Comment: trans    Acceptance, E,TB,D, DU,NR by  at 1/4/2021 1315    Comment: Education to reinforce importance of activity/risks assoc w/ bed rest; education for improved tech w/ bed mobility; education for proper deep breathing and for positioning to assist w/ edema mgmt and for pressure relief    Acceptance, E, NR by WK at 1/2/2021 0911    Comment: BOA    Acceptance, E, NL by WK at 1/1/2021 0935    Comment: BOA    Acceptance, E, NL by MS at 12/28/2020 0900    Comment: role of PT in her care   Family Acceptance, E,TB, VU by  at 1/6/2021 1008    Comment: trans                   Point: Home exercise program (Done)     Learning Progress Summary           Patient Acceptance, E,TB,D,H, VU by  at 1/5/2021 0906    Comment: BLE HEP   Significant Other Acceptance, E,TB,D,H, VU by  at 1/5/2021 0906    Comment: BLE HEP                    Point: Body mechanics (Not Started)     Learner Progress:  Not documented in this visit.          Point: Precautions (Done)     Learning Progress Summary           Patient Acceptance, E,TB,D, DU,NR by  at 1/4/2021 7979    Comment: Education to reinforce importance of activity/risks assoc w/ bed rest; education for improved tech w/ bed mobility; education for proper deep breathing and for positioning to assist w/ edema mgmt and for pressure relief                               User Key     Initials Effective Dates Name Provider Type Discipline     08/02/16 -  Malena Garcia, PTA Physical Therapy Assistant PT    MS 06/19/18 -  Yennifer May, PT, DPT, NCS Physical Therapist PT    WK 08/02/16 -  Rufina Degroot, PTA Physical Therapy Assistant PT     08/02/18 -  Terri Gomez, PT Physical Therapist PT    J 12/23/20 -  Ad Mckeon, PT Student PT Student PT              PT Recommendation and Plan  Planned Therapy Interventions (PT): (P) balance training, gait training, bed mobility training, patient/family education, transfer training, strengthening  Plan of Care Reviewed With: (P) patient  Outcome Summary: (P) Patient is a 70 YOF with pneumonia sec to COVID. She is accompanied by her  in the room where she is supine in bed. She was on 3L of O2 and her sat was 93% upon PT arrival. She required min assist to get to EOB where she was dizzy. She required mod assist x 2 to stand where she stood for ~10 seconds and she lowered herself poorly back to the EOB. She desat to 87% after sitting. She was then min assist to help get her back in bed. She shows weakness in both LEs and balance deficits. PT plan is to continue PT 1-2x/day to address her balance, strength and mobility deficits. PT reccomends D/C to SNF upon completion of POC.     Time Calculation:   PT Charges     Row Name 01/07/21 1016             Time Calculation    Start Time  1016  (Pended)   -JJ      Stop Time  1044  (Pended)   -JJ      Time  Calculation (min)  28 min  (Pended)   -AVELINA      PT Received On  01/07/21  (Pended)   -AVELINA      PT Goal Re-Cert Due Date  01/17/21  (Pended)   -AVELINA        User Key  (r) = Recorded By, (t) = Taken By, (c) = Cosigned By    Initials Name Provider Type    Ad Mckoy, PT Student PT Student            PT G-Codes  Outcome Measure Options: AM-PAC 6 Clicks Daily Activity (OT)  AM-PAC 6 Clicks Score (PT): (P) 8  AM-PAC 6 Clicks Score (OT): 12    Ad Mckeon PT Student  1/7/2021

## 2021-01-07 NOTE — PROGRESS NOTES
"INFECTIOUS DISEASES PROGRESS NOTE    Patient:  Joann Finnegan  YOB: 1950  MRN: [id number]   Admit date: 12/11/2020   Admitting Physician: Ellis Wise MD  Primary Care Physician: Janak Sherwood APRN      Chief Complaint:   \"bad night\"      Interval History: Patient is in the room with her  at bedside.  He reports she \"climbed out of bed\" last night.  He said she was hot\" had to do something\".    She has a diet now and has been working with speech therapy.      Allergies:   Allergies   Allergen Reactions   • Requip [Ropinirole Hcl] Nausea And Vomiting       Current Meds:     Current Facility-Administered Medications:   •  acetaminophen (TYLENOL) tablet 650 mg, 650 mg, Nasogastric, Q4H PRN **OR** acetaminophen (TYLENOL) suppository 650 mg, 650 mg, Rectal, Q4H PRN, Samir Teague MD, 650 mg at 12/30/20 1755  •  albuterol sulfate HFA (PROVENTIL HFA;VENTOLIN HFA;PROAIR HFA) inhaler 2 puff, 2 puff, Inhalation, Q6H PRN, Samir Teague MD  •  albuterol sulfate HFA (PROVENTIL HFA;VENTOLIN HFA;PROAIR HFA) inhaler 2 puff, 2 puff, Inhalation, TID - RT, Samir Teague MD, 2 puff at 01/07/21 1433  •  benzonatate (TESSALON) capsule 100 mg, 100 mg, Oral, TID PRN, Samir Teague MD, 100 mg at 12/12/20 2034  •  dextrose (D50W) 25 g/ 50mL Intravenous Solution 25 g, 25 g, Intravenous, Q15 Min PRN, Samir Teague MD  •  dextrose (GLUTOSE) oral gel 15 g, 15 g, Oral, Q15 Min PRN, Samir Teague MD  •  enalaprilat (VASOTEC) injection 0.625 mg, 0.625 mg, Intravenous, Q6H, Ellis Wise MD, 0.625 mg at 01/07/21 0917  •  enoxaparin (LOVENOX) syringe 40 mg, 40 mg, Subcutaneous, Q12H, Samir Teague MD, 40 mg at 01/07/21 0917  •  famotidine (PEPCID) injection 20 mg, 20 mg, Intravenous, Q12H, Ellis Wise MD, 20 mg at 01/07/21 0917  •  glucagon (human recombinant) (GLUCAGEN DIAGNOSTIC) injection 1 mg, 1 mg, Subcutaneous, Q15 Min PRN, Samir Teague, " "MD  •  insulin lispro (humaLOG) injection 2-7 Units, 2-7 Units, Subcutaneous, Q6H, Samir Teague MD, 2 Units at 21 1156  •  levothyroxine (SYNTHROID, LEVOTHROID) tablet 125 mcg, 125 mcg, Nasogastric, Q AM, Samir Teague MD, 125 mcg at 21 0612  •  lidocaine (XYLOCAINE) 4 % external solution 1 application, 1 application, Topical, Once, Taurus Eastman Jr., MD  •  ondansetron (ZOFRAN) injection 4 mg, 4 mg, Intravenous, Q6H PRN, Samir Teague MD  •  oxymetazoline (AFRIN) nasal spray 2 spray, 2 spray, Each Nare, TID, Taurus Eastman Jr., MD, 2 spray at 21 0917  •  polyethylene glycol (MIRALAX) packet 17 g, 17 g, Nasogastric, Daily, Samir Teague MD, 17 g at 21 0949  •  sennosides-docusate (PERICOLACE) 8.6-50 MG per tablet 1 tablet, 1 tablet, Nasogastric, BID, Samir Teague MD, 1 tablet at 21 0949  •  sodium chloride 0.9 % flush 10 mL, 10 mL, Intravenous, PRN, Samir Teague MD, 10 mL at 21 0806  •  sodium chloride nasal spray 2 spray, 2 spray, Each Nare, 5x Daily, Taurus Eastman Jr., MD, 2 spray at 21 0614  •  sodium chloride nasal spray 2 spray, 2 spray, Each Nare, Continuous PRN, Taurus Eastman Jr., MD      Review of Systems   Constitutional: Negative for fever.   Neurological: Positive for weakness.       Objective     Vital Signs:  Temp (24hrs), Av.4 °F (36.9 °C), Min:97.7 °F (36.5 °C), Max:98.8 °F (37.1 °C)      /60 (BP Location: Left arm, Patient Position: Lying)   Pulse 80   Temp 98.3 °F (36.8 °C) (Oral)   Resp 16   Ht 165.1 cm (65\")   Wt 84.3 kg (185 lb 13.6 oz)   LMP 10/26/2003 (Exact Date) Comment: Hyst for DUB  SpO2 96%   BMI 30.93 kg/m²         Physical Exam   General: Patient continues to appear quite weak.  But improved.  HEENT: Sclera anicteric, O2 per nasal cannula in place, Dobbhoff feeding tube out  Face: Continued improvement, albeit slow, of pressure injury  Respiratory: Effort even " and unlabored  Neuro: Looks little stronger, voice more of a whisper that is audible than previous.  Psych: Pleasant cooperative  Results Review:    I reviewed the patient's new clinical results.    Lab Results:  CBC:   Lab Results   Lab 01/01/21  0522 01/04/21  0306   WBC 12.25* 10.82*   HEMOGLOBIN 12.8 12.4   HEMATOCRIT 37.4 36.5   PLATELETS 170 153   LYMPHOCYTE %  --  8.2*   MONOCYTES %  --  5.2       CMP:   Lab Results   Lab 01/01/21  0523 01/02/21  1033 01/03/21  0626   SODIUM 144 143 144   POTASSIUM 3.7 3.5 3.5   CHLORIDE 106 105 110*   CO2 26.0 25.0 27.0   BUN 22 33* 37*   CREATININE 0.55* 0.70 0.58   CALCIUM 9.2 9.8 8.9   BILIRUBIN  --   --  0.4   ALK PHOS  --   --  90   ALT (SGPT)  --   --  51*   AST (SGOT)  --   --  56*   GLUCOSE 129* 135* 142*       Microscopic urinalysis with 31-50 WBCs, 0-2 RBCs and 3-6 squamous epis with some budding yeast noted    Culture Results:        Microbiology Results Abnormal     Procedure Component Value - Date/Time    Blood Culture - Blood, Arm, Left [[id number]] Collected: 12/30/20 2208    Lab Status: Final result Specimen: Blood from Arm, Left Updated: 01/04/21 2230     Blood Culture No growth at 5 days    Blood Culture - Blood, Hand, Right [[id number]] Collected: 12/30/20 1935    Lab Status: Final result Specimen: Blood from Hand, Right Updated: 01/04/21 2015     Blood Culture No growth at 5 days    Urine Culture - Urine, Urine, Catheter In/Out [[id number]]  (Abnormal) Collected: 01/01/21 0301    Lab Status: Final result Specimen: Urine, Catheter In/Out Updated: 01/02/21 1026     Urine Culture Yeast isolated    Respiratory Culture - Sputum, ET Suction [[id number]]  (Abnormal)  (Susceptibility) Collected: 12/29/20 1955    Lab Status: Final result Specimen: Sputum from ET Suction Updated: 12/31/20 1051     Respiratory Culture Heavy growth (4+) Staphylococcus aureus      No Normal Respiratory Hailee     Gram Stain Moderate (3+) WBCs seen      Many (4+) Gram positive cocci     Susceptibility      Staphylococcus aureus     ISRA     Clindamycin Resistant     Oxacillin Susceptible     Penicillin G Resistant     Tetracycline Susceptible     Trimethoprim + Sulfamethoxazole Susceptible     Vancomycin Susceptible                    Blood Culture - Blood, Arm, Left [[id number]] Collected: 12/19/20 1440    Lab Status: Final result Specimen: Blood from Arm, Left Updated: 12/24/20 1530     Blood Culture No growth at 5 days    Blood Culture - Blood, Blood, Central Line [[id number]] Collected: 12/19/20 1435    Lab Status: Final result Specimen: Blood, Central Line Updated: 12/24/20 1530     Blood Culture No growth at 5 days    Respiratory Culture - Sputum, ET Suction [[id number]]  (Abnormal) Collected: 12/20/20 0046    Lab Status: Final result Specimen: Sputum from ET Suction Updated: 12/22/20 1016     Respiratory Culture Scant growth (1+) Candida albicans      No Normal Respiratory Hailee     Gram Stain Greater than 25 WBCs per low power field      Less than 25 Epithelial cells per low power field      Few (2+) Budding yeast    Respiratory Culture - Sputum, ET Suction [[id number]] Collected: 12/15/20 1148    Lab Status: Final result Specimen: Sputum from ET Suction Updated: 12/17/20 0829     Respiratory Culture Scant growth (1+) Normal Respiratory Hailee: NO S.aureus/MRSA or Pseudomonas aeruginosa     Gram Stain Greater than 25 WBCs per low power field      No Epithelial cells per low power field      No organisms seen    Blood Culture - Blood, Arm, Left [[id number]] Collected: 12/11/20 1220    Lab Status: Final result Specimen: Blood from Arm, Left Updated: 12/16/20 1301     Blood Culture No growth at 5 days    Blood Culture - Blood, Arm, Left [[id number]] Collected: 12/11/20 1221    Lab Status: Final result Specimen: Blood from Arm, Left Updated: 12/16/20 1301     Blood Culture No growth at 5 days    MRSA Screen, PCR (Inpatient) - Swab, Nares [[id number]]  (Normal) Collected: 12/15/20 1148    Lab Status:  Final result Specimen: Swab from Nares Updated: 12/15/20 1401     MRSA PCR No MRSA Detected    Legionella Antigen, Urine - Urine, Urine, Clean Catch [[id number]]  (Normal) Collected: 12/11/20 1829    Lab Status: Final result Specimen: Urine, Clean Catch Updated: 12/11/20 1935     LEGIONELLA ANTIGEN, URINE Negative    S. Pneumo Ag Urine or CSF - Urine, Urine, Clean Catch [[id number]]  (Normal) Collected: 12/11/20 1829    Lab Status: Final result Specimen: Urine, Clean Catch Updated: 12/11/20 1934     Strep Pneumo Ag Negative    Respiratory Panel, PCR (WITHOUT COVID) - Swab, Nasopharynx [[id number]]  (Normal) Collected: 12/11/20 1719    Lab Status: Final result Specimen: Swab from Nasopharynx Updated: 12/11/20 1825     ADENOVIRUS, PCR Not Detected     Coronavirus 229E Not Detected     Coronavirus HKU1 Not Detected     Coronavirus NL63 Not Detected     Coronavirus OC43 Not Detected     Human Metapneumovirus Not Detected     Human Rhinovirus/Enterovirus Not Detected     Influenza B PCR Not Detected     Parainfluenza Virus 1 Not Detected     Parainfluenza Virus 2 Not Detected     Parainfluenza Virus 3 Not Detected     Parainfluenza Virus 4 Not Detected     Bordetella pertussis pcr Not Detected     Influenza A H1 2009 PCR Not Detected     Chlamydophila pneumoniae PCR Not Detected     Mycoplasma pneumo by PCR Not Detected     Influenza A PCR Not Detected     Influenza A H3 Not Detected     Influenza A H1 Not Detected     RSV, PCR Not Detected     Bordetella parapertussis PCR Not Detected    Narrative:      The coronavirus on the RVP is NOT COVID-19 and is NOT indicative of infection with COVID-19.     COVID PRE-OP / PRE-PROCEDURE SCREENING ORDER (NO ISOLATION) - Swab, Nasal Cavity [[id number]]  (Abnormal) Collected: 12/11/20 1346    Lab Status: Final result Specimen: Swab from Nasal Cavity Updated: 12/11/20 1431    Narrative:      The following orders were created for panel order COVID PRE-OP / PRE-PROCEDURE SCREENING ORDER  (NO ISOLATION) - Swab, Nasal Cavity.  Procedure                               Abnormality         Status                     ---------                               -----------         ------                     COVID-19, [facility] IN-HOUS...[[id number]]  Abnormal            Final result                 Please view results for these tests on the individual orders.    COVID-19, [facility] IN-HOUSE,NP Swab (NO TRANSPORT MEDIA) 2 HR TAT - Swab, Nasopharynx [[id number]]  (Abnormal) Collected: 12/11/20 1346    Lab Status: Final result Specimen: Swab from Nasopharynx Updated: 12/11/20 1431     COVID19 Detected    Narrative:      Fact sheet for providers: https://www.fda.gov/media/277968/download     Fact sheet for patients: https://www.fda.gov/media/743066/download    Test performed by PCR.                Radiology:   Imaging Results (Last 72 Hours)     Procedure Component Value Units Date/Time    FL Video Swallow With Speech Single Contrast [[id number]] Collected: 01/06/21 1401     Updated: 01/06/21 1406    Narrative:      EXAMINATION: FL VIDEO SWALLOW W SPEECH SINGLE-CONTRAST- 1/6/2021 2:01 PM  CST     HISTORY: dysphagia, prolonged intubation; U07.1-COVID-19;  J12.82-Pneumonia due to Coronavirus disease 2019; R09.02-Hypoxemia;  J96.01-Acute respiratory failure with hypoxia; Z74.09-Other reduced  mobility; Z74.09-Other reduced mobility; Z78.9-Other specified health  status; R13.12-Dysphagia, oropharyngeal phase.     Fluoroscopy time: 1 minute.     Radiation dose: 18 mGy     Number of fluoroscopic images acquired: 1.     Report: The speech pathologist was present and supervised the  administration of multiple consistencies of barium to the patient  orally, during intermittent lateral fluoroscopy and digital video  capture. With nectar consistency, there is deep penetration and trace  aspiration, with a slightly delayed cough reflex. The patient is able to  swallow the other attempted consistencies without penetration  or  aspiration.       Impression:      Deep penetration and trace aspiration of nectar consistency  of barium. Please review the speech pathologist's report for more  information.     This report was finalized on 01/06/2021 14:03 by Dr. Andrew Tobar MD.          Assessment/Plan     Active Hospital Problems    Diagnosis   • **Pneumonia due to COVID-19 virus   • Aphonia   • Xerostomia   • Post viral debility   • Thrombocytopenia (CMS/HCC)   • Acute respiratory distress syndrome (ARDS) due to COVID-19 virus (CMS/HCC)   • Elevated LFTs   • Leukocytosis   • Elevated ferritin and CRP   • Obesity (BMI 30-39.9)   • Acute respiratory failure with hypoxia (CMS/HCC)   • Dysphagia, oropharyngeal       IMPRESSION:  · COVID-19 infection with pneumonia-symptoms start December 1, patient tested + Dec 2 and received antibiotics and steroids early in course of infection per her nurse practitioner-patient completed remdesivir December 15.    · Fever resolved.  Sputum cultures positive for 4+ methicillin susceptible Staphylococcus aureus.  Could be colonizing airway but obvious concern given recent fever.  Urine culture with yeast.  Dosed with fluconazole x1  · Acute respiratory failure with hypoxemia-extubated December 30, 2020 (status post empiric treatment with Zosyn from 12/15-12/22) continues on 4 L of oxygen with sats recorded 90 to 97%  · thrombocytopenia---resolved  · leukocytosis -resolved and with increased January 1 to 12,000-and declined January 4  · elevated liver enzymes-improved  · MRSA screen negative      RECOMMENDATION:     Discontinue cefazolin-status post treatment course for 4+ MSSA in sputum prior to extubation    Continue PT OT and speech therapy    Wean O2 as tolerated    Infectious diseases will sign off.  Please reconsult if needed        Mervat Ayon MD  01/07/21  15:56 CST

## 2021-01-07 NOTE — PROGRESS NOTES
[facility]  INPATIENT PROGRESS NOTE    Patient Name: Joann Finnegan  Date of Admission: 12/11/2020  Today's Date: 01/07/21  Length of Stay: 27  Primary Care Physician: Janak Sherwood APRN    Subjective   Chief Complaint: follow-up COVID  Shortness of Breath    Patient doing pretty well.  Sounds like had a bad night last night.  She states that she had an episode where she got very hot, and reported that she needed to get out of bed.  Upon doing so, she did fall down to her knees.  Nursing staff helped her back in bed.  She also had a brief episode on telemetry where she got bradycardic; this lasted only seconds with no recurrence.  An EKG was performed revealing no acute findings.  She is currently eating with the assistance of family at bedside.  She voices no new complaints at this time.    Review of Systems   Respiratory: Positive for shortness of breath.         All pertinent negatives and positives are as above. All other systems have been reviewed and are negative unless otherwise stated.     Objective    Temp:  [97.7 °F (36.5 °C)-98.6 °F (37 °C)] 98.3 °F (36.8 °C)  Heart Rate:  [75-92] 80  Resp:  [16-20] 16  BP: (121-151)/(49-70) 148/60  Physical Exam  Vitals signs reviewed.   Constitutional:       Appearance: She is not toxic-appearing.   HENT:      Mouth/Throat:      Pharynx: No oropharyngeal exudate.      Comments: Voice getting stronger  Eyes:      Pupils: Pupils are equal, round, and reactive to light.   Cardiovascular:      Rate and Rhythm: Normal rate.   Pulmonary:      Effort: Pulmonary effort is normal. No respiratory distress.      Breath sounds: No rales.      Comments: Diminished at bases; currently on 4LNC  Musculoskeletal:         General: No swelling.   Skin:     Comments: Chin and left cheek unchanged    Neurological:      General: No focal deficit present.      Mental Status: She is alert.      Motor: Weakness present.    Psychiatric:         Mood and Affect: Mood normal.         Results Review:  I have reviewed the labs, radiology results, and diagnostic studies.    Laboratory Data:   Results from last 7 days   Lab Units 01/04/21  0306 01/01/21  0522   WBC 10*3/mm3 10.82* 12.25*   HEMOGLOBIN g/dL 12.4 12.8   HEMATOCRIT % 36.5 37.4   PLATELETS 10*3/mm3 153 170        Results from last 7 days   Lab Units 01/03/21  0626 01/02/21  1033 01/01/21  0523   SODIUM mmol/L 144 143 144   POTASSIUM mmol/L 3.5 3.5 3.7   CHLORIDE mmol/L 110* 105 106   CO2 mmol/L 27.0 25.0 26.0   BUN mg/dL 37* 33* 22   CREATININE mg/dL 0.58 0.70 0.55*   CALCIUM mg/dL 8.9 9.8 9.2   BILIRUBIN mg/dL 0.4  --   --    ALK PHOS U/L 90  --   --    ALT (SGPT) U/L 51*  --   --    AST (SGOT) U/L 56*  --   --    GLUCOSE mg/dL 142* 135* 129*       Culture Data:   Blood Culture   Date Value Ref Range Status   12/30/2020 No growth at 4 days  Preliminary   12/30/2020 No growth at 4 days  Preliminary     Urine Culture   Date Value Ref Range Status   01/01/2021 Yeast isolated (A)  Final     Respiratory Culture   Date Value Ref Range Status   12/29/2020 Heavy growth (4+) Staphylococcus aureus (A)  Final   12/29/2020 No Normal Respiratory Hailee (A)  Final       Radiology Data:   Imaging Results (Last 24 Hours)     ** No results found for the last 24 hours. **          I have reviewed the patient's current medications.     Assessment/Plan     Active Hospital Problems    Diagnosis   • **Pneumonia due to COVID-19 virus   • Aphonia   • Xerostomia   • Post viral debility   • Thrombocytopenia (CMS/HCC)   • Acute respiratory distress syndrome (ARDS) due to COVID-19 virus (CMS/HCC)   • Elevated LFTs   • Leukocytosis   • Elevated ferritin and CRP   • Obesity (BMI 30-39.9)   • Acute respiratory failure with hypoxia (CMS/HCC)   • Dysphagia, oropharyngeal     Plan:  1.  Completed full course of IV Cefazolin  2.  Now on pureed, honey thick diet  3.  Continue speech therapy  4.  Continue PT and  OT  5.  D/C accuchecks  6.  D/C IV Vasotec; change to PO Norvasc; monitor BP trend  7.  D/C bowel regimen  8.  Dispo: will need to start looking into placement for additional rehabilitation.  She is very weak from this prolonged hospital course and is going to need aggressive rehabilitation.  ? [facility] vs. SNF placement.    Electronically signed by Ellis Wise MD, 01/07/21, 17:37 CST.

## 2021-01-07 NOTE — THERAPY RE-EVALUATION
Patient Name: Joann Finnegan  : 1950    MRN: [id number]                              Today's Date: 2021       Admit Date: 2020    Visit Dx:     ICD-10-CM ICD-9-CM   1. Pneumonia due to COVID-19 virus  U07.1 480.8    J12.89    2. Hypoxic  R09.02 799.02   3. Acute respiratory failure with hypoxia (CMS/HCC)  J96.01 518.81   4. Impaired mobility  Z74.09 799.89   5. Impaired mobility and ADLs  Z74.09 V49.89    Z78.9    6. Oropharyngeal dysphagia  R13.12 787.22     Patient Active Problem List   Diagnosis   • Dysphagia, oropharyngeal   • Heartburn   • Family hx colonic polyps   • Pneumonia due to COVID-19 virus   • Acute respiratory failure with hypoxia (CMS/HCC)   • Obesity (BMI 30-39.9)   • Elevated ferritin and CRP   • Acute respiratory distress syndrome (ARDS) due to COVID-19 virus (CMS/HCC)   • Elevated LFTs   • Leukocytosis   • Thrombocytopenia (CMS/HCC)   • Aphonia   • Xerostomia   • Post viral debility     Past Medical History:   Diagnosis Date   • Abnormal weight gain    • Arthritis    • Breast cancer (CMS/HCC)     right.    • Fatigue    • H/O melanoma excision     CLARKS  LEVEL III      BACK OF UPPER LEFT ARM   • Hypertension    • Hypothyroidism    • Melanoma (CMS/HCC)    • Restless leg syndrome    • Shortness of breath      Past Surgical History:   Procedure Laterality Date   • APPENDECTOMY     • BREAST BIOPSY Right    • BREAST BIOPSY     • BREAST LUMPECTOMY      right.    • BROW LIFT     • COLONOSCOPY     • COLONOSCOPY N/A 2020    Procedure: COLONOSCOPY WITH ANESTHESIA;  Surgeon: Donovan Hernandez MD;  Location: [facility] ENDOSCOPY;  Service: Gastroenterology;  Laterality: N/A;  pre: family hx colon polyps  post: polyps  Janak Sherwood APRN   • ENDOSCOPY N/A 2020    Procedure: ESOPHAGOGASTRODUODENOSCOPY WITH ANESTHESIA;  Surgeon: Donovan Hernandez MD;  Location: [facility] ENDOSCOPY;  Service: Gastroenterology;  Laterality: N/A;  pre: dysphagia; heartburn  post: dilated  Kaela  Janak Ortiz, NITO   • ENDOSCOPY AND COLONOSCOPY  12/22/2011    very minimal distal esophagitis, incomplete esophagus ring dilated   • LUMBAR SPINAL TUMOR REMOVAL      Spinal Cyst removed from Spinal Canal   • MASTECTOMY, PARTIAL     • PARTIAL HIP ARTHROPLASTY Left    • SKIN CANCER EXCISION      Melanoma    • SQUAMOUS CELL CARCINOMA EXCISION     • TEMPOROMANDIBULAR JOINT ARTHROPLASTY     • TOTAL ABDOMINAL HYSTERECTOMY WITH SALPINGO OOPHORECTOMY Bilateral      General Information     Row Name 01/07/21 1228          OT Time and Intention    Document Type  re-evaluation  -     Mode of Treatment  occupational therapy  -     Row Name 01/07/21 1228          General Information    Patient Profile Reviewed  yes  -     Existing Precautions/Restrictions  fall;oxygen therapy device and L/min  -     Barriers to Rehab  medically complex  -     Row Name 01/07/21 1228          Cognition    Orientation Status (Cognition)  oriented to;person;place;situation  -     Row Name 01/07/21 1228          Safety Issues, Functional Mobility    Safety Issues Affecting Function (Mobility)  friction/shear risk;safety precaution awareness  -     Impairments Affecting Function (Mobility)  endurance/activity tolerance;shortness of breath;strength  -     Cognitive Impairments, Mobility Safety/Performance  awareness, need for assistance  -       User Key  (r) = Recorded By, (t) = Taken By, (c) = Cosigned By    Initials Name Provider Type    Theresa Booker OTR/L Occupational Therapist          Mobility/ADL's     Row Name 01/07/21 1026          Bed Mobility    Bed Mobility  scooting/bridging;supine-sit;sit-supine  -JJ     Scooting/Bridging [place] (Bed Mobility)  maximum assist (25% patient effort);2 person assist;verbal cues  -JJ     Supine-Sit [place] (Bed Mobility)  minimum assist (75% patient effort);verbal cues  -JJ     Sit-Supine [place] (Bed Mobility)  minimum assist (75% patient effort);verbal cues  -JJ      Bed Mobility, Safety Issues  decreased use of arms for pushing/pulling;decreased use of legs for bridging/pushing;impaired trunk control for bed mobility  -     Assistive Device (Bed Mobility)  bed rails;draw sheet;head of bed elevated  -[facility] Name 01/07/21 1026          Transfers    Transfers  sit-stand transfer  -     Sit-Stand [place] (Transfers)  moderate assist (50% patient effort);2 person assist;verbal cues  -[facility] Name 01/07/21 1026          Sit-Stand Transfer    Assistive Device (Sit-Stand Transfers)  walker, front-wheeled  -[facility] Name 01/07/21 1026          Functional Mobility    Functional Mobility- Comment  unable to complete d/t weakness  -[facility] Name 01/07/21 1026          Activities of Daily Living    BADL Assessment/Intervention  lower body dressing  -[facility] Name 01/07/21 1026          Lower Body Dressing Assessment/Training    [place] Level (Lower Body Dressing)  maximum assist (25% patient effort)  -     Position (Lower Body Dressing)  edge of bed sitting  -     Comment (Lower Body Dressing)  adjust B socks  -       User Key  (r) = Recorded By, (t) = Taken By, (c) = Cosigned By    Initials Name Provider Type    JTheresa Patterson, OTR/L Occupational Therapist        Obj/Interventions     [facility] Name 01/07/21 1026          Sensory Assessment (Somatosensory)    Sensory Assessment (Somatosensory)  unable/difficult to assess  -[facility] Name 01/07/21 1026          Range of Motion Comprehensive    General Range of Motion  no range of motion deficits identified  -JJ     Row Name 01/07/21 1026          Strength Comprehensive (MMT)    Comment, General Manual Muscle Testing (MMT) Assessment  R UE strength 3-/5, L UE strength 3-/5  -[facility] Name 01/07/21 1026          Balance    Balance Assessment  sitting static balance;standing static balance  -     Static Sitting Balance  sitting, edge of bed;WFL;mild impairment CGA  -     Static Standing Balance   moderate impairment;supported;standing  -       User Key  (r) = Recorded By, (t) = Taken By, (c) = Cosigned By    Initials Name Provider Type    Theresa Booker OTR/L Occupational Therapist        Goals/Plan     Row Name 01/07/21 1026          Dressing Goal 1 (OT)    Activity/Device (Dressing Goal 1, OT)  upper body dressing;lower body dressing  -J     [place]/Cues Needed (Dressing Goal 1, OT)  minimum assist (75% or more patient effort);moderate assist (50-74% patient effort)  -JJ     Time Frame (Dressing Goal 1, OT)  long term goal (LTG);by discharge  -JJ     Progress/Outcome (Dressing Goal 1, OT)  goal partially met;goal revised this date  -     Row Name 01/07/21 1026          Toileting Goal 1 (OT)    Activity/Device (Toileting Goal 1, OT)  toileting skills, all  -JJ     [place] Level/Cues Needed (Toileting Goal 1, OT)  moderate assist (50-74% patient effort)  -JJ     Time Frame (Toileting Goal 1, OT)  long term goal (LTG);by discharge  -JJ     Progress/Outcome (Toileting Goal 1, OT)  goal not met;goal ongoing  -     Row Name 01/07/21 1026          Strength Goal 1 (OT)    Strength Goal 1 (OT)  Increase B UE strength to 3/5 for increased independence with adls.  -JJ     Time Frame (Strength Goal 1, OT)  long term goal (LTG);by discharge  -JJ     Progress/Outcome (Strength Goal 1, OT)  goal partially met;goal ongoing  -     Row Name 01/07/21 1026          Therapy Assessment/Plan (OT)    Planned Therapy Interventions (OT)  activity tolerance training;adaptive equipment training;BADL retraining;functional balance retraining;occupation/activity based interventions;patient/caregiver education/training;strengthening exercise;transfer/mobility retraining  -       User Key  (r) = Recorded By, (t) = Taken By, (c) = Cosigned By    Initials Name Provider Type    Theresa Booker OTR/L Occupational Therapist        Clinical Impression     Row Name 01/07/21 1026          Pain Assessment     Additional Documentation  Pain Scale: Numbers Pre/Post-Treatment (Group)  -     Row Name 01/07/21 1026          Pain Scale: Numbers Pre/Post-Treatment    Pretreatment Pain Rating  0/10 - no pain  -J     Posttreatment Pain Rating  0/10 - no pain  -     Pain Intervention(s)  Medication (See MAR);Ambulation/increased activity;Repositioned  -     Row Name 01/07/21 1026          Plan of Care Review    Plan of Care Reviewed With  patient  -     Outcome Summary  OT re-eval completed for time. Pt is A&Ox3. She was to come to sitting at EOB with Min A. Max A for adjusting socks d/t weakness. Mod Ax2 for sit <> stand t/f from EOB, upon standing able to maintain with CGA for ~ 10 seconds. Pt fatigues quickly with activity and required multiple recovery periods throughout session. She would benefit from skilled OT services to address these deficits. Recommend d/c to SNF for continue skilled therapy services.  -     Row Name 01/07/21 1026          Therapy Assessment/Plan (OT)    Patient/Family Therapy Goal Statement (OT)  not stated  -     Rehab Potential (OT)  good, to achieve stated therapy goals  -     Criteria for Skilled Therapeutic Interventions Met (OT)  yes;skilled treatment is necessary  -     Therapy Frequency (OT)  5 times/wk  -     Predicted Duration of Therapy Intervention (OT)  10 days  -     Row Name 01/07/21 1026          Therapy Plan Review/Discharge Plan (OT)    Anticipated Discharge Disposition (OT)  skilled nursing facility  -     Row Name 01/07/21 1026          Vital Signs    Pre Patient Position  Supine  -     Intra Patient Position  Standing  -     Post Patient Position  Supine  -     Row Name 01/07/21 1026          Positioning and Restraints    Pre-Treatment Position  in bed  -     Post Treatment Position  bed  -J     In Bed  notified nsg;fowlers;call light within reach;encouraged to call for assist;with family/caregiver;side rails up x3;legs elevated  -       User Key   (r) = Recorded By, (t) = Taken By, (c) = Cosigned By    Initials Name Provider Type    Theresa Booker OTR/L Occupational Therapist        Outcome Measures     Row Name 01/07/21 1026          How much help from another is currently needed...    Putting on and taking off regular lower body clothing?  1  -JJ     Bathing (including washing, rinsing, and drying)  2  -JJ     Toileting (which includes using toilet bed pan or urinal)  2  -JJ     Putting on and taking off regular upper body clothing  2  -JJ     Taking care of personal grooming (such as brushing teeth)  2  -JJ     Eating meals  3  -JJ     AM-PAC 6 Clicks Score (OT)  12  -JJ     Row Name 01/07/21 1026          Functional Assessment    Outcome Measure Options  AM-PAC 6 Clicks Daily Activity (OT)  -JJ       User Key  (r) = Recorded By, (t) = Taken By, (c) = Cosigned By    Initials Name Provider Type    Theresa Booker OTR/L Occupational Therapist        Occupational Therapy Education                 Title: PT OT SLP Therapies (In Progress)     Topic: Occupational Therapy (Done)     Point: ADL training (Done)     Description:   Instruct learner(s) on proper safety adaptation and remediation techniques during self care or transfers.   Instruct in proper use of assistive devices.              Learning Progress Summary           Patient Acceptance, E, VU by AVELINA at 1/7/2021 1237    Acceptance, E, NR by  at 12/28/2020 1311                   Point: Home exercise program (Done)     Description:   Instruct learner(s) on appropriate technique for monitoring, assisting and/or progressing therapeutic exercises/activities.              Learning Progress Summary           Patient Acceptance, E,TB, VU,DU,NR by  at 1/6/2021 1335    Comment: Pt. fowlers in bed, performed aarom with this orellana/l after prom with bue's performed by this orellana/l!    Acceptance, TB,E, VU,NR,DU by  at 1/5/2021 0728    Comment: Pt. unable to use wts. for ue exs, Prom/aarom performed!    Significant Other Acceptance, TB,E, VU,NR,DU by  at 1/5/2021 0728    Comment: Pt. unable to use wts. for ue exs, Prom/aarom performed!                   Point: Precautions (Done)     Description:   Instruct learner(s) on prescribed precautions during self-care and functional transfers.              Learning Progress Summary           Patient Acceptance, E,TB, VU,DU,NR by  at 1/6/2021 1335    Comment: Pt. fowlers in bed, performed aarom with this orellana/l after prom with bue's performed by this orellana/l!    Acceptance, TB,E, VU,NR,DU by  at 1/5/2021 0728    Comment: Pt. unable to use wts. for ue exs, Prom/aarom performed!   Significant Other Acceptance, TB,E, VU,NR,DU by  at 1/5/2021 0728    Comment: Pt. unable to use wts. for ue exs, Prom/aarom performed!                   Point: Body mechanics (Done)     Description:   Instruct learner(s) on proper positioning and spine alignment during self-care, functional mobility activities and/or exercises.              Learning Progress Summary           Patient Acceptance, E,TB, VU,DU,NR by  at 1/6/2021 1335    Comment: Pt. fowlers in bed, performed aarom with this orellana/l after prom with bue's performed by this orellana/l!    Acceptance, TB,E, VU,NR,DU by  at 1/5/2021 0728    Comment: Pt. unable to use wts. for ue exs, Prom/aarom performed!    Acceptance, E, NR by  at 12/28/2020 1311   Significant Other Acceptance, TB,E, VU,NR,DU by  at 1/5/2021 0728    Comment: Pt. unable to use wts. for ue exs, Prom/aarom performed!                               User Key     Initials Effective Dates Name Provider Type Discipline     08/02/16 -  Favian Ng ORELLANA/L Occupational Therapy Assistant OT     12/04/20 -  Theresa Nicholas OTR/L Occupational Therapist OT              OT Recommendation and Plan  Planned Therapy Interventions (OT): activity tolerance training, adaptive equipment training, BADL retraining, functional balance retraining, occupation/activity based  interventions, patient/caregiver education/training, strengthening exercise, transfer/mobility retraining  Therapy Frequency (OT): 5 times/wk  Plan of Care Review  Plan of Care Reviewed With: patient  Outcome Summary: OT re-eval completed for time. Pt is A&Ox3. She was to come to sitting at EOB with Min A. Max A for adjusting socks d/t weakness. Mod Ax2 for sit <> stand t/f from EOB, upon standing able to maintain with CGA for ~ 10 seconds. Pt fatigues quickly with activity and required multiple recovery periods throughout session. She would benefit from skilled OT services to address these deficits. Recommend d/c to SNF for continue skilled therapy services.     Time Calculation:   Time Calculation- OT     Row Name 01/07/21 1237             Time Calculation- OT    OT Start Time  1026 add 5 minutes for chart review  -      OT Stop Time  1046  -      OT Time Calculation (min)  20 min  -      OT Received On  01/07/21  -LORENZO      OT Goal Re-Cert Due Date  01/17/21  -        User Key  (r) = Recorded By, (t) = Taken By, (c) = Cosigned By    Initials Name Provider Type    Theresa Booker OTR/L Occupational Therapist        Therapy Charges for Today     Code Description Service Date Service Provider Modifiers Qty    [id number] HC OT RE-EVAL 2 1/7/2021 Theresa Nicholas OTR/L GO 1               ALLIE Yusuf/NANCY  1/7/2021

## 2021-01-08 LAB
ANION GAP SERPL CALCULATED.3IONS-SCNC: 8 MMOL/L (ref 5–15)
BUN SERPL-MCNC: 15 MG/DL (ref 8–23)
BUN/CREAT SERPL: 34.1 (ref 7–25)
CALCIUM SPEC-SCNC: 8.9 MG/DL (ref 8.6–10.5)
CHLORIDE SERPL-SCNC: 106 MMOL/L (ref 98–107)
CO2 SERPL-SCNC: 27 MMOL/L (ref 22–29)
CREAT SERPL-MCNC: 0.44 MG/DL (ref 0.57–1)
DEPRECATED RDW RBC AUTO: 50.3 FL (ref 37–54)
ERYTHROCYTE [DISTWIDTH] IN BLOOD BY AUTOMATED COUNT: 14.4 % (ref 12.3–15.4)
GFR SERPL CREATININE-BSD FRML MDRD: 142 ML/MIN/1.73
GLUCOSE BLDC GLUCOMTR-MCNC: 116 MG/DL (ref 70–130)
GLUCOSE SERPL-MCNC: 114 MG/DL (ref 65–99)
HCT VFR BLD AUTO: 34.6 % (ref 34–46.6)
HGB BLD-MCNC: 11.3 G/DL (ref 12–15.9)
MCH RBC QN AUTO: 31.8 PG (ref 26.6–33)
MCHC RBC AUTO-ENTMCNC: 32.7 G/DL (ref 31.5–35.7)
MCV RBC AUTO: 97.5 FL (ref 79–97)
PLATELET # BLD AUTO: 123 10*3/MM3 (ref 140–450)
PMV BLD AUTO: 12.3 FL (ref 6–12)
POTASSIUM SERPL-SCNC: 3.4 MMOL/L (ref 3.5–5.2)
QT INTERVAL: 392 MS
QTC INTERVAL: 437 MS
RBC # BLD AUTO: 3.55 10*6/MM3 (ref 3.77–5.28)
SARS-COV-2 RNA PNL SPEC NAA+PROBE: NOT DETECTED
SODIUM SERPL-SCNC: 141 MMOL/L (ref 136–145)
WBC # BLD AUTO: 7.22 10*3/MM3 (ref 3.4–10.8)

## 2021-01-08 PROCEDURE — 82962 GLUCOSE BLOOD TEST: CPT

## 2021-01-08 PROCEDURE — 94799 UNLISTED PULMONARY SVC/PX: CPT

## 2021-01-08 PROCEDURE — 80048 BASIC METABOLIC PNL TOTAL CA: CPT | Performed by: INTERNAL MEDICINE

## 2021-01-08 PROCEDURE — 85027 COMPLETE CBC AUTOMATED: CPT | Performed by: INTERNAL MEDICINE

## 2021-01-08 PROCEDURE — 87635 SARS-COV-2 COVID-19 AMP PRB: CPT | Performed by: INTERNAL MEDICINE

## 2021-01-08 PROCEDURE — 25010000002 ENOXAPARIN PER 10 MG: Performed by: INTERNAL MEDICINE

## 2021-01-08 PROCEDURE — 97110 THERAPEUTIC EXERCISES: CPT

## 2021-01-08 PROCEDURE — 92526 ORAL FUNCTION THERAPY: CPT

## 2021-01-08 PROCEDURE — 99232 SBSQ HOSP IP/OBS MODERATE 35: CPT | Performed by: NURSE PRACTITIONER

## 2021-01-08 PROCEDURE — 97530 THERAPEUTIC ACTIVITIES: CPT

## 2021-01-08 RX ORDER — LISINOPRIL 5 MG/1
5 TABLET ORAL
Status: DISCONTINUED | OUTPATIENT
Start: 2021-01-08 | End: 2021-01-11 | Stop reason: HOSPADM

## 2021-01-08 RX ORDER — POTASSIUM CHLORIDE 750 MG/1
40 CAPSULE, EXTENDED RELEASE ORAL ONCE
Status: COMPLETED | OUTPATIENT
Start: 2021-01-08 | End: 2021-01-08

## 2021-01-08 RX ADMIN — ALBUTEROL SULFATE 2 PUFF: 90 AEROSOL, METERED RESPIRATORY (INHALATION) at 07:34

## 2021-01-08 RX ADMIN — LEVOTHYROXINE SODIUM 125 MCG: 125 TABLET ORAL at 09:15

## 2021-01-08 RX ADMIN — LISINOPRIL 5 MG: 5 TABLET ORAL at 17:51

## 2021-01-08 RX ADMIN — ENOXAPARIN SODIUM 40 MG: 40 INJECTION, SOLUTION INTRAVENOUS; SUBCUTANEOUS at 09:15

## 2021-01-08 RX ADMIN — SALINE NASAL SPRAY 2 SPRAY: 1.5 SOLUTION NASAL at 09:16

## 2021-01-08 RX ADMIN — AMLODIPINE BESYLATE 5 MG: 5 TABLET ORAL at 09:15

## 2021-01-08 RX ADMIN — POTASSIUM CHLORIDE 40 MEQ: 750 CAPSULE, EXTENDED RELEASE ORAL at 13:32

## 2021-01-08 NOTE — PLAN OF CARE
Goal Outcome Evaluation:  Plan of Care Reviewed With: patient  Progress: improving  Outcome Summary: Pt is no longer receiving EN, DHT removed. She is on a Pureed, Honey thick liquid diet. Appetite is fair, 38% avg. Ordered Magic cup daily with lunch and dinner. Discharge planning for [facility], will continue to follow for nutrition needs.

## 2021-01-08 NOTE — PROGRESS NOTES
Continued Stay Note   [place]     Patient Name: Joann Finngean  MRN: [id number]  Today's Date: 1/8/2021    Admit Date: 12/11/2020    Discharge Plan     Row Name 01/08/21 1516       Plan    Plan  [facility]    Patient/Family in Agreement with Plan  yes    Plan Comments  REC'D CALL FROM [facility] ADVISING THEY CAN OFFER PT. A BED TODAY.  PT'S SPOUSE FEELS D/C TODAY IS TOO SOON.  WILL PLAN D/C ON MONDAY.  MD AND MICHAEL AT [facility] IS AWARE.  WILL FOLLOW TO COORDINATE TRANSFER.        Discharge Codes    No documentation.             MOSHE Rodriguez

## 2021-01-08 NOTE — THERAPY TREATMENT NOTE
Acute Care - Physical Therapy Treatment Note  [facility]     Patient Name: Joann Finnegan  : 1950  MRN: [id number]  Today's Date: 2021           PT Assessment (last 12 hours)      PT Evaluation and Treatment     [facility] Name 21 1100          Physical Therapy Time and Intention    Subjective Information  complains of;weakness;fatigue  -     Document Type  therapy note (daily note)  -     Mode of Treatment  physical therapy  -[facility] Name 21 1100          General Information    Existing Precautions/Restrictions  fall;oxygen therapy device and L/min  -[facility] Name 21 1100          Pain Scale: Word Pre/Post-Treatment    Pain: Word Scale, Pretreatment  0 - no pain  -     Posttreatment Pain Rating  0 - no pain  -[facility] Name 21 1100          Range of Motion Comprehensive    Comment, General Range of Motion  BLE ROM at EOB x 10 reps, foot drop R   -[facility] Name 21 1100          Bed Mobility    Rolling Left Catron (Bed Mobility)  minimum assist (75% patient effort);verbal cues  -     Rolling Right Catron (Bed Mobility)  minimum assist (75% patient effort);verbal cues  -     Scooting/Bridging Catron (Bed Mobility)  maximum assist (25% patient effort);2 person assist;verbal cues  -     Supine-Sit Catron (Bed Mobility)  minimum assist (75% patient effort);verbal cues  -     Sit-Supine Catron (Bed Mobility)  minimum assist (75% patient effort);verbal cues  -     Comment (Bed Mobility)  sat EOB 30 minutes  -[facility] Name 21 1100          Transfers    Comment (Transfers)  stood x 2, legs buckled  -     Sit-Stand Catron (Transfers)  moderate assist (50% patient effort);2 person assist;verbal cues  -     Stand-Sit Catron (Transfers)  minimum assist (75% patient effort);2 person assist;verbal cues  -[facility] Name             Wound 20 0957 Right lower chin    Wound - Properties Group Placement Date:  12/16/20  -KM Placement Time: 0957  -KM Side: Right  -KM Orientation: lower  -KM Location: chin  -KM Stage, Pressure Injury : deep tissue injury  -KM    Retired Wound - Properties Group Date first assessed: 12/16/20  -KM Time first assessed: 0957  -KM Side: Right  -KM Location: chin  -KM    Row Name             Wound 12/16/20 0959 Right upper lip    Wound - Properties Group Placement Date: 12/16/20  -KM Placement Time: 0959  -KM Present on Hospital Admission: N  -KM Side: Right  -KM Orientation: upper  -KM Location: lip  -KM Stage, Pressure Injury : deep tissue injury  -KM    Retired Wound - Properties Group Date first assessed: 12/16/20  -KM Time first assessed: 0959  -KM Present on Hospital Admission: N  -KM Side: Right  -KM Location: lip  -KM    Row Name             Wound 12/18/20 1425 Left cheek Pressure Injury    Wound - Properties Group Placement Date: 12/18/20  -RM Placement Time: 1425  -RM Present on Hospital Admission: N  -RM Side: Left  -RM Location: cheek  -RM Primary Wound Type: Pressure inj  -RM    Retired Wound - Properties Group Date first assessed: 12/18/20  -RM Time first assessed: 1425  -RM Present on Hospital Admission: N  -RM Side: Left  -RM Location: cheek  -RM Primary Wound Type: Pressure inj  -RM    Row Name             Wound 12/21/20 1118 Left nose Pressure Injury    Wound - Properties Group Placement Date: 12/21/20  -CH Placement Time: 1118  -CH Present on Hospital Admission: N  -CH Side: Left  -CH Location: nose  -CH Primary Wound Type: Pressure inj  -CH    Retired Wound - Properties Group Date first assessed: 12/21/20  -CH Time first assessed: 1118  -CH Present on Hospital Admission: N  -CH Side: Left  -CH Location: nose  -CH Primary Wound Type: Pressure inj  -CH    Row Name 01/08/21 1100          Plan of Care Review    Plan of Care Reviewed With  patient;spouse  -     Progress  improving  -     Outcome Summary  pt trans to EOB min assist, pt sat EOB 30minutes cga-sba, performed BLE  ROM x 10 reps, R foot drop, sit-stand x 2 mod assist of 2, trans back to bed min assist pt would benefit from cont therapy possibly LTACH/SNF  -AH     Row Name 01/08/21 1100          Vital Signs    Pre SpO2 (%)  93  -AH     O2 Delivery Pre Treatment  nasal cannula 1l  -AH     Intra SpO2 (%)  85  -AH     O2 Delivery Intra Treatment  nasal cannula  -AH     Post SpO2 (%)  93  -AH     O2 Delivery Post Treatment  nasal cannula  -AH     Row Name 01/08/21 1100          Positioning and Restraints    Pre-Treatment Position  in bed  -AH     Post Treatment Position  bed  -AH     In Bed  fowlers;call light within reach;encouraged to call for assist;with family/caregiver  -       User Key  (r) = Recorded By, (t) = Taken By, (c) = Cosigned By    Initials Name Provider Type     Malena Garcia, PTA Physical Therapy Assistant    Maine Jaramillo, RN Registered Nurse    Ya Dodson RN Registered Nurse    Gisselle Buchanan RN Registered Nurse        Physical Therapy Education                 Title: PT OT SLP Therapies (Done)     Topic: Physical Therapy (Done)     Point: Mobility training (Done)     Learning Progress Summary           Patient Acceptance, E, VU by JLORENZO at 1/7/2021 1201    Comment: Pt educated on proper AD technique.    Acceptance, E,TB, VU by  at 1/6/2021 1008    Comment: trans    Acceptance, E,TB,D, DU,NR by  at 1/4/2021 1315    Comment: Education to reinforce importance of activity/risks assoc w/ bed rest; education for improved tech w/ bed mobility; education for proper deep breathing and for positioning to assist w/ edema mgmt and for pressure relief    Acceptance, E, NR by WK at 1/2/2021 0911    Comment: BOA    Acceptance, E, NL by WK at 1/1/2021 0935    Comment: BOA    Acceptance, E, NL by MS at 12/28/2020 0900    Comment: role of PT in her care   Family Acceptance, E,TB, VU by  at 1/6/2021 1008    Comment: trans                   Point: Home exercise program (Done)     Learning Progress  Summary           Patient Acceptance, E,TB,D,H, VU by  at 1/5/2021 0906    Comment: BLE HEP   Significant Other Acceptance, E,TB,D,H, VU by  at 1/5/2021 0906    Comment: BLE HEP                   Point: Body mechanics (Done)     Learning Progress Summary           Patient Acceptance, E,TB, VU by  at 1/8/2021 1154    Comment: deep breathing                   Point: Precautions (Done)     Learning Progress Summary           Patient Acceptance, E,TB,D, DU,NR by  at 1/4/2021 1315    Comment: Education to reinforce importance of activity/risks assoc w/ bed rest; education for improved tech w/ bed mobility; education for proper deep breathing and for positioning to assist w/ edema mgmt and for pressure relief                               User Key     Initials Effective Dates Name Provider Type Discipline     08/02/16 -  Malena Garcia, PTA Physical Therapy Assistant PT    MS 06/19/18 -  Yennifer May, PT, DPT, NCS Physical Therapist PT     08/02/16 -  Rufina Degroot, PTA Physical Therapy Assistant PT     08/02/18 -  Terri Gomez, PT Physical Therapist PT     12/23/20 -  Ad Mckeon, NILA Student PT Student PT              PT Recommendation and Plan     Plan of Care Reviewed With: patient, spouse  Progress: improving  Outcome Summary: pt trans to EOB min assist, pt sat EOB 30minutes cga-sba, performed BLE ROM x 10 reps, R foot drop, sit-stand x 2 mod assist of 2, trans back to bed min assist pt would benefit from cont therapy possibly LTACH/SNF  Outcome Measures     Row Name 01/06/21 1335             How much help from another is currently needed...    Putting on and taking off regular lower body clothing?  1  -CJ      Bathing (including washing, rinsing, and drying)  1  -CJ      Toileting (which includes using toilet bed pan or urinal)  1  -CJ      Putting on and taking off regular upper body clothing  1  -CJ      Taking care of personal grooming (such as brushing teeth)  1  -CJ      Eating  meals  1  -      AM-PAC 6 Clicks Score (OT)  6  -         Functional Assessment    Outcome Measure Options  AM-PAC 6 Clicks Daily Activity (OT)  -        User Key  (r) = Recorded By, (t) = Taken By, (c) = Cosigned By    Initials Name Provider Type     Favian Ng COTA/L Occupational Therapy Assistant           Time Calculation:   PT Charges     Row Name 01/08/21 1154             Time Calculation    Start Time  1100  -      Stop Time  1144  -      Time Calculation (min)  44 min  -      PT Received On  01/08/21  -         Time Calculation- PT    Total Timed Code Minutes- PT  44 minute(s)  -         Timed Charges    00855 - PT Therapeutic Exercise Minutes  15  -AH      28328 - PT Therapeutic Activity Minutes  29  -AH        User Key  (r) = Recorded By, (t) = Taken By, (c) = Cosigned By    Initials Name Provider Type     Malena Garcia PTA Physical Therapy Assistant        Therapy Charges for Today     Code Description Service Date Service Provider Modifiers Qty    46124391785 HC PT THER PROC EA 15 MIN 1/8/2021 Malena Garcia PTA GP 1    81887572189 HC PT THERAPEUTIC ACT EA 15 MIN 1/8/2021 Malena Garcia PTA GP 2          PT G-Codes  Outcome Measure Options: AM-PAC 6 Clicks Daily Activity (OT)  AM-PAC 6 Clicks Score (PT): 8  AM-PAC 6 Clicks Score (OT): 12    Malena Garcia PTA  1/8/2021

## 2021-01-08 NOTE — PLAN OF CARE
Goal Outcome Evaluation:  Plan of Care Reviewed With: patient, spouse  Progress: improving  Outcome Summary: NSR on tele, 80-86, one episode of second degree block, rate down to 34. no falls. o2 at 3 lit per NC. taking ice chips without difficulty. alert, oriented, voice stronger tonight. monitoring labs and sats, discharge planning in progress.

## 2021-01-08 NOTE — PLAN OF CARE
Problem: Adult Inpatient Plan of Care  Goal: Plan of Care Review  Outcome: Ongoing, Progressing  Flowsheets (Taken 1/8/2021 1348)  Progress: improving  Plan of Care Reviewed With: patient  Outcome Summary: Pt. aleksandar in bed, increasing her ability with ue exs and function! Pt. able to perform elbow flex/ext ind'ly with vc's from this orellana/l yellow theraband used for tricep ext. exs while this orellana/l stabilizes pt's L. elbow no issues with tx, Rue arom improving, yellow theraband with resistive exs shd. flex to 90*!   Goal Outcome Evaluation:  Plan of Care Reviewed With: patient  Progress: improving  Outcome Summary: Pt. aleksandar in bed, increasing her ability with ue exs and function! Pt. able to perform elbow flex/ext ind'ly with vc's from this orellana/l yellow theraband used for tricep ext. exs while this orellana/l stabilizes pt's L. elbow no issues with tx, Rue arom improving, yellow theraband with resistive exs shd. flex to 90*!

## 2021-01-08 NOTE — PROGRESS NOTES
[facility] Medicine Services  INPATIENT PROGRESS NOTE    Patient Name: Joann Finnegan  Date of Admission: 12/11/2020  Today's Date: 01/08/21  Length of Stay: 28  Primary Care Physician: Janak Sherwood APRN    Subjective   Chief Complaint: follow-up COVID  Shortness of Breath    Patient doing well.  She does appear little bit withdrawn and may be a little bit depressed.  No acute events from overnight.  She is now on room air, and has been weaned off of oxygen.  On bedside pulse oximeter O2 sats are approximately 90%.  She denies feeling any shortness of breath.  She continues to feel very weak.  She very much would like some of the tubes and wires to be removed off of her, including her telemetry.    Review of Systems   Respiratory: Positive for shortness of breath.         All pertinent negatives and positives are as above. All other systems have been reviewed and are negative unless otherwise stated.     Objective    Temp:  [97.4 °F (36.3 °C)-98.6 °F (37 °C)] 97.5 °F (36.4 °C)  Heart Rate:  [74-87] 82  Resp:  [16] 16  BP: (135-158)/(51-63) 151/51  Physical Exam  Vitals signs reviewed.   Constitutional:       Appearance: She is not toxic-appearing.   HENT:      Mouth/Throat:      Pharynx: No oropharyngeal exudate.      Comments: Voice getting stronger  Eyes:      Pupils: Pupils are equal, round, and reactive to light.   Cardiovascular:      Rate and Rhythm: Normal rate.   Pulmonary:      Effort: Pulmonary effort is normal. No respiratory distress.      Breath sounds: No rales.      Comments: Currently on room air  Musculoskeletal:         General: No swelling.   Skin:     Comments: Chin and left cheek unchanged (area of scab/eschar)   Neurological:      General: No focal deficit present.      Mental Status: She is alert.      Motor: Weakness present.   Psychiatric:         Mood and Affect: Mood normal.         Results Review:  I have reviewed the labs, radiology  results, and diagnostic studies.    Laboratory Data:   Results from last 7 days   Lab Units 01/08/21  0439 01/04/21  0306   WBC 10*3/mm3 7.22 10.82*   HEMOGLOBIN g/dL 11.3* 12.4   HEMATOCRIT % 34.6 36.5   PLATELETS 10*3/mm3 123* 153        Results from last 7 days   Lab Units 01/08/21  0439 01/03/21  0626 01/02/21  1033   SODIUM mmol/L 141 144 143   POTASSIUM mmol/L 3.4* 3.5 3.5   CHLORIDE mmol/L 106 110* 105   CO2 mmol/L 27.0 27.0 25.0   BUN mg/dL 15 37* 33*   CREATININE mg/dL 0.44* 0.58 0.70   CALCIUM mg/dL 8.9 8.9 9.8   BILIRUBIN mg/dL  --  0.4  --    ALK PHOS U/L  --  90  --    ALT (SGPT) U/L  --  51*  --    AST (SGOT) U/L  --  56*  --    GLUCOSE mg/dL 114* 142* 135*       Culture Data:   Blood Culture   Date Value Ref Range Status   12/30/2020 No growth at 4 days  Preliminary   12/30/2020 No growth at 4 days  Preliminary     Urine Culture   Date Value Ref Range Status   01/01/2021 Yeast isolated (A)  Final     Respiratory Culture   Date Value Ref Range Status   12/29/2020 Heavy growth (4+) Staphylococcus aureus (A)  Final   12/29/2020 No Normal Respiratory Hailee (A)  Final       Radiology Data:   Imaging Results (Last 24 Hours)     ** No results found for the last 24 hours. **          I have reviewed the patient's current medications.     Assessment/Plan     Active Hospital Problems    Diagnosis   • **Pneumonia due to COVID-19 virus   • Aphonia   • Xerostomia   • Post viral debility   • Thrombocytopenia (CMS/HCC)   • Acute respiratory distress syndrome (ARDS) due to COVID-19 virus (CMS/HCC)   • Elevated LFTs   • Leukocytosis   • Elevated ferritin and CRP   • Obesity (BMI 30-39.9)   • Acute respiratory failure with hypoxia (CMS/HCC)   • Dysphagia, oropharyngeal     Plan:  1.  Completed full course of IV Cefazolin  2.  Looking into placement at [facility] rehab - they will need negative COVID test; will order repeat test today  3.  Continue speech therapy  4.  Continue PT and OT  5.  K supplement  6.   Change Norvasc to Lisinopril; monitor BP trend  7.  D/C bowel regimen  8.  Currently on room air; continue to monitor  9.  I'm okay with dc'ing cardiac telemetry; night before last patient had 1 brief episode of HR dipping down.  No recurrence.  Patient has been on telemetry essentially since admission on 12/11.  Her most recent Echo revealed no acute findings. I think it would make her feel better and hopefully improve her spirit to be removed of the tubes/wires that she has been wearing for weeks.  10.  Dispo: ? [facility] inpatient rehab    Electronically signed by Ellis Wise MD, 01/08/21, 13:14 CST.

## 2021-01-08 NOTE — THERAPY TREATMENT NOTE
Acute Care - Speech Language Pathology   Swallow Treatment Note [facility]     Patient Name: Joann Finnegan  : 1950  MRN: [id number]  Today's Date: 2021               Admit Date: 2020  Pt participated well with neuromuscular electrical stimulation for swallow function. She completed 15 minutes on A1 setting at 17mA. She swallowed applesauce during contractions 50% of the time. She had one immediate mild cough following applesauce, but no other overt s/s of aspiration. Pt may be discharging to [facility] inpatient rehab soon. Recommend continue speech therapy. Continue pureed diet and honey thick liquids at this time.  Litzy Mane, CCC-SLP 2021 13:48 CST    Visit Dx:     ICD-10-CM ICD-9-CM   1. Pneumonia due to COVID-19 virus  U07.1 480.8    J12.89    2. Hypoxic  R09.02 799.02   3. Acute respiratory failure with hypoxia (CMS/HCC)  J96.01 518.81   4. Impaired mobility  Z74.09 799.89   5. Impaired mobility and ADLs  Z74.09 V49.89    Z78.9    6. Oropharyngeal dysphagia  R13.12 787.22     Patient Active Problem List   Diagnosis   • Dysphagia, oropharyngeal   • Heartburn   • Family hx colonic polyps   • Pneumonia due to COVID-19 virus   • Acute respiratory failure with hypoxia (CMS/HCC)   • Obesity (BMI 30-39.9)   • Elevated ferritin and CRP   • Acute respiratory distress syndrome (ARDS) due to COVID-19 virus (CMS/HCC)   • Elevated LFTs   • Leukocytosis   • Thrombocytopenia (CMS/HCC)   • Aphonia   • Xerostomia   • Post viral debility     Past Medical History:   Diagnosis Date   • Abnormal weight gain    • Arthritis    • Breast cancer (CMS/HCC)     right.    • Fatigue    • H/O melanoma excision     CLARKS  LEVEL III      BACK OF UPPER LEFT ARM   • Hypertension    • Hypothyroidism    • Melanoma (CMS/HCC)    • Restless leg syndrome    • Shortness of breath      Past Surgical History:   Procedure Laterality Date   • APPENDECTOMY     • BREAST BIOPSY Right    • BREAST BIOPSY     • BREAST  LUMPECTOMY      right.    • BROW LIFT     • COLONOSCOPY     • COLONOSCOPY N/A 2/14/2020    Procedure: COLONOSCOPY WITH ANESTHESIA;  Surgeon: Donovan Hernandez MD;  Location: [facility] ENDOSCOPY;  Service: Gastroenterology;  Laterality: N/A;  pre: family hx colon polyps  post: polyps  Janak Sherwood APRN   • ENDOSCOPY N/A 2/14/2020    Procedure: ESOPHAGOGASTRODUODENOSCOPY WITH ANESTHESIA;  Surgeon: Donovan Hernandez MD;  Location: [facility] ENDOSCOPY;  Service: Gastroenterology;  Laterality: N/A;  pre: dysphagia; heartburn  post: dilated  Janak Sherwood APRN   • ENDOSCOPY AND COLONOSCOPY  12/22/2011    very minimal distal esophagitis, incomplete esophagus ring dilated   • LUMBAR SPINAL TUMOR REMOVAL      Spinal Cyst removed from Spinal Canal   • MASTECTOMY, PARTIAL     • PARTIAL HIP ARTHROPLASTY Left    • SKIN CANCER EXCISION      Melanoma    • SQUAMOUS CELL CARCINOMA EXCISION     • TEMPOROMANDIBULAR JOINT ARTHROPLASTY     • TOTAL ABDOMINAL HYSTERECTOMY WITH SALPINGO OOPHORECTOMY Bilateral         SWALLOW EVALUATION (last 72 hours)      SLP Adult Swallow Evaluation     Row Name 01/08/21 1313 01/07/21 0947 01/06/21 1300 01/06/21 0817          Rehab Evaluation    Document Type  therapy note (daily note)  -MB  therapy note (daily note)  -MB  evaluation  -CS  therapy note (daily note)  -MB     Subjective Information  no complaints  -MB  no complaints  -MB  complains of;weakness  -CS  complains of;fatigue  -MB     Patient Observations  alert;cooperative  -MB  alert;cooperative  -MB  alert;cooperative;agree to therapy  -CS  alert;cooperative  -MB     Patient/Family/Caregiver Comments/Observations  No family present  -MB   present  -MB  No family present  -CS  Daughter present  -MB     Care Plan Review  --  --  care plan/treatment goals reviewed  -CS  --     Care Plan Review, Other Participant(s)  --  --  family  -CS  --     Patient Effort  good  -MB  good  -MB  good  -CS  good  -MB        General Information     Patient Profile Reviewed  --  --  yes  -CS  --     Pertinent History Of Current Problem  --  --  Covid, pneumonia, acute respiratory distress syndrome, prolonged intubation  -CS  --     Current Method of Nutrition  --  --  NPO  -CS  --     Precautions/Limitations, Vision  --  --  WFL with corrective lenses  -CS  --     Precautions/Limitations, Hearing  --  --  WFL;for purposes of eval  -CS  --     Prior Level of Function-Communication  --  --  WFL  -CS  --     Prior Level of Function-Swallowing  --  --  no diet consistency restrictions  -CS  --     Plans/Goals Discussed with  --  --  patient  -CS  --     Barriers to Rehab  --  --  medically complex;cognitive status  -CS  --     Patient's Goals for Discharge  --  --  patient did not state  -CS  --        Pain    Additional Documentation  Pain Scale: FACES Pre/Post-Treatment (Group)  -MB  Pain Scale: FACES Pre/Post-Treatment (Group)  -MB  Pain Scale: FACES Pre/Post-Treatment (Group)  -CS  --        Pain Scale: FACES Pre/Post-Treatment    Pain: FACES Scale, Pretreatment  0-->no hurt  -MB  0-->no hurt  -MB  0-->no hurt  -CS  0-->no hurt  -MB     Posttreatment Pain Rating  --  --  0-->no hurt  -CS  --        Oral Motor Structure and Function    Secretion Management  --  --  dried secretions in oral cavity  -CS  --     Mucosal Quality  --  --  dry;cracked  -CS  --        Oral Musculature and Cranial Nerve Assessment    Oral Motor General Assessment  --  --  generalized oral motor weakness  -CS  --        General Eating/Swallowing Observations    Respiratory Support Currently in Use  --  --  nasal cannula  -CS  --        MBS/VFSS    Utensils Used  --  --  spoon;straw  -CS  --     Consistencies Trialed  --  --  pudding thick;honey-thick liquids;nectar/syrup-thick liquids  -CS  --        MBS/VFSS Interpretation    Oral Prep Phase  --  --  WFL  -CS  --     Oral Transit Phase  --  --  impaired  -CS  --     Oral Residue  --  --  WFL  -CS  --     VFSS Summary  --  --  See  VFSS note  -CS  --        Oral Transit Phase    Impaired Oral Transit Phase  --  --  premature spillage of liquids into pharynx  -CS  --     Premature Spillage of Liquids into Pharynx  --  --  honey-thick liquids;nectar-thick liquids  -CS  --        Initiation of Pharyngeal Swallow    Initiation of Pharyngeal Swallow  --  --  bolus in valleculae  -CS  --     Pharyngeal Phase  --  --  impaired pharyngeal phase of swallowing  -CS  --     Aspiration During the Swallow  --  --  nectar-thick liquids  -CS  --     Penetration After the Swallow  --  --  nectar-thick liquids  -CS  --     Aspiration After the Swallow  --  --  nectar-thick liquids  -CS  --     Response to Aspiration  --  --  cough  -CS  --        Clinical Impression    Daily Summary of Progress (SLP)  progress toward functional goals is good  -MB  progress towards functional goals is fair  -MB  progress toward functional goals is good  -CS  progress toward functional goals is good  -MB     Barriers to Overall Progress (SLP)  None  -MB  Medically complex, weak  -MB  --  Ill-fitting dentures  -MB     SLP Swallowing Diagnosis  --  --  mod-severe;oral dysphagia;suspected pharyngeal dysphagia  -CS  --     Functional Impact  --  --  risk of aspiration/pneumonia  -CS  --     Rehab Potential/Prognosis, Swallowing  --  --  re-evaluate goals as necessary  -CS  --     Plan for Continued Treatment (SLP)  Continue to follow  -MB  Continue to follow  -MB  --  MBS today  -MB        Recommendations    Therapy Frequency (Swallow)  --  --  at least;3 days per week  -CS  --     Predicted Duration Therapy Intervention (Days)  --  --  until discharge  -CS  --     SLP Diet Recommendation  --  --  NPO;temporary alternate methods of nutrition/hydration  -CS  --     Recommended Diagnostics  --  --  reassess via clinical swallow evaluation  -CS  --     Oral Care Recommendations  --  --  Oral Care BID/PRN  -CS  --     SLP Rec. for Method of Medication Administration  --  --  meds via  alternate route  -CS  --     Anticipated Discharge Disposition (SLP)  --  --  unknown  -CS  --        Swallow Goals (SLP)    Oral Nutrition/Hydration Goal Selection (SLP)  --  --  oral nutrition/hydration, SLP goal 1  -CS  --     Labial Strengthening Goal Selection (SLP)  --  --  labial strengthening, SLP goal 1  -CS  --     Lingual Strengthening Goal Selection (SLP)  --  --  lingual strengthening, SLP goal 1  -CS  --     Pharyngeal Strengthening Exercise Goal Selection (SLP)  --  --  pharyngeal strengthening exercise, SLP goal 1  -CS  --     Additional Documentation  --  --  labial strengthening goal selection (SLP);lingual strengthening goal selection (SLP);pharyngeal strengthening exercise goal selection (SLP)  -CS  --        Oral Nutrition/Hydration Goal 1 (SLP)    Oral Nutrition/Hydration Goal 1, SLP  Pt will tolerate least restrictive diet with no overt s/s of aspiration.   -MB  Pt will tolerate least restrictive diet with no overt s/s of aspiration.   -MB  Pt will tolerate least restrictive diet with no overt s/s of aspiration.   -CS  Pt will tolerate least restrictive diet with no overt s/s of aspiration.   -MB     Time Frame (Oral Nutrition/Hydration Goal 1, SLP)  by discharge  -MB  by discharge  -MB  by discharge  -CS  by discharge  -MB     Barriers (Oral Nutrition/Hydration Goal 1, SLP)  none  -MB  none  -MB  Ill-fitting dentures  -CS  Ill-fitting dentures  -MB     Progress/Outcomes (Oral Nutrition/Hydration Goal 1, SLP)  continuing progress toward goal  -MB  continuing progress toward goal  -MB  goal ongoing  -CS  continuing progress toward goal  -MB        Labial Strengthening Goal 1 (SLP)    Activity (Labial Strengthening Goal 1, SLP)  increase labial tone  -MB  increase labial tone  -MB  increase labial tone  -CS  increase labial tone  -MB     Increase Labial Tone  labial resistance exercises  -MB  labial resistance exercises  -MB  labial resistance exercises  -CS  labial resistance exercises  -MB      [place]/Accuracy (Labial Strengthening Goal 1, SLP)  with minimal cues (75-90% accuracy)  -MB  with minimal cues (75-90% accuracy)  -MB  with minimal cues (75-90% accuracy)  -CS  with minimal cues (75-90% accuracy)  -MB     Time Frame (Labial Strengthening Goal 1, SLP)  short term goal (STG);by discharge  -MB  short term goal (STG);by discharge  -MB  short term goal (STG);by discharge  -CS  short term goal (STG);by discharge  -MB     Barriers (Labial Strengthening Goal 1, SLP)  n/a  -MB  --  n/a  -CS  --     Progress/Outcomes (Labial Strengthening Goal 1, SLP)  goal ongoing  -MB  --  goal ongoing  -CS  --        Lingual Strengthening Goal 1 (SLP)    Activity (Lingual Strengthening Goal 1, SLP)  increase lingual tone/sensation/control/coordination/movement  -MB  increase lingual tone/sensation/control/coordination/movement  -MB  increase lingual tone/sensation/control/coordination/movement  -CS  increase lingual tone/sensation/control/coordination/movement  -MB     Increase Lingual Tone/Sensation/Control/Coordination/Movement  lingual movement exercises  -MB  lingual movement exercises  -MB  lingual movement exercises  -CS  lingual movement exercises  -MB     [place]/Accuracy (Lingual Strengthening Goal 1, SLP)  with minimal cues (75-90% accuracy)  -MB  with minimal cues (75-90% accuracy)  -MB  with minimal cues (75-90% accuracy)  -CS  with minimal cues (75-90% accuracy)  -MB     Time Frame (Lingual Strengthening Goal 1, SLP)  short term goal (STG);by discharge  -MB  short term goal (STG);by discharge  -MB  short term goal (STG);by discharge  -CS  short term goal (STG);by discharge  -MB     Barriers (Lingual Strengthening Goal 1, SLP)  Weaknessn/a  -MB  --  Weakness  -CS  --     Progress/Outcomes (Lingual Strengthening Goal 1, SLP)  goal ongoing  -MB  --  goal ongoing  -CS  --        Pharyngeal Strengthening Exercise Goal 1 (SLP)    Activity (Pharyngeal Strengthening Goal 1, SLP)  increase timing;increase  superior movement of the hyolaryngeal complex;increase anterior movement of the hyolaryngeal complex  -MB  increase timing;increase superior movement of the hyolaryngeal complex;increase anterior movement of the hyolaryngeal complex  -MB  increase timing  -CS  increase timing  -MB     Increase Timing  gustatory stimulation (sour/cold)  -MB  gustatory stimulation (sour/cold)  -MB  gustatory stimulation (sour/cold)  -CS  gustatory stimulation (sour/cold)  -MB     Increase Superior Movement of the Hyolaryngeal Complex  Mendelsohn;hard effortful swallow NMES  -MB  Mendelsohn;hard effortful swallow NMES  -MB  --  --     Increase Anterior Movement of the Hyolaryngeal Complex  shaker  -MB  shaker  -MB  --  --     [place]/Accuracy (Pharyngeal Strengthening Goal 1, SLP)  independently (over 90% accuracy)  -MB  independently (over 90% accuracy)  -MB  independently (over 90% accuracy)  -CS  independently (over 90% accuracy)  -MB     Time Frame (Pharyngeal Strengthening Goal 1, SLP)  short term goal (STG);by discharge  -MB  short term goal (STG);by discharge  -MB  short term goal (STG);by discharge  -CS  short term goal (STG);by discharge  -MB     Barriers (Pharyngeal Strengthening Goal 1, SLP)  none  -MB  none  -MB  n/a  -CS  --     Progress/Outcomes (Pharyngeal Strengthening Goal 1, SLP)  continuing progress toward goal  -MB  continuing progress toward goal  -MB  goal ongoing  -CS  --       User Key  (r) = Recorded By, (t) = Taken By, (c) = Cosigned By    Initials Name Effective Dates    Litzy Pimentel, CCC-SLP 08/02/16 -     Davi Gill, MS CCC-SLP 04/03/18 -           EDUCATION  The patient has been educated in the following areas:   Dysphagia (Swallowing Impairment).    SLP Recommendation and Plan                                         Daily Summary of Progress (SLP): progress toward functional goals is good    Plan for Continued Treatment (SLP): Continue to follow              Plan of Care Reviewed With:  patient  Progress: improving  Outcome Summary: Pt participated well with neuromuscular electrical stimulation for swallow function. She completed 15 minutes on A1 setting at 17mA. She swallowed applesauce during contractions 50% of the time. She had one immediate mild cough following applesauce, but no other overt s/s of aspiration. Pt may be discharging to [facility] inpatient rehab soon. Recommend continue speech therapy. Continue pureed diet and honey thick liquids at this time.    SLP GOALS     Row Name 01/08/21 1313 01/07/21 0947 01/06/21 1300       Oral Nutrition/Hydration Goal 1 (SLP)    Oral Nutrition/Hydration Goal 1, SLP  Pt will tolerate least restrictive diet with no overt s/s of aspiration.   -MB  Pt will tolerate least restrictive diet with no overt s/s of aspiration.   -MB  Pt will tolerate least restrictive diet with no overt s/s of aspiration.   -CS    Time Frame (Oral Nutrition/Hydration Goal 1, SLP)  by discharge  -MB  by discharge  -MB  by discharge  -CS    Barriers (Oral Nutrition/Hydration Goal 1, SLP)  none  -MB  none  -MB  Ill-fitting dentures  -CS    Progress/Outcomes (Oral Nutrition/Hydration Goal 1, SLP)  continuing progress toward goal  -MB  continuing progress toward goal  -MB  goal ongoing  -CS       Labial Strengthening Goal 1 (SLP)    Activity (Labial Strengthening Goal 1, SLP)  increase labial tone  -MB  increase labial tone  -MB  increase labial tone  -CS    Increase Labial Tone  labial resistance exercises  -MB  labial resistance exercises  -MB  labial resistance exercises  -CS    [place]/Accuracy (Labial Strengthening Goal 1, SLP)  with minimal cues (75-90% accuracy)  -MB  with minimal cues (75-90% accuracy)  -MB  with minimal cues (75-90% accuracy)  -CS    Time Frame (Labial Strengthening Goal 1, SLP)  short term goal (STG);by discharge  -MB  short term goal (STG);by discharge  -MB  short term goal (STG);by discharge  -CS    Barriers (Labial Strengthening Goal 1, SLP)  n/a   -MB  --  n/a  -CS    Progress/Outcomes (Labial Strengthening Goal 1, SLP)  goal ongoing  -MB  --  goal ongoing  -CS       Lingual Strengthening Goal 1 (SLP)    Activity (Lingual Strengthening Goal 1, SLP)  increase lingual tone/sensation/control/coordination/movement  -MB  increase lingual tone/sensation/control/coordination/movement  -MB  increase lingual tone/sensation/control/coordination/movement  -CS    Increase Lingual Tone/Sensation/Control/Coordination/Movement  lingual movement exercises  -MB  lingual movement exercises  -MB  lingual movement exercises  -CS    [place]/Accuracy (Lingual Strengthening Goal 1, SLP)  with minimal cues (75-90% accuracy)  -MB  with minimal cues (75-90% accuracy)  -MB  with minimal cues (75-90% accuracy)  -CS    Time Frame (Lingual Strengthening Goal 1, SLP)  short term goal (STG);by discharge  -MB  short term goal (STG);by discharge  -MB  short term goal (STG);by discharge  -CS    Barriers (Lingual Strengthening Goal 1, SLP)  Weaknessn/a  -MB  --  Weakness  -CS    Progress/Outcomes (Lingual Strengthening Goal 1, SLP)  goal ongoing  -MB  --  goal ongoing  -CS       Pharyngeal Strengthening Exercise Goal 1 (SLP)    Activity (Pharyngeal Strengthening Goal 1, SLP)  increase timing;increase superior movement of the hyolaryngeal complex;increase anterior movement of the hyolaryngeal complex  -MB  increase timing;increase superior movement of the hyolaryngeal complex;increase anterior movement of the hyolaryngeal complex  -MB  increase timing  -CS    Increase Timing  gustatory stimulation (sour/cold)  -MB  gustatory stimulation (sour/cold)  -MB  gustatory stimulation (sour/cold)  -CS    Increase Superior Movement of the Hyolaryngeal Complex  Mendelsohn;hard effortful swallow NMES  -MB  Mendelsohn;hard effortful swallow NMES  -MB  --    Increase Anterior Movement of the Hyolaryngeal Complex  shaker  -MB  shaker  -MB  --    [place]/Accuracy (Pharyngeal Strengthening Goal 1,  SLP)  independently (over 90% accuracy)  -MB  independently (over 90% accuracy)  -MB  independently (over 90% accuracy)  -CS    Time Frame (Pharyngeal Strengthening Goal 1, SLP)  short term goal (STG);by discharge  -MB  short term goal (STG);by discharge  -MB  short term goal (STG);by discharge  -CS    Barriers (Pharyngeal Strengthening Goal 1, SLP)  none  -MB  none  -MB  n/a  -CS    Progress/Outcomes (Pharyngeal Strengthening Goal 1, SLP)  continuing progress toward goal  -MB  continuing progress toward goal  -MB  goal ongoing  -CS    Row Name 01/06/21 0817             Oral Nutrition/Hydration Goal 1 (SLP)    Oral Nutrition/Hydration Goal 1, SLP  Pt will tolerate least restrictive diet with no overt s/s of aspiration.   -MB      Time Frame (Oral Nutrition/Hydration Goal 1, SLP)  by discharge  -MB      Barriers (Oral Nutrition/Hydration Goal 1, SLP)  Ill-fitting dentures  -MB      Progress/Outcomes (Oral Nutrition/Hydration Goal 1, SLP)  continuing progress toward goal  -MB         Labial Strengthening Goal 1 (SLP)    Activity (Labial Strengthening Goal 1, SLP)  increase labial tone  -MB      Increase Labial Tone  labial resistance exercises  -MB      [place]/Accuracy (Labial Strengthening Goal 1, SLP)  with minimal cues (75-90% accuracy)  -MB      Time Frame (Labial Strengthening Goal 1, SLP)  short term goal (STG);by discharge  -MB         Lingual Strengthening Goal 1 (SLP)    Activity (Lingual Strengthening Goal 1, SLP)  increase lingual tone/sensation/control/coordination/movement  -MB      Increase Lingual Tone/Sensation/Control/Coordination/Movement  lingual movement exercises  -MB      [place]/Accuracy (Lingual Strengthening Goal 1, SLP)  with minimal cues (75-90% accuracy)  -MB      Time Frame (Lingual Strengthening Goal 1, SLP)  short term goal (STG);by discharge  -MB         Pharyngeal Strengthening Exercise Goal 1 (SLP)    Activity (Pharyngeal Strengthening Goal 1, SLP)  increase timing  -MB       Increase Timing  gustatory stimulation (sour/cold)  -MB      [place]/Accuracy (Pharyngeal Strengthening Goal 1, SLP)  independently (over 90% accuracy)  -MB      Time Frame (Pharyngeal Strengthening Goal 1, SLP)  short term goal (STG);by discharge  -MB        User Key  (r) = Recorded By, (t) = Taken By, (c) = Cosigned By    Initials Name Provider Type    Litzy Pimentel CCC-SLP Speech and Language Pathologist    Davi Gill, MS CCC-SLP Speech and Language Pathologist             Time Calculation:   Time Calculation- SLP     Row Name 01/08/21 1348             Time Calculation- SLP    SLP Start Time  1313  -MB      SLP Stop Time  1343  -MB      SLP Time Calculation (min)  30 min  -MB      SLP Received On  01/08/21  -MB        User Key  (r) = Recorded By, (t) = Taken By, (c) = Cosigned By    Initials Name Provider Type    Litzy Pimentel CCC-SLP Speech and Language Pathologist          Therapy Charges for Today     Code Description Service Date Service Provider Modifiers Qty    80981597713 HC ST TREATMENT SWALLOW 3 1/7/2021 Litzy Mane CCC-SLP GN 1    [id number] HC ST TREATMENT SWALLOW 2 1/8/2021 Litzy Mane CCC-SLP GN 1               Litzy CULVER. SILVINA Mane  1/8/2021

## 2021-01-08 NOTE — PLAN OF CARE
Goal Outcome Evaluation:  Plan of Care Reviewed With: patient  Progress: improving  Outcome Summary: Pt participated well with neuromuscular electrical stimulation for swallow function. She completed 15 minutes on A1 setting at 17mA. She swallowed applesauce during contractions 50% of the time. She had one immediate mild cough following applesauce, but no other overt s/s of aspiration. Pt may be discharging to [facility] inpatient rehab soon. Recommend continue speech therapy. Continue pureed diet and honey thick liquids at this time.

## 2021-01-08 NOTE — PLAN OF CARE
Goal Outcome Evaluation:  Plan of Care Reviewed With: patient, spouse  Progress: improving  Outcome Summary: pt trans to EOB min assist, pt sat EOB 30minutes cga-sba, performed BLE ROM x 10 reps, R foot drop, sit-stand x 2 mod assist of 2, trans back to bed min assist pt would benefit from cont therapy possibly LTACH/SNF

## 2021-01-08 NOTE — PROGRESS NOTES
[facility] -  Progress Note     LOS: 28 days   Patient Care Team:  Janak Sherwood APRN as PCP - General (Family Medicine)  Donovan Hernandez MD as Consulting Physician (Gastroenterology)    Chief Complaint: respiratory failure, COVID-19 pneumonia    Subjective     Interval History:     Patient Complaints: patient sitting up in bed this am. She reports that she is doing ok. She denies any chest pain. She denies any leg swelling, orthopnea or PND. She denies any dizziness, lightheaded-ness or near syncope.     Review of Systems:     Review of Systems   Constitutional: Positive for fatigue.   Respiratory: Negative for shortness of breath.    Cardiovascular: Negative for chest pain, palpitations and leg swelling.   Neurological: Positive for weakness. Negative for dizziness and light-headedness.   All other systems reviewed and are negative.    Objective     Vital Sign Min/Max for last 24 hours  Temp  Min: 97.4 °F (36.3 °C)  Max: 98.6 °F (37 °C)   BP  Min: 135/63  Max: 158/60   Pulse  Min: 74  Max: 87   Resp  Min: 16  Max: 16   SpO2  Min: 93 %  Max: 97 %   No data recorded   Weight  Min: 82.6 kg (182 lb 3 oz)  Max: 82.6 kg (182 lb 3 oz)         01/07/21  2100   Weight: 82.6 kg (182 lb 3 oz)       Telemetry: Sinus rhythm rate of 70-87      Physical Exam:    Vitals signs reviewed.   Constitutional:       General: Not in acute distress.     Appearance: Normal appearance.      Comments: DTI noted to chin and left cheek   Eyes:      Pupils: Pupils are equal, round, and reactive to light.   HENT:      Head: Normocephalic and atraumatic.      Nose: Nose normal.   Neck:      Musculoskeletal: Normal range of motion and neck supple.      Vascular: No carotid bruit.   Pulmonary:      Effort: Pulmonary effort is normal. No respiratory distress.      Breath sounds: Normal breath sounds. No wheezing. No rales.   Cardiovascular:      Normal rate. Regular rhythm.      Murmurs: There is no murmur.    Edema:     Peripheral edema absent.   Abdominal:      General: There is no distension.      Palpations: Abdomen is soft.   Musculoskeletal: Normal range of motion.   Skin:     General: Skin is warm.      Findings: No erythema or rash.   Neurological:      Mental Status: Alert and oriented to person, place, and time.   Psychiatric:         Attention and Perception: Attention normal.         Mood and Affect: Mood normal.         Behavior: Behavior normal.         Thought Content: Thought content normal.         Judgment: Judgment normal.       Results Review:   Lab Results (last 72 hours)     Procedure Component Value Units Date/Time    COVID PRE-OP / PRE-PROCEDURE SCREENING ORDER (NO ISOLATION) - Swab, Nasal Cavity [[id number]] Collected: 01/08/21 1333    Specimen: Swab from Nasal Cavity Updated: 01/08/21 1341    Narrative:      The following orders were created for panel order COVID PRE-OP / PRE-PROCEDURE SCREENING ORDER (NO ISOLATION) - Swab, Nasal Cavity.  Procedure                               Abnormality         Status                     ---------                               -----------         ------                     COVID-19,[facility]...[[id number]]                      In process                   Please view results for these tests on the individual orders.    COVID-19,[facility] IN-HOUSE,Nasal Swab No Transport Media 3-4 HR TAT - Swab, Nasal Cavity [[id number]] Collected: 01/08/21 1333    Specimen: Swab from Nasal Cavity Updated: 01/08/21 1341    Basic Metabolic Panel [[id number]]  (Abnormal) Collected: 01/08/21 0439    Specimen: Blood Updated: 01/08/21 0646     Glucose 114 mg/dL      BUN 15 mg/dL      Creatinine 0.44 mg/dL      Sodium 141 mmol/L      Potassium 3.4 mmol/L      Chloride 106 mmol/L      CO2 27.0 mmol/L      Calcium 8.9 mg/dL      eGFR Non African Amer 142 mL/min/1.73      BUN/Creatinine Ratio 34.1     Anion Gap 8.0 mmol/L     Narrative:      GFR Normal >60  Chronic Kidney Disease  <60  Kidney Failure <15      CBC (No Diff) [[id number]]  (Abnormal) Collected: 01/08/21 0439    Specimen: Blood Updated: 01/08/21 0525     WBC 7.22 10*3/mm3      RBC 3.55 10*6/mm3      Hemoglobin 11.3 g/dL      Hematocrit 34.6 %      MCV 97.5 fL      MCH 31.8 pg      MCHC 32.7 g/dL      RDW 14.4 %      RDW-SD 50.3 fl      MPV 12.3 fL      Platelets 123 10*3/mm3     POC Glucose Once [[id number]]  (Normal) Collected: 01/07/21 1730    Specimen: Blood Updated: 01/07/21 1741     Glucose 100 mg/dL      Comment: : [id number] Lili MikiaMeter ID: [id number]       POC Glucose Once [[id number]]  (Normal) Collected: 01/07/21 1109    Specimen: Blood Updated: 01/07/21 1134     Glucose 112 mg/dL      Comment: : [id number] Gomez EuraisaMeter ID: [id number]       POC Glucose Once [[id number]]  (Normal) Collected: 01/07/21 0720    Specimen: Blood Updated: 01/07/21 0734     Glucose 115 mg/dL      Comment: : [id number] Gomez EuraisaMeter ID: [id number]       POC Glucose Once [[id number]]  (Normal) Collected: 01/06/21 2024    Specimen: Blood Updated: 01/06/21 2036     Glucose 119 mg/dL      Comment: : [id number] Juanis AlexisMeter ID: [id number]       POC Glucose Once [[id number]]  (Normal) Collected: 01/06/21 1627    Specimen: Blood Updated: 01/06/21 1651     Glucose 123 mg/dL      Comment: : [id number] Kvng LisaMeter ID: [id number]       POC Glucose Once [[id number]]  (Normal) Collected: 01/06/21 1059    Specimen: Blood Updated: 01/06/21 1132     Glucose 122 mg/dL      Comment: : [id number] Kvng LisaMeter ID: [id number]       POC Glucose Once [[id number]]  (Abnormal) Collected: 01/06/21 0740    Specimen: Blood Updated: 01/06/21 0812     Glucose 160 mg/dL      Comment: : [id number] Kvng LisaMeter ID: [id number]       POC Glucose Once [[id number]]  (Abnormal) Collected: 01/06/21 0540    Specimen: Blood Updated: 01/06/21 0552     Glucose 147 mg/dL      Comment: : [id number] Yu AddisonMeter ID: [id number]        POC Glucose Once [[id number]]  (Abnormal) Collected: 01/05/21 2341    Specimen: Blood Updated: 01/05/21 2354     Glucose 139 mg/dL      Comment: : [id number] Yu AddisonMeter ID: [id number]       POC Glucose Once [[id number]]  (Normal) Collected: 01/05/21 2009    Specimen: Blood Updated: 01/05/21 2046     Glucose 129 mg/dL      Comment: : [id number] Yu AddisonMeter ID: [id number]       POC Glucose Once [[id number]]  (Normal) Collected: 01/05/21 1731    Specimen: Blood Updated: 01/05/21 1742     Glucose 120 mg/dL      Comment: : [id number] Janasheela Merissa) Mariano Child ID: [id number]                 Echo EF Estimated  No results found for: ECHOEFEST      Cath Ejection Fraction Quantitative  No results found for: CATHEF        Medication Review: yes  Current Facility-Administered Medications   Medication Dose Route Frequency Provider Last Rate Last Admin   • acetaminophen (TYLENOL) tablet 650 mg  650 mg Nasogastric Q4H PRN Samir Teague MD       • albuterol sulfate HFA (PROVENTIL HFA;VENTOLIN HFA;PROAIR HFA) inhaler 2 puff  2 puff Inhalation Q6H PRN Samir Teague MD       • albuterol sulfate HFA (PROVENTIL HFA;VENTOLIN HFA;PROAIR HFA) inhaler 2 puff  2 puff Inhalation TID - RT Samir Teague MD   2 puff at 01/08/21 0734   • amLODIPine (NORVASC) tablet 5 mg  5 mg Oral Q24H Ellis Wise MD   5 mg at 01/08/21 0915   • benzonatate (TESSALON) capsule 100 mg  100 mg Oral TID PRN Samir Teague MD   100 mg at 12/12/20 2034   • dextrose (D50W) 25 g/ 50mL Intravenous Solution 25 g  25 g Intravenous Q15 Min PRN Samir Teague MD       • dextrose (GLUTOSE) oral gel 15 g  15 g Oral Q15 Min PRN Samir Teague MD       • enoxaparin (LOVENOX) syringe 40 mg  40 mg Subcutaneous Q24H Ellis Wise MD   40 mg at 01/08/21 0915   • glucagon (human recombinant) (GLUCAGEN DIAGNOSTIC) injection 1 mg  1 mg Subcutaneous Q15 Min Samir Schwarz MD       •  levothyroxine (SYNTHROID, LEVOTHROID) tablet 125 mcg  125 mcg Nasogastric Q AM Samir Teague MD   125 mcg at 01/08/21 0915   • lidocaine (XYLOCAINE) 4 % external solution 1 application  1 application Topical Once Taurus Eastman Jr., MD       • ondansetron (ZOFRAN) injection 4 mg  4 mg Intravenous Q6H PRN Samir Teague MD       • sodium chloride 0.9 % flush 10 mL  10 mL Intravenous PRN Samir Teague MD   10 mL at 01/01/21 0806   • sodium chloride nasal spray 2 spray  2 spray Each Nare Continuous PRN Taurus Eastman Jr., MD         Assessment/Plan     -Pneumonia due to COVID-19     -Dysphagia: She is now trying honey thickened liquids and medication crushed in applesauce     -Acute respiratory failure with hypoxia: Patient was intubated for 18 days; extubated on 12/30/2020.      -Questionable Heart Block: Patient had some questionable heart block 1/7/2021 around 0400. Reviewed Telemetry and appears to possibly be Mobitz type I second-degree AV block. At this point it appears that patient was asymptomatic to this. EKG done yesterday was reviewed with no concerns.  Patient had an echocardiogram done on 12/11/2020 that revealed structurally normal heart. No recurrences on Telemetry. Telemetry now d/cd per primary.       -Hypertension: Has had difficulty taking oral medications so has been receiving IV Labetalol as needed-last dose was 1254 on 1/4/2021. Scheduled vasotec was started 1/7/2021.      Plan:   -No recurrences of dropping heart rate noted on Telemetry. Patient denies any symptoms.  - Telemetry d/cd per primary   -Patient had echo on 12/11/2020 that revealed structurally normal heart  -would recommend to avoid AV chang blocking drugs in treating blood pressure  -Cardiology will sign off. Thank you for allowing us to participate in the care of your patient.  Please do not hesitate to contact us if we can be of any further assistance.     Electronically signed by Vik Perez  NITO, 01/08/21, 1:59 PM CST.

## 2021-01-08 NOTE — PROGRESS NOTES
Continued Stay Note  [facility]     Patient Name: Joann Finnegan  MRN: [id number]  Today's Date: 1/8/2021    Admit Date: 12/11/2020    Discharge Plan     Row Name 01/08/21 1221       Plan    Plan  [facility]; PENDING EVAL AND BED OFFER    Provided Post Acute Provider List?  Yes    Post Acute Provider List  Inpatient Rehab    Provided Post Acute Provider Quality & Resource List?  Yes    Post Acute Provider Quality and Resource List  Inpatient Rehab    Delivered To  Support Person    Support Person  BILL, SPOUSE    Method of Delivery  In person    Patient/Family in Agreement with Plan  yes    Plan Comments  DISCUSSED ACUTE REHAB VS SNF WITH PT'S , [facility].  [facility] REQ REFERRAL BE CALLED TO [facility] CHOSEN FROM A LIST OF PROVIDERS.  REFERRAL CALLED TO MICHAEL; WILL FOLLOW FOR EVAL AND BED OFFER.        Discharge Codes    No documentation.             MOSHE Rodriguez

## 2021-01-08 NOTE — THERAPY TREATMENT NOTE
Acute Care - Occupational Therapy Treatment Note  [facility]     Patient Name: Joann Finnegan  : 1950  MRN: [id number]  Today's Date: 2021             Admit Date: 2020       ICD-10-CM ICD-9-CM   1. Pneumonia due to COVID-19 virus  U07.1 480.8    J12.89    2. Hypoxic  R09.02 799.02   3. Acute respiratory failure with hypoxia (CMS/HCC)  J96.01 518.81   4. Impaired mobility  Z74.09 799.89   5. Impaired mobility and ADLs  Z74.09 V49.89    Z78.9    6. Oropharyngeal dysphagia  R13.12 787.22     Patient Active Problem List   Diagnosis   • Dysphagia, oropharyngeal   • Heartburn   • Family hx colonic polyps   • Pneumonia due to COVID-19 virus   • Acute respiratory failure with hypoxia (CMS/HCC)   • Obesity (BMI 30-39.9)   • Elevated ferritin and CRP   • Acute respiratory distress syndrome (ARDS) due to COVID-19 virus (CMS/HCC)   • Elevated LFTs   • Leukocytosis   • Thrombocytopenia (CMS/HCC)   • Aphonia   • Xerostomia   • Post viral debility     Past Medical History:   Diagnosis Date   • Abnormal weight gain    • Arthritis    • Breast cancer (CMS/HCC)     right.    • Fatigue    • H/O melanoma excision     CLARKS  LEVEL III      BACK OF UPPER LEFT ARM   • Hypertension    • Hypothyroidism    • Melanoma (CMS/HCC)    • Restless leg syndrome    • Shortness of breath      Past Surgical History:   Procedure Laterality Date   • APPENDECTOMY     • BREAST BIOPSY Right    • BREAST BIOPSY     • BREAST LUMPECTOMY      right.    • BROW LIFT     • COLONOSCOPY     • COLONOSCOPY N/A 2020    Procedure: COLONOSCOPY WITH ANESTHESIA;  Surgeon: Donovan Hernandez MD;  Location: [facility] ENDOSCOPY;  Service: Gastroenterology;  Laterality: N/A;  pre: family hx colon polyps  post: polyps  Janak Sherwood APRN   • ENDOSCOPY N/A 2020    Procedure: ESOPHAGOGASTRODUODENOSCOPY WITH ANESTHESIA;  Surgeon: Donovan Hernandez MD;  Location: [facility] ENDOSCOPY;  Service: Gastroenterology;  Laterality: N/A;  pre: dysphagia;  heartburn  post: dilated  Janak Sherwood, APRN   • ENDOSCOPY AND COLONOSCOPY  12/22/2011    very minimal distal esophagitis, incomplete esophagus ring dilated   • LUMBAR SPINAL TUMOR REMOVAL      Spinal Cyst removed from Spinal Canal   • MASTECTOMY, PARTIAL     • PARTIAL HIP ARTHROPLASTY Left    • SKIN CANCER EXCISION      Melanoma    • SQUAMOUS CELL CARCINOMA EXCISION     • TEMPOROMANDIBULAR JOINT ARTHROPLASTY     • TOTAL ABDOMINAL HYSTERECTOMY WITH SALPINGO OOPHORECTOMY Bilateral             OT ASSESSMENT FLOWSHEET (last 12 hours)      OT Evaluation and Treatment     Row Name 01/08/21 1348                   OT Time and Intention    Subjective Information  no complaints  -        Document Type  therapy note (daily note)  -        Mode of Treatment  occupational therapy  -        Patient Effort  good  -           General Information    Existing Precautions/Restrictions  fall  -           Pain Assessment    Additional Documentation  Pain Scale: Word Pre/Post-Treatment (Group)  -           Pain Scale: Numbers Pre/Post-Treatment    Pretreatment Pain Rating  0/10 - no pain  -        Posttreatment Pain Rating  0/10 - no pain  -        Pain Intervention(s)  Repositioned;Rest  -           Range of Motion (ROM)    Range of Motion  bilateral upper extremities AAROM?Arom!  -           Bed Mobility    Comment (Bed Mobility)  fowlers in bed!  -           Motor Skills    Motor Skills  therapeutic exercise  -        Therapeutic Exercise  elbow/forearm;shoulder  -           Shoulder (Therapeutic Exercise)    Shoulder (Therapeutic Exercise)  AROM (active range of motion);AAROM (active assistive range of motion);strengthening exercise  -        Shoulder AROM (Therapeutic Exercise)  bilateral;flexion;extension;sitting;5 repetitions  -        Shoulder Strengthening (Therapeutic Exercise)  bilateral;flexion;extension;sitting;resistance band  -           Elbow/Forearm (Therapeutic Exercise)     Elbow/Forearm (Therapeutic Exercise)  AROM (active range of motion);strengthening exercise  -        Elbow/Forearm AROM (Therapeutic Exercise)  bilateral;flexion;extension;sitting;5 repetitions;3 sets  -        Elbow/Forearm AAROM (Therapeutic Exercise)  bilateral;flexion;extension;sitting;5 repetitions  -        Elbow/Forearm Strengthening (Therapeutic Exercise)  bilateral;flexion;extension;sitting;resistance band;yellow  -CJ           Wound 12/16/20 0957 Right lower chin    Wound - Properties Group Placement Date: 12/16/20  -KM Placement Time: 0957  -KM Side: Right  -KM Orientation: lower  -KM Location: chin  -KM Stage, Pressure Injury : deep tissue injury  -KM    Retired Wound - Properties Group Date first assessed: 12/16/20  -KM Time first assessed: 0957  -KM Side: Right  -KM Location: chin  -KM       Wound 12/16/20 0959 Right upper lip    Wound - Properties Group Placement Date: 12/16/20  -KM Placement Time: 0959  -KM Present on Hospital Admission: N  -KM Side: Right  -KM Orientation: upper  -KM Location: lip  -KM Stage, Pressure Injury : deep tissue injury  -KM    Retired Wound - Properties Group Date first assessed: 12/16/20  -KM Time first assessed: 0959  -KM Present on Hospital Admission: N  -KM Side: Right  -KM Location: lip  -KM       Wound 12/18/20 1425 Left cheek Pressure Injury    Wound - Properties Group Placement Date: 12/18/20  -RM Placement Time: 1425  -RM Present on Hospital Admission: N  -RM Side: Left  -RM Location: cheek  -RM Primary Wound Type: Pressure inj  -RM    Retired Wound - Properties Group Date first assessed: 12/18/20  -RM Time first assessed: 1425  -RM Present on Hospital Admission: N  -RM Side: Left  -RM Location: cheek  -RM Primary Wound Type: Pressure inj  -RM       Wound 12/21/20 1118 Left nose Pressure Injury    Wound - Properties Group Placement Date: 12/21/20  -CH Placement Time: 1118  -CH Present on Hospital Admission: N  -CH Side: Left  -CH Location: nose  -CH Primary  Wound Type: Pressure inj  -CH    Retired Wound - Properties Group Date first assessed: 12/21/20  -CH Time first assessed: 1118  -CH Present on Hospital Admission: N  -CH Side: Left  -CH Location: nose  -CH Primary Wound Type: Pressure inj  -CH       Positioning and Restraints    Pre-Treatment Position  in bed  -CJ        Post Treatment Position  bed  -CJ        In Bed  fowlers;call light within reach;encouraged to call for assist;with family/caregiver;side rails up x2  -CJ           Progress Summary (OT)    Progress Toward Functional Goals (OT)  progress toward functional goals is good  -CJ        Barriers to Overall Progress (OT)  Fall!  -CJ        Impairments Still Limiting Function (OT)  continue with ot poc!  -CJ          User Key  (r) = Recorded By, (t) = Taken By, (c) = Cosigned By    Initials Name Effective Dates     Favian Ng COTA/L 08/02/16 -     Maine Jaramillo RN 12/29/17 -     Ya Dodson RN 08/02/16 -     Gisselle Buchanan RN 08/02/16 -          Occupational Therapy Education                 Title: PT OT SLP Therapies (Done)     Topic: Occupational Therapy (Done)     Point: ADL training (Done)     Description:   Instruct learner(s) on proper safety adaptation and remediation techniques during self care or transfers.   Instruct in proper use of assistive devices.              Learning Progress Summary           Patient Acceptance, E, VU by  at 1/7/2021 1237    Acceptance, E, NR by  at 12/28/2020 1311                   Point: Home exercise program (Done)     Description:   Instruct learner(s) on appropriate technique for monitoring, assisting and/or progressing therapeutic exercises/activities.              Learning Progress Summary           Patient Acceptance, E,TB, VU,DU,NR by  at 1/8/2021 1348    Comment: Pt. fowlers in bed, Arom with Bue's in elbow flex/ext!    Acceptance, E,TB, VU,DU,NR by  at 1/6/2021 1335    Comment: Pt. fowlers in bed, performed aarom with  this orellana/l after prom with bue's performed by this orellana/l!    Acceptance, TB,E, VU,NR,DU by  at 1/5/2021 0728    Comment: Pt. unable to use wts. for ue exs, Prom/aarom performed!   Significant Other Acceptance, E,TB, VU,DU,NR by  at 1/8/2021 1348    Comment: Pt. fowlers in bed, Arom with Bue's in elbow flex/ext!    Acceptance, TB,E, VU,NR,DU by  at 1/5/2021 0728    Comment: Pt. unable to use wts. for ue exs, Prom/aarom performed!                   Point: Precautions (Done)     Description:   Instruct learner(s) on prescribed precautions during self-care and functional transfers.              Learning Progress Summary           Patient Acceptance, E,TB, VU,DU,NR by  at 1/8/2021 1348    Comment: Pt. fowlers in bed, Arom with Bue's in elbow flex/ext!    Acceptance, E,TB, VU,DU,NR by  at 1/6/2021 1335    Comment: Pt. fowlers in bed, performed aarom with this orellana/l after prom with bue's performed by this orellana/l!    Acceptance, TB,E, VU,NR,DU by  at 1/5/2021 0728    Comment: Pt. unable to use wts. for ue exs, Prom/aarom performed!   Significant Other Acceptance, E,TB, VU,DU,NR by  at 1/8/2021 1348    Comment: Pt. fowlers in bed, Arom with Bue's in elbow flex/ext!    Acceptance, TB,E, VU,NR,DU by  at 1/5/2021 0728    Comment: Pt. unable to use wts. for ue exs, Prom/aarom performed!                   Point: Body mechanics (Done)     Description:   Instruct learner(s) on proper positioning and spine alignment during self-care, functional mobility activities and/or exercises.              Learning Progress Summary           Patient Acceptance, E,TB, VU,DU,NR by  at 1/8/2021 1348    Comment: Pt. fowlers in bed, Arom with Bue's in elbow flex/ext!    Acceptance, E,TB, VU,DU,NR by CJ at 1/6/2021 1335    Comment: Pt. fowlers in bed, performed aarom with this orellana/l after prom with bue's performed by this orellana/l!    Acceptance, TB,E, ASHISH,NR,DU by  at 1/5/2021 0703    Comment: Pt. unable to use wts. for ue exs,  Prom/aarom performed!    Acceptance, E, NR by AVELINA at 12/28/2020 1311   Significant Other Acceptance, E,TB, VU,DU,NR by  at 1/8/2021 1348    Comment: Pt. fowlers in bed, Arom with Bue's in elbow flex/ext!    Acceptance, TB,E, VU,NR,DU by  at 1/5/2021 0728    Comment: Pt. unable to use wts. for ue exs, Prom/aarom performed!                               User Key     Initials Effective Dates Name Provider Type Discipline     08/02/16 -  Favian Ng ORELLANA/L Occupational Therapy Assistant OT    LORENZO 12/04/20 -  Theresa Nicholas OTR/L Occupational Therapist OT                  OT Recommendation and Plan     Progress Toward Functional Goals (OT): progress toward functional goals is good  Plan of Care Review  Plan of Care Reviewed With: patient  Progress: improving  Outcome Summary: Pt. fowlers in bed, increasing her ability with ue exs and function! Pt. able to perform elbow flex/ext ind'ly with vc's from this orellana/l yellow theraband used for tricep ext. exs while this orellana/l stabilizes pt's L. elbow no issues with tx, Rue arom improving, yellow theraband with resistive exs shd. flex to 90*!  Plan of Care Reviewed With: patient  Outcome Summary: Pt. fowlers in bed, increasing her ability with ue exs and function! Pt. able to perform elbow flex/ext ind'ly with vc's from this orellana/l yellow theraband used for tricep ext. exs while this orellana/l stabilizes pt's L. elbow no issues with tx, Rue arom improving, yellow theraband with resistive exs shd. flex to 90*!    Outcome Measures     Row Name 01/08/21 1348 01/06/21 1335          How much help from another is currently needed...    Putting on and taking off regular lower body clothing?  1  -CJ  1  -CJ     Bathing (including washing, rinsing, and drying)  2  -CJ  1  -CJ     Toileting (which includes using toilet bed pan or urinal)  2  -CJ  1  -CJ     Putting on and taking off regular upper body clothing  2  -CJ  1  -CJ     Taking care of personal grooming (such as  brushing teeth)  2  -  1  -     Eating meals  3  -  1  -     AM-PAC 6 Clicks Score (OT)  12  -  6  -        Functional Assessment    Outcome Measure Options  AM-PAC 6 Clicks Daily Activity (OT)  -  AM-PAC 6 Clicks Daily Activity (OT)  -       User Key  (r) = Recorded By, (t) = Taken By, (c) = Cosigned By    Initials Name Provider Type     Favian Ng COTA/L Occupational Therapy Assistant          Time Calculation:   Time Calculation- OT     Row Name 01/08/21 1348             Time Calculation- OT    OT Start Time  1348  -      OT Stop Time  1420  -      OT Time Calculation (min)  32 min  -      Total Timed Code Minutes- OT  32 minute(s)  -      TCU Minutes- OT  32 min  -      OT Received On  01/08/21  -         Timed Charges    33785 - OT Therapeutic Exercise Minutes  32  -        User Key  (r) = Recorded By, (t) = Taken By, (c) = Cosigned By    Initials Name Provider Type     Favian Ng COTA/L Occupational Therapy Assistant        Therapy Charges for Today     Code Description Service Date Service Provider Modifiers Qty    [id number]  OT THER PROC EA 15 MIN 1/8/2021 Favian Ng COTA/L GO 2               ROSLYN Estrada  1/8/2021

## 2021-01-09 PROCEDURE — 25010000002 ONDANSETRON PER 1 MG: Performed by: INTERNAL MEDICINE

## 2021-01-09 PROCEDURE — 94799 UNLISTED PULMONARY SVC/PX: CPT

## 2021-01-09 PROCEDURE — 25010000002 ENOXAPARIN PER 10 MG: Performed by: INTERNAL MEDICINE

## 2021-01-09 RX ADMIN — ALBUTEROL SULFATE 2 PUFF: 90 AEROSOL, METERED RESPIRATORY (INHALATION) at 04:12

## 2021-01-09 RX ADMIN — ENOXAPARIN SODIUM 40 MG: 40 INJECTION, SOLUTION INTRAVENOUS; SUBCUTANEOUS at 09:08

## 2021-01-09 RX ADMIN — ALBUTEROL SULFATE 2 PUFF: 90 AEROSOL, METERED RESPIRATORY (INHALATION) at 20:49

## 2021-01-09 RX ADMIN — LISINOPRIL 5 MG: 5 TABLET ORAL at 09:07

## 2021-01-09 RX ADMIN — ONDANSETRON HYDROCHLORIDE 4 MG: 2 SOLUTION INTRAMUSCULAR; INTRAVENOUS at 18:16

## 2021-01-09 RX ADMIN — ONDANSETRON HYDROCHLORIDE 4 MG: 2 SOLUTION INTRAMUSCULAR; INTRAVENOUS at 09:05

## 2021-01-09 RX ADMIN — ALBUTEROL SULFATE 2 PUFF: 90 AEROSOL, METERED RESPIRATORY (INHALATION) at 14:17

## 2021-01-09 RX ADMIN — LEVOTHYROXINE SODIUM 125 MCG: 125 TABLET ORAL at 09:07

## 2021-01-09 NOTE — PROGRESS NOTES
[facility] Medicine Services  INPATIENT PROGRESS NOTE    Patient Name: Joann Finnegan  Date of Admission: 12/11/2020  Today's Date: 01/09/21  Length of Stay: 29  Primary Care Physician: Janak Sherwood APRN    Subjective   Chief Complaint: follow-up COVID  Shortness of Breath    Patient has experienced some nausea and vomiting since I saw her yesterday.  She is feeling better now.  She was able to eat some grits for breakfast which she tolerated without problem.  She did receive some Zofran.  She had a bowel movement earlier today which was formed and soft.  She denies any abdominal pain or cramping.  She denies any new shortness of breath symptoms.    Review of Systems   Respiratory: Positive for shortness of breath.         All pertinent negatives and positives are as above. All other systems have been reviewed and are negative unless otherwise stated.     Objective    Temp:  [97.4 °F (36.3 °C)-99.2 °F (37.3 °C)] 97.9 °F (36.6 °C)  Heart Rate:  [75-96] 75  Resp:  [16] 16  BP: (139-164)/(62-69) 139/64  Physical Exam  Vitals signs reviewed.   Constitutional:       Appearance: She is not toxic-appearing.   HENT:      Mouth/Throat:      Pharynx: No oropharyngeal exudate.   Eyes:      Pupils: Pupils are equal, round, and reactive to light.   Cardiovascular:      Rate and Rhythm: Normal rate.   Pulmonary:      Effort: Pulmonary effort is normal. No respiratory distress.      Breath sounds: No rales.      Comments: On nasal cannula  Abdominal:      General: There is no distension.      Palpations: Abdomen is soft.      Tenderness: There is abdominal tenderness (very mild JOY TTP). There is no guarding or rebound.   Musculoskeletal:         General: No swelling.   Skin:     Comments: Chin and left cheek unchanged (area of scab/eschar)   Neurological:      General: No focal deficit present.      Mental Status: She is alert.      Motor: Weakness present.   Psychiatric:          Mood and Affect: Mood normal.         Results Review:  I have reviewed the labs, radiology results, and diagnostic studies.    Laboratory Data:   Results from last 7 days   Lab Units 01/08/21  0439 01/04/21  0306   WBC 10*3/mm3 7.22 10.82*   HEMOGLOBIN g/dL 11.3* 12.4   HEMATOCRIT % 34.6 36.5   PLATELETS 10*3/mm3 123* 153        Results from last 7 days   Lab Units 01/08/21  0439 01/03/21  0626   SODIUM mmol/L 141 144   POTASSIUM mmol/L 3.4* 3.5   CHLORIDE mmol/L 106 110*   CO2 mmol/L 27.0 27.0   BUN mg/dL 15 37*   CREATININE mg/dL 0.44* 0.58   CALCIUM mg/dL 8.9 8.9   BILIRUBIN mg/dL  --  0.4   ALK PHOS U/L  --  90   ALT (SGPT) U/L  --  51*   AST (SGOT) U/L  --  56*   GLUCOSE mg/dL 114* 142*       Culture Data:   Blood Culture   Date Value Ref Range Status   12/30/2020 No growth at 4 days  Preliminary   12/30/2020 No growth at 4 days  Preliminary     Urine Culture   Date Value Ref Range Status   01/01/2021 Yeast isolated (A)  Final     Respiratory Culture   Date Value Ref Range Status   12/29/2020 Heavy growth (4+) Staphylococcus aureus (A)  Final   12/29/2020 No Normal Respiratory Hailee (A)  Final       Radiology Data:   Imaging Results (Last 24 Hours)     ** No results found for the last 24 hours. **          I have reviewed the patient's current medications.     Assessment/Plan     Active Hospital Problems    Diagnosis   • **Pneumonia due to COVID-19 virus   • Aphonia   • Xerostomia   • Post viral debility   • Thrombocytopenia (CMS/HCC)   • Acute respiratory distress syndrome (ARDS) due to COVID-19 virus (CMS/HCC)   • Elevated LFTs   • Leukocytosis   • Elevated ferritin and CRP   • Obesity (BMI 30-39.9)   • Acute respiratory failure with hypoxia (CMS/HCC)   • Dysphagia, oropharyngeal     Plan:  1.  Completed full course of IV Cefazolin  2.  Repeat COVID test performed yesterday - negative  3.  Continue speech therapy  4.  Continue PT and OT  5.  PO Lisinopril; monitor BP trend  6.  IV Zofran PRN  7.  If patient  continues to have N/V, will check KKUB  8.  Supp 02 via nasal cannula; wean as tolerated  9.  Dispo: to [facility] inpatient rehab on Monday    Electronically signed by Ellis Wise MD, 01/09/21, 11:09 CST.

## 2021-01-09 NOTE — PLAN OF CARE
Goal Outcome Evaluation:  Plan of Care Reviewed With: patient  Progress: no change  Outcome Summary: no co pain. pt had nausea this am, zofran given with relief. o2 at 2L per NC. purewick in place. cont to monitor.

## 2021-01-09 NOTE — PLAN OF CARE
Goal Outcome Evaluation:  Plan of Care Reviewed With: patient, daughter  Progress: improving  Outcome Summary: pt has had  two episodes of vomiting tonight..no visible food, just green bile. states gets relief after vomiting. good bowel sounds all 4 quads. VSS, o2 sats in lower 90s, dropped to upper 80s with vomiting episodes. 02 at 2 lit / min at present. no falls, daughter at bedside. .

## 2021-01-10 PROCEDURE — 97530 THERAPEUTIC ACTIVITIES: CPT

## 2021-01-10 PROCEDURE — 25010000002 ENOXAPARIN PER 10 MG: Performed by: INTERNAL MEDICINE

## 2021-01-10 PROCEDURE — 94799 UNLISTED PULMONARY SVC/PX: CPT

## 2021-01-10 PROCEDURE — 25010000002 ONDANSETRON PER 1 MG: Performed by: INTERNAL MEDICINE

## 2021-01-10 RX ADMIN — LISINOPRIL 5 MG: 5 TABLET ORAL at 08:40

## 2021-01-10 RX ADMIN — ENOXAPARIN SODIUM 40 MG: 40 INJECTION, SOLUTION INTRAVENOUS; SUBCUTANEOUS at 08:40

## 2021-01-10 RX ADMIN — LEVOTHYROXINE SODIUM 125 MCG: 125 TABLET ORAL at 06:11

## 2021-01-10 RX ADMIN — ONDANSETRON HYDROCHLORIDE 4 MG: 2 SOLUTION INTRAMUSCULAR; INTRAVENOUS at 00:32

## 2021-01-10 RX ADMIN — ALBUTEROL SULFATE 2 PUFF: 90 AEROSOL, METERED RESPIRATORY (INHALATION) at 14:02

## 2021-01-10 RX ADMIN — ALBUTEROL SULFATE 2 PUFF: 90 AEROSOL, METERED RESPIRATORY (INHALATION) at 06:07

## 2021-01-10 RX ADMIN — ALBUTEROL SULFATE 2 PUFF: 90 AEROSOL, METERED RESPIRATORY (INHALATION) at 21:11

## 2021-01-10 NOTE — PLAN OF CARE
Goal Outcome Evaluation:  Plan of Care Reviewed With: patient  Progress: no change   Patient requires minimal assistance for supine to sit to supine. Actively but slowly able to work thru LE exercises with multiple rests. SAT on 2LPm from 94-84%. Patient able to stand with moderate assist for 30 seconds. Will benefit from continued strengthening activities.

## 2021-01-10 NOTE — PLAN OF CARE
Goal Outcome Evaluation:  Plan of Care Reviewed With: patient  Progress: no change    no c/o pain. PRN Zofran given x1 for nausea during night. Anticipated d/c to rehab today. Wounds cleansed. turns encouraged q2. Safety maintained, will cont to monitor and contact MD with changes.

## 2021-01-10 NOTE — PROGRESS NOTES
"    [facility] Medicine Services  INPATIENT PROGRESS NOTE    Patient Name: Joann Finnegan  Date of Admission: 12/11/2020  Today's Date: 01/10/21  Length of Stay: 30  Primary Care Physician: Janak Sherwood APRN    Subjective   Chief Complaint: follow-up COVID  Shortness of Breath    Patient states that she was able to stand for about 30 seconds.  \"I just feel so weak.\"  She is craving a baked potato and some frozen blueberries.  She had grits for breakfast and ate nearly all of them.  No nausea or vomiting.  No new shortness of breath symptoms. \"God is in control.\"      Review of Systems   Respiratory: Positive for shortness of breath.         All pertinent negatives and positives are as above. All other systems have been reviewed and are negative unless otherwise stated.     Objective    Temp:  [98 °F (36.7 °C)-98.4 °F (36.9 °C)] 98.4 °F (36.9 °C)  Heart Rate:  [77-87] 78  Resp:  [16-18] 16  BP: (113-134)/(51-64) 131/52  Physical Exam  Vitals signs reviewed.   Constitutional:       Appearance: She is not toxic-appearing.   HENT:      Mouth/Throat:      Pharynx: No oropharyngeal exudate.      Comments: Voice getting stronger  Eyes:      Pupils: Pupils are equal, round, and reactive to light.   Cardiovascular:      Rate and Rhythm: Normal rate.   Pulmonary:      Effort: Pulmonary effort is normal. No respiratory distress.      Breath sounds: No rales.      Comments: On nasal cannula  Abdominal:      Palpations: Abdomen is soft.   Musculoskeletal:         General: No swelling.   Skin:     Comments: Chin and left cheek area of scab/eschar slowly improving   Neurological:      General: No focal deficit present.      Mental Status: She is alert.      Motor: Weakness present.   Psychiatric:         Mood and Affect: Mood normal.         Results Review:  I have reviewed the labs, radiology results, and diagnostic studies.    Laboratory Data:   Results from last 7 days   Lab Units " 01/08/21  0439 01/04/21  0306   WBC 10*3/mm3 7.22 10.82*   HEMOGLOBIN g/dL 11.3* 12.4   HEMATOCRIT % 34.6 36.5   PLATELETS 10*3/mm3 123* 153        Results from last 7 days   Lab Units 01/08/21  0439   SODIUM mmol/L 141   POTASSIUM mmol/L 3.4*   CHLORIDE mmol/L 106   CO2 mmol/L 27.0   BUN mg/dL 15   CREATININE mg/dL 0.44*   CALCIUM mg/dL 8.9   GLUCOSE mg/dL 114*       Culture Data:   Blood Culture   Date Value Ref Range Status   12/30/2020 No growth at 4 days  Preliminary   12/30/2020 No growth at 4 days  Preliminary     Urine Culture   Date Value Ref Range Status   01/01/2021 Yeast isolated (A)  Final     Respiratory Culture   Date Value Ref Range Status   12/29/2020 Heavy growth (4+) Staphylococcus aureus (A)  Final   12/29/2020 No Normal Respiratory Hailee (A)  Final       Radiology Data:   Imaging Results (Last 24 Hours)     ** No results found for the last 24 hours. **          I have reviewed the patient's current medications.     Assessment/Plan     Active Hospital Problems    Diagnosis   • **Pneumonia due to COVID-19 virus   • Aphonia   • Xerostomia   • Post viral debility   • Thrombocytopenia (CMS/HCC)   • Acute respiratory distress syndrome (ARDS) due to COVID-19 virus (CMS/HCC)   • Elevated LFTs   • Leukocytosis   • Elevated ferritin and CRP   • Obesity (BMI 30-39.9)   • Acute respiratory failure with hypoxia (CMS/HCC)   • Dysphagia, oropharyngeal     Plan:  1.  Completed full course of IV Cefazolin  2.  Repeat COVID test negative  3.  Continue speech therapy  4.  Continue PT and OT  5.  PO Lisinopril; monitor BP trend  6.  Supp 02 via nasal cannula; wean as tolerated  7.  Dispo: to [facility] rehab tomorrow    Electronically signed by Ellis Wise MD, 01/10/21, 11:31 CST.

## 2021-01-11 VITALS
SYSTOLIC BLOOD PRESSURE: 120 MMHG | WEIGHT: 186.07 LBS | TEMPERATURE: 98 F | BODY MASS INDEX: 31 KG/M2 | DIASTOLIC BLOOD PRESSURE: 63 MMHG | HEIGHT: 65 IN | RESPIRATION RATE: 18 BRPM | OXYGEN SATURATION: 96 % | HEART RATE: 90 BPM

## 2021-01-11 PROBLEM — R49.0 DYSPHONIA: Status: ACTIVE | Noted: 2021-01-11

## 2021-01-11 LAB — SARS-COV-2 RDRP RESP QL NAA+PROBE: DETECTED

## 2021-01-11 PROCEDURE — 94799 UNLISTED PULMONARY SVC/PX: CPT

## 2021-01-11 PROCEDURE — 97110 THERAPEUTIC EXERCISES: CPT

## 2021-01-11 PROCEDURE — 99232 SBSQ HOSP IP/OBS MODERATE 35: CPT | Performed by: OTOLARYNGOLOGY

## 2021-01-11 PROCEDURE — 25010000002 ENOXAPARIN PER 10 MG: Performed by: INTERNAL MEDICINE

## 2021-01-11 PROCEDURE — 97530 THERAPEUTIC ACTIVITIES: CPT

## 2021-01-11 PROCEDURE — 99232 SBSQ HOSP IP/OBS MODERATE 35: CPT | Performed by: NURSE PRACTITIONER

## 2021-01-11 PROCEDURE — 87635 SARS-COV-2 COVID-19 AMP PRB: CPT | Performed by: INTERNAL MEDICINE

## 2021-01-11 PROCEDURE — 92526 ORAL FUNCTION THERAPY: CPT

## 2021-01-11 RX ORDER — LISINOPRIL 5 MG/1
5 TABLET ORAL
Start: 2021-01-12

## 2021-01-11 RX ADMIN — ENOXAPARIN SODIUM 40 MG: 40 INJECTION, SOLUTION INTRAVENOUS; SUBCUTANEOUS at 10:01

## 2021-01-11 RX ADMIN — ALBUTEROL SULFATE 2 PUFF: 90 AEROSOL, METERED RESPIRATORY (INHALATION) at 15:26

## 2021-01-11 RX ADMIN — ALBUTEROL SULFATE 2 PUFF: 90 AEROSOL, METERED RESPIRATORY (INHALATION) at 07:09

## 2021-01-11 RX ADMIN — LEVOTHYROXINE SODIUM 125 MCG: 125 TABLET ORAL at 06:22

## 2021-01-11 RX ADMIN — LISINOPRIL 5 MG: 5 TABLET ORAL at 10:01

## 2021-01-11 NOTE — PLAN OF CARE
Goal Outcome Evaluation:  Plan of Care Reviewed With: patient  Progress: improving  Outcome Summary: PT tx completed. Pt supine in bed. C/O fatigue and bed being very uncomfortable. Agrees to ex and getting into chair. Resting sat 93% on 2L, decreased to 84% with activity. Bed mobility Armando, sit<>stand MIinA x 2, transferred to chair with r wx Armando of 2. Benefit from rehab.

## 2021-01-11 NOTE — PROGRESS NOTES
[facility] Medicine Services  INPATIENT PROGRESS NOTE    Patient Name: Joann Finnegan  Date of Admission: 12/11/2020  Today's Date: 01/11/21  Length of Stay: 31  Primary Care Physician: Janak Sherwood APRN    Subjective   Chief Complaint: weakness  HPI     Patient seen and examined.  Voice is much stronger from when I examined her before.  She is tolerating PO diet.  She is much more lively and conversive.  Still has dysphonia.          Review of Systems   Constitutional: Positive for fatigue. Negative for chills and fever.   HENT: Positive for sore throat and voice change.    Respiratory: Negative for cough and shortness of breath.    Cardiovascular: Negative for chest pain and palpitations.   Gastrointestinal: Negative for abdominal distention and abdominal pain.   Neurological: Positive for weakness.      All pertinent negatives and positives are as above. All other systems have been reviewed and are negative unless otherwise stated.     Objective    Temp:  [97.8 °F (36.6 °C)-98.9 °F (37.2 °C)] 98 °F (36.7 °C)  Heart Rate:  [73-96] 90  Resp:  [16-18] 18  BP: (120-146)/(51-65) 120/63  Physical Exam  Vitals signs and nursing note reviewed.   Constitutional:       Appearance: She is obese.   HENT:      Head: Normocephalic and atraumatic.      Nose: No congestion or rhinorrhea.      Mouth/Throat:      Mouth: Mucous membranes are moist.      Pharynx: Oropharynx is clear. No oropharyngeal exudate or posterior oropharyngeal erythema.   Eyes:      General: No scleral icterus.     Conjunctiva/sclera: Conjunctivae normal.   Cardiovascular:      Rate and Rhythm: Normal rate and regular rhythm.   Pulmonary:      Effort: Pulmonary effort is normal.      Breath sounds: Normal breath sounds.   Abdominal:      General: Abdomen is flat. Bowel sounds are normal. There is no distension.      Palpations: Abdomen is soft. There is no mass.      Tenderness: There is no abdominal  tenderness.      Hernia: No hernia is present.   Skin:     General: Skin is warm and dry.      Coloration: Skin is not jaundiced or pale.      Comments: Healing wounds on left cheek and left chin   Neurological:      General: No focal deficit present.      Mental Status: She is alert and oriented to person, place, and time.      Motor: Weakness present.      Comments: Low phonation   Psychiatric:         Mood and Affect: Mood normal.         Behavior: Behavior normal.       Results Review:  I have reviewed the labs, radiology results, and diagnostic studies.    Laboratory Data:   Results from last 7 days   Lab Units 01/08/21  0439   WBC 10*3/mm3 7.22   HEMOGLOBIN g/dL 11.3*   HEMATOCRIT % 34.6   PLATELETS 10*3/mm3 123*        Results from last 7 days   Lab Units 01/08/21  0439   SODIUM mmol/L 141   POTASSIUM mmol/L 3.4*   CHLORIDE mmol/L 106   CO2 mmol/L 27.0   BUN mg/dL 15   CREATININE mg/dL 0.44*   CALCIUM mg/dL 8.9   GLUCOSE mg/dL 114*       Culture Data:   No results found for: BLOODCX, URINECX, WOUNDCX, MRSACX, RESPCX, STOOLCX    Radiology Data:   Imaging Results (Last 24 Hours)     ** No results found for the last 24 hours. **          I have reviewed the patient's current medications.     Assessment/Plan     Active Hospital Problems    Diagnosis   • **Pneumonia due to COVID-19 virus   • Dysphonia     Improving     • Aphonia     Resolved and is now dysphonia     • Xerostomia   • Post viral debility   • Thrombocytopenia (CMS/HCC)   • Acute respiratory distress syndrome (ARDS) due to COVID-19 virus (CMS/HCC)   • Elevated LFTs   • Leukocytosis   • Elevated ferritin and CRP   • Obesity (BMI 30-39.9)   • Acute respiratory failure with hypoxia (CMS/HCC)   • Dysphagia, oropharyngeal       Plan:  1.  COVID result from today was positive.  Although it is likely the shedding of dead viral particles.  Suspect the patient likely had a false negative test on Friday.  Do not plan on re-instituting precautions as she is  showing now signs of active infection and is shedding.   2.  Continue SLP, discussed with Litzy, patient had E-stim today.  3.  PT/OT  4.  D/C lovenox, switch to xarelto 10 mg daily for at least 4 more days.  IMPROVE-VTE score at least 2 and patient had D-dimer > 2x ULN; she also is >63 yo with D-Dimer 2x ULN.  She would benefit from extended thromboprophylaxis.  5.  Continue weaning O2 as tolerated  6.  Shower patient     Awaiting placement.      Per CDC website and recommendations:        From [place] Department of Public Health:        Discharge Planning: I expect the patient to be [facility]hab [facility]      Electronically signed by Samir Teague MD, 01/11/21, 11:33 CST.

## 2021-01-11 NOTE — PROGRESS NOTES
Continued Stay Note  Psychiatric     Patient Name: Joann Finnegan  MRN: [id number]  Today's Date: 1/11/2021    Admit Date: 12/11/2020    Discharge Plan     Row Name 01/11/21 1402       Plan    Plan Comments  PT. AND SPOUSE WANT REFERRAL MADE TO [facility]; CONTACTED BREEZY AT [facility] WITH REFERRAL AND WILL FOLLOW FOR EVAL AND BED OFFER.    Row Name 01/11/21 1207       Plan    Plan Comments  REC'D CALL FROM [facility] ADVISING THEY WILL NOT BE ABLE TO ADMIT PT. WITH POS. COVID RESULT.  DR. BEARD AWARE.  WILL FOLLOW TO DISCUSS WITH PT AND SPOUSE ABOUT ALTERNATE D/C PLAN.        Discharge Codes    No documentation.             MOSHE Rodriguez

## 2021-01-11 NOTE — PLAN OF CARE
Goal Outcome Evaluation:  Plan of Care Reviewed With: patient  Progress: improving    no c/o pain, no nausea. Purewick in place cont to work well. Pt slept throughout night. Anticipated d/c to [facility] rehab today. No tele on.

## 2021-01-11 NOTE — PLAN OF CARE
Problem: Adult Inpatient Plan of Care  Goal: Plan of Care Review  Outcome: Ongoing, Progressing  Flowsheets (Taken 1/11/2021 1030)  Progress: improving  Plan of Care Reviewed With:   patient   spouse  Outcome Summary: Pt. sitting in chair, Ue exs performed with this orellana/l assisting with vc's and tactile cues for reinforcement, pt. tires easily and time given for rests between sets, elbow flex pt. has little issues with 2lb. bar, but ue shd. flex 5 reps x 3 sets tire pt. out! Home safety and work simplification/enrgy conservation techniques reviewed with pt/! Plan for Rehab!   Goal Outcome Evaluation:  Plan of Care Reviewed With: patient, spouse  Progress: improving  Outcome Summary: Pt. sitting in chair, Ue exs performed with this orellana/l assisting with vc's and tactile cues for reinforcement, pt. tires easily and time given for rests between sets, elbow flex pt. has little issues with 2lb. bar, but ue shd. flex 5 reps x 3 sets tire pt. out! Home safety and work simplification/enrgy conservation techniques reviewed with pt/! Plan for Rehab!

## 2021-01-11 NOTE — THERAPY TREATMENT NOTE
Acute Care - Physical Therapy Treatment Note  [facility]     Patient Name: Joann Finnegan  : 1950  MRN: [id number]  Today's Date: 2021           PT Assessment (last 12 hours)      PT Evaluation and Treatment     Row Name 21 1410 21       Physical Therapy Time and Intention    Subjective Information  complains of;dyspnea  -KJ  complains of;weakness;fatigue  -KJ    Document Type  therapy note (daily note)  -KJ  therapy note (daily note)  -KJ    Mode of Treatment  physical therapy  -KJ  physical therapy  -KJ    Patient Effort  good  -KJ  good  -KJ    Symptoms Noted During/After Treatment  --  shortness of breath  -KJ    Comment  (S) monitor O2 sats  -KJ  monitor O2 sats  -KJ    Row Name 21 1410 21       General Information    Existing Precautions/Restrictions  fall;oxygen therapy device and L/min  -KJ  fall;oxygen therapy device and L/min  -KJ    Row Name 21 1410 21       Pain Scale: Numbers Pre/Post-Treatment    Pretreatment Pain Rating  0/10 - no pain  -KJ  0/10 - no pain  -KJ    Posttreatment Pain Rating  0/10 - no pain  -KJ  0/10 - no pain  -KJ    Row Name 21 1410 21       Bed Mobility    Supine-Sit [place] (Bed Mobility)  --  verbal cues;minimum assist (75% patient effort)  -KJ    Sit-Supine [place] (Bed Mobility)  verbal cues;minimum assist (75% patient effort)  -KJ  --    Bed Mobility, Safety Issues  decreased use of legs for bridging/pushing  -KJ  --    Row Name 21 1410 21       Transfers    Sit-Stand [place] (Transfers)  verbal cues;moderate assist (50% patient effort);2 person assist  -KJ  verbal cues;minimum assist (75% patient effort);2 person assist  -KJ    Stand-Sit [place] (Transfers)  verbal cues;minimum assist (75% patient effort);2 person assist  -KJ  verbal cues;minimum assist (75% patient effort)  -KJ    Row Name 21 1410 21       Sit-Stand Transfer    Assistive  Device (Sit-Stand Transfers)  walker, front-wheeled  -KJ  walker, front-wheeled  -KJ    Row Name 01/11/21 1410 01/11/21 0912       Stand-Sit Transfer    Assistive Device (Stand-Sit Transfers)  walker, front-wheeled  -KJ  walker, front-wheeled  -KJ    Row Name 01/11/21 1410 01/11/21 0912       Gait/Stairs (Locomotion)    [place] Level (Gait)  verbal cues;minimum assist (75% patient effort);2 person assist  -KJ  minimum assist (75% patient effort);2 person assist;verbal cues  -KJ    Assistive Device (Gait)  walker, front-wheeled  -KJ  walker, front-wheeled  -MONICA    Distance in Feet (Gait)  pivot to shower chair; pivot BTB from shower chair  -KJ  steps from bed  to  chair  -KJ    Row Name 01/11/21 0912          Hip (Therapeutic Exercise)    Hip (Therapeutic Exercise)  AROM (active range of motion)  -KJ     Hip AROM (Therapeutic Exercise)  bilateral  -KJ     Row Name 01/11/21 0912          Knee (Therapeutic Exercise)    Knee (Therapeutic Exercise)  AROM (active range of motion)  -KJ     Knee AROM (Therapeutic Exercise)  bilateral  -KJ     Row Name 01/11/21 0912          Ankle (Therapeutic Exercise)    Ankle (Therapeutic Exercise)  AROM (active range of motion)  -KJ     Ankle AROM (Therapeutic Exercise)  bilateral  -KJ     Row Name             Wound 12/16/20 0957 Right lower chin    Wound - Properties Group Placement Date: 12/16/20  -KM Placement Time: 0957  -KM Side: Right  -KM Orientation: lower  -KM Location: chin  - Stage, Pressure Injury : deep tissue injury  -KM    Retired Wound - Properties Group Date first assessed: 12/16/20  -KM Time first assessed: 0957  -KM Side: Right  -KM Location: chin  -KM    Row Name             Wound 12/18/20 1425 Left cheek Pressure Injury    Wound - Properties Group Placement Date: 12/18/20  -RM Placement Time: 1425  -RM Present on Hospital Admission: N  -RM Side: Left  -RM Location: cheek  -RM Primary Wound Type: Pressure inj  -RM    Retired Wound - Properties Group Date first  assessed: 12/18/20  -RM Time first assessed: 1425  -RM Present on Hospital Admission: N  -RM Side: Left  -RM Location: cheek  -RM Primary Wound Type: Pressure inj  -RM    Row Name             Wound 12/21/20 1118 Left nose Pressure Injury    Wound - Properties Group Placement Date: 12/21/20  -CH Placement Time: 1118  -CH Present on Hospital Admission: N  -CH Side: Left  -CH Location: nose  -CH Primary Wound Type: Pressure inj  -CH    Retired Wound - Properties Group Date first assessed: 12/21/20  -CH Time first assessed: 1118  -CH Present on Hospital Admission: N  -CH Side: Left  -CH Location: nose  -CH Primary Wound Type: Pressure inj  -CH    Row Name 01/11/21 1410 01/11/21 0912       Vital Signs    Pre SpO2 (%)  92  -KJ  91  -KJ    O2 Delivery Pre Treatment  nasal cannula  -KJ  nasal cannula  -KJ    Intra SpO2 (%)  82 respiratory states bump to 8L during shower  -KJ  84  -KJ    O2 Delivery Intra Treatment  nasal cannula  -KJ  nasal cannula bumped pt up to 4L for activity  -KJ    Post SpO2 (%)  96  -KJ  93  -KJ    O2 Delivery Post Treatment  nasal cannula back on 2L after shower  -KJ  nasal cannula  -KJ    Row Name 01/11/21 1410 01/11/21 0912       Positioning and Restraints    Pre-Treatment Position  sitting in chair/recliner  -KJ  in bed  -KJ    Post Treatment Position  bed  -KJ  chair  -KJ    In Bed  --  call light within reach  -KJ      User Key  (r) = Recorded By, (t) = Taken By, (c) = Cosigned By    Initials Name Provider Type    Mikayla Weaver PTA Physical Therapy Assistant    Maine Jaramillo, RN Registered Nurse    Ya Dodson RN Registered Nurse    Gisselle Buchanan RN Registered Nurse        Physical Therapy Education                 Title: PT OT SLP Therapies (In Progress)     Topic: Physical Therapy (In Progress)     Point: Mobility training (In Progress)     Learning Progress Summary           Patient Acceptance, E, NR by DONIS at 1/10/2021 1112    Comment: Rolling in bed     Acceptance, E, VU by JJ at 1/7/2021 1201    Comment: Pt educated on proper AD technique.    Acceptance, E,TB, VU by  at 1/6/2021 1008    Comment: trans    Acceptance, E,TB,D, DU,NR by  at 1/4/2021 1315    Comment: Education to reinforce importance of activity/risks assoc w/ bed rest; education for improved tech w/ bed mobility; education for proper deep breathing and for positioning to assist w/ edema mgmt and for pressure relief    Acceptance, E, NR by WK at 1/2/2021 0911    Comment: BOA    Acceptance, E, NL by WK at 1/1/2021 0935    Comment: BOA    Acceptance, E, NL by MS at 12/28/2020 0900    Comment: role of PT in her care   Family Acceptance, E,TB, VU by  at 1/6/2021 1008    Comment: trans                   Point: Home exercise program (Done)     Learning Progress Summary           Patient Acceptance, E,TB,D,H, VU by  at 1/5/2021 0906    Comment: BLE HEP   Significant Other Acceptance, E,TB,D,H, VU by  at 1/5/2021 0906    Comment: BLE HEP                   Point: Body mechanics (Done)     Learning Progress Summary           Patient Acceptance, E,TB, VU by  at 1/8/2021 1154    Comment: deep breathing                   Point: Precautions (Done)     Learning Progress Summary           Patient Acceptance, E,TB,D, DU,NR by  at 1/4/2021 1315    Comment: Education to reinforce importance of activity/risks assoc w/ bed rest; education for improved tech w/ bed mobility; education for proper deep breathing and for positioning to assist w/ edema mgmt and for pressure relief                               User Key     Initials Effective Dates Name Provider Type Discipline     08/02/16 -  Malena Garcia, PTA Physical Therapy Assistant PT     08/02/16 -  Renée Naylor, PTA Physical Therapy Assistant PT    MS 06/19/18 -  Yennifer May, PT, DPT, NCS Physical Therapist PT    WK 08/02/16 -  Rufina Degroot, PTA Physical Therapy Assistant PT     08/02/18 -  Terri Gomez, PT Physical Therapist PT     JLORENZO 12/23/20 -  Ad Mckeon, PT Student PT Student PT              PT Recommendation and Plan     Plan of Care Reviewed With: patient  Progress: improving  Outcome Summary: PT tx completed. Pt supine in bed. C/O fatigue and bed being very uncomfortable. Agrees to ex and getting into chair. Resting sat 93% on 2L, decreased to 84% with activity. Bed mobility Arm[place], sit<>stand MIinA x 2, transferred to chair with r wx Armando of 2. Benefit from rehab.  Outcome Measures     Row Name 01/11/21 1030             How much help from another is currently needed...    Putting on and taking off regular lower body clothing?  1  -CJ      Bathing (including washing, rinsing, and drying)  2  -CJ      Toileting (which includes using toilet bed pan or urinal)  2  -CJ      Putting on and taking off regular upper body clothing  2  -CJ      Taking care of personal grooming (such as brushing teeth)  2  -CJ      Eating meals  3  -CJ      AM-PAC 6 Clicks Score (OT)  12  -CJ         Functional Assessment    Outcome Measure Options  AM-PAC 6 Clicks Daily Activity (OT)  -CJ        User Key  (r) = Recorded By, (t) = Taken By, (c) = Cosigned By    Initials Name Provider Type    Favian Garcia, KORTNEY/L Occupational Therapy Assistant           Time Calculation:   PT Charges     Row Name 01/11/21 1424 01/11/21 0949          Time Calculation    Start Time  1410  -KJ  0912  -KJ     Stop Time  1434  -KJ  0950  -KJ     Time Calculation (min)  24 min  -KJ  38 min  -KJ     PT Received On  01/11/21  -KJ  01/11/21  -KJ     PT Goal Re-Cert Due Date  01/17/21  -KJ  01/17/21  -KJ        Time Calculation- PT    Total Timed Code Minutes- PT  24 minute(s)  -KJ  38 minute(s)  -KJ       User Key  (r) = Recorded By, (t) = Taken By, (c) = Cosigned By    Initials Name Provider Type    Mikayla Weaver, PTA Physical Therapy Assistant        Therapy Charges for Today     Code Description Service Date Service Provider Modifiers Qty    07743417374  PT THER PROC  EA 15 MIN 1/11/2021 Mikayla Martinez, PTA GP 1    69898424278 HC PT THERAPEUTIC ACT EA 15 MIN 1/11/2021 Mikayla Martinez, PTA GP 2    16562834144 HC PT THERAPEUTIC ACT EA 15 MIN 1/11/2021 Mikayla Martinez, PTA GP 2          PT G-Codes  Outcome Measure Options: AM-PAC 6 Clicks Daily Activity (OT)  AM-PAC 6 Clicks Score (PT): 8  AM-PAC 6 Clicks Score (OT): 12    Mikayla Martinez PTA  1/11/2021

## 2021-01-11 NOTE — THERAPY TREATMENT NOTE
Acute Care - Speech Language Pathology   Swallow Treatment Note [facility]     Patient Name: Joann Finnegan  : 1950  MRN: [id number]  Today's Date: 2021               Admit Date: 2020  Pt participated well with swallow therapy. Her vocal quality has continued to improve. Pt first completed dry swallows during sEMG with ratings of 350, 325, and 300 for an average of 325. She completed 15.5 minutes of neuromuscular electrical stimulation to the suprahyoids on A1 setting at 16mA. She swallowed during contractions 70% of the time. Trials of applesauce were given during therapy. She had delayed coughs 2x at the beginning of the session, however her vocal quality remained clear and no other overt s/s of aspiration were observed. Plans are for pt to discharge to IRF at [facility] today. Recommend continuing a pureed diet and honey thick liquids at this time. Pt would continue to benefit from swallow therapy with speech.  Litzy Mane, JAY JAY-SLP 2021 10:54 CST    Visit Dx:     ICD-10-CM ICD-9-CM   1. Pneumonia due to COVID-19 virus  U07.1 480.8    J12.89    2. Hypoxic  R09.02 799.02   3. Acute respiratory failure with hypoxia (CMS/HCC)  J96.01 518.81   4. Impaired mobility  Z74.09 799.89   5. Impaired mobility and ADLs  Z74.09 V49.89    Z78.9    6. Oropharyngeal dysphagia  R13.12 787.22     Patient Active Problem List   Diagnosis   • Dysphagia, oropharyngeal   • Heartburn   • Family hx colonic polyps   • Pneumonia due to COVID-19 virus   • Acute respiratory failure with hypoxia (CMS/HCC)   • Obesity (BMI 30-39.9)   • Elevated ferritin and CRP   • Acute respiratory distress syndrome (ARDS) due to COVID-19 virus (CMS/HCC)   • Elevated LFTs   • Leukocytosis   • Thrombocytopenia (CMS/HCC)   • Aphonia   • Xerostomia   • Post viral debility   • Dysphonia     Past Medical History:   Diagnosis Date   • Abnormal weight gain    • Arthritis    • Breast cancer (CMS/HCC)     right.    • Fatigue    • H/O  melanoma excision     CLARKS  LEVEL III      BACK OF UPPER LEFT ARM   • Hypertension    • Hypothyroidism    • Melanoma (CMS/HCC)    • Restless leg syndrome    • Shortness of breath      Past Surgical History:   Procedure Laterality Date   • APPENDECTOMY     • BREAST BIOPSY Right    • BREAST BIOPSY     • BREAST LUMPECTOMY      right.    • BROW LIFT     • COLONOSCOPY     • COLONOSCOPY N/A 2/14/2020    Procedure: COLONOSCOPY WITH ANESTHESIA;  Surgeon: Donovan Hernandez MD;  Location: [facility] ENDOSCOPY;  Service: Gastroenterology;  Laterality: N/A;  pre: family hx colon polyps  post: polyps  Janak Sherwood APRN   • ENDOSCOPY N/A 2/14/2020    Procedure: ESOPHAGOGASTRODUODENOSCOPY WITH ANESTHESIA;  Surgeon: Donovan Hernandez MD;  Location: [facility] ENDOSCOPY;  Service: Gastroenterology;  Laterality: N/A;  pre: dysphagia; heartburn  post: dilated  Janak Sherwood APRN   • ENDOSCOPY AND COLONOSCOPY  12/22/2011    very minimal distal esophagitis, incomplete esophagus ring dilated   • LUMBAR SPINAL TUMOR REMOVAL      Spinal Cyst removed from Spinal Canal   • MASTECTOMY, PARTIAL     • PARTIAL HIP ARTHROPLASTY Left    • SKIN CANCER EXCISION      Melanoma    • SQUAMOUS CELL CARCINOMA EXCISION     • TEMPOROMANDIBULAR JOINT ARTHROPLASTY     • TOTAL ABDOMINAL HYSTERECTOMY WITH SALPINGO OOPHORECTOMY Bilateral         SWALLOW EVALUATION (last 72 hours)      SLP Adult Swallow Evaluation     Row Name 01/11/21 1014 01/08/21 1313                Rehab Evaluation    Document Type  therapy note (daily note)  -MB  therapy note (daily note)  -MB       Subjective Information  no complaints  -MB  no complaints  -MB       Patient Observations  alert;cooperative  -MB  alert;cooperative  -MB       Patient/Family/Caregiver Comments/Observations  Spouse present  -MB  No family present  -MB       Patient Effort  --  good  -MB          Pain    Additional Documentation  Pain Scale: FACES Pre/Post-Treatment (Group)  -MB  Pain Scale: FACES  Pre/Post-Treatment (Group)  -MB          Pain Scale: FACES Pre/Post-Treatment    Pain: FACES Scale, Pretreatment  2-->hurts little bit  -MB  0-->no hurt  -MB          Clinical Impression    Daily Summary of Progress (SLP)  progress toward functional goals is good  -MB  progress toward functional goals is good  -MB       Barriers to Overall Progress (SLP)  none  -MB  None  -MB       Plan for Continued Treatment (SLP)  Continue to follow  -MB  Continue to follow  -MB          Oral Nutrition/Hydration Goal 1 (SLP)    Oral Nutrition/Hydration Goal 1, SLP  Pt will tolerate least restrictive diet with no overt s/s of aspiration.   -MB  Pt will tolerate least restrictive diet with no overt s/s of aspiration.   -MB       Time Frame (Oral Nutrition/Hydration Goal 1, SLP)  by discharge  -MB  by discharge  -MB       Barriers (Oral Nutrition/Hydration Goal 1, SLP)  none  -MB  none  -MB       Progress/Outcomes (Oral Nutrition/Hydration Goal 1, SLP)  continuing progress toward goal  -MB  continuing progress toward goal  -MB          Labial Strengthening Goal 1 (SLP)    Activity (Labial Strengthening Goal 1, SLP)  increase labial tone  -MB  increase labial tone  -MB       Increase Labial Tone  labial resistance exercises  -MB  labial resistance exercises  -MB       [place]/Accuracy (Labial Strengthening Goal 1, SLP)  with minimal cues (75-90% accuracy)  -MB  with minimal cues (75-90% accuracy)  -MB       Time Frame (Labial Strengthening Goal 1, SLP)  short term goal (STG);by discharge  -MB  short term goal (STG);by discharge  -MB       Barriers (Labial Strengthening Goal 1, SLP)  --  n/a  -MB       Progress/Outcomes (Labial Strengthening Goal 1, SLP)  --  goal ongoing  -MB          Lingual Strengthening Goal 1 (SLP)    Activity (Lingual Strengthening Goal 1, SLP)  increase lingual tone/sensation/control/coordination/movement  -MB  increase lingual tone/sensation/control/coordination/movement  -MB       Increase Lingual  Tone/Sensation/Control/Coordination/Movement  lingual movement exercises  -MB  lingual movement exercises  -MB       [place]/Accuracy (Lingual Strengthening Goal 1, SLP)  with minimal cues (75-90% accuracy)  -MB  with minimal cues (75-90% accuracy)  -MB       Time Frame (Lingual Strengthening Goal 1, SLP)  short term goal (STG);by discharge  -MB  short term goal (STG);by discharge  -MB       Barriers (Lingual Strengthening Goal 1, SLP)  --  Weaknessn/a  -MB       Progress/Outcomes (Lingual Strengthening Goal 1, SLP)  --  goal ongoing  -MB          Pharyngeal Strengthening Exercise Goal 1 (SLP)    Activity (Pharyngeal Strengthening Goal 1, SLP)  increase timing;increase superior movement of the hyolaryngeal complex;increase anterior movement of the hyolaryngeal complex  -MB  increase timing;increase superior movement of the hyolaryngeal complex;increase anterior movement of the hyolaryngeal complex  -MB       Increase Timing  gustatory stimulation (sour/cold)  -MB  gustatory stimulation (sour/cold)  -MB       Increase Superior Movement of the Hyolaryngeal Complex  Mendelsohn;hard effortful swallow NMES  -MB  Mendelsohn;hard effortful swallow NMES  -MB       Increase Anterior Movement of the Hyolaryngeal Complex  shaker  -MB  shaker  -MB       [place]/Accuracy (Pharyngeal Strengthening Goal 1, SLP)  independently (over 90% accuracy)  -MB  independently (over 90% accuracy)  -MB       Time Frame (Pharyngeal Strengthening Goal 1, SLP)  short term goal (STG);by discharge  -MB  short term goal (STG);by discharge  -MB       Barriers (Pharyngeal Strengthening Goal 1, SLP)  none  -MB  none  -MB       Progress/Outcomes (Pharyngeal Strengthening Goal 1, SLP)  continuing progress toward goal  -MB  continuing progress toward goal  -MB         User Key  (r) = Recorded By, (t) = Taken By, (c) = Cosigned By    Initials Name Effective Dates    Litzy Pimentel, CCC-SLP 08/02/16 -           EDUCATION  The patient has  been educated in the following areas:   Dysphagia (Swallowing Impairment).    SLP Recommendation and Plan                                         Daily Summary of Progress (SLP): progress toward functional goals is good    Plan for Continued Treatment (SLP): Continue to follow              Plan of Care Reviewed With: patient, spouse  Progress: improving  Outcome Summary: Pt participated well with swallow therapy. Her vocal quality has continued to improve. She completed 15.5 minutes of neuromuscular electrical stimulation to the suprahyoids on A1 setting at 16mA. She swallowed during contractions 70% of the time. Trials of applesauce were given during therapy. She had delayed coughs 2x at the beginning of the session, however her vocal quality remained clear and no other overt s/s of aspiration were observed. Plans are for pt to discharge to [facility] today. Recommend continuing a pureed diet and honey thick liquids at this time. Pt would continue to benefit from swallow therapy with speech.    SLP GOALS     Row Name 01/11/21 1014 01/08/21 1313          Oral Nutrition/Hydration Goal 1 (SLP)    Oral Nutrition/Hydration Goal 1, SLP  Pt will tolerate least restrictive diet with no overt s/s of aspiration.   -MB  Pt will tolerate least restrictive diet with no overt s/s of aspiration.   -MB     Time Frame (Oral Nutrition/Hydration Goal 1, SLP)  by discharge  -MB  by discharge  -MB     Barriers (Oral Nutrition/Hydration Goal 1, SLP)  none  -MB  none  -MB     Progress/Outcomes (Oral Nutrition/Hydration Goal 1, SLP)  continuing progress toward goal  -MB  continuing progress toward goal  -MB        Labial Strengthening Goal 1 (SLP)    Activity (Labial Strengthening Goal 1, SLP)  increase labial tone  -MB  increase labial tone  -MB     Increase Labial Tone  labial resistance exercises  -MB  labial resistance exercises  -MB     [place]/Accuracy (Labial Strengthening Goal 1, SLP)  with minimal cues (75-90%  accuracy)  -MB  with minimal cues (75-90% accuracy)  -MB     Time Frame (Labial Strengthening Goal 1, SLP)  short term goal (STG);by discharge  -MB  short term goal (STG);by discharge  -MB     Barriers (Labial Strengthening Goal 1, SLP)  --  n/a  -MB     Progress/Outcomes (Labial Strengthening Goal 1, SLP)  --  goal ongoing  -MB        Lingual Strengthening Goal 1 (SLP)    Activity (Lingual Strengthening Goal 1, SLP)  increase lingual tone/sensation/control/coordination/movement  -MB  increase lingual tone/sensation/control/coordination/movement  -MB     Increase Lingual Tone/Sensation/Control/Coordination/Movement  lingual movement exercises  -MB  lingual movement exercises  -MB     [place]/Accuracy (Lingual Strengthening Goal 1, SLP)  with minimal cues (75-90% accuracy)  -MB  with minimal cues (75-90% accuracy)  -MB     Time Frame (Lingual Strengthening Goal 1, SLP)  short term goal (STG);by discharge  -MB  short term goal (STG);by discharge  -MB     Barriers (Lingual Strengthening Goal 1, SLP)  --  Weaknessn/a  -MB     Progress/Outcomes (Lingual Strengthening Goal 1, SLP)  --  goal ongoing  -MB        Pharyngeal Strengthening Exercise Goal 1 (SLP)    Activity (Pharyngeal Strengthening Goal 1, SLP)  increase timing;increase superior movement of the hyolaryngeal complex;increase anterior movement of the hyolaryngeal complex  -MB  increase timing;increase superior movement of the hyolaryngeal complex;increase anterior movement of the hyolaryngeal complex  -MB     Increase Timing  gustatory stimulation (sour/cold)  -MB  gustatory stimulation (sour/cold)  -MB     Increase Superior Movement of the Hyolaryngeal Complex  Mendelsohn;hard effortful swallow NMES  -MB  Mendelsohn;hard effortful swallow NMES  -MB     Increase Anterior Movement of the Hyolaryngeal Complex  shaker  -MB  shaker  -MB     [place]/Accuracy (Pharyngeal Strengthening Goal 1, SLP)  independently (over 90% accuracy)  -MB  independently (over  90% accuracy)  -MB     Time Frame (Pharyngeal Strengthening Goal 1, SLP)  short term goal (STG);by discharge  -MB  short term goal (STG);by discharge  -MB     Barriers (Pharyngeal Strengthening Goal 1, SLP)  none  -MB  none  -MB     Progress/Outcomes (Pharyngeal Strengthening Goal 1, SLP)  continuing progress toward goal  -MB  continuing progress toward goal  -MB       User Key  (r) = Recorded By, (t) = Taken By, (c) = Cosigned By    Initials Name Provider Type    Litzy Pimentel CCC-SLP Speech and Language Pathologist             Time Calculation:   Time Calculation- SLP     Row Name 01/11/21 1054             Time Calculation- SLP    SLP Start Time  1014  -MB      SLP Stop Time  1044  -MB      SLP Time Calculation (min)  30 min  -MB      SLP Received On  01/11/21  -MB        User Key  (r) = Recorded By, (t) = Taken By, (c) = Cosigned By    Initials Name Provider Type    Litzy Pimentel CCC-SLP Speech and Language Pathologist          Therapy Charges for Today     Code Description Service Date Service Provider Modifiers Qty    53578405945  ST TREATMENT SWALLOW 2 1/11/2021 Litzy Mane CCC-SLP GN 1               Litzy CULVER. SILVINA Mane  1/11/2021

## 2021-01-11 NOTE — THERAPY TREATMENT NOTE
Acute Care - Physical Therapy Treatment Note  [facility]     Patient Name: Joann Finnegan  : 1950  MRN: [id number]  Today's Date: 2021           PT Assessment (last 12 hours)      PT Evaluation and Treatment     Row Name 21 09          Physical Therapy Time and Intention    Subjective Information  complains of;weakness;fatigue  -KJ     Document Type  therapy note (daily note)  -KJ     Mode of Treatment  physical therapy  -KJ     Patient Effort  good  -KJ     Symptoms Noted During/After Treatment  shortness of breath  -KJ     Comment  monitor O2 sats  -KJ     Row Name 21 09          General Information    Existing Precautions/Restrictions  fall;oxygen therapy device and L/min  -KJ     Row Name 21          Pain Scale: Numbers Pre/Post-Treatment    Pretreatment Pain Rating  0/10 - no pain  -KJ     Posttreatment Pain Rating  0/10 - no pain  -KJ     Row Name 21          Bed Mobility    Supine-Sit Compton (Bed Mobility)  verbal cues;minimum assist (75% patient effort)  -KJ     Row Name 21          Transfers    Sit-Stand [place] (Transfers)  verbal cues;minimum assist (75% patient effort);2 person assist  -KJ     Stand-Sit [place] (Transfers)  verbal cues;minimum assist (75% patient effort)  -KJ     Row Name 21          Sit-Stand Transfer    Assistive Device (Sit-Stand Transfers)  walker, front-wheeled  -KJ     Row Name 21          Stand-Sit Transfer    Assistive Device (Stand-Sit Transfers)  walker, front-wheeled  -KJ     Row Name 21          Gait/Stairs (Locomotion)    Compton Level (Gait)  minimum assist (75% patient effort);2 person assist;verbal cues  -KJ     Assistive Device (Gait)  walker, front-wheeled  -KJ     Distance in Feet (Gait)  steps from bed  to  chair  -KJ     Row Name 21          Hip (Therapeutic Exercise)    Hip (Therapeutic Exercise)  AROM (active range of motion)  -KJ      Hip AROM (Therapeutic Exercise)  bilateral  -KJ     Row Name 01/11/21 0912          Knee (Therapeutic Exercise)    Knee (Therapeutic Exercise)  AROM (active range of motion)  -KJ     Knee AROM (Therapeutic Exercise)  bilateral  -KJ     Row Name 01/11/21 0912          Ankle (Therapeutic Exercise)    Ankle (Therapeutic Exercise)  AROM (active range of motion)  -KJ     Ankle AROM (Therapeutic Exercise)  bilateral  -KJ     Row Name             Wound 12/16/20 0957 Right lower chin    Wound - Properties Group Placement Date: 12/16/20  -KM Placement Time: 0957  -KM Side: Right  -KM Orientation: lower  -KM Location: chin  -KM Stage, Pressure Injury : deep tissue injury  -KM    Retired Wound - Properties Group Date first assessed: 12/16/20  -KM Time first assessed: 0957  -KM Side: Right  -KM Location: chin  -KM    Row Name             Wound 12/18/20 1425 Left cheek Pressure Injury    Wound - Properties Group Placement Date: 12/18/20  -RM Placement Time: 1425  -RM Present on Hospital Admission: N  -RM Side: Left  -RM Location: cheek  -RM Primary Wound Type: Pressure inj  -RM    Retired Wound - Properties Group Date first assessed: 12/18/20  -RM Time first assessed: 1425  -RM Present on Hospital Admission: N  -RM Side: Left  -RM Location: cheek  -RM Primary Wound Type: Pressure inj  -RM    Row Name             Wound 12/21/20 1118 Left nose Pressure Injury    Wound - Properties Group Placement Date: 12/21/20  -CH Placement Time: 1118  -CH Present on Hospital Admission: N  -CH Side: Left  -CH Location: nose  -CH Primary Wound Type: Pressure inj  -CH    Retired Wound - Properties Group Date first assessed: 12/21/20  -CH Time first assessed: 1118  -CH Present on Hospital Admission: N  -CH Side: Left  -CH Location: nose  -CH Primary Wound Type: Pressure inj  -CH    Row Name 01/11/21 0912          Vital Signs    Pre SpO2 (%)  91  -KJ     O2 Delivery Pre Treatment  nasal cannula  -KJ     Intra SpO2 (%)  84  -KJ     O2 Delivery Intra  Treatment  nasal cannula bumped pt up to 4L for activity  -KJ     Post SpO2 (%)  93  -KJ     O2 Delivery Post Treatment  nasal cannula  -KJ     Row Name 01/11/21 0912          Positioning and Restraints    Pre-Treatment Position  in bed  -KJ     Post Treatment Position  chair  -KJ     In Bed  call light within reach  -KJ       User Key  (r) = Recorded By, (t) = Taken By, (c) = Cosigned By    Initials Name Provider Type    Mikayla Weaver, KATHY Physical Therapy Assistant    Maine Jaramillo, RN Registered Nurse    Ya Dodson RN Registered Nurse    Gisselle Buchanan RN Registered Nurse        Physical Therapy Education                 Title: PT OT SLP Therapies (In Progress)     Topic: Physical Therapy (In Progress)     Point: Mobility training (In Progress)     Learning Progress Summary           Patient Acceptance, E, NR by DONIS at 1/10/2021 1112    Comment: Rolling in bed    Acceptance, E, VU by JJ at 1/7/2021 1201    Comment: Pt educated on proper AD technique.    Acceptance, E,TB, VU by  at 1/6/2021 1008    Comment: trans    Acceptance, E,TB,D, DU,NR by  at 1/4/2021 1315    Comment: Education to reinforce importance of activity/risks assoc w/ bed rest; education for improved tech w/ bed mobility; education for proper deep breathing and for positioning to assist w/ edema mgmt and for pressure relief    Acceptance, E, NR by WK at 1/2/2021 0911    Comment: BOA    Acceptance, E, NL by WK at 1/1/2021 0935    Comment: BOA    Acceptance, E, NL by MS at 12/28/2020 0900    Comment: role of PT in her care   Family Acceptance, E,TB, VU by  at 1/6/2021 1008    Comment: trans                   Point: Home exercise program (Done)     Learning Progress Summary           Patient Acceptance, E,TB,D,H, VU by  at 1/5/2021 0906    Comment: BLE HEP   Significant Other Acceptance, E,TB,D,H, VU by  at 1/5/2021 0906    Comment: BLE HEP                   Point: Body mechanics (Done)     Learning Progress  Summary           Patient Acceptance, E,TB, VU by  at 1/8/2021 1154    Comment: deep breathing                   Point: Precautions (Done)     Learning Progress Summary           Patient Acceptance, E,TB,D, DU,NR by  at 1/4/2021 1315    Comment: Education to reinforce importance of activity/risks assoc w/ bed rest; education for improved tech w/ bed mobility; education for proper deep breathing and for positioning to assist w/ edema mgmt and for pressure relief                               User Key     Initials Effective Dates Name Provider Type Discipline     08/02/16 -  Malena Garcia, PTA Physical Therapy Assistant PT    DONIS 08/02/16 -  Renée Naylor, PTA Physical Therapy Assistant PT    MS 06/19/18 -  Yennifer May, PT, DPT, NCS Physical Therapist PT    WK 08/02/16 -  Rufina Degroot, PTA Physical Therapy Assistant PT     08/02/18 -  Terri Gomez, PT Physical Therapist PT    J 12/23/20 -  Ad Mckeon, PT Student PT Student PT              PT Recommendation and Plan     Plan of Care Reviewed With: patient  Progress: improving  Outcome Summary: PT tx completed. Pt supine in bed. C/O fatigue and bed being very uncomfortable. Agrees to ex and getting into chair. Resting sat 93% on 2L, decreased to 84% with activity. Bed mobility Armando, sit<>stand MIinA x 2, transferred to chair with r wx Armando of 2. Benefit from rehab.  Outcome Measures     Row Name 01/08/21 3005             How much help from another is currently needed...    Putting on and taking off regular lower body clothing?  1  -CJ      Bathing (including washing, rinsing, and drying)  2  -CJ      Toileting (which includes using toilet bed pan or urinal)  2  -CJ      Putting on and taking off regular upper body clothing  2  -CJ      Taking care of personal grooming (such as brushing teeth)  2  -CJ      Eating meals  3  -CJ      AM-PAC 6 Clicks Score (OT)  12  -CJ         Functional Assessment    Outcome Measure Options  AM-PAC 6 Clicks  Daily Activity (OT)  -        User Key  (r) = Recorded By, (t) = Taken By, (c) = Cosigned By    Initials Name Provider Type    Favian Garcia COTA/L Occupational Therapy Assistant           Time Calculation:   PT Charges     Row Name 01/11/21 0949             Time Calculation    Start Time  0912  -KJ      Stop Time  0950  -KJ      Time Calculation (min)  38 min  -KJ      PT Received On  01/11/21  -KJ      PT Goal Re-Cert Due Date  01/17/21  -KJ         Time Calculation- PT    Total Timed Code Minutes- PT  38 minute(s)  -KJ        User Key  (r) = Recorded By, (t) = Taken By, (c) = Cosigned By    Initials Name Provider Type    Mikayla Weaver, KATHY Physical Therapy Assistant        Therapy Charges for Today     Code Description Service Date Service Provider Modifiers Qty    53661908540 HC PT THER PROC EA 15 MIN 1/11/2021 Mikayla Martinez, KATHY GP 1    05009476743 HC PT THERAPEUTIC ACT EA 15 MIN 1/11/2021 Mikayla Martinez, KATHY GP 2          PT G-Codes  Outcome Measure Options: AM-PAC 6 Clicks Daily Activity (OT)  AM-PAC 6 Clicks Score (PT): 8  AM-PAC 6 Clicks Score (OT): 12    Mikayla Martinez PTA  1/11/2021

## 2021-01-11 NOTE — THERAPY TREATMENT NOTE
Acute Care - Occupational Therapy Treatment Note  [facility]     Patient Name: Joann Finnegan  : 1950  MRN: [id number]  Today's Date: 2021             Admit Date: 2020       ICD-10-CM ICD-9-CM   1. Pneumonia due to COVID-19 virus  U07.1 480.8    J12.89    2. Hypoxic  R09.02 799.02   3. Acute respiratory failure with hypoxia (CMS/HCC)  J96.01 518.81   4. Impaired mobility  Z74.09 799.89   5. Impaired mobility and ADLs  Z74.09 V49.89    Z78.9    6. Oropharyngeal dysphagia  R13.12 787.22     Patient Active Problem List   Diagnosis   • Dysphagia, oropharyngeal   • Heartburn   • Family hx colonic polyps   • Pneumonia due to COVID-19 virus   • Acute respiratory failure with hypoxia (CMS/HCC)   • Obesity (BMI 30-39.9)   • Elevated ferritin and CRP   • Acute respiratory distress syndrome (ARDS) due to COVID-19 virus (CMS/HCC)   • Elevated LFTs   • Leukocytosis   • Thrombocytopenia (CMS/HCC)   • Aphonia   • Xerostomia   • Post viral debility   • Dysphonia     Past Medical History:   Diagnosis Date   • Abnormal weight gain    • Arthritis    • Breast cancer (CMS/HCC)     right.    • Fatigue    • H/O melanoma excision     CLARKS  LEVEL III      BACK OF UPPER LEFT ARM   • Hypertension    • Hypothyroidism    • Melanoma (CMS/HCC)    • Restless leg syndrome    • Shortness of breath      Past Surgical History:   Procedure Laterality Date   • APPENDECTOMY     • BREAST BIOPSY Right    • BREAST BIOPSY     • BREAST LUMPECTOMY      right.    • BROW LIFT     • COLONOSCOPY     • COLONOSCOPY N/A 2020    Procedure: COLONOSCOPY WITH ANESTHESIA;  Surgeon: Donovan Hernandez MD;  Location: [facility] ENDOSCOPY;  Service: Gastroenterology;  Laterality: N/A;  pre: family hx colon polyps  post: polyps  Janak Sherwood APRN   • ENDOSCOPY N/A 2020    Procedure: ESOPHAGOGASTRODUODENOSCOPY WITH ANESTHESIA;  Surgeon: Donovan Hernandez MD;  Location: [facility] ENDOSCOPY;  Service: Gastroenterology;  Laterality: N/A;  pre:  dysphagia; heartburn  post: dilated  Janak Sherwood, APRN   • ENDOSCOPY AND COLONOSCOPY  12/22/2011    very minimal distal esophagitis, incomplete esophagus ring dilated   • LUMBAR SPINAL TUMOR REMOVAL      Spinal Cyst removed from Spinal Canal   • MASTECTOMY, PARTIAL     • PARTIAL HIP ARTHROPLASTY Left    • SKIN CANCER EXCISION      Melanoma    • SQUAMOUS CELL CARCINOMA EXCISION     • TEMPOROMANDIBULAR JOINT ARTHROPLASTY     • TOTAL ABDOMINAL HYSTERECTOMY WITH SALPINGO OOPHORECTOMY Bilateral             OT ASSESSMENT FLOWSHEET (last 12 hours)      OT Evaluation and Treatment     Row Name 01/11/21 1030                   OT Time and Intention    Subjective Information  complains of;weakness;fatigue;dyspnea  -        Document Type  therapy note (daily note)  -        Mode of Treatment  occupational therapy  -        Patient Effort  good  -           General Information    Existing Precautions/Restrictions  fall;oxygen therapy device and L/min  -           Pain Assessment    Additional Documentation  Pain Scale: Word Pre/Post-Treatment (Group)  -           Pain Scale: Numbers Pre/Post-Treatment    Pretreatment Pain Rating  0/10 - no pain  -        Posttreatment Pain Rating  0/10 - no pain  -        Pain Intervention(s)  Repositioned;Rest  -           Bed Mobility    Comment (Bed Mobility)  sitting in chair!  -           Motor Skills    Motor Skills  therapeutic exercise  -        Therapeutic Exercise  shoulder;elbow/forearm  -           Shoulder (Therapeutic Exercise)    Shoulder (Therapeutic Exercise)  AROM (active range of motion);strengthening exercise  -        Shoulder AROM (Therapeutic Exercise)  bilateral;flexion;extension;sitting;5 repetitions  -        Shoulder Strengthening (Therapeutic Exercise)  bilateral;flexion;extension;sitting;2 lb free weight  -           Elbow/Forearm (Therapeutic Exercise)    Elbow/Forearm (Therapeutic Exercise)  AROM (active range of  motion);strengthening exercise  -        Elbow/Forearm AROM (Therapeutic Exercise)  bilateral;flexion;extension;sitting;5 repetitions;3 sets  -        Elbow/Forearm Strengthening (Therapeutic Exercise)  bilateral;flexion;extension;sitting;2 lb free weight;yellow;resistance band  -CJ           Wound 12/16/20 0957 Right lower chin    Wound - Properties Group Placement Date: 12/16/20  -KM Placement Time: 0957  -KM Side: Right  -KM Orientation: lower  -KM Location: chin  -KM Stage, Pressure Injury : deep tissue injury  -KM    Retired Wound - Properties Group Date first assessed: 12/16/20  -KM Time first assessed: 0957  -KM Side: Right  -KM Location: chin  -KM       Wound 12/18/20 1425 Left cheek Pressure Injury    Wound - Properties Group Placement Date: 12/18/20  -RM Placement Time: 1425  -RM Present on Hospital Admission: N  -RM Side: Left  -RM Location: cheek  -RM Primary Wound Type: Pressure inj  -RM    Retired Wound - Properties Group Date first assessed: 12/18/20  -RM Time first assessed: 1425  -RM Present on Hospital Admission: N  -RM Side: Left  -RM Location: cheek  -RM Primary Wound Type: Pressure inj  -RM       Wound 12/21/20 1118 Left nose Pressure Injury    Wound - Properties Group Placement Date: 12/21/20  -CH Placement Time: 1118  -CH Present on Hospital Admission: N  -CH Side: Left  -CH Location: nose  -CH Primary Wound Type: Pressure inj  -CH    Retired Wound - Properties Group Date first assessed: 12/21/20  -CH Time first assessed: 1118  -CH Present on Hospital Admission: N  -CH Side: Left  -CH Location: nose  -CH Primary Wound Type: Pressure inj  -CH       Positioning and Restraints    Pre-Treatment Position  sitting in chair/recliner  -        Post Treatment Position  chair  -CJ        In Chair  reclined;sitting;call light within reach;encouraged to call for assist;with family/caregiver;legs elevated  -           Progress Summary (OT)    Progress Toward Functional Goals (OT)  progress toward  functional goals is good  -        Barriers to Overall Progress (OT)  Fall/o2!  -        Impairments Still Limiting Function (OT)  Plan d/c to rehab!  -          User Key  (r) = Recorded By, (t) = Taken By, (c) = Cosigned By    Initials Name Effective Dates     Hernandez Favian VALENCIA, ORELLANA/L 08/02/16 -     Maine Jaramillo RN 12/29/17 -     Ya Dodson RN 08/02/16 -     Gisselle Buchanan RN 08/02/16 -          Occupational Therapy Education                 Title: PT OT SLP Therapies (In Progress)     Topic: Occupational Therapy (Done)     Point: ADL training (Done)     Description:   Instruct learner(s) on proper safety adaptation and remediation techniques during self care or transfers.   Instruct in proper use of assistive devices.              Learning Progress Summary           Patient Acceptance, E, VU by AVELINA at 1/7/2021 1237    Acceptance, E, NR by AVELINA at 12/28/2020 1311                   Point: Home exercise program (Done)     Description:   Instruct learner(s) on appropriate technique for monitoring, assisting and/or progressing therapeutic exercises/activities.              Learning Progress Summary           Patient Acceptance, E,TB, VU,DU,NR by  at 1/11/2021 1030    Comment: Pt. performed ue exs with this orellana/l assisting and encouraging pt!    Acceptance, E,TB, VU,DU,NR by  at 1/8/2021 1348    Comment: Pt. fowlers in bed, Arom with Bue's in elbow flex/ext!    Acceptance, E,TB, VU,DU,NR by  at 1/6/2021 1335    Comment: Pt. fowlers in bed, performed aarom with this orellana/l after prom with bue's performed by this orellana/l!    Acceptance, TB,E, VU,NR,DU by  at 1/5/2021 0728    Comment: Pt. unable to use wts. for ue exs, Prom/aarom performed!   Significant Other Acceptance, E,TB, VU,DU,NR by  at 1/11/2021 1030    Comment: Pt. performed ue exs with this orellana/l assisting and encouraging pt!    Acceptance, E,TB, VU,DU,NR by  at 1/8/2021 8035    Comment: Pt. fowlers in bed, Arom with  Bue's in elbow flex/ext!    Acceptance, TB,E, VU,NR,DU by  at 1/5/2021 0728    Comment: Pt. unable to use wts. for ue exs, Prom/aarom performed!                   Point: Precautions (Done)     Description:   Instruct learner(s) on prescribed precautions during self-care and functional transfers.              Learning Progress Summary           Patient Acceptance, E,TB, VU,DU,NR by  at 1/11/2021 1030    Comment: Pt. performed ue exs with this orellana/l assisting and encouraging pt!    Acceptance, E,TB, VU,DU,NR by  at 1/8/2021 1348    Comment: Pt. fowlers in bed, Arom with Bue's in elbow flex/ext!    Acceptance, E,TB, VU,DU,NR by  at 1/6/2021 1335    Comment: Pt. fowlers in bed, performed aarom with this orellana/l after prom with bue's performed by this orellana/l!    Acceptance, TB,E, VU,NR,DU by  at 1/5/2021 0728    Comment: Pt. unable to use wts. for ue exs, Prom/aarom performed!   Significant Other Acceptance, E,TB, VU,DU,NR by  at 1/11/2021 1030    Comment: Pt. performed ue exs with this orellana/l assisting and encouraging pt!    Acceptance, E,TB, VU,DU,NR by  at 1/8/2021 1348    Comment: Pt. fowlers in bed, Arom with Bue's in elbow flex/ext!    Acceptance, TB,E, VU,NR,DU by  at 1/5/2021 0728    Comment: Pt. unable to use wts. for ue exs, Prom/aarom performed!                   Point: Body mechanics (Done)     Description:   Instruct learner(s) on proper positioning and spine alignment during self-care, functional mobility activities and/or exercises.              Learning Progress Summary           Patient Acceptance, E,TB, VU,DU,NR by  at 1/11/2021 1030    Comment: Pt. performed ue exs with this orellana/l assisting and encouraging pt!    Acceptance, E,TB, VU,DU,NR by  at 1/8/2021 1348    Comment: Pt. fowlers in bed, Arom with Bue's in elbow flex/ext!    Acceptance, E,TB, VU,DU,NR by  at 1/6/2021 4781    Comment: Pt. fowlers in bed, performed aarom with this orellana/l after prom with bue's performed by this  orellana/l!    Acceptance, TB,E, VU,NR,DU by  at 1/5/2021 0728    Comment: Pt. unable to use wts. for ue exs, Prom/aarom performed!    Acceptance, E, NR by JLORENZO at 12/28/2020 1311   Significant Other Acceptance, E,TB, VU,DU,NR by  at 1/11/2021 1030    Comment: Pt. performed ue exs with this orellana/l assisting and encouraging pt!    Acceptance, E,TB, VU,DU,NR by  at 1/8/2021 1348    Comment: Pt. fowlers in bed, Arom with Bue's in elbow flex/ext!    Acceptance, TB,E, VU,NR,DU by  at 1/5/2021 0728    Comment: Pt. unable to use wts. for ue exs, Prom/aarom performed!                               User Key     Initials Effective Dates Name Provider Type Discipline     08/02/16 -  Favian Ng COTA/L Occupational Therapy Assistant OT     12/04/20 -  Theresa Nicholas OTR/L Occupational Therapist OT                  OT Recommendation and Plan     Progress Toward Functional Goals (OT): progress toward functional goals is good  Plan of Care Review  Plan of Care Reviewed With: patient, spouse  Progress: improving  Outcome Summary: Pt. sitting in chair, Ue exs performed with this orellana/l assisting with vc's and tactile cues for reinforcement, pt. tires easily and time given for rests between sets, elbow flex pt. has little issues with 2lb. bar, but ue shd. flex 5 reps x 3 sets tire pt. out! Home safety and work simplification/enrgy conservation techniques reviewed with pt/! Plan for Rehab!  Plan of Care Reviewed With: patient, spouse  Outcome Summary: Pt. sitting in chair, Ue exs performed with this orellana/l assisting with vc's and tactile cues for reinforcement, pt. tires easily and time given for rests between sets, elbow flex pt. has little issues with 2lb. bar, but ue shd. flex 5 reps x 3 sets tire pt. out! Home safety and work simplification/enrgy conservation techniques reviewed with pt/! Plan for Rehab!    Outcome Measures     Row Name 01/11/21 1030 01/08/21 1348          How much help from  another is currently needed...    Putting on and taking off regular lower body clothing?  1  -CJ  1  -CJ     Bathing (including washing, rinsing, and drying)  2  -CJ  2  -CJ     Toileting (which includes using toilet bed pan or urinal)  2  -CJ  2  -CJ     Putting on and taking off regular upper body clothing  2  -CJ  2  -CJ     Taking care of personal grooming (such as brushing teeth)  2  -CJ  2  -CJ     Eating meals  3  -CJ  3  -CJ     AM-PAC 6 Clicks Score (OT)  12  -CJ  12  -        Functional Assessment    Outcome Measure Options  AM-PAC 6 Clicks Daily Activity (OT)  -  AM-PAC 6 Clicks Daily Activity (OT)  -       User Key  (r) = Recorded By, (t) = Taken By, (c) = Cosigned By    Initials Name Provider Type     Favian Ng COTA/L Occupational Therapy Assistant          Time Calculation:   Time Calculation- OT     Row Name 01/11/21 1030             Time Calculation- OT    OT Start Time  1030  -      OT Stop Time  1111  -      OT Time Calculation (min)  41 min  -      Total Timed Code Minutes- OT  41 minute(s)  -      TCU Minutes- OT  41 min  -      OT Received On  01/11/21  -         Timed Charges    45206 - OT Therapeutic Exercise Minutes  41  -        User Key  (r) = Recorded By, (t) = Taken By, (c) = Cosigned By    Initials Name Provider Type     Favian Ng COTA/L Occupational Therapy Assistant        Therapy Charges for Today     Code Description Service Date Service Provider Modifiers Qty    25886380308 HC OT THER PROC EA 15 MIN 1/11/2021 Favian Ng COTA/L GO 3               ROSLYN Estrada  1/11/2021

## 2021-01-11 NOTE — CONSULTS
Taurus Eastman Jr, MD     OTOLARYNGOLOGY, HEAD AND NECK SURGERY PROGRESS NOTE   Referring Provider: Samir Teague MD  Reason for Consultation: Aphonia  Location: 442/1  Accompanied by:   Chief complaint: Hoarseness      Interval History:   Since last examined, Joann Finnegan has a markedly improved voice.  She is able to talk to me this morning.  She is still hoarse.  She denies any nausea vomiting.  She says she is breathing well.  She is very weak.  Patient seen. Chart reviewed.      Review of Systems:   ROS master CMN: No change from prior inquiry    Vital Signs  Temp:  [97.8 °F (36.6 °C)-98.9 °F (37.2 °C)] 97.8 °F (36.6 °C)  Heart Rate:  [73-96] 73  Resp:  [16-18] 18  BP: (132-146)/(51-65) 132/51        EXAMINATION:  CONSTITUTIONAL:    well developed  well nourished  Lying in bed, gives me a thumbs up, seems more alert, facial scabbing still present but improved     BODY HABITUS:    obese  severe     COMMUNICATION:    Markedly improved  VOICE:   hoarse-  moderate    raspy-  mild    breathy-  moderate    weak-  moderate    pitch: Low    intensity: Low   She is able to speak at two thirds sentence formation     HEAD:     Normocephalic, without obvious abnormality, atraumatic     FACE:    adiposis- Mild to moderate  skin- Eschars present to along the left cheek and one on the left lateral mental area, dry, healing well and smaller, very dry     SALIVARY GLANDS:    parotid glands with no tenderness, no swelling, no masses, submandibular glands with normal size, nontender      EYE:    ocular motility normal, eyelids normal, orbits normal, no proptosis, conjunctiva clear, sclera non-icteric, pupils equal, round, reactive to light and accomodation  Color:   green     HEARING:      response to conversational voice decreased  Bilateral     EARS:     PINNA:     normal, non-tender, skin intact without lesions      NOSE EXTERNAL:    APPEARANCE: normal, straight, with good projection, no tenderness, no  lesions, no tenderness, good nasal support, patent nares     NOSE INTERNAL:    Anterior rhinoscopy   NASALMUCOSA:    abnormal:        Bilateral- Moderate crusting    NASAL PASSAGES:     abnormal   Left- Feeding tube absent   NASAL VALVE:     abnormal  Bilateral- Week    SEPTUM:     Not clearly visualized   INFERIOR TURBINATES:     Not clearly visualized     ORAL CAVITY:      LIPS:      structure normal, no tenderness on palpation, no lesions, no evidence of trauma, normal mobility and oral competence    TEETH:       edentulous:  upper and lower    GUMS:      Intact    ORAL MUCOSA:       abnormal: Dry but not coated   FLOOR OF MOUTH:       Warthin's duct patent, mucosa normal  TONGUE:       mucosa-intact without coating this morning      hypertrophy  Severe, prolapse   Severe    PALATE:       hard palate  normal      soft palate- Low, thick moist     OROPHARYNX:    Direct examination  OROPHARYNGEAL MUCOSA:     abnormal-        lateral walls-          Bilateral-redundant, no coating      posterior wall-  pale  TONSIL:     Tonsil pillars-  Atrophic     NECK:    short, thick  moderate   LARYNGEAL POSITION: Low     LYMPH NODES :    no adenopathy     CHEST/RESPIRATORY:    unlabored     NEURO/PSYCHIATRIC :    Awake, alert, oriented x3, cranial nerves appear to be intact    Physical examination has been copied from prior note.  This is been carefully reviewed and appropriate changes have been made in the documentation.     Results Review:       I reviewed the patient's other test results and agree with the interpretation  CBC (No Diff) (01/08/2021 04:39)   Basic Metabolic Panel (01/08/2021 04:39)   Therapy Treatment Note by Litzy Mane CCC-SLP (01/08/2021 13:48)   Progress Notes by Ellis Wise MD (01/10/2021 11:30)     Medications, Allergies and Past History Reviewed      Assessment       Pneumonia due to COVID-19 virus    Dysphagia, oropharyngeal    Acute respiratory failure with hypoxia (CMS/HCC)     Obesity (BMI 30-39.9)    Elevated ferritin and CRP    Acute respiratory distress syndrome (ARDS) due to COVID-19 virus (CMS/HCC)    Elevated LFTs    Leukocytosis    Thrombocytopenia (CMS/HCC)    Aphonia    Xerostomia    Post viral debility         Plan      Patient has improved substantially since last examination.  She is able to vocalize much better.  She still has problems with her voice but is not aphonic any longer.  Chart review reveals that the patient being transferred to rehab.  This is appropriate.  She should continue SLP.  And swallowing therapy.  I will plan to follow her up in approximately 1 month.  We will evaluate her voice and vocal cord mobility.  I will treat her expectantly, the idea that she should improve over time with strengthening.  Follow up ENT 2-4 weeks for voice and TVC evaluation.      I will follow peripherally. Please call if further consultation needed.      Taurus Eastman Jr, MD  01/11/21  09:05 CST

## 2021-01-11 NOTE — PROGRESS NOTES
Wound Progress Note  [facility]     Patient Name: Joann Finnegan  : 1950  MRN: [id number]  Today's Date: 2021 Room Number: 442/1      Admit Date: 2020  Attending: Samir Teague MD    Consult Requested By: Dr. Wise    Reason For Consult: Hospital Acquired Pressure Injury    Chief Complaint: Wounds to the cheek and chin    HPI: Patient presents sitting up in chair with her  at the bedside.  She has wounds to her cheek and chin that developed during her stay in the COVID unit.  These wounds are related to multiple factors including the ET tube wu, positioning, acuity of her illness, and skin manifestations that are related to COVID.  RN staff stated she was being positioned prone for 12 to 18 hours at a time with repositioning for pressure relief every two hours.  The wound on her chin was first assessed on 2020 and the wound on her cheek was first assessed on 2020.  She has a history of fever blisters and was admitted with fever blisters on her lips which worsened during her stay.  She did develop a pressure injury to her left nostril related to the NG tube on 2020 that is now healed.      Visit Dx:    ICD-10-CM ICD-9-CM   1. Pneumonia due to COVID-19 virus  U07.1 480.8    J12.89    2. Hypoxic  R09.02 799.02   3. Acute respiratory failure with hypoxia (CMS/HCC)  J96.01 518.81   4. Impaired mobility  Z74.09 799.89   5. Impaired mobility and ADLs  Z74.09 V49.89    Z78.9    6. Oropharyngeal dysphagia  R13.12 787.22     Patient Active Problem List   Diagnosis   • Dysphagia, oropharyngeal   • Heartburn   • Family hx colonic polyps   • Pneumonia due to COVID-19 virus   • Acute respiratory failure with hypoxia (CMS/HCC)   • Obesity (BMI 30-39.9)   • Elevated ferritin and CRP   • Acute respiratory distress syndrome (ARDS) due to COVID-19 virus (CMS/HCC)   • Elevated LFTs   • Leukocytosis   • Thrombocytopenia (CMS/HCC)   • Aphonia   • Xerostomia   • Post  viral debility   • Dysphonia       History:   Past Medical History:   Diagnosis Date   • Abnormal weight gain    • Arthritis    • Breast cancer (CMS/HCC)     right.    • Fatigue    • H/O melanoma excision     CLARKS  LEVEL III      BACK OF UPPER LEFT ARM   • Hypertension    • Hypothyroidism    • Melanoma (CMS/HCC)    • Restless leg syndrome    • Shortness of breath      Past Surgical History:   Procedure Laterality Date   • APPENDECTOMY     • BREAST BIOPSY Right    • BREAST BIOPSY     • BREAST LUMPECTOMY      right.    • BROW LIFT     • COLONOSCOPY     • COLONOSCOPY N/A 2/14/2020    Procedure: COLONOSCOPY WITH ANESTHESIA;  Surgeon: Donovan Hernandez MD;  Location: [facility] ENDOSCOPY;  Service: Gastroenterology;  Laterality: N/A;  pre: family hx colon polyps  post: polyps  Janak Sherwood APRN   • ENDOSCOPY N/A 2/14/2020    Procedure: ESOPHAGOGASTRODUODENOSCOPY WITH ANESTHESIA;  Surgeon: Donovan Hernandez MD;  Location: [facility] ENDOSCOPY;  Service: Gastroenterology;  Laterality: N/A;  pre: dysphagia; heartburn  post: dilated  Janak Sherwood APRN   • ENDOSCOPY AND COLONOSCOPY  12/22/2011    very minimal distal esophagitis, incomplete esophagus ring dilated   • LUMBAR SPINAL TUMOR REMOVAL      Spinal Cyst removed from Spinal Canal   • MASTECTOMY, PARTIAL     • PARTIAL HIP ARTHROPLASTY Left    • SKIN CANCER EXCISION      Melanoma    • SQUAMOUS CELL CARCINOMA EXCISION     • TEMPOROMANDIBULAR JOINT ARTHROPLASTY     • TOTAL ABDOMINAL HYSTERECTOMY WITH SALPINGO OOPHORECTOMY Bilateral      Social History     Socioeconomic History   • Marital status:      Spouse name: Not on file   • Number of children: Not on file   • Years of education: Not on file   • Highest education level: Not on file   Tobacco Use   • Smoking status: Former Smoker     Types: Cigarettes   • Smokeless tobacco: Never Used   Substance and Sexual Activity   • Alcohol use: Yes     Comment: rare   • Drug use: No   • Sexual activity: Yes      Partners: Male     Birth control/protection: Surgical       Allergies:  Allergies   Allergen Reactions   • Requip [Ropinirole Hcl] Nausea And Vomiting       Medications:    Current Facility-Administered Medications:   •  acetaminophen (TYLENOL) tablet 650 mg, 650 mg, Nasogastric, Q4H PRN **OR** [DISCONTINUED] acetaminophen (TYLENOL) suppository 650 mg, 650 mg, Rectal, Q4H PRN, Samir Teague MD, 650 mg at 12/30/20 1755  •  albuterol sulfate HFA (PROVENTIL HFA;VENTOLIN HFA;PROAIR HFA) inhaler 2 puff, 2 puff, Inhalation, Q6H PRN, Samir Teague MD, 2 puff at 01/09/21 0412  •  albuterol sulfate HFA (PROVENTIL HFA;VENTOLIN HFA;PROAIR HFA) inhaler 2 puff, 2 puff, Inhalation, TID - RT, Samir Teague MD, 2 puff at 01/11/21 0709  •  benzonatate (TESSALON) capsule 100 mg, 100 mg, Oral, TID PRN, Samir Teague MD, 100 mg at 12/12/20 2034  •  dextrose (D50W) 25 g/ 50mL Intravenous Solution 25 g, 25 g, Intravenous, Q15 Min PRN, Samir Teague MD  •  dextrose (GLUTOSE) oral gel 15 g, 15 g, Oral, Q15 Min PRN, Samir Teague MD  •  glucagon (human recombinant) (GLUCAGEN DIAGNOSTIC) injection 1 mg, 1 mg, Subcutaneous, Q15 Min PRN, Samir Teague MD  •  levothyroxine (SYNTHROID, LEVOTHROID) tablet 125 mcg, 125 mcg, Nasogastric, Q AM, Samir Teague MD, 125 mcg at 01/11/21 0622  •  lisinopril (PRINIVIL,ZESTRIL) tablet 5 mg, 5 mg, Oral, Q24H, Ellis Wise MD, 5 mg at 01/11/21 1001  •  ondansetron (ZOFRAN) injection 4 mg, 4 mg, Intravenous, Q6H PRN, Samir Teague MD, 4 mg at 01/10/21 0032  •  rivaroxaban (XARELTO) tablet 10 mg, 10 mg, Oral, Daily With Dinner, Samir Teague MD  •  sodium chloride 0.9 % flush 10 mL, 10 mL, Intravenous, PRN, Samir Teague MD, 10 mL at 01/01/21 0806  •  sodium chloride nasal spray 2 spray, 2 spray, Each Nare, Continuous PRN, Taurus Eastman Jr., MD    Results Review:  Lab Results (last 48 hours)     Procedure Component Value Units  Date/Time    COVID-19, ABBOTT IN-HOUSE,NASAL Swab (NO TRANSPORT MEDIA) 2 HR TAT - Swab, Nasopharynx [[id number]]  (Abnormal) Collected: 01/11/21 1012    Specimen: Swab from Nasopharynx Updated: 01/11/21 1041     COVID19 Detected    Narrative:      Fact sheet for providers: https://www.fda.gov/media/806545/download     Fact sheet for patients: https://www.fda.gov/media/161455/download    Test performed by PCR.        Imaging Results (Last 72 Hours)     ** No results found for the last 72 hours. **          Review of Systems:  Review of Systems   Constitutional: Positive for fatigue. Negative for fever.   HENT: Positive for sore throat.    Respiratory: Negative for shortness of breath.    Cardiovascular: Negative for chest pain and palpitations.   Gastrointestinal: Negative for nausea and vomiting.   Musculoskeletal: Positive for myalgias.   Psychiatric/Behavioral: Negative for confusion. The patient is not nervous/anxious.        Physical Assessment:  Patient presents sitting up in chair at the bedside.   is at the bedside and patient is preparing to shower.  Wounds on left cheek and lateral chin have decreased in size.  Currently the wound on the cheek meausres 1.2cm x 1.3cm.  The base is covered with a scab.  The periwound area is pink and blanchable.  The wound on the lateral chin measures 1.1cm x 2.3 cm.  The base is covered with a scab.  The patient denies pain in these areas.        Recommendation and Plan  Diagnosis:   - DTI of Left Cheek/Face  - DTI of Left Lateral Chin      Patient states she feels the wounds are healing well on their own.  Suggested the use of Petrolatum as needed to promote a moist wound healing environment.      PRN Wound Care:   Clean with NS.  Apply thin layer of petrolatum.      Olga Bishop, APRN   1/11/2021   14:30 CST

## 2021-01-11 NOTE — PROGRESS NOTES
Continued Stay Note  [facility]     Patient Name: Joann Finnegan  MRN: [id number]  Today's Date: 1/11/2021    Admit Date: 12/11/2020    Discharge Plan     Row Name 01/11/21 1209       Plan    Plan Comments  [facility] ADVISING THEY WILL NOT BE ABLE TO ADMIT PT. WITH POS. COVID RESULT.  DR. BEARD AWARE.  WILL FOLLOW TO DISCUSS WITH PT AND SPOUSE ABOUT ALTERNATE D/C PLAN.        Discharge Codes    No documentation.             MOSHE Rodriguez

## 2021-01-11 NOTE — PLAN OF CARE
Goal Outcome Evaluation:  Plan of Care Reviewed With: patient, spouse  Progress: improving  See note

## 2021-01-11 NOTE — PROGRESS NOTES
Case Management Discharge Note      Final Note: NOTIFIED BREEZY AT [facility] OF PT'S D/C.  PT. WILL BE ADMITTED TO THE SKILLED LEVEL OF CARE.  NURSE PLEASE CALL REPORT [phone].  [facility] HAS ACCESS TO D/C SUMMARY SO NO NEED TO FAX, HOWEVER IF PT. HAS [facility] THEY NEED TO BE FAXED [phone].  THANKS.    Provided Post Acute Provider List?: Yes  Post Acute Provider List: Nursing Home  Provided Post Acute Provider Quality & Resource List?: Yes  Post Acute Provider Quality and Resource List: Nursing Home  Delivered To: Patient, Support Person  Support Person: BILL, SPOUSE  Method of Delivery: In person    Selected Continued Care - Admitted Since 12/11/2020     Destination Coordination complete    Service Provider Selected Services Address Phone Fax Patient [facility]  Skilled Nursing [facility] [address] [place] [id number] [phone] --          Durable Medical Equipment    No services have been selected for the patient.              Dialysis/Infusion    No services have been selected for the patient.              Home Medical Care    No services have been selected for the patient.              Therapy    No services have been selected for the patient.              Community Resources    No services have been selected for the patient.                       Final Discharge Disposition Code: 03 - skilled nursing facility (SNF)

## 2021-01-11 NOTE — PROGRESS NOTES
Continued Stay Note  [facility]     Patient Name: Joann Finnegan  MRN: [id number]  Today's Date: 1/11/2021    Admit Date: 12/11/2020    Discharge Plan     Row Name 01/11/21 1444       Plan    Plan  [facility]; SKILLED LEVEL    Provided Post Acute Provider List?  Yes    Post Acute Provider List  Nursing Home    Provided Post Acute Provider Quality & Resource List?  Yes    Post Acute Provider Quality and Resource List  Nursing Home    Delivered To  Patient;Support Person    Support Person  BILL, SPOUSE    Method of Delivery  In person    Patient/Family in Agreement with Plan  yes    Plan Comments  PT. REC'D AND ACCEPTED BED OFFER AT [facility].  MD IS AWARE.  WILL FOLLOW TO COORDINATE PT'S TRANSFER.    Row Name 01/11/21 1402       Plan    Plan Comments  PT. AND SPOUSE WANT REFERRAL MADE TO [facility]; CONTACTED BREEZY AT [facility] WITH REFERRAL AND WILL FOLLOW FOR EVAL AND BED OFFER.    Row Name 01/11/21 1202       Plan    Plan Comments  REC'D CALL FROM MICHAEL AT [facility] ADVISING THEY WILL NOT BE ABLE TO ADMIT PT. WITH POS. COVID RESULT.  DR. BEARD AWARE.  WILL FOLLOW TO DISCUSS WITH PT AND SPOUSE ABOUT ALTERNATE D/C PLAN.        Discharge Codes    No documentation.             MOSHE Rodriguez

## 2021-01-11 NOTE — PLAN OF CARE
Goal Outcome Evaluation:  Plan of Care Reviewed With: patient  Progress: improving  Outcome Summary: no c/o pain, no nausea, no zofran given, purewick in place and working good, wounds to face healing nicely, patient with quiet speech, plans to dc with [facility] tomorrow, no tele

## 2021-01-11 NOTE — PROGRESS NOTES
[facility]  INPATIENT PROGRESS NOTE    Patient Name: Joann Finngean  Date of Admission: 12/11/2020  Today's Date: 01/11/21  Length of Stay: 31  Primary Care Physician: Janak Sherwood APRN    Subjective   Chief Complaint: shortness of breath  Shortness of Breath         Patient was seen and examined at bedside.      Still very weak and lethargic.  Out of precautions.  Will transition to the floor.        Review of Systems   Unable to perform ROS: Patient nonverbal   Respiratory: Positive for shortness of breath.       Attempted ROS but patient did not answer questions      All pertinent negatives and positives are as above. All other systems have been reviewed and are negative unless otherwise stated.     Objective    Temp:  [97.8 °F (36.6 °C)-98.9 °F (37.2 °C)] 97.8 °F (36.6 °C)  Heart Rate:  [73-96] 73  Resp:  [16-18] 18  BP: (132-146)/(51-65) 132/51  Physical Exam  Vitals signs and nursing note reviewed.   Constitutional:       General: She is not in acute distress.     Appearance: She is obese.   HENT:      Head: Normocephalic and atraumatic.      Nose: No congestion or rhinorrhea.      Comments: DHT in place      Mouth/Throat:      Pharynx: No oropharyngeal exudate or posterior oropharyngeal erythema.   Eyes:      General: No scleral icterus.     Conjunctiva/sclera: Conjunctivae normal.   Cardiovascular:      Rate and Rhythm: Normal rate and regular rhythm.      Heart sounds: No murmur.   Pulmonary:      Effort: Pulmonary effort is normal. No respiratory distress.      Breath sounds: No stridor. No wheezing or rales.      Comments: Diminished at bases;   Abdominal:      General: Abdomen is flat. Bowel sounds are normal. There is no distension.      Palpations: Abdomen is soft. There is no mass.      Tenderness: There is no abdominal tenderness.      Hernia: No hernia is present.   Musculoskeletal: Normal range of motion.         General: No  "swelling.   Skin:     General: Skin is warm and dry.      Coloration: Skin is not jaundiced or pale.      Findings: Lesion (left jaw line, abrasion/pressure injury) present.   Neurological:      General: No focal deficit present.      Mental Status: She is disoriented.      Comments: Diffuse weakness   Psychiatric:         Behavior: Behavior normal.      Comments: Lethargic         Results Review:  I have reviewed the labs, radiology results, and diagnostic studies.    Laboratory Data:   Results from last 7 days   Lab Units 01/08/21  0439   WBC 10*3/mm3 7.22   HEMOGLOBIN g/dL 11.3*   HEMATOCRIT % 34.6   PLATELETS 10*3/mm3 123*        Results from last 7 days   Lab Units 01/08/21  0439   SODIUM mmol/L 141   POTASSIUM mmol/L 3.4*   CHLORIDE mmol/L 106   CO2 mmol/L 27.0   BUN mg/dL 15   CREATININE mg/dL 0.44*   CALCIUM mg/dL 8.9   GLUCOSE mg/dL 114*       Culture Data:   Blood Culture   Date Value Ref Range Status   12/11/2020 No growth at 24 hours  Preliminary   12/11/2020 No growth at 24 hours  Preliminary       Radiology Data:   Imaging Results (Last 24 Hours)     ** No results found for the last 24 hours. **          I have reviewed the patient's current medications.     Assessment/Plan     Active Hospital Problems    Diagnosis   • **Pneumonia due to COVID-19 virus   • Dysphonia     Improving     • Aphonia     Resolved and is now dysphonia     • Xerostomia   • Post viral debility   • Thrombocytopenia (CMS/HCC)   • Acute respiratory distress syndrome (ARDS) due to COVID-19 virus (CMS/HCC)   • Elevated LFTs   • Leukocytosis   • Elevated ferritin and CRP   • Obesity (BMI 30-39.9)   • Acute respiratory failure with hypoxia (CMS/HCC)   • Dysphagia, oropharyngeal       Plan:  1.  Patient was admitted by Dr. Jean on 12/11 for respiratory failure.  Patient required vapotherm upon admission.  Patient was tested for COVID-19 on 12/2 as she developed \"cold-like symptoms\" and loss of taste on 12/1.  While test was pending " she was given IM ceftriaxone and azithromycin PO.  Patient got the positive test result on 12/5 from [facility] and antibiotics were stable and she was started on hydroxychloroquine, dexamethasone, Vitamin D, Vitamin C, and zinc.    2.  Patient was admitted and required vapotherm initially and she was subsequently intubated on 12/13 for respiratory failure.  Patient was treated with prone protocol for several days after intubation and reached maximum benefit and has since been supine.  Patient was weaned and successfully extubated on 12/30.  Patient did struggle with anxiety during weaning trials, but decision was made to extubate and watch closely.  Pulmonary was consulted and assisted with ventilator management, they have since signed off.  Wean O2 to maintain sat >92%, currently on nasal cannula.  3.  Patient was placed in enhanced droplet and contact precautions from the time of admission.  These precautions will be discontinued 1/2 after discussion with infectious disease and infection control.  4.  COVID-19 treatment consistent of Zinc, atorvastatin, vitamin C, thiamine, initiated upon admission, discontinued on 12/28.  Patient also received remdesivir initiated on 12/11, received 5-day treatment course.  She was treated with a tapering dose of dexamethasone she reeceived 6 mg x 10 days, tapered down and discontinued 12/28.   Symptomatic treatment with: tussinex PRN, acetaminophen PRN, albuterol PRN, Tessalon pearls PRN.   5.  Course was complicated by thrombocytopenia.   Peripheral smear (12/22) shows moderate peripheral thrombocytopenia, small platelet clump.  We will monitor platelets closely, especially while on lovenox for VTE PPx.  Naidr was 80K (12/24), patient showing improvement.  6.  VTE PPx with lovenox 40 mg BID; GI PPx with famotidine PO  7.  Infectious disease consulted, appreciate assistance.  MRSA nasal screen negative, respiratory culture NGTD.  Empirically started on zosyn on 12/15  and completed a 7-day treatment course.  Patient spiked a fever on 12/30 post-extubation, discussed with Dr. Ayon, respiratory culture from 12/29 shows staph aureus (not MRSA) cefazolin has been started.  UA (1/1) shows WBCs, UC showing yeast.  8.  Intermittent diuresis with IV furosemide.  Hold for today.  9.  Patient has difficulty with speech and dysphagia, continue working with SLP  10.  Will give a single dose of diflucan 400 mg IV   11.  AM labs, ordered BMP for today as patient did not have labs this AM    12.  Now that patient is out of precautions, will really focus on stimulation, PT/OT, day/night cycle maintenance.  Consider starting melatonin.                                                                                                                                      Transfer to floor.  DHT working better.      COVID LABS:  Results From Last 14 Days   Lab Units 12/30/20  0204   CK TOTAL U/L 28   CRP mg/dL 4.84*   D DIMER QUANT mg/L (FEU) 1.91*   FERRITIN ng/mL 452.50*   LDH U/L 275*   PROCALCITONIN ng/mL 0.24   PROTIME Seconds 14.8*   INR  1.20*       COVID19   Date Value Ref Range Status   01/11/2021 Detected (C) Presumptive Negative - Ref. Range Final             Discharge Planning: I expect the patient to be discharged to  in >2 days.    Electronically signed by Samir Teague MD, 01/11/21, 10:55 CST.

## 2021-01-11 NOTE — DISCHARGE SUMMARY
[facility] Medicine Services  DISCHARGE SUMMARY       Date of Admission: 12/11/2020  Date of Discharge:  1/11/2021  Primary Care Physician: Janak Sherwood APRN    Presenting Problem/History of Present Illness:  Shortness of breath     Final Discharge Diagnoses:  Active Hospital Problems    Diagnosis   • **Pneumonia due to COVID-19 virus   • Dysphonia     Improving     • Aphonia     Resolved and is now dysphonia     • Xerostomia   • Post viral debility   • Thrombocytopenia (CMS/HCC)   • Acute respiratory distress syndrome (ARDS) due to COVID-19 virus (CMS/HCC)   • Elevated LFTs   • Leukocytosis   • Elevated ferritin and CRP   • Obesity (BMI 30-39.9)   • Acute respiratory failure with hypoxia (CMS/HCC)   • Dysphagia, oropharyngeal       Consults:   ENT  Pulmonary  Cardiology   Infectious Disease       Procedures Performed:   Central venous catheter placement  Endotracheal intubation  Arterial line placement  Flexible laryngoscopy     Pertinent Test Results:   Imaging Results (Last 7 Days)     Procedure Component Value Units Date/Time    FL Video Swallow With Speech Single Contrast [[id number]] Collected: 01/06/21 1401     Updated: 01/06/21 1406    Narrative:      EXAMINATION: FL VIDEO SWALLOW W SPEECH SINGLE-CONTRAST- 1/6/2021 2:01 PM  CST     HISTORY: dysphagia, prolonged intubation; U07.1-COVID-19;  J12.82-Pneumonia due to Coronavirus disease 2019; R09.02-Hypoxemia;  J96.01-Acute respiratory failure with hypoxia; Z74.09-Other reduced  mobility; Z74.09-Other reduced mobility; Z78.9-Other specified health  status; R13.12-Dysphagia, oropharyngeal phase.     Fluoroscopy time: 1 minute.     Radiation dose: 18 mGy     Number of fluoroscopic images acquired: 1.     Report: The speech pathologist was present and supervised the  administration of multiple consistencies of barium to the patient  orally, during intermittent lateral fluoroscopy and digital video  capture. With nectar  consistency, there is deep penetration and trace  aspiration, with a slightly delayed cough reflex. The patient is able to  swallow the other attempted consistencies without penetration or  aspiration.       Impression:      Deep penetration and trace aspiration of nectar consistency  of barium. Please review the speech pathologist's report for more  information.     This report was finalized on 01/06/2021 14:03 by Dr. Andrew Tobar MD.        Lab Results (last 7 days)     Procedure Component Value Units Date/Time    COVID-19, [facility] IN-HOUSE,NASAL Swab (NO TRANSPORT MEDIA) 2 HR TAT - Swab, Nasopharynx [[id number]]  (Abnormal) Collected: 01/11/21 1012    Specimen: Swab from Nasopharynx Updated: 01/11/21 1041     COVID19 Detected    Narrative:      Fact sheet for providers: https://www.fda.gov/media/454181/download     Fact sheet for patients: https://www.fda.gov/media/110455/download    Test performed by PCR.    POC Glucose Once [[id number]]  (Normal) Collected: 01/08/21 1659    Specimen: Blood Updated: 01/08/21 1712     Glucose 116 mg/dL      Comment: : [id number] Lili MikiaMeter ID: [id number]       COVID PRE-OP / PRE-PROCEDURE SCREENING ORDER (NO ISOLATION) - Swab, Nasal Cavity [[id number]]  (Normal) Collected: 01/08/21 1333    Specimen: Swab from Nasal Cavity Updated: 01/08/21 1420    Narrative:      The following orders were created for panel order COVID PRE-OP / PRE-PROCEDURE SCREENING ORDER (NO ISOLATION) - Swab, Nasal Cavity.  Procedure                               Abnormality         Status                     ---------                               -----------         ------                     COVID-19,[facility]...[[id number]]  Normal              Final result                 Please view results for these tests on the individual orders.    COVID-19,[facility] IN-HOUSE,Nasal Swab No Transport Media 3-4 HR TAT - Swab, Nasal Cavity [[id number]]  (Normal) Collected: 01/08/21 1333    Specimen: Swab  from Nasal Cavity Updated: 01/08/21 1420     COVID19 Not Detected    Narrative:      Fact sheet for providers: https://www.fda.gov/media/065746/download     Fact sheet for patients: https://www.fda.gov/media/649521/download    Test performed by PCR.  Fact sheet for providers: https://www.fda.gov/media/800427/download     Fact sheet for patients: https://www.fda.gov/media/869656/download    Test performed by PCR.    Basic Metabolic Panel [[id number]]  (Abnormal) Collected: 01/08/21 0439    Specimen: Blood Updated: 01/08/21 0646     Glucose 114 mg/dL      BUN 15 mg/dL      Creatinine 0.44 mg/dL      Sodium 141 mmol/L      Potassium 3.4 mmol/L      Chloride 106 mmol/L      CO2 27.0 mmol/L      Calcium 8.9 mg/dL      eGFR Non African Amer 142 mL/min/1.73      BUN/Creatinine Ratio 34.1     Anion Gap 8.0 mmol/L     Narrative:      GFR Normal >60  Chronic Kidney Disease <60  Kidney Failure <15      CBC (No Diff) [[id number]]  (Abnormal) Collected: 01/08/21 0439    Specimen: Blood Updated: 01/08/21 0525     WBC 7.22 10*3/mm3      RBC 3.55 10*6/mm3      Hemoglobin 11.3 g/dL      Hematocrit 34.6 %      MCV 97.5 fL      MCH 31.8 pg      MCHC 32.7 g/dL      RDW 14.4 %      RDW-SD 50.3 fl      MPV 12.3 fL      Platelets 123 10*3/mm3     POC Glucose Once [[id number]]  (Normal) Collected: 01/07/21 1730    Specimen: Blood Updated: 01/07/21 1741     Glucose 100 mg/dL      Comment: : [id number] Lili MikiaMeter ID: [id number]       POC Glucose Once [[id number]]  (Normal) Collected: 01/07/21 1109    Specimen: Blood Updated: 01/07/21 1134     Glucose 112 mg/dL      Comment: : [id number] Gomez EuraisaMeter ID: [id number]       POC Glucose Once [[id number]]  (Normal) Collected: 01/07/21 0720    Specimen: Blood Updated: 01/07/21 0734     Glucose 115 mg/dL      Comment: : [id number] Gomez EuraisaMeter ID: [id number]       POC Glucose Once [[id number]]  (Normal) Collected: 01/06/21 2024    Specimen: Blood Updated: 01/06/21 2036      Glucose 119 mg/dL      Comment: : [id number] Juanis AlexisMeter ID: [id number]       POC Glucose Once [[id number]]  (Normal) Collected: 01/06/21 1627    Specimen: Blood Updated: 01/06/21 1651     Glucose 123 mg/dL      Comment: : [id number] Kvng LisaMeter ID: [id number]       POC Glucose Once [[id number]]  (Normal) Collected: 01/06/21 1059    Specimen: Blood Updated: 01/06/21 1132     Glucose 122 mg/dL      Comment: : [id number] [place] LisaMeter ID: [id number]       POC Glucose Once [[id number]]  (Abnormal) Collected: 01/06/21 0740    Specimen: Blood Updated: 01/06/21 0812     Glucose 160 mg/dL      Comment: : [id number] Kvng LisaMeter ID: [id number]       POC Glucose Once [[id number]]  (Abnormal) Collected: 01/06/21 0540    Specimen: Blood Updated: 01/06/21 0552     Glucose 147 mg/dL      Comment: : [id number] Nicholas AddisonMeter ID: [id number]       POC Glucose Once [[id number]]  (Abnormal) Collected: 01/05/21 2341    Specimen: Blood Updated: 01/05/21 2354     Glucose 139 mg/dL      Comment: : [id number] Nicholas AddisonMeter ID: [id number]       POC Glucose Once [[id number]]  (Normal) Collected: 01/05/21 2009    Specimen: Blood Updated: 01/05/21 2046     Glucose 129 mg/dL      Comment: : [id number] Nicholas AddisonMeter ID: [id number]       POC Glucose Once [[id number]]  (Normal) Collected: 01/05/21 1731    Specimen: Blood Updated: 01/05/21 1742     Glucose 120 mg/dL      Comment: : [id number] Yaw Craven) Mariano TMeter ID: [id number]       POC Glucose Once [[id number]]  (Abnormal) Collected: 01/05/21 1128    Specimen: Blood Updated: 01/05/21 1139     Glucose 145 mg/dL      Comment: : [id number] Vilonia ToriMeter ID: [id number]       POC Glucose Once [[id number]]  (Abnormal) Collected: 01/05/21 0526    Specimen: Blood Updated: 01/05/21 0540     Glucose 133 mg/dL      Comment: : [id number] [facility] [id number]       POC Glucose Once [[id number]]  (Abnormal) Collected:  "01/05/21 0001    Specimen: Blood Updated: 01/05/21 0013     Glucose 167 mg/dL      Comment: : [id number] Nicholas AddisonMeter ID: [id number]       Blood Culture - Blood, Arm, Left [[id number]] Collected: 12/30/20 2208    Specimen: Blood from Arm, Left Updated: 01/04/21 2230     Blood Culture No growth at 5 days    POC Glucose Once [[id number]]  (Abnormal) Collected: 01/04/21 1958    Specimen: Blood Updated: 01/04/21 2045     Glucose 132 mg/dL      Comment: : [id number] Nicholas AddisonMeter ID: [id number]       Blood Culture - Blood, Hand, Right [[id number]] Collected: 12/30/20 1935    Specimen: Blood from Hand, Right Updated: 01/04/21 2015     Blood Culture No growth at 5 days    POC Glucose Once [[id number]]  (Abnormal) Collected: 01/04/21 1636    Specimen: Blood Updated: 01/04/21 1647     Glucose 138 mg/dL      Comment: : [id number] Helm ToriMeter ID: [id number]       POC Glucose Once [[id number]]  (Abnormal) Collected: 01/04/21 1129    Specimen: Blood Updated: 01/04/21 1140     Glucose 171 mg/dL      Comment: : [id number] Helm ToriMeter ID: [id number]             HPI 12/11/2020:  Dr. Jean  \"History of Present Illness  Patient is a 70-year-old  female presents the ER with complaint of shortness of breath.  Patient is extremely hypoxic upon arrival in the 70s.  Patient came by EMS.  Patient currently on Vapotherm 25/70.     Patient stated she was tested for Covid on the 11/2/2020.  She was then put on a Z-Donald, shot of Rocephin, a shot of steroid and was sent home.  Patient did not get the results of Covid until 11/5/2020.  Patient was then started on hydroxychloroquine and Decadron.  In addition patient was also started taking zinc vitamin C and vitamin D.  Patient has not made any better since then.  Patient is a retired nurse.\"    Hospital Course:    Patient was admitted by Dr. Jean on 12/11 for respiratory failure.  Patient required vapotherm upon admission.  Patient was tested for " "COVID-19 on 12/2 as she developed \"cold-like symptoms\" and loss of taste on 12/1.  While test was pending she was given IM ceftriaxone and azithromycin PO.  Patient got the positive test result on 12/5 from [facility] and antibiotics were stable and she was started on hydroxychloroquine, dexamethasone, Vitamin D, Vitamin C, and zinc.      Upon admission, the patient was requiring Vapotherm, however between 12/11 and 12/18, she continued to decline, and ultimately required intubation.  Patient was treated with prone protocol for several days after intubation, after maximum benefit felt as though had been reached, she was left supine.  Patient had a prolonged weaning process, and was subsequently able to be weaned on 12/30.  During weaning trials, the patient suffered from anxiety, which made it difficult to intubate.  Ultimately decided despite failed weaning trials to extubate the patient and monitor closely.  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    Patient was placed and maintained in enhanced droplet and contact precautions from 12/11 until 1/2 when they are subsequently discontinued after discussion with infectious disease as well as infection control.    Her COVID-19 treatment consisted of zinc, atorvastatin, vitamin C, thiamine all that were initiated on 12/11 on admission, these were discontinued on 12/28.  Patient also received a 5-day course of remdesivir that was initiated on 12/11.  She was initiated on dexamethasone 6 mg for 10 days started on 12/11, these were subsequently tapered and discontinued on 12/28.  Patient also received symptomatic treatment with Tussionex, acetaminophen, albuterol, Tessalon Perles.    Patient's hospital course was complicated by thrombocytopenia, peripheral smear 12/22 showed moderate peripheral thrombocytopenia, small platelet clumps.  We held her Lovenox for " several days until her platelet showed improvement.  Her christian was 80 K on 12/24.  These resolved to normal without any significant issue.    For the most part, the patient was treated with Lovenox for VTE prophylaxis 40 mg twice daily as well as GI prophylaxis with famotidine.  Based on NIH guidelines, patient was considered for extended thromboprophylaxis.  Patient meets two different criteria for continued VTE PPx for up to 30 days.  Patients Modified IMPROVE-VTE Score was at least 2 and D-Dimer was > 2x upper limit of normal.  Patient also meets criteria of age > 60 with D-Dimer level >2x upper limit of normal.  Would recommend continue VTE PPx for at least 2 more weeks.     Patient's hospital course was also complicated by a post viral pneumonia, and she spiked a fever on 12/30.  Patient empirically received a course of Zosyn earlier in her course from 12/15-12/22.  After the patient spiked a fever, respiratory culture grew MSSA, the patient was started on cefazolin, and completed a course of this guided by infectious disease.  Patient was also noted to have yeast in her urine, she received a few days of fluconazole.    During the patient's mechanical relation.  As well as afterwards, she was intermittently diuresed to try to keep her euvolemic and avoid pulmonary edema.      Since extubation, the patient has been noticeably weak, she also has difficulty with speaking and swallowing.  She continues to show significant improvement in regards to this.  She has been working with speech therapy.  Speech has neuromuscular electrical stimulation (E-stim) which we recommend continuing if this service is available.  ENT also evaluated the patient and will follow-up with her in 2 weeks as an outpatient.        Encourage participation with PT/OT and SLP.  Continues to show significant improvement.       Physical Exam on Discharge:  /51 (BP Location: Left arm, Patient Position: Lying)   Pulse 73   Temp 97.8 °F  "(36.6 °C) (Oral)   Resp 18   Ht 165.1 cm (65\")   Wt 84.4 kg (186 lb 1.1 oz)   LMP 10/26/2003 (Exact Date) Comment: Hyst for DUB  SpO2 96%   BMI 30.96 kg/m²   Physical Exam  Vitals signs and nursing note reviewed.   Constitutional:       Appearance: She is obese.   HENT:      Head: Normocephalic and atraumatic.      Nose: No congestion or rhinorrhea.      Mouth/Throat:      Mouth: Mucous membranes are moist.      Pharynx: Oropharynx is clear. No oropharyngeal exudate or posterior oropharyngeal erythema.   Eyes:      General: No scleral icterus.     Conjunctiva/sclera: Conjunctivae normal.   Cardiovascular:      Rate and Rhythm: Normal rate and regular rhythm.   Pulmonary:      Effort: Pulmonary effort is normal.      Breath sounds: Normal breath sounds.   Abdominal:      General: Abdomen is flat. Bowel sounds are normal. There is no distension.      Palpations: Abdomen is soft. There is no mass.      Tenderness: There is no abdominal tenderness.      Hernia: No hernia is present.   Skin:     General: Skin is warm and dry.      Coloration: Skin is not jaundiced or pale.      Comments: Healing wounds on left cheek and left chin   Neurological:      General: No focal deficit present.      Mental Status: She is alert and oriented to person, place, and time.      Motor: Weakness present.      Comments: Low phonation   Psychiatric:         Mood and Affect: Mood normal.         Behavior: Behavior normal.           Condition on Discharge: Stable     Discharge Disposition: Rehab       Discharge Medications:     Discharge Medications      New Medications      Instructions Start Date   lisinopril 5 MG tablet  Commonly known as: PRINIVIL,ZESTRIL   5 mg, Oral, Every 24 Hours Scheduled   Start Date: January 12, 2021     rivaroxaban 10 MG tablet  Commonly known as: XARELTO   10 mg, Oral, Daily With Dinner         Continue These Medications      Instructions Start Date   levothyroxine 125 MCG tablet  Commonly known as: " SYNTHROID, LEVOTHROID   125 mcg, Oral, Daily, NAME BRAND          Stop These Medications    amitriptyline 25 MG tablet  Commonly known as: ELAVIL     budesonide-formoterol 160-4.5 MCG/ACT inhaler  Commonly known as: SYMBICORT     celecoxib 200 MG capsule  Commonly known as: CeleBREX     gabapentin 300 MG capsule  Commonly known as: NEURONTIN     oxybutynin 5 MG tablet  Commonly known as: DITROPAN     tamoxifen 20 MG chemo tablet  Commonly known as: NOLVADEX     vitamin D3 125 MCG (5000 UT) tablet tablet     zinc gluconate 50 MG tablet          Discharge Diet:    Dietary Orders (From admission, onward)     Start     Ordered    01/08/21 1800  Dietary Nutrition Supplements Magic Cup  Daily With Lunch & Dinner     Question:  Select Supplement:  Answer:  Magic Cup    01/08/21 1708    01/06/21 1506  Diet Pureed; Honey Thick  Diet Effective Now     Comments: Ok for ice chips between meals 30 minutes following any other PO intake and after oral care is complete.   Question Answer Comment   Diet Texture / Consistency Pureed    Fluid Consistency Honey Thick        01/06/21 1509                Activity at Discharge: As tolerated    Follow-up Appointments:   No future appointments.    Test Results Pending at Discharge: None    Electronically signed by Samir Teague MD, 01/11/21, 11:13 CST.    Time: 35 minutes.

## 2021-01-12 NOTE — THERAPY DISCHARGE NOTE
Acute Care - Physical Therapy Treatment Note/Discharge  [facility]     Patient Name: Joann Finnegan  : 1950  MRN: [id number]  Today's Date: 2021                Admit Date: 2020    Visit Dx:    ICD-10-CM ICD-9-CM   1. Pneumonia due to COVID-19 virus  U07.1 480.8    J12.89    2. Hypoxic  R09.02 799.02   3. Acute respiratory failure with hypoxia (CMS/HCC)  J96.01 518.81   4. Impaired mobility  Z74.09 799.89   5. Impaired mobility and ADLs  Z74.09 V49.89    Z78.9    6. Oropharyngeal dysphagia  R13.12 787.22     Patient Active Problem List   Diagnosis   • Dysphagia, oropharyngeal   • Heartburn   • Family hx colonic polyps   • Pneumonia due to COVID-19 virus   • Acute respiratory failure with hypoxia (CMS/HCC)   • Obesity (BMI 30-39.9)   • Elevated ferritin and CRP   • Acute respiratory distress syndrome (ARDS) due to COVID-19 virus (CMS/HCC)   • Elevated LFTs   • Leukocytosis   • Thrombocytopenia (CMS/HCC)   • Aphonia   • Xerostomia   • Post viral debility   • Dysphonia     Past Medical History:   Diagnosis Date   • Abnormal weight gain    • Arthritis    • Breast cancer (CMS/HCC)     right.    • Fatigue    • H/O melanoma excision     CLARKS  LEVEL III      BACK OF UPPER LEFT ARM   • Hypertension    • Hypothyroidism    • Melanoma (CMS/HCC)    • Restless leg syndrome    • Shortness of breath      Past Surgical History:   Procedure Laterality Date   • APPENDECTOMY     • BREAST BIOPSY Right    • BREAST BIOPSY     • BREAST LUMPECTOMY      right.    • BROW LIFT     • COLONOSCOPY     • COLONOSCOPY N/A 2020    Procedure: COLONOSCOPY WITH ANESTHESIA;  Surgeon: Donovan Hernandez MD;  Location: [facility] ENDOSCOPY;  Service: Gastroenterology;  Laterality: N/A;  pre: family hx colon polyps  post: polyps  Janak Sherwood APRN   • ENDOSCOPY N/A 2020    Procedure: ESOPHAGOGASTRODUODENOSCOPY WITH ANESTHESIA;  Surgeon: Donovan Hernandez MD;  Location: [facility] ENDOSCOPY;  Service: Gastroenterology;   Laterality: N/A;  pre: dysphagia; heartburn  post: dilated  Janak Sherwood, NITO   • ENDOSCOPY AND COLONOSCOPY  12/22/2011    very minimal distal esophagitis, incomplete esophagus ring dilated   • LUMBAR SPINAL TUMOR REMOVAL      Spinal Cyst removed from Spinal Canal   • MASTECTOMY, PARTIAL     • PARTIAL HIP ARTHROPLASTY Left    • SKIN CANCER EXCISION      Melanoma    • SQUAMOUS CELL CARCINOMA EXCISION     • TEMPOROMANDIBULAR JOINT ARTHROPLASTY     • TOTAL ABDOMINAL HYSTERECTOMY WITH SALPINGO OOPHORECTOMY Bilateral           PT Assessment (last 12 hours)      PT Evaluation and Treatment     Row Name             Wound 12/16/20 0957 Right lower chin    Wound - Properties Group Placement Date: 12/16/20  -KM Placement Time: 0957  -KM Side: Right  -KM Orientation: lower  -KM Location: chin  -KM Stage, Pressure Injury : deep tissue injury  -KM    Retired Wound - Properties Group Date first assessed: 12/16/20  -KM Time first assessed: 0957  -KM Side: Right  -KM Location: chin  -KM    Row Name             Wound 12/18/20 1425 Left cheek Pressure Injury    Wound - Properties Group Placement Date: 12/18/20  -RM Placement Time: 1425  -RM Present on Hospital Admission: N  -RM Side: Left  -RM Location: cheek  -RM Primary Wound Type: Pressure inj  -RM    Retired Wound - Properties Group Date first assessed: 12/18/20  -RM Time first assessed: 1425  -RM Present on Hospital Admission: N  -RM Side: Left  -RM Location: cheek  -RM Primary Wound Type: Pressure inj  -RM    Row Name             Wound 12/21/20 1118 Left nose Pressure Injury    Wound - Properties Group Placement Date: 12/21/20  -CH Placement Time: 1118  -CH Present on Hospital Admission: N  -CH Side: Left  -CH Location: nose  -CH Primary Wound Type: Pressure inj  -CH    Retired Wound - Properties Group Date first assessed: 12/21/20  -CH Time first assessed: 1118  -CH Present on Hospital Admission: N  -CH Side: Left  -CH Location: nose  -CH Primary Wound Type: Pressure  inj  -CH      User Key  (r) = Recorded By, (t) = Taken By, (c) = Cosigned By    Initials Name Provider Type    Maine Jaramillo, RN Registered Nurse    Ya Dodson RN Registered Nurse    Gisselle Buchanan RN Registered Nurse          Physical Therapy Education                 Title: PT OT SLP Therapies (Done)     Topic: Physical Therapy (Resolved)     Point: Mobility training (Resolved)     Learning Progress Summary           Patient Acceptance, E, NR by DONIS at 1/10/2021 1112    Comment: Rolling in bed    Acceptance, E, VU by JJ at 1/7/2021 1201    Comment: Pt educated on proper AD technique.    Acceptance, E,TB, VU by  at 1/6/2021 1008    Comment: trans    Acceptance, E,TB,D, DU,NR by  at 1/4/2021 1315    Comment: Education to reinforce importance of activity/risks assoc w/ bed rest; education for improved tech w/ bed mobility; education for proper deep breathing and for positioning to assist w/ edema mgmt and for pressure relief    Acceptance, E, NR by WK at 1/2/2021 0911    Comment: BOA    Acceptance, E, NL by WK at 1/1/2021 0935    Comment: BOA    Acceptance, E, NL by MS at 12/28/2020 0900    Comment: role of PT in her care   Family Acceptance, E,TB, VU by  at 1/6/2021 1008    Comment: trans                   Point: Home exercise program (Resolved)     Learning Progress Summary           Patient Acceptance, E,TB,D,H, VU by  at 1/5/2021 0906    Comment: BLE HEP   Significant Other Acceptance, E,TB,D,H, VU by  at 1/5/2021 0906    Comment: BLE HEP                   Point: Body mechanics (Resolved)     Learning Progress Summary           Patient Acceptance, E,TB, VU by  at 1/8/2021 1154    Comment: deep breathing                   Point: Precautions (Resolved)     Learning Progress Summary           Patient Acceptance, E,TB,D, DU,NR by  at 1/4/2021 1315    Comment: Education to reinforce importance of activity/risks assoc w/ bed rest; education for improved tech w/ bed mobility;  education for proper deep breathing and for positioning to assist w/ edema mgmt and for pressure relief                               User Key     Initials Effective Dates Name Provider Type Discipline     08/02/16 -  Malena Garcia, PTA Physical Therapy Assistant PT    DONIS 08/02/16 -  Renée Naylor, PTA Physical Therapy Assistant PT    MS 06/19/18 -  Yennifer May, PT, DPT, NCS Physical Therapist PT    WK 08/02/16 -  Rufina Degroot, PTA Physical Therapy Assistant PT    JE 08/02/18 -  Terri Gomez, PT Physical Therapist PT    JJ 12/23/20 -  Ad Mckeon, PT Student PT Student PT                PT Recommendation and Plan  Anticipated Discharge Disposition (PT): skilled nursing facility  Plan of Care Reviewed With: patient  Progress: no change  Outcome Summary: Pt continues to be lethargic and require max cues to attempt to stay awake.  Pt performed AAROM-PROM LLE/LUE and PROM RUE/RLE.    Outcome Measures     Row Name 01/11/21 1030             How much help from another is currently needed...    Putting on and taking off regular lower body clothing?  1  -CJ      Bathing (including washing, rinsing, and drying)  2  -CJ      Toileting (which includes using toilet bed pan or urinal)  2  -CJ      Putting on and taking off regular upper body clothing  2  -CJ      Taking care of personal grooming (such as brushing teeth)  2  -CJ      Eating meals  3  -CJ      AM-PAC 6 Clicks Score (OT)  12  -CJ         Functional Assessment    Outcome Measure Options  AM-PAC 6 Clicks Daily Activity (OT)  -CJ        User Key  (r) = Recorded By, (t) = Taken By, (c) = Cosigned By    Initials Name Provider Type    CJ Favian Ng COTA/L Occupational Therapy Assistant           Time Calculation:         PT G-Codes  Outcome Measure Options: AM-PAC 6 Clicks Daily Activity (OT)  AM-PAC 6 Clicks Score (PT): 8  AM-PAC 6 Clicks Score (OT): 12    PT Discharge Summary  Anticipated Discharge Disposition (PT): skilled nursing  facility  Reason for Discharge: Discharge from facility  Outcomes Achieved: Refer to plan of care for updates on goals achieved  Discharge Destination: SNF    Rufina Degroot, PTA  1/12/2021

## 2021-01-12 NOTE — THERAPY DISCHARGE NOTE
Acute Care - Occupational Therapy Discharge Summary  [facility]     Patient Name: Joann Finnegan  : 1950  MRN: [id number]    Today's Date: 2021                 Admit Date: 2020        OT Recommendation and Plan    Visit Dx:    ICD-10-CM ICD-9-CM   1. Pneumonia due to COVID-19 virus  U07.1 480.8    J12.89    2. Hypoxic  R09.02 799.02   3. Acute respiratory failure with hypoxia (CMS/HCC)  J96.01 518.81   4. Impaired mobility  Z74.09 799.89   5. Impaired mobility and ADLs  Z74.09 V49.89    Z78.9    6. Oropharyngeal dysphagia  R13.12 787.22               Rehab Goal Summary     Row Name 21 0700             Dressing Goal 1 (OT)    Activity/Device (Dressing Goal 1, OT)  upper body dressing;lower body dressing  -TS      [place]/Cues Needed (Dressing Goal 1, OT)  minimum assist (75% or more patient effort);moderate assist (50-74% patient effort)  -TS      Time Frame (Dressing Goal 1, OT)  long term goal (LTG);by discharge  -TS      Progress/Outcome (Dressing Goal 1, OT)  goal not met  -TS         Toileting Goal 1 (OT)    Activity/Device (Toileting Goal 1, OT)  toileting skills, all  -TS      [place] Level/Cues Needed (Toileting Goal 1, OT)  moderate assist (50-74% patient effort)  -TS      Time Frame (Toileting Goal 1, OT)  long term goal (LTG);by discharge  -TS      Progress/Outcome (Toileting Goal 1, OT)  goal not met  -TS         Strength Goal 1 (OT)    Strength Goal 1 (OT)  Increase B UE strength to 3/5 for increased independence with adls.  -TS      Time Frame (Strength Goal 1, OT)  long term goal (LTG);by discharge  -TS      Progress/Outcome (Strength Goal 1, OT)  goal not met  -TS        User Key  (r) = Recorded By, (t) = Taken By, (c) = Cosigned By    Initials Name Provider Type Discipline    TS Mary Granado, SHEETS/L Occupational Therapy Assistant OT          Outcome Measures     Row Name 21 1030             How much help from another is currently needed...     Putting on and taking off regular lower body clothing?  1  -CJ      Bathing (including washing, rinsing, and drying)  2  -CJ      Toileting (which includes using toilet bed pan or urinal)  2  -CJ      Putting on and taking off regular upper body clothing  2  -CJ      Taking care of personal grooming (such as brushing teeth)  2  -CJ      Eating meals  3  -CJ      AM-PAC 6 Clicks Score (OT)  12  -CJ         Functional Assessment    Outcome Measure Options  AM-PAC 6 Clicks Daily Activity (OT)  -CJ        User Key  (r) = Recorded By, (t) = Taken By, (c) = Cosigned By    Initials Name Provider Type    Favian Garcia COTA/L Occupational Therapy Assistant          Timed Therapy Charges  Total Units: 3    Charges  Total Units: 3    Procedure Name Documented Minutes Units Code    HC OT THER PROC EA 15 MIN 41  3    25347 (CPT®)               Documented Minutes  Total Minutes: 41    Therapy Provided Minutes    85227 - OT Therapeutic Exercise Minutes 41                    OT Discharge Summary  Anticipated Discharge Disposition (OT): skilled nursing facility  Reason for Discharge: Discharge from facility  Outcomes Achieved: Refer to plan of care for updates on goals achieved  Discharge Destination: SNF      ROSLYN Molina  1/12/2021

## 2021-01-14 NOTE — THERAPY DISCHARGE NOTE
Acute Care - Speech Language Pathology Discharge Summary  [facility]       Patient Name: Joann Finnegan  : 1950  MRN: [id number]    Today's Date: 2021                   Admit Date: 2020      SLP Recommendation and Plan  Pureed diet and honey thick liquids    Visit Dx:    ICD-10-CM ICD-9-CM   1. Pneumonia due to COVID-19 virus  U07.1 480.8    J12.89    2. Hypoxic  R09.02 799.02   3. Acute respiratory failure with hypoxia (CMS/HCC)  J96.01 518.81   4. Impaired mobility  Z74.09 799.89   5. Impaired mobility and ADLs  Z74.09 V49.89    Z78.9    6. Oropharyngeal dysphagia  R13.12 787.22               SLP GOALS     Row Name 21 1500             Oral Nutrition/Hydration Goal 1 (SLP)    Oral Nutrition/Hydration Goal 1, SLP  Pt will tolerate least restrictive diet with no overt s/s of aspiration.   -MB      Time Frame (Oral Nutrition/Hydration Goal 1, SLP)  by discharge  -MB      Barriers (Oral Nutrition/Hydration Goal 1, SLP)  none  -MB      Progress/Outcomes (Oral Nutrition/Hydration Goal 1, SLP)  goal partially met  -MB         Labial Strengthening Goal 1 (SLP)    Activity (Labial Strengthening Goal 1, SLP)  increase labial tone  -MB      Increase Labial Tone  labial resistance exercises  -MB      [place]/Accuracy (Labial Strengthening Goal 1, SLP)  with minimal cues (75-90% accuracy)  -MB      Time Frame (Labial Strengthening Goal 1, SLP)  short term goal (STG);by discharge  -MB      Barriers (Labial Strengthening Goal 1, SLP)  n/a  -MB      Progress/Outcomes (Labial Strengthening Goal 1, SLP)  goal not met  -MB         Lingual Strengthening Goal 1 (SLP)    Activity (Lingual Strengthening Goal 1, SLP)  increase lingual tone/sensation/control/coordination/movement  -MB      Increase Lingual Tone/Sensation/Control/Coordination/Movement  lingual movement exercises  -MB      [place]/Accuracy (Lingual Strengthening Goal 1, SLP)  with minimal cues (75-90% accuracy)  -MB      Time Frame  (Lingual Strengthening Goal 1, SLP)  short term goal (STG);by discharge  -MB      Barriers (Lingual Strengthening Goal 1, SLP)  Weaknessn/a  -MB      Progress/Outcomes (Lingual Strengthening Goal 1, SLP)  goal not met  -MB         Pharyngeal Strengthening Exercise Goal 1 (SLP)    Activity (Pharyngeal Strengthening Goal 1, SLP)  increase timing;increase superior movement of the hyolaryngeal complex;increase anterior movement of the hyolaryngeal complex  -MB      Increase Timing  gustatory stimulation (sour/cold)  -MB      Increase Superior Movement of the Hyolaryngeal Complex  [place];hard effortful swallow NMES  -MB      Increase Anterior Movement of the Hyolaryngeal Complex  shaker  -MB      [place]/Accuracy (Pharyngeal Strengthening Goal 1, SLP)  independently (over 90% accuracy)  -MB      Time Frame (Pharyngeal Strengthening Goal 1, SLP)  short term goal (STG);by discharge  -MB      Barriers (Pharyngeal Strengthening Goal 1, SLP)  none  -MB      Progress/Outcomes (Pharyngeal Strengthening Goal 1, SLP)  goal partially met  -MB        User Key  (r) = Recorded By, (t) = Taken By, (c) = Cosigned By    Initials Name Provider Type    Litzy Pimentel CCC-SLP Speech and Language Pathologist                  SLP Discharge Summary  Anticipated Discharge Disposition (SLP): unknown  Reason for Discharge: discharge from this facility  Progress Toward Achieving Short/long Term Goals: goals partially met within established timelines  Discharge Destination: [facility]      Litzy Mane CCC-SLP  1/14/2021

## 2021-02-05 ENCOUNTER — TELEPHONE (OUTPATIENT)
Dept: SURGERY | Age: 71
End: 2021-02-05

## 2021-02-05 NOTE — TELEPHONE ENCOUNTER
Patient has had COVID and was on a ventilator for a few weeks. She is now at [facility] receiving physical therapy and is not able to get Mammogram or follow up at this time.  She will call us back to schedule when she is able

## 2021-02-09 NOTE — PROGRESS NOTES
[address] [facility]   1950  2/10/2021     Chief Complaint   Patient presents with    Cancer        INTERVAL HISTORY/HISTORY OF PRESENT ILLNESS:  Diagnosis   Right breast IDC, April 2018   grade 1, ER 91%, ME 5%, HER-2/aziza IHC 1+/FISH not amplified, Ki-67 9%. [id number](sn)M0, stage IA, AJCC 2018 prognostic stage IA. Piedad Hsu Treatment summary  7/27/2018Right lumpectomy/sentinel lymph node biopsy   7/27/2018IORT 2000 cGy   November 2018tamoxifen x 10 years  No need for Oncotype Dx- low risk disease. Interval history  The patient is a pleasant 81years old female who has been diagnosed with stage I ER/ME positive HER-2 negative right breast cancer in April 2018. She is node negative. She received IORT 2000 cGy. She has been started on adjuvant endocrine therapy with Femara. She had COVID-19 pneumonia and was hospitalized at [facility] [place].  She had acute respiratory failure and was intubated for about 3 weeks. She eventually made a full recovery. She was severely deconditioned and therefore was discharged to a nursing home for rehab. She is feeling stronger. She still requiring O2 supplementation. Her tamoxifen has been on hold and I requested to continue to hold it for now until she gets home. Cancer history  Ms. Shayna Rahman was first seen by me on 8/29/2018 referred by Dr. Leyla Pathak for a diagnosis of breast cancer.   4/26/2018screening mammogram showed a area of architectural distortion in the upper outer right breast.   5/3/2018diagnostic mammogram/ultrasound showed a 8 mm nodule at 9 o'clock position right breast.   5/23/2018core biopsy revealed invasive mammary carcinoma, grade 1, ER 91%, ME 5%, HER-2/aziza IHC 1+/FISH not amplified, Ki-67 9%.   6/19/2018bilateral MRI of the breast showed known invasive mammary carcinoma measuring 8 mm at the 9 o'clock position of the right breast. Suspicious 9 mm mass 12 o'clock position of the left breast. Targeted ultrasound to the left breast was unremarkable. 7/27/2018tristan underwent a right lumpectomy/sentinel lymph node biopsy revealing a residual invasive ductal carcinoma, grade 1, measuring 1.3 cm. Clear margins. LV I not identified. 4 sentinel lymph nodes negative for metastatic disease. Final pathologic staging [id number](sn)M0, stage IA, AJCC 2018.   7/27/2018tristan underwent intraoperative RT 2000 cGy by Dr. Getachew Guzmán. 8/29/2018 she was first seen by me. Recommended 5 years adjuvant endocrine therapy with Femara. No need for Oncotype Dx.   11/6/2018switched to tamoxifen (due to significant arthralgia) to complete 10 years in August 2028 1/31/2020 Mammo Changes from right breast cancer, lumpectomy and radiation therapy. No malignant features. ACR BI-RADS Category 2, benign findings.           PAST MEDICAL HISTORY:   Past Medical History:   Diagnosis Date    Arthritis     Breast cancer ([address]     Cancer ([address]     melonoma    Condition not found 2019    per patient she was diagnosed at [facility] with [address] H/O screening mammography 01/30/2019    @LMP BR 2     PONV (postoperative nausea and vomiting)     Restless leg syndrome     Sinus disease     Thyroid disease           PAST SURGICAL HISTORY:  Past Surgical History:   Procedure Laterality Date    APPENDECTOMY      BACK SURGERY      synovial cyst off spinal canal    BREAST SURGERY Right     biopsy    COLONOSCOPY  2010    @ DR. Janette Daniel COSMETIC SURGERY      brow lift    DENTAL SURGERY      EYE SURGERY Bilateral     multi focial implants    HYSTERECTOMY      TX MASTECTOMY, PARTIAL Right 7/27/2018    LUMPECTOMY W/SNB & INTRAOP US GUIDED NL & IORT performed by Meir Carvajal MD at [address] [place] [address] , rt shoulder    TEMPOROMANDIBULAR JOINT SURGERY      TOTAL HIP ARTHROPLASTY Left 1/6/2016    HIP TOTAL ARTHROPLASTY ANTERIOR APPROACH performed by Emmett Salcido MD at [address] [place] [address]:  Social History Socioeconomic History    Marital status:      Spouse name: Not on file    Number of children: Not on file    Years of education: Not on file    Highest education level: Not on file   Occupational History    Not on file   Social Needs    Financial resource strain: Not on file    Food insecurity     Worry: Not on file     Inability: Not on file    Transportation needs     Medical: Not on file     Non-medical: Not on file   Tobacco Use    Smoking status: Former Smoker     Packs/day: 1.00     Years: 30.00     Pack years: 30.00     Quit date: 1999     Years since quittin.1    Smokeless tobacco: Never Used   Substance and Sexual Activity    Alcohol use: Yes     Comment: rarely    Drug use: No    Sexual activity: Not on file   Lifestyle    Physical activity     Days per week: Not on file     Minutes per session: Not on file    Stress: Not on file   Relationships    Social connections     Talks on phone: Not on file     Gets together: Not on file     Attends Buddhist service: Not on file     Active member of club or organization: Not on file     Attends meetings of clubs or organizations: Not on file     Relationship status: Not on file    Intimate partner violence     Fear of current or ex partner: Not on file     Emotionally abused: Not on file     Physically abused: Not on file     Forced sexual activity: Not on file   Other Topics Concern    Not on file   Social History Narrative    Not on file       FAMILY HISTORY:  Family History   Problem Relation Age of Onset    Neuropathy Mother     Other Mother         Parkinson's Disease    Seizures Mother     Diabetes Father     Heart Disease Father     Cancer Father         skin    Heart Attack Father         Current Outpatient Medications   Medication Sig Dispense Refill    oxybutynin (DITROPAN) 5 MG tablet Take 1 tablet by mouth twice daily 60 tablet 2    tamoxifen (NOLVADEX) 20 MG tablet Take 1 tablet by mouth once daily 90 tablet 0    Ergocalciferol (VITAMIN D2 PO) Take 50,000 Units by mouth once a week      venlafaxine (EFFEXOR XR) 37.5 MG extended release capsule Take 1 capsule by mouth daily 30 capsule 2    cloNIDine (CATAPRES) 0.1 MG tablet Taking 1 at bedtime  3    celecoxib (CELEBREX) 200 MG capsule Take 200 mg by mouth 2 times daily      SYNTHROID 112 MCG tablet Take 112 mcg by mouth Daily Taking 125 mg      gabapentin (NEURONTIN) 300 MG capsule Take 300 mg by mouth nightly       No current facility-administered medications for this visit. REVIEW OF SYSTEMS:    Constitutional: no fever, no night sweats, fatigue;   HEENT: no blurring of vision, no double vision, no hearing difficulty, no tinnitus,no ulceration, no dysphagia  Lungs: no cough, shortness of breath, no wheeze;   CVS: no palpitation, no chest pain,  shortness of breath;  GI: no abdominal pain, no nausea , no vomiting, no constipation;   ELEAZAR: no dysuria, frequency and urgency, no hematuria, no kidney stones;   Musculoskeletal: no joint pain, swelling , stiffness;   Endocrine: no polyuria, polydypsia,   Hematology/lymphatic: no easy brusing or bleeding, no hx of clotting disorder; no peripheral adenopathy. Dermatology: no skin rash, no eczema, no pruritis;   Psychiatry: no depression, no anxiety,no panic attacks, no suicide ideation; Neurology: no syncope, no seizures, no numbness or tingling of hands, no numbness or tingling of feet, no paresis;     PHYSICAL EXAM:    Vitals signs: There were no vitals taken for this visit. Pain scale:      COVID-19 VISIT    Relevant Lab findings/reviewed by me:  None- COVID 19 Visit    Relevant Imaging studies/reviewed by me:  No results found. ASSESSMENT    No orders of the defined types were placed in this encounter. Yosvany Damon was seen today for cancer.     Diagnoses and all orders for this visit:    Malignant neoplasm of upper-outer quadrant of right female breast, unspecified estrogen receptor status ([facility] [address] Encounter for follow-up surveillance of breast cancer    Care plan discussed with patient    History of COVID-19         Diagnosis   Right breast IDC, April 2018   grade 1, ER 91%, OR 5%, HER-2/aziza IHC 1+/FISH not amplified, Ki-67 9%. [id number](sn)M0, stage IA, AJCC 2018 prognostic stage IA. Arleth Gutierrez Treatment summary  7/27/2018Right lumpectomy/sentinel lymph node biopsy   7/27/2018IORT 2000 cGy   Anticipated adjuvant endocrine therapy- Femara x 5 years. Up until July 2023   No need for Oncotype Dx- low risk disease. Breast cancer stage I   She could not tolerate Femara. She is currently on tamoxifen. Tamoxifen has been on hold now. The patient was recently hospitalized with COVID-19 pneumonia. COVID-19 pneumoniathe patient had severe COVID-19 pneumonia and was intubated for 3 weeks. She eventually made a full recovery and now has been discharged to a nursing home. She is regaining strength slowly. She still requiring O2 supplementation. I requested patient to continue to hold her tamoxifen now. She has increased risk of blood clots post Covid therefore will hold it until she gets back home and is more active. Hot flashes-she was asked to discontinue Effexor. She was started on clonidine by endocrinology at [facility] clinic. I discussed at length the results of the new study with oxybutynin for her hot flashes. She had a remarkable response with oxybutynin. Continue oxybutynin 5 mg p.o. twice daily 1 month trial.    Side effects monitoring-  The selection, dosing administration of anticancer agents in the management of associated toxicities are complex. Modifications of drug dose and schedule and the initiation of supportive care interventions are often necessary because of expected toxicities individual patient variability, prior treatment and comorbidity.      Health maintenance  The patient is to follow-up with primary care for further recommendation regarding age appropriate screening, well-being visit, follow-up and treatment of other medical comorbidities. PLAN:  Continue tamoxifen ×10 years through August 2028   E prescribed oxybutynin 5 mg p.o. twice daily  RTC February 2021   follow-up primary care physician for other medical problems      Follow Up:     No follow-ups on file. Data Yolanda Barertt am scribing for Diana Caldwell MD. Electronically signed by Eloise Guerrero RN on 2/10/2021 at 8:25 AM CST. I, Dr Sridevi Shearer, personally performed the services described in this documentation as scribed by Eloise Guerrero RN in my presence and is both accurate and complete. I have seen, examined and reviewed this patient medication list, appropriate labs and imaging studies. I reviewed relevant medical records and others physicians notes. I discussed the plans of care with the patient. I answered all the questions to the patients satisfaction. I have also reviewed the chief complaint (CC) and part of the history (History of Present Illness (HPI), Past Family Social History [facility]), or Review of Systems (ROS) and made changes when appropriated. (Please note that portions of this note were completed with a voice recognition program. Efforts were made to edit the dictations but occasionally words are mis-transcribed.)    2/10/2021    Sebastian Castillo is a 78 y.o. female evaluated via telephone on 2/10/2021. Consent:  She and/or health care decision maker is aware that that she may receive a bill for this telephone service, depending on her insurance coverage, and has provided verbal consent to proceed: Yes      Documentation:  I communicated with the patient and/or health care decision maker about as above.    Details of this discussion including any medical advice provided: as above      I affirm this is a Patient Initiated Episode with a Patient who has not had a related appointment within my department in the past 7 days or scheduled within the

## 2021-02-10 ENCOUNTER — VIRTUAL VISIT (OUTPATIENT)
Dept: HEMATOLOGY | Age: 71
End: 2021-02-10
Payer: MEDICARE

## 2021-02-10 ENCOUNTER — TELEPHONE (OUTPATIENT)
Dept: HEMATOLOGY | Age: 71
End: 2021-02-10

## 2021-02-10 DIAGNOSIS — C50.411 MALIGNANT NEOPLASM OF UPPER-OUTER QUADRANT OF RIGHT FEMALE BREAST, UNSPECIFIED ESTROGEN RECEPTOR STATUS (HCC): Primary | ICD-10-CM

## 2021-02-10 DIAGNOSIS — Z85.3 ENCOUNTER FOR FOLLOW-UP SURVEILLANCE OF BREAST CANCER: ICD-10-CM

## 2021-02-10 DIAGNOSIS — Z71.89 CARE PLAN DISCUSSED WITH PATIENT: ICD-10-CM

## 2021-02-10 DIAGNOSIS — Z86.16 HISTORY OF COVID-19: ICD-10-CM

## 2021-02-10 DIAGNOSIS — Z08 ENCOUNTER FOR FOLLOW-UP SURVEILLANCE OF BREAST CANCER: ICD-10-CM

## 2021-02-10 PROCEDURE — 99442 PR PHYS/QHP TELEPHONE EVALUATION 11-20 MIN: CPT | Performed by: INTERNAL MEDICINE

## 2021-02-10 NOTE — TELEPHONE ENCOUNTER
Called and got patient registered for telephone visit with Dr Karla Bello. She is currently in a nursing home and accepted the insurance disclaimer.

## 2021-03-16 ENCOUNTER — TELEPHONE (OUTPATIENT)
Dept: CARDIOLOGY CLINIC | Age: 71
End: 2021-03-16

## 2021-03-24 ENCOUNTER — TRANSCRIBE ORDERS (OUTPATIENT)
Dept: ADMINISTRATIVE | Facility: HOSPITAL | Age: 71
End: 2021-03-24

## 2021-03-24 ENCOUNTER — HOSPITAL ENCOUNTER (OUTPATIENT)
Dept: GENERAL RADIOLOGY | Facility: HOSPITAL | Age: 71
Discharge: HOME | End: 2021-03-24
Admitting: NURSE PRACTITIONER

## 2021-03-24 DIAGNOSIS — Z86.16 PERSONAL HISTORY OF COVID-19: ICD-10-CM

## 2021-03-24 DIAGNOSIS — R06.00 DYSPNEA, UNSPECIFIED TYPE: ICD-10-CM

## 2021-03-24 DIAGNOSIS — R06.00 DYSPNEA, UNSPECIFIED TYPE: Primary | ICD-10-CM

## 2021-03-24 PROCEDURE — 71046 X-RAY EXAM CHEST 2 VIEWS: CPT

## 2021-03-25 ENCOUNTER — TRANSCRIBE ORDERS (OUTPATIENT)
Dept: ADMINISTRATIVE | Facility: HOSPITAL | Age: 71
End: 2021-03-25

## 2021-03-25 DIAGNOSIS — Z86.16 PERSONAL HISTORY OF COVID-19: Primary | ICD-10-CM

## 2021-03-25 DIAGNOSIS — U07.1 PNEUMONIA DUE TO CORONAVIRUS DISEASE 2019: ICD-10-CM

## 2021-03-25 DIAGNOSIS — J12.82 PNEUMONIA DUE TO CORONAVIRUS DISEASE 2019: ICD-10-CM

## 2021-03-25 DIAGNOSIS — Z86.16 PERSONAL HISTORY OF COVID-19: ICD-10-CM

## 2021-03-25 DIAGNOSIS — R06.00 DYSPNEA, UNSPECIFIED TYPE: Primary | ICD-10-CM

## 2021-03-25 DIAGNOSIS — R06.00 DYSPNEA, UNSPECIFIED TYPE: ICD-10-CM

## 2021-03-26 ENCOUNTER — HOSPITAL ENCOUNTER (OUTPATIENT)
Dept: CT IMAGING | Facility: HOSPITAL | Age: 71
Discharge: HOME | End: 2021-03-26
Admitting: NURSE PRACTITIONER

## 2021-03-26 DIAGNOSIS — Z86.16 PERSONAL HISTORY OF COVID-19: ICD-10-CM

## 2021-03-26 DIAGNOSIS — R06.00 DYSPNEA, UNSPECIFIED TYPE: ICD-10-CM

## 2021-03-26 DIAGNOSIS — U07.1 PNEUMONIA DUE TO CORONAVIRUS DISEASE 2019: ICD-10-CM

## 2021-03-26 DIAGNOSIS — J12.82 PNEUMONIA DUE TO CORONAVIRUS DISEASE 2019: ICD-10-CM

## 2021-03-26 PROCEDURE — 71250 CT THORAX DX C-: CPT

## 2021-03-29 ENCOUNTER — TRANSCRIBE ORDERS (OUTPATIENT)
Dept: LAB | Facility: HOSPITAL | Age: 71
End: 2021-03-29

## 2021-03-29 DIAGNOSIS — Z01.818 PRE-OP TESTING: Primary | ICD-10-CM

## 2021-04-01 ENCOUNTER — LAB (OUTPATIENT)
Dept: LAB | Facility: HOSPITAL | Age: 71
End: 2021-04-01

## 2021-04-01 LAB — SARS-COV-2 ORF1AB RESP QL NAA+PROBE: NOT DETECTED

## 2021-04-01 PROCEDURE — U0005 INFEC AGEN DETEC AMPLI PROBE: HCPCS | Performed by: OTOLARYNGOLOGY

## 2021-04-01 PROCEDURE — C9803 HOPD COVID-19 SPEC COLLECT: HCPCS | Performed by: OTOLARYNGOLOGY

## 2021-04-01 PROCEDURE — U0004 COV-19 TEST NON-CDC HGH THRU: HCPCS | Performed by: OTOLARYNGOLOGY

## 2021-04-05 ENCOUNTER — OFFICE VISIT (OUTPATIENT)
Dept: OTOLARYNGOLOGY | Facility: CLINIC | Age: 71
End: 2021-04-05

## 2021-04-05 VITALS — HEART RATE: 87 BPM | DIASTOLIC BLOOD PRESSURE: 70 MMHG | SYSTOLIC BLOOD PRESSURE: 144 MMHG

## 2021-04-05 DIAGNOSIS — J38.00 VOCAL CORD WEAKNESS: ICD-10-CM

## 2021-04-05 DIAGNOSIS — R13.12 DYSPHAGIA, OROPHARYNGEAL: Primary | ICD-10-CM

## 2021-04-05 DIAGNOSIS — J34.89 NASAL VALVE COLLAPSE: ICD-10-CM

## 2021-04-05 DIAGNOSIS — L90.5 FACIAL SCAR: ICD-10-CM

## 2021-04-05 DIAGNOSIS — J34.2 ACQUIRED DEVIATED NASAL SEPTUM: ICD-10-CM

## 2021-04-05 DIAGNOSIS — R49.0 DYSPHONIA: ICD-10-CM

## 2021-04-05 PROCEDURE — 99214 OFFICE O/P EST MOD 30 MIN: CPT | Performed by: OTOLARYNGOLOGY

## 2021-04-05 PROCEDURE — 31575 DIAGNOSTIC LARYNGOSCOPY: CPT | Performed by: OTOLARYNGOLOGY

## 2021-04-05 RX ORDER — CHOLECALCIFEROL (VITAMIN D3) 1250 MCG
50000 CAPSULE ORAL
COMMUNITY

## 2021-04-05 RX ORDER — OXYBUTYNIN CHLORIDE 10 MG/1
10 TABLET, EXTENDED RELEASE ORAL 2 TIMES DAILY
Status: ON HOLD | COMMUNITY
Start: 2021-03-03 | End: 2022-09-19

## 2021-04-05 RX ORDER — GABAPENTIN 300 MG/1
600 CAPSULE ORAL
COMMUNITY
Start: 2021-03-04

## 2021-04-05 RX ORDER — CELECOXIB 200 MG/1
200 CAPSULE ORAL
Status: ON HOLD | COMMUNITY
End: 2022-09-19

## 2021-04-05 RX ORDER — TAMOXIFEN CITRATE 20 MG/1
TABLET ORAL
COMMUNITY
Start: 2020-11-11

## 2021-04-05 RX ORDER — BUDESONIDE AND FORMOTEROL FUMARATE DIHYDRATE 160; 4.5 UG/1; UG/1
2 AEROSOL RESPIRATORY (INHALATION) 2 TIMES DAILY
COMMUNITY
Start: 2021-03-04

## 2021-04-05 RX ORDER — PREDNISONE 1 MG/1
5 TABLET ORAL DAILY
COMMUNITY
Start: 2021-03-25 | End: 2022-09-27 | Stop reason: HOSPADM

## 2021-04-05 RX ORDER — ASPIRIN 81 MG/1
81 TABLET ORAL DAILY
COMMUNITY

## 2021-04-05 NOTE — PROGRESS NOTES
[facility] OTOLARYNGOLOGY, HEAD AND NECK SURGERY PROCEDURE NOTE  Anesthesia:   topical 4% tetracaine and oxymetazoline mix    Endoscopy Type:   Flexible Laryngoscopy    Indications for Procedure:   1. Dysphagia, oropharyngeal    2. Dysphonia    3. Facial scar    4. Vocal cord weakness         Procedure Details:    The patient was placed in an upright position.  The laryngoscope was passed right nasal passge.  The nasal cavities, nasopharynx, oropharynx, hypopharynx, and larynx were all examined.  Vocal cords were examined during respiration and phonation.  The following findings were noted:    Findings:   LARYNGOSCOPY FINDINGS :    NASOPHARYNX:     adenoids  Flat, no lesions, Eustachian tubes normal, without lesions, palate with normal mobility without lesions.  OROPHARYNX:     Lateral walls:         Redundant          BILATERAL: Moderate    Posterior walls:         normal, no lesions    Tonsils:         BILATERAL: Atrophic    BASEOF TONGUE:          prolapse  moderate    LINGUAL TONSILS:          normal, no lesions  Bilateral    VALLECULA:         normal, no lesions, no pooling  Bilateral  EPIGLOTTIS:    normal color, position, without lesions  HYPOPHARYNX:    Lateral walls:         BILATERAL: Redundant    Posterior wall:        Mildly thick  ARYEPIGLOTTIC FOLDS:     normal mucosa, no lesions  ARYTENOIDS:    normal position, normal size, normal mobility, no lesions, tower size normal  FALSE CORDS:     normal mucosa, no lesions, normal mobility  Bilateral  TRUE VOCAL FOLDS:      appearance:         BILATERAL: Mildly thickened    mobility        BILATERAL: Full excursion    mucosa:        BILATERAL: Minimal submucosal edema  GLOTTIS:     abnormal: Mid cord with very mild weakness  SUBGLOTTIS:     unobstructed, patent, no lesions               Condition:  Stable.  Patient tolerated procedure well.    Complications:  None    Post-procedure instructions reviewed with Patient  Instructions documented in AVS for patient to  review

## 2021-04-05 NOTE — PATIENT INSTRUCTIONS
NASAL SALINE:  Use 2 puffs each nostril 4-6 times daily and more frequently if possible.  You can buy saline spray or you can make your own and use an old spray bottle to administer  Use a humidifier at bedside  Recipe for saline:  Water                                 1 quart  Salt (table)                        1 tablespoon  Gylcerin (or Jenny Syrup)    1 teaspoon  Sodium bicarbonate           1 teaspoon  Sprays or [place] pots are recommended    Nasal steroid use:  Using nasal steroids:  You will be prescribed one of the following nasal steroids: Flonase, Nasacort, Nasonex, Rhinocort, Qnasl, Zetonna  2 puffs each nostril 2 times daily  Start as soon as possible  If you are using Afrin for 3 days with the nasal steroid,  Use Afrin first and wait 10 minutes to allow the nose to open. Then administer nasal steroids.    APPLYING OINTMENT IN THE NOSE:  Use a Qtip or your little finger.  Get a small amount of Ointment (Polysporin, Bactroban, Generic ointment is fine)  Insert into nose gently applying the ointment all around, THEN gently pinch nostrils. This will spread ointement inside nose better.  Do this 2-4 times daily and more often as desired    VOICE HYGIENE:    Many factors go into manufacturing what we call the voice. More goes on than just breathing out and using the voice box and mouth. Creating the voice requires good lung function, a healthy voice box, and complex muscle coordination. Any malfunction in any of the systems and voice problems can occur.    The most common problem seen in the office is hoarseness. Other types of voice problems can include breathiness, double voice, raspy voice, or simply a change in the pitch. Many things can cause a hoarse or weak voice, including laryngitis, vocal abuse (screaming, cheering, singing too loud for too long), smoking, etc. In order to treat the problem, your physician first must find the cause, then try to correct it to restore your normal voice.    The first  part of a voice analysis is a detailed history of the problem, including any habits such as smoking, to delineate possible factors. The second step is a careful examination of the head and neck, including the voice box. The examination is essential, as this eliminates any anatomic problems with the vocal cords and the surrounding structures. More detailed analysis may be used in the form of videotaping the vocal cords with a stroboscopic light, thus providing additional information.     Suggestions for Voice Use and Conservation    Treatment of voice problems depends on the cause of the problems. Fortunately, most problems are easily correctable. The following are a few suggestions you will be asked to carry out, depending on the results of your examination.    >Avoid stress  >Avoid habitually clearing throat as this strains and fatigues the vocal cords  >Avoid yelling, especially for extended periods of time  >Avoid falsetto singing  >Avoid talking when it hurts or if your neck is excessively tired.  >Do exercise regularly to maintain fitness and breathe control. Do eat correctly, to avoid acid reflux  >Drink 6-8 glasses of water daily to keep the vocal cords flexible, making it easier to speak  >Do take sips of water while speaking as swallowing helps relax the muscles of the throat and neck  >Do sip water, sniff or say the letter “H” forcefully rather than clearing throat  >Do listen to your voice as you speak to avoid trailing off or “tightness” at the end of sentences  >Do use plenty of breath while speaking. If you feel you are running out of breath, stop, breathe then continue.  >Do speak easily rather than pushing, forcing or straining  >Do stop talking and take a voice break, especially if you are fading or fatiguing  >Do increase your awareness of your voice. When does it sound better, worse? >Recording your voice or watching yourself speak in a mirror to watch for movement in the neck and shoulders can be  beneficial.    Whispering is not a protective mechanism for the voice. In fact, whispering can make your condition worse. In order to limit further damage to your voice, maintain a normal volume and tone. Only in certain circumstances will this recommendation require modification. Dr. Eastman will tell you if that is the case.    Complete voice rest is used occasionally to treat voice and vocal cord problems. However, this is a rare situation. Dr. Eastman will tell you if this is indicated.    A great deal of voice hygiene is based on common sense. If it hurts to talk, then a problem exists.    Vocal Strengthening Exercises    >Sit in a comfortable and upright position. The chair should have a hard back and hard arm rests.  >Rest your hands on the arms of the chair. Push down with both of your hands at the same time. Do not use too much shoulder movement, simply push with your hands. You should feel a tightening of your abdominal muscles, not your neck muscles.  >After you become comfortable with the pushing motion, begin to make a short sound (such as at/ ah) each time you push down. You can substitute other short sounds such as a vowel sound or counting.  >Be sure to take a deep breath before each push down and vocalization of the sound.  Summary of exercise sequence:   Take a deep breath in  Push down while saying ah  Do NOT hold your breath  Relax and breathe out  Repeat above sequence 15-20 times every hour or several times each day.    Speech Pathology    Frequently, physicians and speech pathologists treat voice disorders together. These are highly trained individuals who specialize in therapy for the voice box and throat. You may recognize the better as speech, swallowing or voice therapists. Dr. Eastman may elect to refer you for evaluation and treatment, depending on your diagnosis and condition.    Special Testing    You may be referred certain tests to fully evaluate your throat, swallowing or voice.  These may include CAT scans, swallowing tests, MRIs, or videostroboscopy. You may not be familiar these tests but the results will discussed them with you at the time of your office visit and answer your questions about them.      SKIN CARE:  Moisturize 3-4 times daily and more if possible (example: Neutrogena, Urea cream, Silicone gels)  Use mild soap (example: Wan soap, Dove soap)  Please be careful if you are receiving radiation therapy, No blade razors, use electric razor to trim beard  -  CLEANSERS: for the skin  The following cleansers are safe to use to clean the skin  Dove  Dial  Wan soap  Hernandez's Baby Shampoo  Aveeno soap  Neutragena soap  -  SCAR MANGEMENT:  Begin after the skin has healed or when instructed  Massage scar 3-4 times daily in multiple directions (to break up the scar tissue)  Lubricate the scar with a good moisturizer at least 3-4 times daily  Silicone gel is of great benefit as a moisturizer (Example: Mederma, Nugel)  Use this frequently  Use sunblock even on shady days to prevent sun damage to the healing scar,  Very Important    SINUS CONES:   Sinus cones are recommended for medical treatment of nasal breathing and other conditions  Place in nose nightly  Sinus cone size:  Medium  Sinus cones-   v2 Ratings search to purchase  Max-Air Nose Cones ®  Sinus Cones ®      CONTACT INFORMATION:  The main office phone number is [phone]. For emergencies after hours and on weekends, this number will convert over to our answering service and the on call provider will answer. Please try to keep non emergent phone calls/ questions to office hours 9am-5pm Monday through Friday.     [employer]  As an alternative, you can sign up and use the Epic MyChart system for more direct and quicker access for non emergent questions/ problems.  [facility] [employer] allows you to send messages to your doctor, view your test results, renew your prescriptions, schedule appointments, and more. To sign up,  go to [email or web address] and click on the Sign Up Now link in the New User? box. Enter your [facility] Activation Code exactly as it appears below along with the last four digits of your Social Security Number and your Date of Birth () to complete the sign-up process. If you do not sign up before the expiration date, you must request a new code.    [facility] Activation Code: Activation code not generated  Current [facility] Status: Active    If you have questions, you can email [email or web address] or call [phone] to talk to our [facility] staff. Remember, [facility] is NOT to be used for urgent needs. For medical emergencies, dial 911.

## 2021-04-05 NOTE — PROGRESS NOTES
[facility] OTOLARYNGOLOGY, HEAD AND NECK SURGERY CLINIC NOTE    Chief Complaint   Patient presents with   • Follow-up     recheck vocal cords          HPI   Follow up  Accompanied by:  No one  Joann Finnegan is a 70 y.o. female who is here for follow up. She has had marked improvement.  Voice is better.  Swallowing- good  CT chest show insterstitial fibrosis.  Voice will get raspy, maliha wearing O2      History     Last Reviewed by Taurus Eastman Jr., MD on 4/5/2021 at  3:11 PM    Sections Reviewed    Medical, Family, Tobacco, Surgical      Problem list reviewed by Taurus Eastman Jr., MD on 4/5/2021 at  3:11 PM  Medicines reviewed by Taurus Eastman Jr., MD on 4/5/2021 at  3:11 PM  Allergies reviewed by Taurus Eastman Jr., MD on 4/5/2021 at  3:11 PM         Vital Signs:   Heart Rate:  [87] 87  BP: (144)/(70) 144/70    Physical Exam  Vitals reviewed.   Constitutional:       Appearance: Normal appearance. She is well-developed. She is obese.   HENT:      Head: Normocephalic and atraumatic.        Right Ear: Hearing, tympanic membrane, ear canal and external ear normal.      Left Ear: Hearing, tympanic membrane, ear canal and external ear normal.      Nose: Nasal deformity (severe NVC), septal deviation (mod to severe L to R low and mid) and rhinorrhea present. Rhinorrhea is clear.      Right Turbinates: Enlarged.      Left Turbinates: Enlarged.      Comments: Crusted nose  Wearing O2     Mouth/Throat:      Lips: Pink.      Mouth: Mucous membranes are dry.      Dentition: Abnormal dentition. Has dentures (upper and lower).      Tongue: No lesions. Tongue does not deviate from midline.      Palate: No mass and lesions.      Pharynx: Uvula midline.      Tonsils: No tonsillar exudate or tonsillar abscesses.   Eyes:      General: Lids are normal. Vision grossly intact. Gaze aligned appropriately.      Extraocular Movements: Extraocular movements intact.      Conjunctiva/sclera: Conjunctivae normal.    Neck:      Thyroid: Thyroid mass present. No thyromegaly.      Vascular: No carotid bruit.      Trachea: Trachea normal.      Comments: VOICE: VOICE QUALITY:     hoarse-  mild    raspy-  mild    breathy-  mild    weak-  moderate    pitch: High    sentence length: decreased    intensity: decreased  Cardiovascular:      Rate and Rhythm: Regular rhythm.   Pulmonary:      Effort: Tachypnea present. No respiratory distress.      Breath sounds: No stridor.   Musculoskeletal:      Cervical back: Decreased range of motion.   Lymphadenopathy:      Cervical: No cervical adenopathy.   Skin:     Findings: No rash.   Neurological:      General: No focal deficit present.      Mental Status: She is alert and oriented to person, place, and time.      Cranial Nerves: Cranial nerves are intact. No cranial nerve deficit.   Psychiatric:         Attention and Perception: Attention and perception normal.         Behavior: Behavior normal. Behavior is cooperative.         Thought Content: Thought content normal.         Cognition and Memory: Cognition and memory normal.         Judgment: Judgment normal.             SnapShot   Notes   Encounters  Labs   Imaging   INDOM   Rslt Rev   :23}    Result Review    RESULTS REVIEW:    I have reviewed the patients old records in the chart.  The following results/records were reviewed:    CT Chest Without Contrast Diagnostic (03/26/2021 10:10)            Assessment:        Diagnosis Plan   1. Dysphagia, oropharyngeal      MOstly resolved   2. Dysphonia      Improved since hoispitalization   3. Facial scar      L cheek  L chin   4. Vocal cord weakness      Mild mid cord   5. Nasal valve collapse      Severe bilateral internal and external   6. Acquired deviated nasal septum      L to R low moderate to severe              Plan:        Conservative management.  Patient is markedly improved from her last lamination by me in the hospital.  Her voice is markedly improved.  She still has mid  glottic weakness.  I feel that time will help with this.  Nose-the patient has severe nasal valve collapse and deviated nasal septum.  She also has bilateral crusting nose from wearing nasal O2.  She will need to use nasal hygiene.  Facial scarring-patient has a deep scar with firm tissue band in the left mid cheek, and left mentum.  I will have her begin to massage these.  I will plan to address scars as they begin to mature and soften.  Nasal saline  Flonase BID  Ointment to nose  Voice hygiene  Scar care with massage  Sinus cones medium             MY CHART:  Patient is Patient is using My Chart    Patient understand(s) and agree(s) with the treatment plan as described.    Return in about 3 months (around 7/5/2021) for , flex scope.            Taurus Eastman Jr, MD  04/05/21  15:16 CDT

## 2021-04-23 RX ORDER — FLUTICASONE PROPIONATE 50 MCG
2 SPRAY, SUSPENSION (ML) NASAL DAILY
Qty: 16 G | Refills: 11 | Status: SHIPPED | OUTPATIENT
Start: 2021-04-23

## 2021-04-29 ENCOUNTER — HOSPITAL ENCOUNTER (OUTPATIENT)
Dept: CARDIOLOGY | Facility: HOSPITAL | Age: 71
Discharge: HOME | End: 2021-04-29
Admitting: NURSE PRACTITIONER

## 2021-04-29 VITALS
WEIGHT: 186 LBS | HEIGHT: 65 IN | BODY MASS INDEX: 30.99 KG/M2 | SYSTOLIC BLOOD PRESSURE: 144 MMHG | DIASTOLIC BLOOD PRESSURE: 70 MMHG

## 2021-04-29 DIAGNOSIS — R06.00 DYSPNEA, UNSPECIFIED TYPE: ICD-10-CM

## 2021-04-29 DIAGNOSIS — Z86.16 PERSONAL HISTORY OF COVID-19: ICD-10-CM

## 2021-04-29 PROCEDURE — 93306 TTE W/DOPPLER COMPLETE: CPT | Performed by: INTERNAL MEDICINE

## 2021-04-29 PROCEDURE — 93306 TTE W/DOPPLER COMPLETE: CPT

## 2021-05-02 LAB
BH CV ECHO MEAS - AO MAX PG (FULL): 0.72 MMHG
BH CV ECHO MEAS - AO MAX PG: 5.5 MMHG
BH CV ECHO MEAS - AO MEAN PG (FULL): 0 MMHG
BH CV ECHO MEAS - AO MEAN PG: 3 MMHG
BH CV ECHO MEAS - AO ROOT AREA (BSA CORRECTED): 1.6
BH CV ECHO MEAS - AO ROOT AREA: 7.1 CM^2
BH CV ECHO MEAS - AO ROOT DIAM: 3 CM
BH CV ECHO MEAS - AO V2 MAX: 117 CM/SEC
BH CV ECHO MEAS - AO V2 MEAN: 74.2 CM/SEC
BH CV ECHO MEAS - AO V2 VTI: 26.6 CM
BH CV ECHO MEAS - AVA(I,A): 3.3 CM^2
BH CV ECHO MEAS - AVA(I,D): 3.3 CM^2
BH CV ECHO MEAS - AVA(V,A): 2.9 CM^2
BH CV ECHO MEAS - AVA(V,D): 2.9 CM^2
BH CV ECHO MEAS - BSA(HAYCOCK): 2 M^2
BH CV ECHO MEAS - BSA: 1.9 M^2
BH CV ECHO MEAS - BZI_BMI: 31 KILOGRAMS/M^2
BH CV ECHO MEAS - BZI_METRIC_HEIGHT: 165.1 CM
BH CV ECHO MEAS - BZI_METRIC_WEIGHT: 84.4 KG
BH CV ECHO MEAS - EDV(CUBED): 118.4 ML
BH CV ECHO MEAS - EDV(MOD-SP4): 81.4 ML
BH CV ECHO MEAS - EDV(TEICH): 113.4 ML
BH CV ECHO MEAS - EF(CUBED): 76.3 %
BH CV ECHO MEAS - EF(MOD-SP4): 61.2 %
BH CV ECHO MEAS - EF(TEICH): 68.1 %
BH CV ECHO MEAS - ESV(CUBED): 28.1 ML
BH CV ECHO MEAS - ESV(MOD-SP4): 31.6 ML
BH CV ECHO MEAS - ESV(TEICH): 36.2 ML
BH CV ECHO MEAS - FS: 38.1 %
BH CV ECHO MEAS - IVS/LVPW: 0.75
BH CV ECHO MEAS - IVSD: 0.78 CM
BH CV ECHO MEAS - LA DIMENSION: 3.3 CM
BH CV ECHO MEAS - LA/AO: 1.1
BH CV ECHO MEAS - LAT PEAK E' VEL: 12.4 CM/SEC
BH CV ECHO MEAS - LV DIASTOLIC VOL/BSA (35-75): 42.4 ML/M^2
BH CV ECHO MEAS - LV MASS(C)D: 156.2 GRAMS
BH CV ECHO MEAS - LV MASS(C)DI: 81.4 GRAMS/M^2
BH CV ECHO MEAS - LV MAX PG: 4.8 MMHG
BH CV ECHO MEAS - LV MEAN PG: 3 MMHG
BH CV ECHO MEAS - LV SYSTOLIC VOL/BSA (12-30): 16.5 ML/M^2
BH CV ECHO MEAS - LV V1 MAX: 109 CM/SEC
BH CV ECHO MEAS - LV V1 MEAN: 73.9 CM/SEC
BH CV ECHO MEAS - LV V1 VTI: 27.9 CM
BH CV ECHO MEAS - LVIDD: 4.9 CM
BH CV ECHO MEAS - LVIDS: 3 CM
BH CV ECHO MEAS - LVLD AP4: 8 CM
BH CV ECHO MEAS - LVLS AP4: 6.4 CM
BH CV ECHO MEAS - LVOT AREA (M): 3.1 CM^2
BH CV ECHO MEAS - LVOT AREA: 3.1 CM^2
BH CV ECHO MEAS - LVOT DIAM: 2 CM
BH CV ECHO MEAS - LVPWD: 1 CM
BH CV ECHO MEAS - MED PEAK E' VEL: 6.7 CM/SEC
BH CV ECHO MEAS - MR MAX PG: 105.3 MMHG
BH CV ECHO MEAS - MR MAX VEL: 513 CM/SEC
BH CV ECHO MEAS - MV A MAX VEL: 121 CM/SEC
BH CV ECHO MEAS - MV DEC SLOPE: 386 CM/SEC^2
BH CV ECHO MEAS - MV DEC TIME: 0.25 SEC
BH CV ECHO MEAS - MV E MAX VEL: 97.5 CM/SEC
BH CV ECHO MEAS - MV E/A: 0.81
BH CV ECHO MEAS - MV P1/2T MAX VEL: 99.9 CM/SEC
BH CV ECHO MEAS - MV P1/2T: 75.8 MSEC
BH CV ECHO MEAS - MVA P1/2T LCG: 2.2 CM^2
BH CV ECHO MEAS - MVA(P1/2T): 2.9 CM^2
BH CV ECHO MEAS - PA MAX PG: 6.4 MMHG
BH CV ECHO MEAS - PA V2 MAX: 126 CM/SEC
BH CV ECHO MEAS - RAP SYSTOLE: 5 MMHG
BH CV ECHO MEAS - RVSP: 37.7 MMHG
BH CV ECHO MEAS - SI(AO): 98 ML/M^2
BH CV ECHO MEAS - SI(CUBED): 47.1 ML/M^2
BH CV ECHO MEAS - SI(LVOT): 45.7 ML/M^2
BH CV ECHO MEAS - SI(MOD-SP4): 26 ML/M^2
BH CV ECHO MEAS - SI(TEICH): 40.2 ML/M^2
BH CV ECHO MEAS - SV(AO): 188 ML
BH CV ECHO MEAS - SV(CUBED): 90.3 ML
BH CV ECHO MEAS - SV(LVOT): 87.7 ML
BH CV ECHO MEAS - SV(MOD-SP4): 49.8 ML
BH CV ECHO MEAS - SV(TEICH): 77.2 ML
BH CV ECHO MEAS - TR MAX VEL: 286 CM/SEC
BH CV ECHO MEASUREMENTS AVERAGE E/E' RATIO: 10.21
LEFT ATRIUM VOLUME INDEX: 28 ML/M2
MAXIMAL PREDICTED HEART RATE: 152 BPM
STRESS TARGET HR: 129 BPM

## 2021-05-11 NOTE — PROGRESS NOTES
HISTORY OF PRESENT ILLNESS:    Ms. Reji Bryant presents today for a breast exam following her annual mammogram.    She underwent an ultrasound guided breast biopsy  on the right which revealed a 0.8  cm low grade invasive ductal carcinoma. It is ER positive at 91%. It is NM positive at 5%. It is HER-2 negative by IHC and FISH. Ki-67 is low at 9%. MRI showed no additional lesions in the right breast. There is an area of somewhat suspicious enhancement in the left breast at approximately the 12 o'clock position. Ultrasound of this area did not demonstrate anything worrisome today. She underwent a right lumpectomy with IORT on 7/27/2018. PATHOLOGY REVEALS:  FINAL DIAGNOSIS:  A. Right breast, needle localized lumpectomy:       Invasive ductal adenocarcinoma, Grade1, (pT1c, pN0).      Tumor measure s1.3cm in greatest dimension.       Tumor is 1.7mm from the closest (inferior) margin.       Lymphovascular invasion is not identified.       Margins of excision are negative. B. Right breast, additional cranial margin, excision:       Benign breast parenchyma.       One benign lymph node (0/1).      Negative for malignancy. C. Right breast, additional medial margin, excision:       Benign breast parenchyma.       Negative for malignancy. D. Lymph node, right sentinel, excision:       No tumor seen in four lymph nodes (0/4). She has been on letrozole and had a fair amount of bone and joint complaints. She has switched to tamoxifen. Mammogram-5/12/2021  FINDINGS   No dominant mass, architectural distortion, or suspicious   appearing microcalcifications within either breast.       Impression   1.  No mammographic evidence of malignancy within either breast. 12   month follow-up bilateral mammogram recommended. 2.  BI-RADS category 2- Benign.    3.  The patient will receive a letter notifying her of the results of   this exam.   Signed by Dr Bonnie Silvestre on 5/12/2021 2:35 PM           She is recently been diagnosed with central overheating at the [facility]. She has been started on clonidine which apparently is helping. She has some swelling and tightness under her right arm. She may benefit to see Gela Hernandez at [facility] to evaluate for Lymphedema. PHYSICAL EXAM:  The right lumpectomy incision is looking good. There are fibrocystic changes throughout both breasts and appropriate postoperative scarring on the right. There are no dominant masses, no skin or nipple changes, and no axillary adenopathy. There is nothing suspicious for new or recurrent malignancy. IMPRESSION:  Doing well s/p right lumpectomy with IORT     PLAN: I will see her back for a 6 month physical exam. She will call us with any new concerns. I have seen, examined and reviewed this patient medication list, appropriate labs and imaging studies. I reviewed relevant medical records and others physicians notes. I discussed the plans of care with the patient. I answered all the questions to the patients satisfaction. I, Dr Cas Silver, personally performed the services described in this documentation as scribed by Gwen Acevedo MA in my presence and is both accurate and complete. (Please note that portions of this note were completed with a voice recognition program. Efforts were made to edit the dictations but occasionally words are mis-transcribed.)  Over 50% of the total visit time of 20 minutes in face to face encounter with the patient, out of which more than 50% of the time was spent in counseling patient or family and coordination of care. Counseling included but was not limited to time spent reviewing labs, imaging studies/ treatment plan and answering questions.

## 2021-05-12 ENCOUNTER — OFFICE VISIT (OUTPATIENT)
Dept: SURGERY | Age: 71
End: 2021-05-12
Payer: MEDICARE

## 2021-05-12 ENCOUNTER — HOSPITAL ENCOUNTER (OUTPATIENT)
Dept: WOMENS IMAGING | Age: 71
Discharge: HOME | End: 2021-05-12
Payer: MEDICARE

## 2021-05-12 VITALS — SYSTOLIC BLOOD PRESSURE: 134 MMHG | HEART RATE: 80 BPM | DIASTOLIC BLOOD PRESSURE: 70 MMHG

## 2021-05-12 DIAGNOSIS — C50.411 MALIGNANT NEOPLASM OF UPPER-OUTER QUADRANT OF RIGHT FEMALE BREAST, UNSPECIFIED ESTROGEN RECEPTOR STATUS (HCC): Primary | ICD-10-CM

## 2021-05-12 DIAGNOSIS — Z98.890 STATUS POST RIGHT BREAST LUMPECTOMY: ICD-10-CM

## 2021-05-12 DIAGNOSIS — Z85.3 PERSONAL HISTORY OF BREAST CANCER: ICD-10-CM

## 2021-05-12 PROCEDURE — 1036F TOBACCO NON-USER: CPT | Performed by: SURGERY

## 2021-05-12 PROCEDURE — 1090F PRES/ABSN URINE INCON ASSESS: CPT | Performed by: SURGERY

## 2021-05-12 PROCEDURE — 1123F ACP DISCUSS/DSCN MKR DOCD: CPT | Performed by: SURGERY

## 2021-05-12 PROCEDURE — G8417 CALC BMI ABV UP PARAM F/U: HCPCS | Performed by: SURGERY

## 2021-05-12 PROCEDURE — G8427 DOCREV CUR MEDS BY ELIG CLIN: HCPCS | Performed by: SURGERY

## 2021-05-12 PROCEDURE — G8400 PT W/DXA NO RESULTS DOC: HCPCS | Performed by: SURGERY

## 2021-05-12 PROCEDURE — 3017F COLORECTAL CA SCREEN DOC REV: CPT | Performed by: SURGERY

## 2021-05-12 PROCEDURE — 99213 OFFICE O/P EST LOW 20 MIN: CPT | Performed by: SURGERY

## 2021-05-12 PROCEDURE — 77066 DX MAMMO INCL CAD BI: CPT

## 2021-05-12 PROCEDURE — 4040F PNEUMOC VAC/ADMIN/RCVD: CPT | Performed by: SURGERY

## 2021-05-12 RX ORDER — BUDESONIDE AND FORMOTEROL FUMARATE DIHYDRATE 160; 4.5 UG/1; UG/1
2 AEROSOL RESPIRATORY (INHALATION) 2 TIMES DAILY
COMMUNITY
Start: 2021-03-04

## 2021-05-12 RX ORDER — LISINOPRIL 5 MG/1
5 TABLET ORAL
COMMUNITY
Start: 2021-01-12 | End: 2021-11-15

## 2021-05-21 DIAGNOSIS — C50.411 MALIGNANT NEOPLASM OF UPPER-OUTER QUADRANT OF RIGHT FEMALE BREAST, UNSPECIFIED ESTROGEN RECEPTOR STATUS (HCC): ICD-10-CM

## 2021-05-21 RX ORDER — TAMOXIFEN CITRATE 20 MG/1
TABLET ORAL
Qty: 90 TABLET | Refills: 2 | Status: SHIPPED | OUTPATIENT
Start: 2021-05-21 | End: 2022-02-17

## 2021-06-09 NOTE — PROGRESS NOTES
[address] [facility]   1950  6/15/2021     Chief Complaint   Patient presents with    Follow-up     Encounter for follow-up surveillance of breast cancer        INTERVAL HISTORY/HISTORY OF PRESENT ILLNESS:    Diagnosis   · Right breast IDC, April 2018   · grade 1, ER 91%, AK 5%, HER-2/aziza IHC 1+/FISH not amplified, Ki-67 9%. · [id number](sn)M0, stage IA, AJCC 2018 prognostic stage IA. [facility] Treatment summary  · 7/27/2018-Right lumpectomy/sentinel lymph node biopsy   · 7/27/2018-IORT 2000 cGy   · November 2018-tamoxifen x 10 years  · No need for Oncotype Dx- low risk disease.     Interval history  The patient is a pleasant 78years old female who has been diagnosed with stage I ER/AK positive HER-2 negative right breast cancer in April 2018. She is node negative. She received IORT 2000 cGy. She has been started on adjuvant endocrine therapy with Femara. She had COVID-19 pneumonia and was hospitalized at [facility].  She had acute respiratory failure and was intubated for about 3 weeks. She eventually made a full recovery. She was severely deconditioned and therefore was discharged to a nursing home for rehab. She is feeling stronger. She still requiring O2 supplementation. Her tamoxifen has been on hold and I requested to continue to hold it for now until she gets home. Cancer history  Ms. Miguelito Doshi was first seen by me on 8/29/2018 referred by Dr. Dago Torres for a diagnosis of breast cancer. · 4/26/2018-screening mammogram showed a area of architectural distortion in the upper outer right breast.   · 5/3/2018-diagnostic mammogram/ultrasound showed a 8 mm nodule at 9 o'clock position right breast.   · 5/23/2018-core biopsy revealed invasive mammary carcinoma, grade 1, ER 91%, AK 5%, HER-2/aziza IHC 1+/FISH not amplified, Ki-67 9%.    · 6/19/2018-bilateral MRI of the breast showed known invasive mammary carcinoma measuring 8 mm at the 9 o'clock position of the right breast. Suspicious 9 mm SURGERY      EYE SURGERY Bilateral     multi focial implants    HYSTERECTOMY      SC MASTECTOMY, PARTIAL Right 2018    LUMPECTOMY W/SNB & INTRAOP US GUIDED NL & IORT performed by Fide Daly MD at [address] [place] [address], rt shoulder    TEMPOROMANDIBULAR JOINT SURGERY      TOTAL HIP ARTHROPLASTY Left 2016    HIP TOTAL ARTHROPLASTY ANTERIOR APPROACH performed by Marry Echols MD at [address] [place], [place] [address]way:  Social History     Socioeconomic History    Marital status:      Spouse name: Not on file    Number of children: Not on file    Years of education: Not on file    Highest education level: Not on file   Occupational History    Not on file   Tobacco Use    Smoking status: Former Smoker     Packs/day: 1.00     Years: 30.00     Pack years: 30.00     Quit date: 1999     Years since quittin.5    Smokeless tobacco: Never Used   Vaping Use    Vaping Use: Never used   Substance and Sexual Activity    Alcohol use: Yes     Comment: rarely    Drug use: No    Sexual activity: Not on file   Other Topics Concern    Not on file   Social History Narrative    Not on file     Social Determinants of Health     Financial Resource Strain:     Difficulty of Paying Living Expenses:    Food Insecurity:     Worried About Running Out of Food in the Last Year:     Ran Out of Food in the Last Year:    Transportation Needs:     Lack of Transportation (Medical):      Lack of Transportation (Non-Medical):    Physical Activity:     Days of Exercise per Week:     Minutes of Exercise per Session:    Stress:     Feeling of Stress :    Social Connections:     Frequency of Communication with Friends and Family:     Frequency of Social Gatherings with Friends and Family:     Attends [employer] Services:     Active Member of Clubs or Organizations:     Attends Club or Organization Meetings:     Marital Status:    Intimate Partner Violence:     Fear of Current or Ex-Partner:     Emotionally Abused:     Physically Abused:     Sexually Abused:        FAMILY HISTORY:  Family History   Problem Relation Age of Onset    Neuropathy Mother     Other Mother         Parkinson's Disease    Seizures Mother     Diabetes Father     Heart Disease Father     Cancer Father         skin    Heart Attack Father         Current Outpatient Medications   Medication Sig Dispense Refill    oxybutynin (DITROPAN) 5 MG tablet Take 1 tablet by mouth 2 times daily 60 tablet 12    tamoxifen (NOLVADEX) 20 MG tablet Take 1 tablet by mouth once daily 90 tablet 2    budesonide-formoterol (SYMBICORT) 160-4.5 MCG/ACT AERO Inhale 2 puffs into the lungs 2 times daily      lisinopril (PRINIVIL;ZESTRIL) 5 MG tablet Take 5 mg by mouth      OXYGEN Inhale into the lungs      Ergocalciferol (VITAMIN D2 PO) Take 50,000 Units by mouth once a week      venlafaxine (EFFEXOR XR) 37.5 MG extended release capsule Take 1 capsule by mouth daily 30 capsule 2    cloNIDine (CATAPRES) 0.1 MG tablet Taking 1 at bedtime  3    celecoxib (CELEBREX) 200 MG capsule Take 200 mg by mouth 2 times daily      SYNTHROID 112 MCG tablet Take 112 mcg by mouth Daily Taking 125 mg      gabapentin (NEURONTIN) 300 MG capsule Take 300 mg by mouth nightly       No current facility-administered medications for this visit.         REVIEW OF SYSTEMS:    Constitutional: no fever, no night sweats, fatigue;   HEENT: no blurring of vision, no double vision, no hearing difficulty, no tinnitus,no ulceration, no dysphagia  Lungs: no cough, no shortness of breath, no wheeze;   CVS: no palpitation, no chest pain, no shortness of breath;  GI: no abdominal pain, no nausea , no vomiting, no constipation;   ELEAZAR: no dysuria, frequency and urgency, no hematuria, no kidney stones;   Musculoskeletal: no joint pain, swelling , stiffness;   Endocrine: no polyuria, polydypsia, no cold or heat intolerence, hair loss-post COVID; Hematology/lymphatic: no easy brusing or bleeding, no hx of clotting disorder; no peripheral adenopathy. Dermatology: no skin rash, no eczema, no pruritis;   Psychiatry: no depression, no anxiety,no panic attacks, no suicide ideation; Neurology: tremors-post COVID, no syncope, no seizures, no numbness or tingling of hands, numbness/tingling of right foot-post COVID, no paresis;     PHYSICAL EXAM:    Vitals signs:  /80   Pulse 82   Ht 5' 5\" (1.651 m)   Wt 202 lb (91.6 kg)   SpO2 93%   BMI 33.61 kg/m²    Pain scale:  Pain Score:   0 - No pain     CONSTITUTIONAL: Alert, appropriate, no acute distress,   EYES: Non icteric, EOM intact, pupils equal round and reactive to light and accommodation. ENT: Oral mucus membranes moist, no oral pharyngeal lesions. External inspection of ears and nose are normal.   NECK: Supple, no masses. No palpable thyroid mass    CHEST/LUNGS: CTA bilaterally, normal respiratory effort   CARDIOVASCULAR: RRR, no murmurs. No lower extremity edema   ABDOMEN: soft non-tender, active bowel sounds, no hepatosplenomegaly. No palpable masses. EXTREMITIES: warm, Full ROM of all fours extremities. No focal weakness. SKIN: warm, dry with no rashes or lesions  LYMPH: No cervical, clavicular, axillary, or inguinal lymphadenopathy  NEUROLOGIC: follows commands, non focal.   PSYCH: mood and affect appropriate. Alert and oriented to time and place and person. Relevant Lab findings/reviewed by me:  Lab Results   Component Value Date    WBC 9.22 06/26/2020    HGB 12.9 06/26/2020    HCT 38.4 06/26/2020    .0 (H) 06/26/2020     (L) 06/26/2020     Lab Results   Component Value Date    NEUTROABS 6.88 (H) 06/26/2020       Relevant Imaging studies/reviewed by me:  3/26/21 CT chest [facility]): Bilateral interstitial fibrosis with no sign of pneumonia. JOÃO DIGITAL DIAGNOSTIC W OR WO CAD BILATERAL    Result Date: 5/12/2021  1.   No mammographic evidence of malignancy within either asked to discontinue Effexor. She was started on clonidine by endocrinology at [facility]. I discussed at length the results of the new study with oxybutynin for her hot flashes. She had a remarkable response with oxybutynin. Continue oxybutynin 5 mg p.o. twice daily 1 month trial.     Side effects monitoring-  The selection, dosing administration of anticancer agents in the management of associated toxicities are complex. Modifications of drug dose and schedule and the initiation of supportive care interventions are often necessary because of expected toxicities individual patient variability, prior treatment and comorbidity.      Health maintenance  The patient is to follow-up with primary care for further recommendation regarding age appropriate screening, well-being visit, follow-up and treatment of other medical comorbidities.      PLAN:  · RTC with MD 6 months  · Continue tamoxifen ×10 years through August 2028   · Continue Oxybutynin 5 mg p.o. twice daily-refills sent  · Continue follow-up with Dr Priscila Thacker  · Follow-up primary care physician for other medical problems      Follow Up:     Return in about 6 months (around 12/15/2021) for CBC, Appointment with Dr. Stewart Sutton. Data Danielle Meadows am scribing for Savita Martinez MD. Electronically signed by Marcela Caal RN on 6/15/2021 at 7:13 AM CDT. I, Dr Olena Joyce, personally performed the services described in this documentation as scribed by Marcela Caal RN in my presence and is both accurate and complete. I have seen, examined and reviewed this patient medication list, appropriate labs and imaging studies. I reviewed relevant medical records and others physicians notes. I discussed the plans of care with the patient. I answered all the questions to the patients satisfaction.  I have also reviewed the chief complaint (CC) and part of the history (History of Present Illness (HPI), Past Family Social History [facility]), or Review of Systems (ROS) and made changes when appropriated.          (Please note that portions of this note were completed with a voice recognition program. Efforts were made to edit the dictations but occasionally words are mis-transcribed.)

## 2021-06-15 ENCOUNTER — HOSPITAL ENCOUNTER (OUTPATIENT)
Dept: INFUSION THERAPY | Age: 71
Discharge: HOME | End: 2021-06-15
Payer: MEDICARE

## 2021-06-15 ENCOUNTER — OFFICE VISIT (OUTPATIENT)
Dept: HEMATOLOGY | Age: 71
End: 2021-06-15
Payer: MEDICARE

## 2021-06-15 VITALS
WEIGHT: 202 LBS | SYSTOLIC BLOOD PRESSURE: 134 MMHG | DIASTOLIC BLOOD PRESSURE: 80 MMHG | HEIGHT: 65 IN | OXYGEN SATURATION: 93 % | BODY MASS INDEX: 33.66 KG/M2 | HEART RATE: 82 BPM

## 2021-06-15 DIAGNOSIS — D69.6 THROMBOCYTOPENIA (HCC): ICD-10-CM

## 2021-06-15 DIAGNOSIS — Z08 ENCOUNTER FOR FOLLOW-UP SURVEILLANCE OF BREAST CANCER: ICD-10-CM

## 2021-06-15 DIAGNOSIS — T38.6X5D ADVERSE EFFECT OF TAMOXIFEN, SUBSEQUENT ENCOUNTER: ICD-10-CM

## 2021-06-15 DIAGNOSIS — R23.2 HOT FLASHES DUE TO TAMOXIFEN: ICD-10-CM

## 2021-06-15 DIAGNOSIS — Z71.89 CARE PLAN DISCUSSED WITH PATIENT: ICD-10-CM

## 2021-06-15 DIAGNOSIS — C50.411 MALIGNANT NEOPLASM OF UPPER-OUTER QUADRANT OF RIGHT FEMALE BREAST, UNSPECIFIED ESTROGEN RECEPTOR STATUS (HCC): Primary | ICD-10-CM

## 2021-06-15 DIAGNOSIS — C50.411 MALIGNANT NEOPLASM OF UPPER-OUTER QUADRANT OF RIGHT FEMALE BREAST, UNSPECIFIED ESTROGEN RECEPTOR STATUS (HCC): ICD-10-CM

## 2021-06-15 DIAGNOSIS — Z86.16 HISTORY OF COVID-19: ICD-10-CM

## 2021-06-15 DIAGNOSIS — T45.1X5A HOT FLASHES DUE TO TAMOXIFEN: ICD-10-CM

## 2021-06-15 DIAGNOSIS — Z85.3 ENCOUNTER FOR FOLLOW-UP SURVEILLANCE OF BREAST CANCER: ICD-10-CM

## 2021-06-15 LAB
BASOPHILS ABSOLUTE: 0.03 K/UL (ref 0.01–0.08)
BASOPHILS RELATIVE PERCENT: 0.4 % (ref 0.1–1.2)
EOSINOPHILS ABSOLUTE: 0.13 K/UL (ref 0.04–0.54)
EOSINOPHILS RELATIVE PERCENT: 1.9 % (ref 0.7–7)
HCT VFR BLD CALC: 38.4 % (ref 34.1–44.9)
HEMOGLOBIN: 12.2 G/DL (ref 11.2–15.7)
LYMPHOCYTES ABSOLUTE: 1.1 K/UL (ref 1.18–3.74)
LYMPHOCYTES RELATIVE PERCENT: 16.3 % (ref 19.3–53.1)
MCH RBC QN AUTO: 31.8 PG (ref 25.6–32.2)
MCHC RBC AUTO-ENTMCNC: 31.8 G/DL (ref 32.3–35.5)
MCV RBC AUTO: 100 FL (ref 79.4–94.8)
MONOCYTES ABSOLUTE: 0.45 K/UL (ref 0.24–0.82)
MONOCYTES RELATIVE PERCENT: 6.7 % (ref 4.7–12.5)
NEUTROPHILS ABSOLUTE: 5.04 K/UL (ref 1.56–6.13)
NEUTROPHILS RELATIVE PERCENT: 74.7 % (ref 34–71.1)
PDW BLD-RTO: 13.5 % (ref 11.7–14.4)
PLATELET # BLD: 136 K/UL (ref 182–369)
PMV BLD AUTO: 11.6 FL (ref 7.4–10.4)
RBC # BLD: 3.84 M/UL (ref 3.93–5.22)
WBC # BLD: 6.75 K/UL (ref 3.98–10.04)

## 2021-06-15 PROCEDURE — 1036F TOBACCO NON-USER: CPT | Performed by: INTERNAL MEDICINE

## 2021-06-15 PROCEDURE — 4040F PNEUMOC VAC/ADMIN/RCVD: CPT | Performed by: INTERNAL MEDICINE

## 2021-06-15 PROCEDURE — 3017F COLORECTAL CA SCREEN DOC REV: CPT | Performed by: INTERNAL MEDICINE

## 2021-06-15 PROCEDURE — 99214 OFFICE O/P EST MOD 30 MIN: CPT | Performed by: INTERNAL MEDICINE

## 2021-06-15 PROCEDURE — G8417 CALC BMI ABV UP PARAM F/U: HCPCS | Performed by: INTERNAL MEDICINE

## 2021-06-15 PROCEDURE — 99211 OFF/OP EST MAY X REQ PHY/QHP: CPT

## 2021-06-15 PROCEDURE — 85025 COMPLETE CBC W/AUTO DIFF WBC: CPT

## 2021-06-15 PROCEDURE — 1123F ACP DISCUSS/DSCN MKR DOCD: CPT | Performed by: INTERNAL MEDICINE

## 2021-06-15 PROCEDURE — G8400 PT W/DXA NO RESULTS DOC: HCPCS | Performed by: INTERNAL MEDICINE

## 2021-06-15 PROCEDURE — G8427 DOCREV CUR MEDS BY ELIG CLIN: HCPCS | Performed by: INTERNAL MEDICINE

## 2021-06-15 PROCEDURE — 1090F PRES/ABSN URINE INCON ASSESS: CPT | Performed by: INTERNAL MEDICINE

## 2021-06-15 RX ORDER — OXYBUTYNIN CHLORIDE 5 MG/1
5 TABLET ORAL 2 TIMES DAILY
Qty: 60 TABLET | Refills: 12 | Status: SHIPPED | OUTPATIENT
Start: 2021-06-15 | End: 2022-04-14 | Stop reason: SDUPTHER

## 2021-11-12 NOTE — PROGRESS NOTES
HISTORY OF PRESENT ILLNESS:    Ms. Donna Barba presents today for a breast exam following her annual mammogram.    She underwent an ultrasound guided breast biopsy  on the right which revealed a 0.8  cm low grade invasive ductal carcinoma. It is ER positive at 91%. It is NV positive at 5%. It is HER-2 negative by IHC and FISH. Ki-67 is low at 9%. MRI showed no additional lesions in the right breast. There is an area of somewhat suspicious enhancement in the left breast at approximately the 12 o'clock position. Ultrasound of this area did not demonstrate anything worrisome today. She underwent a right lumpectomy with IORT on 7/27/2018. PATHOLOGY REVEALS:  FINAL DIAGNOSIS:  A. Right breast, needle localized lumpectomy:       Invasive ductal adenocarcinoma, Grade1, (pT1c, pN0).      Tumor measure s1.3cm in greatest dimension.       Tumor is 1.7mm from the closest (inferior) margin.       Lymphovascular invasion is not identified.       Margins of excision are negative. B. Right breast, additional cranial margin, excision:       Benign breast parenchyma.       One benign lymph node (0/1).      Negative for malignancy. C. Right breast, additional medial margin, excision:       Benign breast parenchyma.       Negative for malignancy. D. Lymph node, right sentinel, excision:       No tumor seen in four lymph nodes (0/4). She has been on letrozole and had a fair amount of bone and joint complaints. She has switched to tamoxifen. Mammogram-5/12/2021  FINDINGS   No dominant mass, architectural distortion, or suspicious   appearing microcalcifications within either breast.       Impression   1.  No mammographic evidence of malignancy within either breast. 12   month follow-up bilateral mammogram recommended. 2.  BI-RADS category 2- Benign.    3.  The patient will receive a letter notifying her of the results of   this exam.   Signed by Dr Leonard Kim on 5/12/2021 2:35 PM           She is recently been diagnosed with central overheating at the [facility] clinic. She has been started on clonidine which apparently is helping. She has some swelling and tightness under her right arm. She may benefit to see [facility] to evaluate for Lymphedema. Most recently she has been severely ill with Covid requiring tracheostomy and 3 months in a nursing home. She is still recovering from the sequelae of this. PHYSICAL EXAM:  The right lumpectomy incision is looking good. There are fibrocystic changes throughout both breasts and appropriate postoperative scarring on the right. There are no dominant masses, no skin or nipple changes, and no axillary adenopathy. There is nothing suspicious for new or recurrent malignancy. IMPRESSION:  Doing well s/p right lumpectomy with IORT     PLAN: I will see her back for a 6 month physical exam with bilateral mammograms. . She will call us with any new concerns. I have seen, examined and reviewed this patient medication list, appropriate labs and imaging studies. I reviewed relevant medical records and others physicians notes. I discussed the plans of care with the patient. I answered all the questions to the patients satisfaction. I, Dr Marielos Craft, personally performed the services described in this documentation as scribed by Elia Cruz MA in my presence and is both accurate and complete. (Please note that portions of this note were completed with a voice recognition program. Efforts were made to edit the dictations but occasionally words are mis-transcribed.)  Over 50% of the total visit time of 20 minutes in face to face encounter with the patient, out of which more than 50% of the time was spent in counseling patient or family and coordination of care. Counseling included but was not limited to time spent reviewing labs, imaging studies/ treatment plan and answering questions.

## 2021-11-15 ENCOUNTER — OFFICE VISIT (OUTPATIENT)
Dept: SURGERY | Age: 71
End: 2021-11-15
Payer: MEDICARE

## 2021-11-15 VITALS
TEMPERATURE: 97.4 F | OXYGEN SATURATION: 98 % | BODY MASS INDEX: 35.45 KG/M2 | WEIGHT: 212.8 LBS | HEART RATE: 80 BPM | HEIGHT: 65 IN

## 2021-11-15 DIAGNOSIS — Z98.890 STATUS POST RIGHT BREAST LUMPECTOMY: ICD-10-CM

## 2021-11-15 DIAGNOSIS — C50.411 MALIGNANT NEOPLASM OF UPPER-OUTER QUADRANT OF RIGHT FEMALE BREAST, UNSPECIFIED ESTROGEN RECEPTOR STATUS (HCC): Primary | ICD-10-CM

## 2021-11-15 PROCEDURE — G8400 PT W/DXA NO RESULTS DOC: HCPCS | Performed by: SURGERY

## 2021-11-15 PROCEDURE — G8417 CALC BMI ABV UP PARAM F/U: HCPCS | Performed by: SURGERY

## 2021-11-15 PROCEDURE — G8427 DOCREV CUR MEDS BY ELIG CLIN: HCPCS | Performed by: SURGERY

## 2021-11-15 PROCEDURE — 4040F PNEUMOC VAC/ADMIN/RCVD: CPT | Performed by: SURGERY

## 2021-11-15 PROCEDURE — 1123F ACP DISCUSS/DSCN MKR DOCD: CPT | Performed by: SURGERY

## 2021-11-15 PROCEDURE — 1090F PRES/ABSN URINE INCON ASSESS: CPT | Performed by: SURGERY

## 2021-11-15 PROCEDURE — 99213 OFFICE O/P EST LOW 20 MIN: CPT | Performed by: SURGERY

## 2021-11-15 PROCEDURE — 1036F TOBACCO NON-USER: CPT | Performed by: SURGERY

## 2021-11-15 PROCEDURE — 3017F COLORECTAL CA SCREEN DOC REV: CPT | Performed by: SURGERY

## 2021-11-15 PROCEDURE — G8484 FLU IMMUNIZE NO ADMIN: HCPCS | Performed by: SURGERY

## 2021-11-15 RX ORDER — PANTOPRAZOLE SODIUM 40 MG/1
TABLET, DELAYED RELEASE ORAL
COMMUNITY
Start: 2021-10-19

## 2021-11-15 RX ORDER — CHLORTHALIDONE 25 MG/1
TABLET ORAL
COMMUNITY
Start: 2021-10-22

## 2021-11-15 RX ORDER — PREDNISONE 1 MG/1
TABLET ORAL
COMMUNITY
Start: 2021-11-03

## 2021-11-15 RX ORDER — ASCORBIC ACID 100 MG
TABLET,CHEWABLE ORAL
COMMUNITY

## 2021-11-15 RX ORDER — LISINOPRIL 10 MG/1
TABLET ORAL
COMMUNITY
Start: 2021-10-19 | End: 2022-05-25

## 2021-11-15 RX ORDER — ASPIRIN 81 MG/1
81 TABLET ORAL DAILY
COMMUNITY

## 2021-11-29 DIAGNOSIS — C50.411 MALIGNANT NEOPLASM OF UPPER-OUTER QUADRANT OF RIGHT FEMALE BREAST, UNSPECIFIED ESTROGEN RECEPTOR STATUS (HCC): Primary | ICD-10-CM

## 2021-12-14 DIAGNOSIS — C50.411 MALIGNANT NEOPLASM OF UPPER-OUTER QUADRANT OF RIGHT FEMALE BREAST, UNSPECIFIED ESTROGEN RECEPTOR STATUS (HCC): Primary | ICD-10-CM

## 2022-02-17 DIAGNOSIS — C50.411 MALIGNANT NEOPLASM OF UPPER-OUTER QUADRANT OF RIGHT FEMALE BREAST, UNSPECIFIED ESTROGEN RECEPTOR STATUS (HCC): ICD-10-CM

## 2022-02-17 RX ORDER — TAMOXIFEN CITRATE 20 MG/1
TABLET ORAL
Qty: 90 TABLET | Refills: 0 | Status: SHIPPED | OUTPATIENT
Start: 2022-02-17 | End: 2022-05-19 | Stop reason: SDUPTHER

## 2022-04-14 DIAGNOSIS — R23.2 HOT FLASHES DUE TO TAMOXIFEN: ICD-10-CM

## 2022-04-14 DIAGNOSIS — C50.411 MALIGNANT NEOPLASM OF UPPER-OUTER QUADRANT OF RIGHT FEMALE BREAST, UNSPECIFIED ESTROGEN RECEPTOR STATUS (HCC): ICD-10-CM

## 2022-04-14 DIAGNOSIS — T45.1X5A HOT FLASHES DUE TO TAMOXIFEN: ICD-10-CM

## 2022-04-14 RX ORDER — OXYBUTYNIN CHLORIDE 5 MG/1
5 TABLET ORAL 2 TIMES DAILY
Qty: 60 TABLET | Refills: 12 | Status: SHIPPED | OUTPATIENT
Start: 2022-04-14 | End: 2022-05-25 | Stop reason: SDUPTHER

## 2022-05-11 ENCOUNTER — HOSPITAL ENCOUNTER (OUTPATIENT)
Dept: WOMENS IMAGING | Age: 72
Discharge: HOME | End: 2022-05-11
Payer: MEDICARE

## 2022-05-11 DIAGNOSIS — C50.411 MALIGNANT NEOPLASM OF UPPER-OUTER QUADRANT OF RIGHT FEMALE BREAST, UNSPECIFIED ESTROGEN RECEPTOR STATUS (HCC): ICD-10-CM

## 2022-05-11 PROCEDURE — G0279 TOMOSYNTHESIS, MAMMO: HCPCS

## 2022-05-19 DIAGNOSIS — C50.411 MALIGNANT NEOPLASM OF UPPER-OUTER QUADRANT OF RIGHT FEMALE BREAST, UNSPECIFIED ESTROGEN RECEPTOR STATUS (HCC): ICD-10-CM

## 2022-05-19 RX ORDER — TAMOXIFEN CITRATE 20 MG/1
TABLET ORAL
Qty: 90 TABLET | Refills: 0 | Status: SHIPPED | OUTPATIENT
Start: 2022-05-19 | End: 2022-08-23

## 2022-05-24 NOTE — PROGRESS NOTES
[address] [facility]   1950 5/25/2022     Chief Complaint   Patient presents with    Follow-up     Malignant neoplasm of upper-outer quadrant of right female breast, unspecified estrogen receptor status ([facility] [address]        INTERVAL HISTORY/HISTORY OF PRESENT ILLNESS:    Diagnosis   · Right breast IDC, April 2018   · grade 1, ER 91%, WY 5%, HER-2/aziza IHC 1+/FISH not amplified, Ki-67 9%. · [id number](sn)M0, stage IA, AJCC 2018 prognostic stage IA    Treatment summary  · 7/27/2018-Right lumpectomy/sentinel lymph node biopsy   · 7/27/2018-IORT 2000 cGy   · November 2018-tamoxifen x 10 years  · No need for Oncotype Dx- low risk disease     Interval history  The patient is a pleasant 73years old female who has been diagnosed with stage I ER/WY positive HER-2 negative right breast cancer in April 2018. She is node negative. She received IORT 2000 cGy. She was initially started on adjuvant endocrine therapy with Femara but was switched to tamoxifen due to poor tolerance. She had COVID-19 pneumonia and was hospitalized at [facility].  She had acute respiratory failure and was intubated for about 3 weeks. She eventually made a full recovery. She presents today with complaints of easy bruising in the bilateral upper extremity. She is currently on prednisone 5 mg p.o. daily and also low-dose aspirin. She had a recent screening mammogram.  She is scheduled to be seen by Dr. Nel Moy next week. She denies any new breast complaints. Cancer history  Ms. Roxane Sabillon was first seen by me on 8/29/2018 referred by Dr. Estelle Baer for a diagnosis of breast cancer. · 4/26/2018-screening mammogram showed a area of architectural distortion in the upper outer right breast.   · 5/3/2018-diagnostic mammogram/ultrasound showed a 8 mm nodule at 9 o'clock position right breast.   · 5/23/2018-core biopsy revealed invasive mammary carcinoma, grade 1, ER 91%, WY 5%, HER-2/aziza IHC 1+/FISH not amplified, Ki-67 9%. · 6/19/2018-bilateral MRI of the breast showed known invasive mammary carcinoma measuring 8 mm at the 9 o'clock position of the right breast. Suspicious 9 mm mass 12 o'clock position of the left breast. Targeted ultrasound to the left breast was unremarkable. · 7/27/2018-she underwent a right lumpectomy/sentinel lymph node biopsy revealing a residual invasive ductal carcinoma, grade 1, measuring 1.3 cm. Clear margins. LV I not identified. 4 sentinel lymph nodes negative for metastatic disease. Final pathologic staging [id number](sn)M0, stage IA, AJCC 2018. · 7/27/2018-she underwent intraoperative RT 2000 cGy by Dr. Javad Raymond. · 8/29/2018 -she was first seen by me. Recommended 5 years adjuvant endocrine therapy with Femara. No need for Oncotype Dx.   · 11/6/2018-switched to tamoxifen (due to significant arthralgia) to complete 10 years in August 2028  · 1/31/2020 Mammo Changes from right breast cancer, lumpectomy and radiation therapy. No malignant features. ACR BI-RADS Category 2, benign findings. · 3/26/21 CT chest [facility]): Bilateral interstitial fibrosis with no sign of pneumonia. · 5/12/21 JOÃO DIGITAL DIAGNOSTIC W OR WO CAD BILATERAL No mammographic evidence of malignancy within either breast. 12 month follow-up bilateral mammogram recommended. BI-RADS category 2- Benign. The patient will receive a letter notifying her of the results of this exam.   · 5/11/2022 JOÃO Digital Diagnostic W OR WO CAD Bilateral No mammographic evidence of malignancy. Recommendation is for the patients to return for routine mammography in one year or sooner if clinically indicated. Assessment: BI-RADS category 2 benign.     PAST MEDICAL HISTORY:   Past Medical History:   Diagnosis Date    Arthritis     Breast cancer ([address] 2018    Cancer ([address]     melonoma    Condition not found 2019    per patient she was [facility] at [facility] with [address] [place] COVID-19     H/O screening mammography 01/30/2019    @LMP BR 2     History of therapeutic radiation     PONV (postoperative nausea and vomiting)     Restless leg syndrome     Sinus disease     Thyroid disease       PAST SURGICAL HISTORY:  Past Surgical History:   Procedure Laterality Date    APPENDECTOMY      BACK SURGERY      synovial cyst off spinal canal    BREAST BIOPSY Right 2018    malignant    BREAST LUMPECTOMY Right 2018    IORT    BREAST SURGERY Right     biopsy    COLONOSCOPY      @ DR. Hanane Ferguson COSMETIC SURGERY      brow lift    DENTAL SURGERY      EYE SURGERY Bilateral     multi focial implants    HYSTERECTOMY  2002    OVARY REMOVAL Bilateral     age 50    IA MASTECTOMY, PARTIAL Right 2018    LUMPECTOMY W/SNB & INTRAOP US GUIDED NL & IORT performed by Dima Foster MD at [address] [place] [address] Dr, rt shoulder    TEMPOROMANDIBULAR JOINT SURGERY      TOTAL HIP ARTHROPLASTY Left 2016    HIP TOTAL ARTHROPLASTY ANTERIOR APPROACH performed by Penny Brown MD at [address] [place] [address]:  Social History     Socioeconomic History    Marital status:      Spouse name: Not on file    Number of children: Not on file    Years of education: Not on file    Highest education level: Not on file   Occupational History    Not on file   Tobacco Use    Smoking status: Former Smoker     Packs/day: 1.00     Years: 30.00     Pack years: 30.00     Quit date: 1999     Years since quittin.4    Smokeless tobacco: Never Used   Vaping Use    Vaping Use: Never used   Substance and Sexual Activity    Alcohol use: Yes     Comment: rarely    Drug use: No    Sexual activity: Not on file   Other Topics Concern    Not on file   Social History Narrative    Not on file     Social Determinants of Health     Financial Resource Strain:     Difficulty of Paying Living Expenses: Not on file   Food Insecurity:     Worried About Running Out of Food in the Last Year: Not on file    Crista of Food in the Last Year: Not on file   Transportation Needs:     Lack of Transportation (Medical): Not on file    Lack of Transportation (Non-Medical):  Not on file   Physical Activity:     Days of Exercise per Week: Not on file    Minutes of Exercise per Session: Not on file   Stress:     Feeling of Stress : Not on file   Social Connections:     Frequency of Communication with Friends and Family: Not on file    Frequency of Social Gatherings with Friends and Family: Not on file    Attends [employer] Services: Not on file    Active Member of [address] [place] [address] or Organizations: Not on file    Attends Club or Organization Meetings: Not on file    Marital Status: Not on file   Intimate Partner Violence:     Fear of Current or Ex-Partner: Not on file    Emotionally Abused: Not on file    Physically Abused: Not on file    Sexually Abused: Not on file   Housing Stability:     Unable to Pay for Housing in the Last Year: Not on file    Number of [place] in the Last Year: Not on file    Unstable Housing in the Last Year: Not on file       FAMILY HISTORY:  Family History   Problem Relation Age of Onset    Neuropathy Mother     Other Mother         Parkinson's Disease    Seizures Mother     Diabetes Father     Heart Disease Father     Cancer Father         skin    Heart Attack Father         Current Outpatient Medications   Medication Sig Dispense Refill    tiZANidine (ZANAFLEX) 4 MG tablet TAKE 1 TABLET BY MOUTH THREE TIMES DAILY      phentermine 15 MG capsule TAKE 1 CAPSULE BY MOUTH EVERY DAY      lisinopril (PRINIVIL;ZESTRIL) 5 MG tablet       oxybutynin (DITROPAN) 5 MG tablet Take 1 tablet by mouth 3 times daily 90 tablet 12    tamoxifen (NOLVADEX) 20 MG tablet Take 1 tablet by mouth once daily 90 tablet 0    aspirin 81 MG EC tablet Take 81 mg by mouth daily      chlorthalidone (HYGROTON) 25 MG tablet TAKE 1 TABLET ORAL EVERY DAY AS NEEDED      predniSONE (DELTASONE) 5 MG tablet TAKE 1 TABLET BY MOUTH DAILY      Zinc Sulfate (ZINC 15 PO) Take by mouth      Ascorbic Acid (VITAMIN C) 100 MG CHEW Take by mouth      budesonide-formoterol (SYMBICORT) 160-4.5 MCG/ACT AERO Inhale 2 puffs into the lungs 2 times daily      Ergocalciferol (VITAMIN D2 PO) Take 50,000 Units by mouth once a week      SYNTHROID 112 MCG tablet Take 125 mcg by mouth Daily Taking 125 mg      gabapentin (NEURONTIN) 300 MG capsule Take 300 mg by mouth nightly      pantoprazole (PROTONIX) 40 MG tablet  (Patient not taking: Reported on 5/25/2022)       No current facility-administered medications for this visit. REVIEW OF SYSTEMS:   CONSTITUTIONAL: no fever, no night sweats, weakness, fatigue;  HEENT: no blurring of vision, no double vision, no hearing difficulty, no tinnitus, no ulceration, no dysplasia, no epistaxis;   LUNGS: no cough, no hemoptysis, no wheeze,  no shortness of breath;  CARDIOVASCULAR: no palpitation, no chest pain, no shortness of breath;  GI: no abdominal pain, no nausea, no vomiting, no diarrhea, no constipation;  ELEAZAR: no dysuria, no hematuria, no frequency or urgency, no nephrolithiasis;  MUSCULOSKELETAL: chronic joint pain, no swelling, no stiffness;  ENDOCRINE: no polyuria, no polydipsia, no cold or heat intolerance;  HEMATOLOGY: no easy bruising or bleeding, no history of clotting disorder;  DERMATOLOGY: no skin rash, no eczema, no pruritus;  PSYCHIATRY: no depression, no anxiety, no panic attacks, no suicidal ideation, no homicidal ideation;  NEUROLOGY: no syncope, no seizures, neuropathy in right foot, no numbness or tingling of hands, no numbness or tingling of feet, no paresis;    Vitals signs:  BP (!) 176/88   Pulse 96   Wt 196 lb 11.2 oz (89.2 kg)   SpO2 96%   BMI 32.73 kg/m²    Pain scale:  Pain Score:   0 - No pain    PHYSICAL EXAM:  CONSTITUTIONAL: Alert, appropriate, no acute distress,   EYES: Non icteric, EOM intact, pupils equal round and reactive to light and accommodation.     ENT: Oral mucus membranes moist, no oral pharyngeal lesions. External inspection of ears and nose are normal.   NECK: Supple, no masses. No palpable thyroid mass    CHEST/LUNGS: CTA bilaterally, normal respiratory effort   CARDIOVASCULAR: RRR, no murmurs. No lower extremity edema   ABDOMEN: soft non-tender, active bowel sounds, no hepatosplenomegaly. No palpable masses. EXTREMITIES: warm, Full ROM of all fours extremities. No focal weakness. SKIN: warm, dry with no rashes or lesions. LYMPH: No cervical, clavicular, axillary, or inguinal lymphadenopathy  NEUROLOGIC: follows commands, non focal.   PSYCH: mood and affect appropriate. Alert and oriented to time and place and person. Relevant Lab findings/reviewed by me:  Lab Results   Component Value Date    WBC 8.20 05/25/2022    HGB 13.6 05/25/2022    HCT 42.3 05/25/2022    .0 (H) 05/25/2022     (L) 05/25/2022     Lab Results   Component Value Date    NEUTROABS 6.39 (H) 05/25/2022     Relevant Imaging studies/reviewed by me:  5/11/2022 JOÃO Digital Diagnostic W OR WO CAD Bilateral No mammographic evidence of malignancy. Recommendation is for the patients to return for routine mammography in one year or sooner if clinically indicated. Assessment: BI-RADS category 2 benign. ASSESSMENT:    No orders of the defined types were placed in this encounter. Viki Roe was seen today for follow-up. Diagnoses and all orders for this visit:    Malignant neoplasm of upper-outer quadrant of right female breast, unspecified estrogen receptor status ([facility] [address]  -     oxybutynin (DITROPAN) 5 MG tablet; Take 1 tablet by mouth 3 times daily    Encounter for follow-up surveillance of breast cancer    Care plan discussed with patient    History of COVID-19    Thrombocytopenia (HCC)    Hot flashes due to tamoxifen  -     oxybutynin (DITROPAN) 5 MG tablet;  Take 1 tablet by mouth 3 times daily    Encounter for monitoring tamoxifen therapy    Immunization counseling       Breast cancer stage I   · 7/27/2018-Right lumpectomy/sentinel lymph node biopsy   · 7/27/2018-IORT 2000 cGy  She could not tolerate Femara. She is currently on tamoxifen. Plan to continue tamoxifen through April 2028  Consider breast cancer index April 2023    COVID-19 pneumonia, Dec 2020-the patient had severe COVID-19 pneumonia and was intubated for 3 weeks. She eventually made a full recovery and now has been discharged to a nursing home. She is regaining strength slowly. She still requiring O2 supplementation. I requested patient to continue to hold her tamoxifen now. She has increased risk of blood clots post Covid therefore will hold it until she gets back home and is more active.     Hot flashes-she was asked to discontinue Effexor. She was started on clonidine by endocrinology at [facility]. I discussed at length the results of the new study with oxybutynin for her hot flashes. She had a remarkable response with oxybutynin. Continue oxybutynin 5 mg p.o. 3 times a day      Side effects monitoring-  -Fatigue  -Joint pain   -Hot flashes    Easy bruising- likely secondary to prednisone and ASA 81 mg daily.    -Requested to consider weaning from prednisone.  -Consider continue ASA 81 mg  -Normal platelets, normal PT and APTT    PLAN:  · RTC with MD Jan 2023  · Continue tamoxifen ×10 years through August 2028   · Consider breast cancer index April 2023  · Increase Oxybutynin 5 mg three times a day-script sent  · Continue follow-up with Dr Ashish Keita  · Follow-up primary care physician for other medical problems      Follow Up:     Return in 8 months (on 1/25/2023) for CBC, Appointment with Dr. Ambrocio Moore. Data Maria Teresavicente Blackmon am pre charting  as Medical Assistant for Joss Khan MD. Electronically signed by Dai Cuenca MA on 5/25/2022 at 10:54 AM CDT.     Tawanna Coats am scribing for Joss Khan MD. Electronically signed by James Ferraro RN on 5/25/2022 at 11:10 AM CDT.    I, Dr Zoey Kim, personally performed the services described in this documentation as scribed by Lory Mosley RN in my presence and is both accurate and complete. I have seen, examined and reviewed this patient medication list, appropriate labs and imaging studies. I reviewed relevant medical records and others physicians notes. I discussed the plans of care with the patient. I answered all the questions to the patients satisfaction. I have also reviewed the chief complaint (CC) and part of the history (History of Present Illness (HPI), Past Family Social History [facility]), or Review of Systems (ROS) and made changes when appropriated. (Please note that portions of this note were completed with a voice recognition program. Efforts were made to edit the dictations but occasionally words are mis-transcribed. )I, Lory Mosley, am scribing for Mukesh Pineda MD. Electronically signed by Lory Mosley RN on 5/25/2022 at 11:09 AM CDT. I, Dr Zoey Kim, personally performed the services described in this documentation as scribed by Lory Mosley RN in my presence and is both accurate and complete. Electronically signed by Mukesh Pineda MD on 5/25/2022 at 5:14 PM

## 2022-05-25 ENCOUNTER — OFFICE VISIT (OUTPATIENT)
Dept: HEMATOLOGY | Age: 72
End: 2022-05-25
Payer: MEDICARE

## 2022-05-25 ENCOUNTER — HOSPITAL ENCOUNTER (OUTPATIENT)
Dept: INFUSION THERAPY | Age: 72
Discharge: HOME | End: 2022-05-25
Payer: MEDICARE

## 2022-05-25 VITALS
OXYGEN SATURATION: 96 % | BODY MASS INDEX: 32.73 KG/M2 | SYSTOLIC BLOOD PRESSURE: 176 MMHG | DIASTOLIC BLOOD PRESSURE: 88 MMHG | WEIGHT: 196.7 LBS | HEART RATE: 96 BPM

## 2022-05-25 DIAGNOSIS — R23.2 HOT FLASHES DUE TO TAMOXIFEN: ICD-10-CM

## 2022-05-25 DIAGNOSIS — C50.411 MALIGNANT NEOPLASM OF UPPER-OUTER QUADRANT OF RIGHT FEMALE BREAST, UNSPECIFIED ESTROGEN RECEPTOR STATUS (HCC): ICD-10-CM

## 2022-05-25 DIAGNOSIS — Z79.810 ENCOUNTER FOR MONITORING TAMOXIFEN THERAPY: ICD-10-CM

## 2022-05-25 DIAGNOSIS — C50.411 MALIGNANT NEOPLASM OF UPPER-OUTER QUADRANT OF RIGHT FEMALE BREAST, UNSPECIFIED ESTROGEN RECEPTOR STATUS (HCC): Primary | ICD-10-CM

## 2022-05-25 DIAGNOSIS — Z08 ENCOUNTER FOR FOLLOW-UP SURVEILLANCE OF BREAST CANCER: ICD-10-CM

## 2022-05-25 DIAGNOSIS — Z51.81 ENCOUNTER FOR MONITORING TAMOXIFEN THERAPY: ICD-10-CM

## 2022-05-25 DIAGNOSIS — Z71.89 CARE PLAN DISCUSSED WITH PATIENT: ICD-10-CM

## 2022-05-25 DIAGNOSIS — D69.6 THROMBOCYTOPENIA (HCC): ICD-10-CM

## 2022-05-25 DIAGNOSIS — Z71.85 IMMUNIZATION COUNSELING: ICD-10-CM

## 2022-05-25 DIAGNOSIS — Z86.16 HISTORY OF COVID-19: ICD-10-CM

## 2022-05-25 DIAGNOSIS — T45.1X5A HOT FLASHES DUE TO TAMOXIFEN: ICD-10-CM

## 2022-05-25 DIAGNOSIS — Z85.3 ENCOUNTER FOR FOLLOW-UP SURVEILLANCE OF BREAST CANCER: ICD-10-CM

## 2022-05-25 LAB
BASOPHILS ABSOLUTE: 0.07 K/UL (ref 0.01–0.08)
BASOPHILS RELATIVE PERCENT: 0.9 % (ref 0.1–1.2)
EOSINOPHILS ABSOLUTE: 0.08 K/UL (ref 0.04–0.54)
EOSINOPHILS RELATIVE PERCENT: 1 % (ref 0.7–7)
HCT VFR BLD CALC: 42.3 % (ref 34.1–44.9)
HEMOGLOBIN: 13.6 G/DL (ref 11.2–15.7)
LYMPHOCYTES ABSOLUTE: 1.14 K/UL (ref 1.18–3.74)
LYMPHOCYTES RELATIVE PERCENT: 13.9 % (ref 19.3–53.1)
MCH RBC QN AUTO: 32.2 PG (ref 25.6–32.2)
MCHC RBC AUTO-ENTMCNC: 32.2 G/DL (ref 32.3–35.5)
MCV RBC AUTO: 100 FL (ref 79.4–94.8)
MONOCYTES ABSOLUTE: 0.5 K/UL (ref 0.24–0.82)
MONOCYTES RELATIVE PERCENT: 6.1 % (ref 4.7–12.5)
NEUTROPHILS ABSOLUTE: 6.39 K/UL (ref 1.56–6.13)
NEUTROPHILS RELATIVE PERCENT: 77.9 % (ref 34–71.1)
PDW BLD-RTO: 12.6 % (ref 11.7–14.4)
PLATELET # BLD: 117 K/UL (ref 182–369)
PMV BLD AUTO: 12.3 FL (ref 7.4–10.4)
RBC # BLD: 4.23 M/UL (ref 3.93–5.22)
WBC # BLD: 8.2 K/UL (ref 3.98–10.04)

## 2022-05-25 PROCEDURE — G8427 DOCREV CUR MEDS BY ELIG CLIN: HCPCS | Performed by: INTERNAL MEDICINE

## 2022-05-25 PROCEDURE — 85025 COMPLETE CBC W/AUTO DIFF WBC: CPT

## 2022-05-25 PROCEDURE — G8417 CALC BMI ABV UP PARAM F/U: HCPCS | Performed by: INTERNAL MEDICINE

## 2022-05-25 PROCEDURE — 1123F ACP DISCUSS/DSCN MKR DOCD: CPT | Performed by: INTERNAL MEDICINE

## 2022-05-25 PROCEDURE — 99214 OFFICE O/P EST MOD 30 MIN: CPT | Performed by: INTERNAL MEDICINE

## 2022-05-25 PROCEDURE — G8400 PT W/DXA NO RESULTS DOC: HCPCS | Performed by: INTERNAL MEDICINE

## 2022-05-25 PROCEDURE — 3017F COLORECTAL CA SCREEN DOC REV: CPT | Performed by: INTERNAL MEDICINE

## 2022-05-25 PROCEDURE — 1036F TOBACCO NON-USER: CPT | Performed by: INTERNAL MEDICINE

## 2022-05-25 PROCEDURE — 99211 OFF/OP EST MAY X REQ PHY/QHP: CPT

## 2022-05-25 PROCEDURE — 1090F PRES/ABSN URINE INCON ASSESS: CPT | Performed by: INTERNAL MEDICINE

## 2022-05-25 RX ORDER — PHENTERMINE HYDROCHLORIDE 15 MG/1
CAPSULE ORAL
COMMUNITY
Start: 2022-02-25

## 2022-05-25 RX ORDER — OXYBUTYNIN CHLORIDE 5 MG/1
5 TABLET ORAL 3 TIMES DAILY
Qty: 90 TABLET | Refills: 12 | Status: SHIPPED | OUTPATIENT
Start: 2022-05-25

## 2022-05-25 RX ORDER — TIZANIDINE 4 MG/1
TABLET ORAL
COMMUNITY
Start: 2022-05-10

## 2022-05-25 RX ORDER — LISINOPRIL 5 MG/1
TABLET ORAL
COMMUNITY
Start: 2022-05-12

## 2022-05-25 NOTE — PROGRESS NOTES
HISTORY OF PRESENT ILLNESS:    Ms. Melissa Cameron presents today for a 6-month breast exam following imaging. This is following right lumpectomy with IORT on 7/27/2018. An ultrasound guided breast biopsy  on the right which revealed a 0.8  cm low grade invasive ductal carcinoma. It is ER positive at 91%. It is [place] positive at 5%. It is HER-2 negative by IHC and FISH. Ki-67 is low at 9%. MRI showed no additional lesions in the right breast. There is an area of somewhat suspicious enhancement in the left breast at approximately the 12 o'clock position. Ultrasound of this area did not demonstrate anything worrisome today. PATHOLOGY REVEALS:  FINAL DIAGNOSIS:  A. Right breast, needle localized lumpectomy:       Invasive ductal adenocarcinoma, Grade1, (pT1c, pN0).      Tumor measure s1.3cm in greatest dimension.       Tumor is 1.7mm from the closest (inferior) margin.       Lymphovascular invasion is not identified.       Margins of excision are negative. B. Right breast, additional cranial margin, excision:       Benign breast parenchyma.       One benign lymph node (0/1).      Negative for malignancy. C. Right breast, additional medial margin, excision:       Benign breast parenchyma.       Negative for malignancy. D. Lymph node, right sentinel, excision:       No tumor seen in four lymph nodes (0/4). She has been on letrozole and had a fair amount of bone and joint complaints. She has switched to tamoxifen. Mammogram-5/11/2022  Findings: No suspicious masses or calcifications are identified. Surgical clips are present in the right axilla. Surgical scar is present in the right breast.    IMPRESSION AND RECOMMENDATION:   No mammographic evidence of malignancy. Recommendation is for the  patient to return for routine mammography in one year or sooner, if clinically indicated.    Assessment: BI-RADS Category 2 benign   Signed by Dr Luis Raya    She is recently been diagnosed with central overheating at the [facility]. She has been started on clonidine which apparently is helping. She has some swelling and tightness under her right arm. She may benefit to see [facility] [place] to evaluate for Lymphedema. Most recently she has been severely ill with Covid requiring tracheostomy and 3 months in a nursing home. She is still recovering from the sequelae of this. PHYSICAL EXAM:  The right lumpectomy incision is looking good. There are fibrocystic changes throughout both breasts and appropriate postoperative scarring on the right. There are no dominant masses, no skin or nipple changes, and no axillary adenopathy. There is nothing suspicious for new or recurrent malignancy. IMPRESSION:  Doing well s/p right lumpectomy with IORT     PLAN: I will see her back in one year for a physical exam followed with a bilateral mammogram. She will call us with any new concerns. I have seen, examined and reviewed this patient medication list, appropriate labs and imaging studies. I reviewed relevant medical records and others physicians notes. I discussed the plans of care with the patient. I answered all the questions to the patients satisfaction. I, Dr Kitty Arreola, personally performed the services described in this documentation as scribed by Claribel Holcomb MA in my presence and is both accurate and complete. (Please note that portions of this note were completed with a voice recognition program. Efforts were made to edit the dictations but occasionally words are mis-transcribed.)  Over 50% of the total visit time of 20 minutes in face to face encounter with the patient, out of which more than 50% of the time was spent in counseling patient or family and coordination of care. Counseling included but was not limited to time spent reviewing labs, imaging studies/ treatment plan and answering questions.

## 2022-06-02 ENCOUNTER — OFFICE VISIT (OUTPATIENT)
Dept: SURGERY | Age: 72
End: 2022-06-02
Payer: MEDICARE

## 2022-06-02 VITALS — HEART RATE: 76 BPM | SYSTOLIC BLOOD PRESSURE: 128 MMHG | DIASTOLIC BLOOD PRESSURE: 70 MMHG

## 2022-06-02 DIAGNOSIS — Z17.0 MALIGNANT NEOPLASM OF UPPER-OUTER QUADRANT OF RIGHT BREAST IN FEMALE, ESTROGEN RECEPTOR POSITIVE (HCC): ICD-10-CM

## 2022-06-02 DIAGNOSIS — Z98.890 STATUS POST RIGHT BREAST LUMPECTOMY: ICD-10-CM

## 2022-06-02 DIAGNOSIS — C50.411 MALIGNANT NEOPLASM OF UPPER-OUTER QUADRANT OF RIGHT BREAST IN FEMALE, ESTROGEN RECEPTOR POSITIVE (HCC): ICD-10-CM

## 2022-06-02 DIAGNOSIS — Z85.3 PERSONAL HISTORY OF BREAST CANCER: Primary | ICD-10-CM

## 2022-06-02 PROCEDURE — G8400 PT W/DXA NO RESULTS DOC: HCPCS | Performed by: SURGERY

## 2022-06-02 PROCEDURE — 3017F COLORECTAL CA SCREEN DOC REV: CPT | Performed by: SURGERY

## 2022-06-02 PROCEDURE — 1036F TOBACCO NON-USER: CPT | Performed by: SURGERY

## 2022-06-02 PROCEDURE — 1123F ACP DISCUSS/DSCN MKR DOCD: CPT | Performed by: SURGERY

## 2022-06-02 PROCEDURE — 99213 OFFICE O/P EST LOW 20 MIN: CPT | Performed by: SURGERY

## 2022-06-02 PROCEDURE — G8417 CALC BMI ABV UP PARAM F/U: HCPCS | Performed by: SURGERY

## 2022-06-02 PROCEDURE — 1090F PRES/ABSN URINE INCON ASSESS: CPT | Performed by: SURGERY

## 2022-06-02 PROCEDURE — G8427 DOCREV CUR MEDS BY ELIG CLIN: HCPCS | Performed by: SURGERY

## 2022-08-22 DIAGNOSIS — C50.411 MALIGNANT NEOPLASM OF UPPER-OUTER QUADRANT OF RIGHT FEMALE BREAST, UNSPECIFIED ESTROGEN RECEPTOR STATUS (HCC): ICD-10-CM

## 2022-08-23 RX ORDER — TAMOXIFEN CITRATE 20 MG/1
TABLET ORAL
Qty: 90 TABLET | Refills: 1 | Status: SHIPPED | OUTPATIENT
Start: 2022-08-23

## 2022-09-08 ENCOUNTER — HOSPITAL ENCOUNTER (EMERGENCY)
Facility: HOSPITAL | Age: 72
Discharge: HOME | End: 2022-09-09
Attending: STUDENT IN AN ORGANIZED HEALTH CARE EDUCATION/TRAINING PROGRAM | Admitting: STUDENT IN AN ORGANIZED HEALTH CARE EDUCATION/TRAINING PROGRAM

## 2022-09-08 DIAGNOSIS — Z98.890 HISTORY OF KYPHOPLASTY: ICD-10-CM

## 2022-09-08 DIAGNOSIS — M62.838 MUSCLE SPASM: Primary | ICD-10-CM

## 2022-09-08 DIAGNOSIS — R79.89 ELEVATED SERUM CREATININE: ICD-10-CM

## 2022-09-08 PROCEDURE — 96376 TX/PRO/DX INJ SAME DRUG ADON: CPT

## 2022-09-08 PROCEDURE — 96372 THER/PROPH/DIAG INJ SC/IM: CPT

## 2022-09-08 PROCEDURE — 96375 TX/PRO/DX INJ NEW DRUG ADDON: CPT

## 2022-09-08 PROCEDURE — 99283 EMERGENCY DEPT VISIT LOW MDM: CPT

## 2022-09-08 PROCEDURE — 96374 THER/PROPH/DIAG INJ IV PUSH: CPT

## 2022-09-08 RX ORDER — HYDROCODONE BITARTRATE AND ACETAMINOPHEN 10; 325 MG/1; MG/1
1 TABLET ORAL EVERY 6 HOURS PRN
COMMUNITY
End: 2022-09-27 | Stop reason: HOSPADM

## 2022-09-09 ENCOUNTER — APPOINTMENT (OUTPATIENT)
Dept: CT IMAGING | Facility: HOSPITAL | Age: 72
End: 2022-09-09

## 2022-09-09 VITALS
BODY MASS INDEX: 30.99 KG/M2 | OXYGEN SATURATION: 95 % | HEIGHT: 65 IN | TEMPERATURE: 98.4 F | SYSTOLIC BLOOD PRESSURE: 132 MMHG | RESPIRATION RATE: 19 BRPM | HEART RATE: 82 BPM | DIASTOLIC BLOOD PRESSURE: 45 MMHG | WEIGHT: 186 LBS

## 2022-09-09 LAB
ALBUMIN SERPL-MCNC: 4 G/DL (ref 3.5–5.2)
ALBUMIN/GLOB SERPL: 1.2 G/DL
ALP SERPL-CCNC: 97 U/L (ref 39–117)
ALT SERPL W P-5'-P-CCNC: 27 U/L (ref 1–33)
ANION GAP SERPL CALCULATED.3IONS-SCNC: 8 MMOL/L (ref 5–15)
AST SERPL-CCNC: 28 U/L (ref 1–32)
BASOPHILS # BLD AUTO: 0.08 10*3/MM3 (ref 0–0.2)
BASOPHILS NFR BLD AUTO: 0.8 % (ref 0–1.5)
BILIRUB SERPL-MCNC: 0.8 MG/DL (ref 0–1.2)
BUN SERPL-MCNC: 18 MG/DL (ref 8–23)
BUN/CREAT SERPL: 13.6 (ref 7–25)
CALCIUM SPEC-SCNC: 9.6 MG/DL (ref 8.6–10.5)
CHLORIDE SERPL-SCNC: 101 MMOL/L (ref 98–107)
CO2 SERPL-SCNC: 28 MMOL/L (ref 22–29)
CREAT SERPL-MCNC: 1.32 MG/DL (ref 0.57–1)
CRP SERPL-MCNC: 3.53 MG/DL (ref 0–0.5)
DEPRECATED RDW RBC AUTO: 46.2 FL (ref 37–54)
EGFRCR SERPLBLD CKD-EPI 2021: 43.5 ML/MIN/1.73
EOSINOPHIL # BLD AUTO: 0.08 10*3/MM3 (ref 0–0.4)
EOSINOPHIL NFR BLD AUTO: 0.8 % (ref 0.3–6.2)
ERYTHROCYTE [DISTWIDTH] IN BLOOD BY AUTOMATED COUNT: 13.2 % (ref 12.3–15.4)
GLOBULIN UR ELPH-MCNC: 3.4 GM/DL
GLUCOSE SERPL-MCNC: 96 MG/DL (ref 65–99)
HCT VFR BLD AUTO: 37.3 % (ref 34–46.6)
HGB BLD-MCNC: 12.7 G/DL (ref 12–15.9)
IMM GRANULOCYTES # BLD AUTO: 0.04 10*3/MM3 (ref 0–0.05)
IMM GRANULOCYTES NFR BLD AUTO: 0.4 % (ref 0–0.5)
LYMPHOCYTES # BLD AUTO: 1.69 10*3/MM3 (ref 0.7–3.1)
LYMPHOCYTES NFR BLD AUTO: 16.8 % (ref 19.6–45.3)
MCH RBC QN AUTO: 32.4 PG (ref 26.6–33)
MCHC RBC AUTO-ENTMCNC: 34 G/DL (ref 31.5–35.7)
MCV RBC AUTO: 95.2 FL (ref 79–97)
MONOCYTES # BLD AUTO: 0.95 10*3/MM3 (ref 0.1–0.9)
MONOCYTES NFR BLD AUTO: 9.4 % (ref 5–12)
NEUTROPHILS NFR BLD AUTO: 7.22 10*3/MM3 (ref 1.7–7)
NEUTROPHILS NFR BLD AUTO: 71.8 % (ref 42.7–76)
NRBC BLD AUTO-RTO: 0 /100 WBC (ref 0–0.2)
PLATELET # BLD AUTO: 157 10*3/MM3 (ref 140–450)
PMV BLD AUTO: 12.1 FL (ref 6–12)
POTASSIUM SERPL-SCNC: 4.4 MMOL/L (ref 3.5–5.2)
PROT SERPL-MCNC: 7.4 G/DL (ref 6–8.5)
RBC # BLD AUTO: 3.92 10*6/MM3 (ref 3.77–5.28)
SODIUM SERPL-SCNC: 137 MMOL/L (ref 136–145)
WBC NRBC COR # BLD: 10.06 10*3/MM3 (ref 3.4–10.8)

## 2022-09-09 PROCEDURE — 25010000002 ORPHENADRINE CITRATE PER 60 MG: Performed by: NURSE PRACTITIONER

## 2022-09-09 PROCEDURE — 72132 CT LUMBAR SPINE W/DYE: CPT

## 2022-09-09 PROCEDURE — 0 HYDROMORPHONE 1 MG/ML SOLUTION: Performed by: NURSE PRACTITIONER

## 2022-09-09 PROCEDURE — 96372 THER/PROPH/DIAG INJ SC/IM: CPT

## 2022-09-09 PROCEDURE — 85025 COMPLETE CBC W/AUTO DIFF WBC: CPT | Performed by: NURSE PRACTITIONER

## 2022-09-09 PROCEDURE — 96375 TX/PRO/DX INJ NEW DRUG ADDON: CPT

## 2022-09-09 PROCEDURE — 25010000002 ONDANSETRON PER 1 MG: Performed by: NURSE PRACTITIONER

## 2022-09-09 PROCEDURE — 86140 C-REACTIVE PROTEIN: CPT | Performed by: NURSE PRACTITIONER

## 2022-09-09 PROCEDURE — 25010000002 IOPAMIDOL 61 % SOLUTION: Performed by: NURSE PRACTITIONER

## 2022-09-09 PROCEDURE — 96374 THER/PROPH/DIAG INJ IV PUSH: CPT

## 2022-09-09 PROCEDURE — 96376 TX/PRO/DX INJ SAME DRUG ADON: CPT

## 2022-09-09 PROCEDURE — 80053 COMPREHEN METABOLIC PANEL: CPT | Performed by: NURSE PRACTITIONER

## 2022-09-09 RX ORDER — ONDANSETRON 2 MG/ML
4 INJECTION INTRAMUSCULAR; INTRAVENOUS ONCE
Status: COMPLETED | OUTPATIENT
Start: 2022-09-09 | End: 2022-09-09

## 2022-09-09 RX ORDER — TIZANIDINE 4 MG/1
4 TABLET ORAL ONCE
Status: COMPLETED | OUTPATIENT
Start: 2022-09-09 | End: 2022-09-09

## 2022-09-09 RX ORDER — SODIUM CHLORIDE 0.9 % (FLUSH) 0.9 %
10 SYRINGE (ML) INJECTION AS NEEDED
Status: DISCONTINUED | OUTPATIENT
Start: 2022-09-09 | End: 2022-09-09 | Stop reason: HOSPADM

## 2022-09-09 RX ORDER — ORPHENADRINE CITRATE 30 MG/ML
60 INJECTION INTRAMUSCULAR; INTRAVENOUS ONCE
Status: COMPLETED | OUTPATIENT
Start: 2022-09-09 | End: 2022-09-09

## 2022-09-09 RX ORDER — TIZANIDINE HYDROCHLORIDE 4 MG/1
4 CAPSULE, GELATIN COATED ORAL 3 TIMES DAILY
Qty: 12 CAPSULE | Refills: 0 | Status: SHIPPED | OUTPATIENT
Start: 2022-09-09 | End: 2022-09-13

## 2022-09-09 RX ADMIN — TIZANIDINE 4 MG: 4 TABLET ORAL at 03:50

## 2022-09-09 RX ADMIN — HYDROMORPHONE HYDROCHLORIDE 1 MG: 1 INJECTION, SOLUTION INTRAMUSCULAR; INTRAVENOUS; SUBCUTANEOUS at 00:54

## 2022-09-09 RX ADMIN — HYDROMORPHONE HYDROCHLORIDE 1 MG: 1 INJECTION, SOLUTION INTRAMUSCULAR; INTRAVENOUS; SUBCUTANEOUS at 02:29

## 2022-09-09 RX ADMIN — SODIUM CHLORIDE, POTASSIUM CHLORIDE, SODIUM LACTATE AND CALCIUM CHLORIDE 1000 ML: 600; 310; 30; 20 INJECTION, SOLUTION INTRAVENOUS at 02:30

## 2022-09-09 RX ADMIN — IOPAMIDOL 100 ML: 612 INJECTION, SOLUTION INTRAVENOUS at 01:44

## 2022-09-09 RX ADMIN — ORPHENADRINE CITRATE 60 MG: 30 INJECTION INTRAMUSCULAR; INTRAVENOUS at 01:08

## 2022-09-09 RX ADMIN — ONDANSETRON 4 MG: 2 INJECTION INTRAMUSCULAR; INTRAVENOUS at 01:10

## 2022-09-09 NOTE — ED PROVIDER NOTES
Subjective   PIT    72 yof with PMH of arthritis, R breast cancer, hypothyroidism, HTN and restless leg syndrome presents with c/o low back pain.  She states several weeks ago, she developed low back pain.  She denies any injury.  She was seen by her PCP (GARY Sherwood) and dx with an L3 fracture.  She then had an L3 kyphoplasty on 08/18/22 by Dr Fajardo in [place], [place].  She states she was having some mild pain but seemed to be improving. The last couple of days, she developed what she describes as 'muscle spasms' like a contraction. She started physical therapy yesterday and the spasms have worsened.  She denies any new injury.  She denies saddle anesthesia or bowel/bladder incontinence. She states she is ambulating.  She denies fever.  She denies CP or SOB.  She denies n/v/d.  She has no weakness of the extremities.  She is prescribed norco and neurontin routinely for pain.          Review of Systems   Constitutional: Negative for activity change, appetite change, fatigue and fever.   HENT: Negative for congestion, ear pain, facial swelling and sore throat.    Eyes: Negative for discharge and visual disturbance.   Respiratory: Negative for apnea, chest tightness, shortness of breath, wheezing and stridor.    Cardiovascular: Negative for chest pain and palpitations.   Gastrointestinal: Negative for abdominal distention, abdominal pain, diarrhea, nausea and vomiting.   Genitourinary: Negative for difficulty urinating and dysuria.   Musculoskeletal: Positive for back pain. Negative for arthralgias and myalgias.   Skin: Negative for rash and wound.   Neurological: Negative for dizziness and seizures.   Psychiatric/Behavioral: Negative for agitation and confusion.       Past Medical History:   Diagnosis Date   • Abnormal weight gain    • Arthritis    • Breast cancer (HCC)     right.    • Fatigue    • H/O melanoma excision     CLARKS  LEVEL III      BACK OF UPPER LEFT ARM   • Hypertension    • Hypothyroidism    • Injury of  back    • Melanoma (HCC)    • Restless leg syndrome    • Shortness of breath        Allergies   Allergen Reactions   • Requip [Ropinirole Hcl] Nausea And Vomiting       Past Surgical History:   Procedure Laterality Date   • APPENDECTOMY     • BACK SURGERY     • BREAST BIOPSY Right    • BREAST BIOPSY     • BREAST LUMPECTOMY      right.    • BROW LIFT     • COLONOSCOPY     • COLONOSCOPY N/A 02/14/2020    Procedure: COLONOSCOPY WITH ANESTHESIA;  Surgeon: Donovan Hernandez MD;  Location: [facility] ENDOSCOPY;  Service: Gastroenterology;  Laterality: N/A;  pre: family hx colon polyps  post: polyps  Janak Sherwood APRN   • ENDOSCOPY N/A 02/14/2020    Procedure: ESOPHAGOGASTRODUODENOSCOPY WITH ANESTHESIA;  Surgeon: Donovan Hernandez MD;  Location: [facility] ENDOSCOPY;  Service: Gastroenterology;  Laterality: N/A;  pre: dysphagia; heartburn  post: dilated  Janak Sherwood APRN   • ENDOSCOPY AND COLONOSCOPY  12/22/2011    very minimal distal esophagitis, incomplete esophagus ring dilated   • LUMBAR SPINAL TUMOR REMOVAL      Spinal Cyst removed from Spinal Canal   • MASTECTOMY, PARTIAL     • PARTIAL HIP ARTHROPLASTY Left    • SKIN CANCER EXCISION      Melanoma    • SQUAMOUS CELL CARCINOMA EXCISION     • TEMPOROMANDIBULAR JOINT ARTHROPLASTY     • TOTAL ABDOMINAL HYSTERECTOMY WITH SALPINGO OOPHORECTOMY Bilateral        Family History   Problem Relation Age of Onset   • Arthritis Mother    • Seizures Mother    • Diabetes Father    • Skin cancer Father    • Heart attack Father    • Heart disease Father    • Colon polyps Father    • Arthritis Sister    • Ulcers Brother    • No Known Problems Maternal Grandmother    • No Known Problems Maternal Grandfather    • No Known Problems Paternal Grandmother    • No Known Problems Paternal Grandfather    • Ulcers Sister    • Colon cancer Neg Hx        Social History     Socioeconomic History   • Marital status:    Tobacco Use   • Smoking status: Former Smoker     Types:  Cigarettes   • Smokeless tobacco: Never Used   Substance and Sexual Activity   • Alcohol use: Yes     Comment: rare   • Drug use: No   • Sexual activity: Yes     Partners: Male     Birth control/protection: Surgical           Objective   Physical Exam  Vitals and nursing note reviewed.   Constitutional:       General: She is not in acute distress.     Appearance: She is well-developed. She is not ill-appearing or toxic-appearing.   HENT:      Head: Normocephalic.   Eyes:      Pupils: Pupils are equal, round, and reactive to light.   Cardiovascular:      Rate and Rhythm: Normal rate and regular rhythm.      Heart sounds: No murmur heard.  Pulmonary:      Effort: Pulmonary effort is normal.      Breath sounds: Normal breath sounds.   Abdominal:      General: Bowel sounds are normal.      Palpations: Abdomen is soft.   Musculoskeletal:      Cervical back: Normal range of motion and neck supple.      Lumbar back: Spasms present. No swelling, deformity or tenderness. Decreased range of motion.      Comments: No redness or swelling present to the lumbar spine.  She has decreased ROM secondary to pain.  She has +PMS of all extremities.   Skin:     General: Skin is warm and dry.   Neurological:      Mental Status: She is alert and oriented to person, place, and time.      GCS: GCS eye subscore is 4. GCS verbal subscore is 5. GCS motor subscore is 6.      Comments: She is neurologically intact         Procedures           ED Course  ED Course as of 09/14/22 2137   Fri Sep 09, 2022   0215 Her creatinine is elevated today at 1.32. Her baseline from a year ago is 0.44. I discussed her case with Dr Benedict.  We will give her IVF and repeat lab work (BMP). She has been up ambulating in the room with assistance.  [KS]   0241 Transfer of care to Dr Benedict.  [KS]      ED Course User Index  [KS] Nani, Glynn Gramajo, APRN                                           MDM  Number of Diagnoses or Management Options     Amount and/or  Complexity of Data Reviewed  Clinical lab tests: ordered and reviewed  Tests in the radiology section of CPT®: ordered and reviewed  Decide to obtain previous medical records or to obtain history from someone other than the patient: yes  Discuss the patient with other providers: yes    Risk of Complications, Morbidity, and/or Mortality  Presenting problems: minimal  Diagnostic procedures: minimal  Management options: minimal    Patient Progress  Patient progress: stable      Final diagnoses:   Muscle spasm   History of kyphoplasty   Elevated serum creatinine       ED Disposition  ED Disposition     ED Disposition   Discharge    Condition   Stable    Comment   --             Janak Sherwood, APRN  [address] [facility] [id number] [phone]    Schedule an appointment as soon as possible for a visit in 3 days      Your Spine Surgeon    Schedule an appointment as soon as possible for a visit in 1 week      [facility]  [address] [place] [id number] [phone]             Medication List      ASK your doctor about these medications    TiZANidine 4 MG capsule  Commonly known as: Zanaflex  Take 1 capsule by mouth 3 (Three) Times a Day for 4 days.  Ask about: Should I take this medication?           Where to Get Your Medications      These medications were sent to [facility] [address] [place] [address] [place] - [address] - [phone]  - [phone] FX  [address] [facility] [address]    Phone: [phone]   · TiZANidine 4 MG capsule          Glynn Cardoza, APRTIFFANIE  09/14/22 8256

## 2022-09-09 NOTE — DISCHARGE INSTRUCTIONS
Today you are seen for your symptoms are most consistent with muscle spasms.  Your CT imaging did not show any complications from your recent surgery.  I would like to follow close with your primary care provider for further management and I also like you to discuss your issues with your back surgeon please call to schedule appointment within 1 week.  If your symptoms worsen prior particular new weakness or numbness please immediately return to the emergency department.

## 2022-09-09 NOTE — PROGRESS NOTES
Patient was signed out to me by outgoing advanced practice provider is a patient who had recent kyphoplasty and was presenting with what the patient described as muscle spasms.  No numbness or weakness was seen on exam patient's exam otherwise reassuring but patient having some pain.    Patient previously received Dilaudid as well as Norflex and Zofran.    CT Lumbar Spine With Contrast    (Results Pending)     CT imaging was reviewed by me.  Per overnight stat read read no acute abnormality.  Evidence of prior surgery.    I discussed these results with the patient as well as gave her 1 dose of Zanaflex with plan to discharge her with a prescription for Zanaflex.  I instructed her to follow-up close with her primary care provider within 3 days for further management and also to call her spine surgeon to let them know that she is having issues with muscle spasm since her surgery.  I should return return to the emergency department if symptoms worsen.    Patient's presentation most is the mud spasms.  In the setting of recent surgery would consider surgical complication including fracture fragment Gratian but there is no evidence of acute abnormality on CT imaging.  She has no numbness or weakness of a concern for spinal cord compression.  Has had no recent trauma and otherwise no evidence of acute fractures.    Patient was discharged home.    ED diagnosis:  Muscle spasms  History of kyphoplasty

## 2022-09-12 ENCOUNTER — HOSPITAL ENCOUNTER (OUTPATIENT)
Dept: MRI IMAGING | Age: 72
Discharge: HOME | End: 2022-09-12
Payer: MEDICARE

## 2022-09-12 ENCOUNTER — TRANSCRIBE ORDERS (OUTPATIENT)
Dept: ADMINISTRATIVE | Age: 72
End: 2022-09-12

## 2022-09-12 DIAGNOSIS — M48.56XS COLLAPSED VERTEBRA, NOT ELSEWHERE CLASSIFIED, LUMBAR REGION, SEQUELA OF FRACTURE: ICD-10-CM

## 2022-09-12 DIAGNOSIS — M48.56XS COLLAPSED VERTEBRA, NOT ELSEWHERE CLASSIFIED, LUMBAR REGION, SEQUELA OF FRACTURE: Primary | ICD-10-CM

## 2022-09-12 PROCEDURE — 72148 MRI LUMBAR SPINE W/O DYE: CPT

## 2022-09-12 PROCEDURE — 72148 MRI LUMBAR SPINE W/O DYE: CPT | Performed by: RADIOLOGY

## 2022-09-18 ENCOUNTER — APPOINTMENT (OUTPATIENT)
Dept: MRI IMAGING | Facility: HOSPITAL | Age: 72
End: 2022-09-18

## 2022-09-18 ENCOUNTER — HOSPITAL ENCOUNTER (INPATIENT)
Facility: HOSPITAL | Age: 72
LOS: 7 days | Discharge: HOME | End: 2022-09-27
Attending: EMERGENCY MEDICINE | Admitting: FAMILY MEDICINE

## 2022-09-18 DIAGNOSIS — D72.829 LEUKOCYTOSIS, UNSPECIFIED TYPE: ICD-10-CM

## 2022-09-18 DIAGNOSIS — Z74.09 IMPAIRED FUNCTIONAL MOBILITY AND ACTIVITY TOLERANCE: ICD-10-CM

## 2022-09-18 DIAGNOSIS — U07.1 PNEUMONIA DUE TO COVID-19 VIRUS: ICD-10-CM

## 2022-09-18 DIAGNOSIS — J12.82 PNEUMONIA DUE TO COVID-19 VIRUS: ICD-10-CM

## 2022-09-18 DIAGNOSIS — K11.7 XEROSTOMIA: ICD-10-CM

## 2022-09-18 DIAGNOSIS — J80 ACUTE RESPIRATORY DISTRESS SYNDROME (ARDS) DUE TO COVID-19 VIRUS: ICD-10-CM

## 2022-09-18 DIAGNOSIS — R49.0 DYSPHONIA: ICD-10-CM

## 2022-09-18 DIAGNOSIS — Z98.890 H/O KYPHOPLASTY: ICD-10-CM

## 2022-09-18 DIAGNOSIS — Z78.9 IMPAIRED MOBILITY AND ADLS: ICD-10-CM

## 2022-09-18 DIAGNOSIS — U07.1 ACUTE RESPIRATORY DISTRESS SYNDROME (ARDS) DUE TO COVID-19 VIRUS: ICD-10-CM

## 2022-09-18 DIAGNOSIS — R12 HEARTBURN: ICD-10-CM

## 2022-09-18 DIAGNOSIS — Z83.71 FAMILY HX COLONIC POLYPS: ICD-10-CM

## 2022-09-18 DIAGNOSIS — B94.8 POST VIRAL DEBILITY: ICD-10-CM

## 2022-09-18 DIAGNOSIS — R53.81 POST VIRAL DEBILITY: ICD-10-CM

## 2022-09-18 DIAGNOSIS — M54.9 INTRACTABLE BACK PAIN: Primary | ICD-10-CM

## 2022-09-18 DIAGNOSIS — E66.9 OBESITY (BMI 30-39.9): ICD-10-CM

## 2022-09-18 DIAGNOSIS — R79.89 ELEVATED FERRITIN: ICD-10-CM

## 2022-09-18 DIAGNOSIS — Z74.09 IMPAIRED MOBILITY AND ADLS: ICD-10-CM

## 2022-09-18 DIAGNOSIS — D69.6 THROMBOCYTOPENIA: ICD-10-CM

## 2022-09-18 DIAGNOSIS — J96.01 ACUTE RESPIRATORY FAILURE WITH HYPOXIA: ICD-10-CM

## 2022-09-18 DIAGNOSIS — R79.89 ELEVATED LFTS: ICD-10-CM

## 2022-09-18 DIAGNOSIS — R13.12 DYSPHAGIA, OROPHARYNGEAL: ICD-10-CM

## 2022-09-18 DIAGNOSIS — R49.1 APHONIA: ICD-10-CM

## 2022-09-18 LAB
ALBUMIN SERPL-MCNC: 3.6 G/DL (ref 3.5–5.2)
ALBUMIN/GLOB SERPL: 1.1 G/DL
ALP SERPL-CCNC: 99 U/L (ref 39–117)
ALT SERPL W P-5'-P-CCNC: 35 U/L (ref 1–33)
ANION GAP SERPL CALCULATED.3IONS-SCNC: 10 MMOL/L (ref 5–15)
AST SERPL-CCNC: 66 U/L (ref 1–32)
BASOPHILS # BLD AUTO: 0.02 10*3/MM3 (ref 0–0.2)
BASOPHILS NFR BLD AUTO: 0.4 % (ref 0–1.5)
BILIRUB SERPL-MCNC: 0.5 MG/DL (ref 0–1.2)
BUN SERPL-MCNC: 14 MG/DL (ref 8–23)
BUN/CREAT SERPL: 13.5 (ref 7–25)
CALCIUM SPEC-SCNC: 9.4 MG/DL (ref 8.6–10.5)
CHLORIDE SERPL-SCNC: 97 MMOL/L (ref 98–107)
CO2 SERPL-SCNC: 26 MMOL/L (ref 22–29)
CREAT SERPL-MCNC: 1.04 MG/DL (ref 0.57–1)
CRP SERPL-MCNC: 5.91 MG/DL (ref 0–0.5)
DEPRECATED RDW RBC AUTO: 46.7 FL (ref 37–54)
EGFRCR SERPLBLD CKD-EPI 2021: 57.9 ML/MIN/1.73
EOSINOPHIL # BLD AUTO: 0.03 10*3/MM3 (ref 0–0.4)
EOSINOPHIL NFR BLD AUTO: 0.7 % (ref 0.3–6.2)
ERYTHROCYTE [DISTWIDTH] IN BLOOD BY AUTOMATED COUNT: 13.2 % (ref 12.3–15.4)
GLOBULIN UR ELPH-MCNC: 3.3 GM/DL
GLUCOSE SERPL-MCNC: 96 MG/DL (ref 65–99)
HCT VFR BLD AUTO: 36 % (ref 34–46.6)
HGB BLD-MCNC: 11.9 G/DL (ref 12–15.9)
IMM GRANULOCYTES # BLD AUTO: 0.02 10*3/MM3 (ref 0–0.05)
IMM GRANULOCYTES NFR BLD AUTO: 0.4 % (ref 0–0.5)
LYMPHOCYTES # BLD AUTO: 1.07 10*3/MM3 (ref 0.7–3.1)
LYMPHOCYTES NFR BLD AUTO: 23.9 % (ref 19.6–45.3)
MCH RBC QN AUTO: 31.6 PG (ref 26.6–33)
MCHC RBC AUTO-ENTMCNC: 33.1 G/DL (ref 31.5–35.7)
MCV RBC AUTO: 95.5 FL (ref 79–97)
MONOCYTES # BLD AUTO: 0.47 10*3/MM3 (ref 0.1–0.9)
MONOCYTES NFR BLD AUTO: 10.5 % (ref 5–12)
NEUTROPHILS NFR BLD AUTO: 2.87 10*3/MM3 (ref 1.7–7)
NEUTROPHILS NFR BLD AUTO: 64.1 % (ref 42.7–76)
NRBC BLD AUTO-RTO: 0 /100 WBC (ref 0–0.2)
PLATELET # BLD AUTO: 129 10*3/MM3 (ref 140–450)
PMV BLD AUTO: 12.1 FL (ref 6–12)
POTASSIUM SERPL-SCNC: 4.5 MMOL/L (ref 3.5–5.2)
PROCALCITONIN SERPL-MCNC: 0.36 NG/ML (ref 0–0.25)
PROT SERPL-MCNC: 6.9 G/DL (ref 6–8.5)
RBC # BLD AUTO: 3.77 10*6/MM3 (ref 3.77–5.28)
SODIUM SERPL-SCNC: 133 MMOL/L (ref 136–145)
WBC NRBC COR # BLD: 4.48 10*3/MM3 (ref 3.4–10.8)

## 2022-09-18 PROCEDURE — 25010000002 METHYLPREDNISOLONE PER 125 MG: Performed by: INTERNAL MEDICINE

## 2022-09-18 PROCEDURE — G0378 HOSPITAL OBSERVATION PER HR: HCPCS

## 2022-09-18 PROCEDURE — 25010000002 KETOROLAC TROMETHAMINE PER 15 MG: Performed by: EMERGENCY MEDICINE

## 2022-09-18 PROCEDURE — 72158 MRI LUMBAR SPINE W/O & W/DYE: CPT

## 2022-09-18 PROCEDURE — A9577 INJ MULTIHANCE: HCPCS | Performed by: EMERGENCY MEDICINE

## 2022-09-18 PROCEDURE — 0 GADOBENATE DIMEGLUMINE 529 MG/ML SOLUTION: Performed by: EMERGENCY MEDICINE

## 2022-09-18 PROCEDURE — 85025 COMPLETE CBC W/AUTO DIFF WBC: CPT | Performed by: EMERGENCY MEDICINE

## 2022-09-18 PROCEDURE — 80053 COMPREHEN METABOLIC PANEL: CPT | Performed by: EMERGENCY MEDICINE

## 2022-09-18 PROCEDURE — 25010000002 ORPHENADRINE CITRATE PER 60 MG: Performed by: EMERGENCY MEDICINE

## 2022-09-18 PROCEDURE — 86140 C-REACTIVE PROTEIN: CPT | Performed by: EMERGENCY MEDICINE

## 2022-09-18 PROCEDURE — 25010000002 CEFTRIAXONE PER 250 MG: Performed by: INTERNAL MEDICINE

## 2022-09-18 PROCEDURE — 25010000002 ONDANSETRON PER 1 MG: Performed by: EMERGENCY MEDICINE

## 2022-09-18 PROCEDURE — 99284 EMERGENCY DEPT VISIT MOD MDM: CPT

## 2022-09-18 PROCEDURE — 0 HYDROMORPHONE 1 MG/ML SOLUTION: Performed by: EMERGENCY MEDICINE

## 2022-09-18 PROCEDURE — 84145 PROCALCITONIN (PCT): CPT | Performed by: EMERGENCY MEDICINE

## 2022-09-18 RX ORDER — ORPHENADRINE CITRATE 30 MG/ML
60 INJECTION INTRAMUSCULAR; INTRAVENOUS ONCE
Status: COMPLETED | OUTPATIENT
Start: 2022-09-18 | End: 2022-09-18

## 2022-09-18 RX ORDER — ONDANSETRON 2 MG/ML
4 INJECTION INTRAMUSCULAR; INTRAVENOUS EVERY 6 HOURS PRN
Status: DISCONTINUED | OUTPATIENT
Start: 2022-09-18 | End: 2022-09-27 | Stop reason: HOSPADM

## 2022-09-18 RX ORDER — HYDROMORPHONE HYDROCHLORIDE 1 MG/ML
0.5 INJECTION, SOLUTION INTRAMUSCULAR; INTRAVENOUS; SUBCUTANEOUS
Status: DISCONTINUED | OUTPATIENT
Start: 2022-09-18 | End: 2022-09-20 | Stop reason: RX

## 2022-09-18 RX ORDER — SODIUM CHLORIDE 0.9 % (FLUSH) 0.9 %
10 SYRINGE (ML) INJECTION AS NEEDED
Status: DISCONTINUED | OUTPATIENT
Start: 2022-09-18 | End: 2022-09-27 | Stop reason: HOSPADM

## 2022-09-18 RX ORDER — CIPROFLOXACIN 250 MG/1
250 TABLET, FILM COATED ORAL 2 TIMES DAILY
COMMUNITY
End: 2022-09-27 | Stop reason: HOSPADM

## 2022-09-18 RX ORDER — ACETAMINOPHEN 325 MG/1
650 TABLET ORAL EVERY 4 HOURS PRN
Status: DISCONTINUED | OUTPATIENT
Start: 2022-09-18 | End: 2022-09-27 | Stop reason: HOSPADM

## 2022-09-18 RX ORDER — LEVOTHYROXINE SODIUM 0.12 MG/1
125 TABLET ORAL
Status: DISCONTINUED | OUTPATIENT
Start: 2022-09-19 | End: 2022-09-27 | Stop reason: HOSPADM

## 2022-09-18 RX ORDER — ASPIRIN 81 MG/1
81 TABLET ORAL DAILY
Status: DISCONTINUED | OUTPATIENT
Start: 2022-09-19 | End: 2022-09-27 | Stop reason: HOSPADM

## 2022-09-18 RX ORDER — OXYCODONE AND ACETAMINOPHEN 10; 325 MG/1; MG/1
1 TABLET ORAL EVERY 6 HOURS PRN
Status: DISCONTINUED | OUTPATIENT
Start: 2022-09-18 | End: 2022-09-27 | Stop reason: HOSPADM

## 2022-09-18 RX ORDER — TAMOXIFEN CITRATE 20 MG/1
20 TABLET ORAL DAILY
Status: DISCONTINUED | OUTPATIENT
Start: 2022-09-19 | End: 2022-09-27 | Stop reason: HOSPADM

## 2022-09-18 RX ORDER — OXYBUTYNIN CHLORIDE 5 MG/1
10 TABLET, EXTENDED RELEASE ORAL DAILY
Status: DISCONTINUED | OUTPATIENT
Start: 2022-09-19 | End: 2022-09-27 | Stop reason: HOSPADM

## 2022-09-18 RX ORDER — DOCUSATE SODIUM 100 MG/1
100 CAPSULE, LIQUID FILLED ORAL 2 TIMES DAILY
Status: DISCONTINUED | OUTPATIENT
Start: 2022-09-18 | End: 2022-09-19

## 2022-09-18 RX ORDER — KETOROLAC TROMETHAMINE 30 MG/ML
30 INJECTION, SOLUTION INTRAMUSCULAR; INTRAVENOUS ONCE
Status: COMPLETED | OUTPATIENT
Start: 2022-09-18 | End: 2022-09-18

## 2022-09-18 RX ORDER — SODIUM CHLORIDE 0.9 % (FLUSH) 0.9 %
10 SYRINGE (ML) INJECTION EVERY 12 HOURS SCHEDULED
Status: DISCONTINUED | OUTPATIENT
Start: 2022-09-18 | End: 2022-09-27 | Stop reason: HOSPADM

## 2022-09-18 RX ORDER — BUDESONIDE AND FORMOTEROL FUMARATE DIHYDRATE 160; 4.5 UG/1; UG/1
2 AEROSOL RESPIRATORY (INHALATION) 2 TIMES DAILY
Status: DISCONTINUED | OUTPATIENT
Start: 2022-09-18 | End: 2022-09-27 | Stop reason: HOSPADM

## 2022-09-18 RX ORDER — ONDANSETRON 2 MG/ML
4 INJECTION INTRAMUSCULAR; INTRAVENOUS ONCE
Status: COMPLETED | OUTPATIENT
Start: 2022-09-18 | End: 2022-09-18

## 2022-09-18 RX ORDER — GABAPENTIN 300 MG/1
600 CAPSULE ORAL NIGHTLY
Status: DISCONTINUED | OUTPATIENT
Start: 2022-09-18 | End: 2022-09-27 | Stop reason: HOSPADM

## 2022-09-18 RX ORDER — CYCLOBENZAPRINE HCL 10 MG
5 TABLET ORAL 3 TIMES DAILY PRN
Status: DISCONTINUED | OUTPATIENT
Start: 2022-09-18 | End: 2022-09-19

## 2022-09-18 RX ORDER — METHYLPREDNISOLONE SODIUM SUCCINATE 125 MG/2ML
60 INJECTION, POWDER, LYOPHILIZED, FOR SOLUTION INTRAMUSCULAR; INTRAVENOUS EVERY 8 HOURS
Status: DISCONTINUED | OUTPATIENT
Start: 2022-09-18 | End: 2022-09-22

## 2022-09-18 RX ORDER — LISINOPRIL 5 MG/1
5 TABLET ORAL
Status: DISCONTINUED | OUTPATIENT
Start: 2022-09-19 | End: 2022-09-27 | Stop reason: HOSPADM

## 2022-09-18 RX ADMIN — ORPHENADRINE CITRATE 60 MG: 30 INJECTION INTRAMUSCULAR; INTRAVENOUS at 17:44

## 2022-09-18 RX ADMIN — CEFTRIAXONE 1 G: 1 INJECTION, POWDER, FOR SOLUTION INTRAMUSCULAR; INTRAVENOUS at 23:26

## 2022-09-18 RX ADMIN — HYDROMORPHONE HYDROCHLORIDE 1 MG: 1 INJECTION, SOLUTION INTRAMUSCULAR; INTRAVENOUS; SUBCUTANEOUS at 21:56

## 2022-09-18 RX ADMIN — GABAPENTIN 300 MG: 300 CAPSULE ORAL at 23:30

## 2022-09-18 RX ADMIN — GABAPENTIN 300 MG: 300 CAPSULE ORAL at 23:26

## 2022-09-18 RX ADMIN — ONDANSETRON 4 MG: 2 INJECTION INTRAMUSCULAR; INTRAVENOUS at 17:31

## 2022-09-18 RX ADMIN — GADOBENATE DIMEGLUMINE 16 ML: 529 INJECTION, SOLUTION INTRAVENOUS at 20:30

## 2022-09-18 RX ADMIN — DOCUSATE SODIUM 100 MG: 100 CAPSULE, LIQUID FILLED ORAL at 23:27

## 2022-09-18 RX ADMIN — HYDROMORPHONE HYDROCHLORIDE 1 MG: 1 INJECTION, SOLUTION INTRAMUSCULAR; INTRAVENOUS; SUBCUTANEOUS at 17:30

## 2022-09-18 RX ADMIN — Medication 10 ML: at 23:27

## 2022-09-18 RX ADMIN — METHYLPREDNISOLONE SODIUM SUCCINATE 60 MG: 125 INJECTION, POWDER, FOR SOLUTION INTRAMUSCULAR; INTRAVENOUS at 23:26

## 2022-09-18 RX ADMIN — OXYCODONE AND ACETAMINOPHEN 1 TABLET: 325; 10 TABLET ORAL at 23:47

## 2022-09-18 RX ADMIN — KETOROLAC TROMETHAMINE 30 MG: 30 INJECTION, SOLUTION INTRAMUSCULAR; INTRAVENOUS at 17:45

## 2022-09-19 LAB
BASOPHILS # BLD AUTO: 0.01 10*3/MM3 (ref 0–0.2)
BASOPHILS NFR BLD AUTO: 0.3 % (ref 0–1.5)
DEPRECATED RDW RBC AUTO: 43.8 FL (ref 37–54)
EOSINOPHIL # BLD AUTO: 0 10*3/MM3 (ref 0–0.4)
EOSINOPHIL NFR BLD AUTO: 0 % (ref 0.3–6.2)
ERYTHROCYTE [DISTWIDTH] IN BLOOD BY AUTOMATED COUNT: 12.5 % (ref 12.3–15.4)
ERYTHROCYTE [SEDIMENTATION RATE] IN BLOOD: 63 MM/HR (ref 0–30)
HCT VFR BLD AUTO: 34.7 % (ref 34–46.6)
HGB BLD-MCNC: 11.2 G/DL (ref 12–15.9)
IMM GRANULOCYTES # BLD AUTO: 0.02 10*3/MM3 (ref 0–0.05)
IMM GRANULOCYTES NFR BLD AUTO: 0.6 % (ref 0–0.5)
LYMPHOCYTES # BLD AUTO: 0.47 10*3/MM3 (ref 0.7–3.1)
LYMPHOCYTES NFR BLD AUTO: 13.1 % (ref 19.6–45.3)
MCH RBC QN AUTO: 30.6 PG (ref 26.6–33)
MCHC RBC AUTO-ENTMCNC: 32.3 G/DL (ref 31.5–35.7)
MCV RBC AUTO: 94.8 FL (ref 79–97)
MONOCYTES # BLD AUTO: 0.07 10*3/MM3 (ref 0.1–0.9)
MONOCYTES NFR BLD AUTO: 1.9 % (ref 5–12)
NEUTROPHILS NFR BLD AUTO: 3.03 10*3/MM3 (ref 1.7–7)
NEUTROPHILS NFR BLD AUTO: 84.1 % (ref 42.7–76)
NRBC BLD AUTO-RTO: 0 /100 WBC (ref 0–0.2)
PLATELET # BLD AUTO: 122 10*3/MM3 (ref 140–450)
PMV BLD AUTO: 12.3 FL (ref 6–12)
RBC # BLD AUTO: 3.66 10*6/MM3 (ref 3.77–5.28)
WBC NRBC COR # BLD: 3.6 10*3/MM3 (ref 3.4–10.8)

## 2022-09-19 PROCEDURE — 85025 COMPLETE CBC W/AUTO DIFF WBC: CPT | Performed by: INTERNAL MEDICINE

## 2022-09-19 PROCEDURE — 85652 RBC SED RATE AUTOMATED: CPT | Performed by: INTERNAL MEDICINE

## 2022-09-19 PROCEDURE — G0378 HOSPITAL OBSERVATION PER HR: HCPCS

## 2022-09-19 PROCEDURE — 94799 UNLISTED PULMONARY SVC/PX: CPT

## 2022-09-19 PROCEDURE — 97162 PT EVAL MOD COMPLEX 30 MIN: CPT

## 2022-09-19 PROCEDURE — 97116 GAIT TRAINING THERAPY: CPT

## 2022-09-19 PROCEDURE — 25010000002 METHYLPREDNISOLONE PER 125 MG: Performed by: INTERNAL MEDICINE

## 2022-09-19 PROCEDURE — 94664 DEMO&/EVAL PT USE INHALER: CPT

## 2022-09-19 PROCEDURE — 97166 OT EVAL MOD COMPLEX 45 MIN: CPT | Performed by: OCCUPATIONAL THERAPIST

## 2022-09-19 PROCEDURE — 25010000002 CEFTRIAXONE PER 250 MG: Performed by: INTERNAL MEDICINE

## 2022-09-19 PROCEDURE — 94640 AIRWAY INHALATION TREATMENT: CPT

## 2022-09-19 RX ORDER — TIZANIDINE 4 MG/1
1 TABLET ORAL 3 TIMES DAILY
Status: ON HOLD | COMMUNITY
Start: 2022-05-10 | End: 2022-09-19

## 2022-09-19 RX ORDER — POLYETHYLENE GLYCOL 3350 17 G/17G
17 POWDER, FOR SOLUTION ORAL DAILY
Status: DISCONTINUED | OUTPATIENT
Start: 2022-09-19 | End: 2022-09-27 | Stop reason: HOSPADM

## 2022-09-19 RX ORDER — ASCORBIC ACID 500 MG
500 TABLET ORAL DAILY
COMMUNITY

## 2022-09-19 RX ORDER — CYCLOBENZAPRINE HCL 10 MG
10 TABLET ORAL 3 TIMES DAILY PRN
Status: DISCONTINUED | OUTPATIENT
Start: 2022-09-19 | End: 2022-09-23

## 2022-09-19 RX ORDER — OXYBUTYNIN CHLORIDE 5 MG/1
1 TABLET ORAL 2 TIMES DAILY
COMMUNITY
Start: 2022-05-25

## 2022-09-19 RX ORDER — CARISOPRODOL 350 MG/1
350 TABLET ORAL 3 TIMES DAILY PRN
COMMUNITY
End: 2022-09-27 | Stop reason: HOSPADM

## 2022-09-19 RX ORDER — DOCUSATE SODIUM 100 MG/1
100 CAPSULE, LIQUID FILLED ORAL 2 TIMES DAILY
Status: DISCONTINUED | OUTPATIENT
Start: 2022-09-19 | End: 2022-09-27 | Stop reason: HOSPADM

## 2022-09-19 RX ADMIN — OXYCODONE AND ACETAMINOPHEN 1 TABLET: 325; 10 TABLET ORAL at 06:12

## 2022-09-19 RX ADMIN — CYCLOBENZAPRINE 10 MG: 10 TABLET, FILM COATED ORAL at 22:58

## 2022-09-19 RX ADMIN — ASPIRIN 81 MG: 81 TABLET, COATED ORAL at 09:31

## 2022-09-19 RX ADMIN — OXYCODONE AND ACETAMINOPHEN 1 TABLET: 325; 10 TABLET ORAL at 22:58

## 2022-09-19 RX ADMIN — BUDESONIDE AND FORMOTEROL FUMARATE DIHYDRATE 2 PUFF: 160; 4.5 AEROSOL RESPIRATORY (INHALATION) at 20:53

## 2022-09-19 RX ADMIN — GABAPENTIN 600 MG: 300 CAPSULE ORAL at 20:39

## 2022-09-19 RX ADMIN — METHYLPREDNISOLONE SODIUM SUCCINATE 60 MG: 125 INJECTION, POWDER, FOR SOLUTION INTRAMUSCULAR; INTRAVENOUS at 22:59

## 2022-09-19 RX ADMIN — OXYBUTYNIN CHLORIDE 10 MG: 5 TABLET, EXTENDED RELEASE ORAL at 09:31

## 2022-09-19 RX ADMIN — Medication 10 ML: at 09:50

## 2022-09-19 RX ADMIN — LISINOPRIL 5 MG: 5 TABLET ORAL at 09:30

## 2022-09-19 RX ADMIN — DOCUSATE SODIUM 100 MG: 100 CAPSULE, LIQUID FILLED ORAL at 09:30

## 2022-09-19 RX ADMIN — CEFTRIAXONE 1 G: 1 INJECTION, POWDER, FOR SOLUTION INTRAMUSCULAR; INTRAVENOUS at 22:59

## 2022-09-19 RX ADMIN — CYCLOBENZAPRINE 5 MG: 10 TABLET, FILM COATED ORAL at 15:18

## 2022-09-19 RX ADMIN — DOCUSATE SODIUM 100 MG: 100 CAPSULE, LIQUID FILLED ORAL at 12:16

## 2022-09-19 RX ADMIN — TAMOXIFEN CITRATE 20 MG: 20 TABLET ORAL at 08:47

## 2022-09-19 RX ADMIN — Medication 10 ML: at 20:39

## 2022-09-19 RX ADMIN — BUDESONIDE AND FORMOTEROL FUMARATE DIHYDRATE 2 PUFF: 160; 4.5 AEROSOL RESPIRATORY (INHALATION) at 06:44

## 2022-09-19 RX ADMIN — METHYLPREDNISOLONE SODIUM SUCCINATE 60 MG: 125 INJECTION, POWDER, FOR SOLUTION INTRAMUSCULAR; INTRAVENOUS at 06:13

## 2022-09-19 RX ADMIN — LEVOTHYROXINE SODIUM 125 MCG: 125 TABLET ORAL at 06:36

## 2022-09-19 RX ADMIN — METHYLPREDNISOLONE SODIUM SUCCINATE 60 MG: 125 INJECTION, POWDER, FOR SOLUTION INTRAMUSCULAR; INTRAVENOUS at 08:47

## 2022-09-19 RX ADMIN — POLYETHYLENE GLYCOL 3350 17 G: 17 POWDER, FOR SOLUTION ORAL at 12:17

## 2022-09-19 RX ADMIN — METHYLPREDNISOLONE SODIUM SUCCINATE 60 MG: 125 INJECTION, POWDER, FOR SOLUTION INTRAMUSCULAR; INTRAVENOUS at 16:46

## 2022-09-19 RX ADMIN — OXYCODONE AND ACETAMINOPHEN 1 TABLET: 325; 10 TABLET ORAL at 16:46

## 2022-09-20 PROCEDURE — 25010000002 HYDROMORPHONE PER 4 MG: Performed by: FAMILY MEDICINE

## 2022-09-20 PROCEDURE — 25010000002 METHYLPREDNISOLONE PER 125 MG: Performed by: INTERNAL MEDICINE

## 2022-09-20 PROCEDURE — 97116 GAIT TRAINING THERAPY: CPT

## 2022-09-20 PROCEDURE — 25010000002 CEFTRIAXONE PER 250 MG: Performed by: INTERNAL MEDICINE

## 2022-09-20 PROCEDURE — 94664 DEMO&/EVAL PT USE INHALER: CPT

## 2022-09-20 PROCEDURE — 97535 SELF CARE MNGMENT TRAINING: CPT

## 2022-09-20 PROCEDURE — 94799 UNLISTED PULMONARY SVC/PX: CPT

## 2022-09-20 PROCEDURE — 94761 N-INVAS EAR/PLS OXIMETRY MLT: CPT

## 2022-09-20 RX ORDER — HYDROMORPHONE HYDROCHLORIDE 1 MG/ML
0.5 INJECTION, SOLUTION INTRAMUSCULAR; INTRAVENOUS; SUBCUTANEOUS
Status: DISCONTINUED | OUTPATIENT
Start: 2022-09-20 | End: 2022-09-27 | Stop reason: HOSPADM

## 2022-09-20 RX ORDER — METAXALONE 800 MG/1
800 TABLET ORAL 3 TIMES DAILY
Status: DISCONTINUED | OUTPATIENT
Start: 2022-09-20 | End: 2022-09-27 | Stop reason: HOSPADM

## 2022-09-20 RX ADMIN — CYCLOBENZAPRINE 10 MG: 10 TABLET, FILM COATED ORAL at 07:10

## 2022-09-20 RX ADMIN — METAXALONE 800 MG: 800 TABLET ORAL at 14:58

## 2022-09-20 RX ADMIN — LEVOTHYROXINE SODIUM 125 MCG: 125 TABLET ORAL at 05:23

## 2022-09-20 RX ADMIN — BUDESONIDE AND FORMOTEROL FUMARATE DIHYDRATE 2 PUFF: 160; 4.5 AEROSOL RESPIRATORY (INHALATION) at 19:26

## 2022-09-20 RX ADMIN — METHYLPREDNISOLONE SODIUM SUCCINATE 60 MG: 125 INJECTION, POWDER, FOR SOLUTION INTRAMUSCULAR; INTRAVENOUS at 16:45

## 2022-09-20 RX ADMIN — ASPIRIN 81 MG: 81 TABLET, COATED ORAL at 08:20

## 2022-09-20 RX ADMIN — HYDROMORPHONE HYDROCHLORIDE 0.5 MG: 1 INJECTION, SOLUTION INTRAMUSCULAR; INTRAVENOUS; SUBCUTANEOUS at 16:14

## 2022-09-20 RX ADMIN — OXYCODONE AND ACETAMINOPHEN 1 TABLET: 325; 10 TABLET ORAL at 20:00

## 2022-09-20 RX ADMIN — GABAPENTIN 600 MG: 300 CAPSULE ORAL at 19:57

## 2022-09-20 RX ADMIN — Medication 10 ML: at 19:57

## 2022-09-20 RX ADMIN — OXYCODONE AND ACETAMINOPHEN 1 TABLET: 325; 10 TABLET ORAL at 11:19

## 2022-09-20 RX ADMIN — POLYETHYLENE GLYCOL 3350 17 G: 17 POWDER, FOR SOLUTION ORAL at 08:20

## 2022-09-20 RX ADMIN — OXYCODONE AND ACETAMINOPHEN 1 TABLET: 325; 10 TABLET ORAL at 05:24

## 2022-09-20 RX ADMIN — BUDESONIDE AND FORMOTEROL FUMARATE DIHYDRATE 2 PUFF: 160; 4.5 AEROSOL RESPIRATORY (INHALATION) at 07:01

## 2022-09-20 RX ADMIN — OXYBUTYNIN CHLORIDE 10 MG: 5 TABLET, EXTENDED RELEASE ORAL at 08:20

## 2022-09-20 RX ADMIN — METHYLPREDNISOLONE SODIUM SUCCINATE 60 MG: 125 INJECTION, POWDER, FOR SOLUTION INTRAMUSCULAR; INTRAVENOUS at 19:56

## 2022-09-20 RX ADMIN — METAXALONE 800 MG: 800 TABLET ORAL at 16:44

## 2022-09-20 RX ADMIN — TAMOXIFEN CITRATE 20 MG: 20 TABLET ORAL at 08:21

## 2022-09-20 RX ADMIN — CEFTRIAXONE 1 G: 1 INJECTION, POWDER, FOR SOLUTION INTRAMUSCULAR; INTRAVENOUS at 22:51

## 2022-09-20 RX ADMIN — DOCUSATE SODIUM 100 MG: 100 CAPSULE, LIQUID FILLED ORAL at 19:56

## 2022-09-20 RX ADMIN — LISINOPRIL 5 MG: 5 TABLET ORAL at 08:20

## 2022-09-20 RX ADMIN — DOCUSATE SODIUM 100 MG: 100 CAPSULE, LIQUID FILLED ORAL at 08:20

## 2022-09-20 RX ADMIN — METHYLPREDNISOLONE SODIUM SUCCINATE 60 MG: 125 INJECTION, POWDER, FOR SOLUTION INTRAMUSCULAR; INTRAVENOUS at 08:20

## 2022-09-20 RX ADMIN — METAXALONE 800 MG: 800 TABLET ORAL at 20:45

## 2022-09-21 LAB
ANION GAP SERPL CALCULATED.3IONS-SCNC: 14 MMOL/L (ref 5–15)
BILIRUB UR QL STRIP: NEGATIVE
BUN SERPL-MCNC: 23 MG/DL (ref 8–23)
BUN/CREAT SERPL: 25.6 (ref 7–25)
CALCIUM SPEC-SCNC: 9.2 MG/DL (ref 8.6–10.5)
CHLORIDE SERPL-SCNC: 102 MMOL/L (ref 98–107)
CLARITY UR: CLEAR
CO2 SERPL-SCNC: 23 MMOL/L (ref 22–29)
COLOR UR: YELLOW
CREAT SERPL-MCNC: 0.9 MG/DL (ref 0.57–1)
CRP SERPL-MCNC: 1.1 MG/DL (ref 0–0.5)
DEPRECATED RDW RBC AUTO: 46.4 FL (ref 37–54)
EGFRCR SERPLBLD CKD-EPI 2021: 68.9 ML/MIN/1.73
ERYTHROCYTE [DISTWIDTH] IN BLOOD BY AUTOMATED COUNT: 13 % (ref 12.3–15.4)
ERYTHROCYTE [SEDIMENTATION RATE] IN BLOOD: 59 MM/HR (ref 0–30)
GLUCOSE SERPL-MCNC: 177 MG/DL (ref 65–99)
GLUCOSE UR STRIP-MCNC: NEGATIVE MG/DL
HCT VFR BLD AUTO: 36.5 % (ref 34–46.6)
HGB BLD-MCNC: 11.9 G/DL (ref 12–15.9)
HGB UR QL STRIP.AUTO: NEGATIVE
KETONES UR QL STRIP: NEGATIVE
LEUKOCYTE ESTERASE UR QL STRIP.AUTO: NEGATIVE
MCH RBC QN AUTO: 31.6 PG (ref 26.6–33)
MCHC RBC AUTO-ENTMCNC: 32.6 G/DL (ref 31.5–35.7)
MCV RBC AUTO: 96.8 FL (ref 79–97)
NITRITE UR QL STRIP: NEGATIVE
PH UR STRIP.AUTO: 7 [PH] (ref 5–8)
PLATELET # BLD AUTO: 192 10*3/MM3 (ref 140–450)
PMV BLD AUTO: 12.5 FL (ref 6–12)
POTASSIUM SERPL-SCNC: 4.7 MMOL/L (ref 3.5–5.2)
PROT UR QL STRIP: NEGATIVE
RBC # BLD AUTO: 3.77 10*6/MM3 (ref 3.77–5.28)
SODIUM SERPL-SCNC: 139 MMOL/L (ref 136–145)
SP GR UR STRIP: 1.01 (ref 1–1.03)
UROBILINOGEN UR QL STRIP: NORMAL
WBC NRBC COR # BLD: 8.67 10*3/MM3 (ref 3.4–10.8)

## 2022-09-21 PROCEDURE — 97535 SELF CARE MNGMENT TRAINING: CPT

## 2022-09-21 PROCEDURE — 94799 UNLISTED PULMONARY SVC/PX: CPT

## 2022-09-21 PROCEDURE — 99222 1ST HOSP IP/OBS MODERATE 55: CPT | Performed by: NEUROLOGICAL SURGERY

## 2022-09-21 PROCEDURE — 97116 GAIT TRAINING THERAPY: CPT

## 2022-09-21 PROCEDURE — 81003 URINALYSIS AUTO W/O SCOPE: CPT | Performed by: FAMILY MEDICINE

## 2022-09-21 PROCEDURE — 94761 N-INVAS EAR/PLS OXIMETRY MLT: CPT

## 2022-09-21 PROCEDURE — 25010000002 METHYLPREDNISOLONE PER 125 MG: Performed by: INTERNAL MEDICINE

## 2022-09-21 PROCEDURE — 85652 RBC SED RATE AUTOMATED: CPT | Performed by: FAMILY MEDICINE

## 2022-09-21 PROCEDURE — 25010000002 HYDROMORPHONE PER 4 MG: Performed by: FAMILY MEDICINE

## 2022-09-21 PROCEDURE — 86140 C-REACTIVE PROTEIN: CPT | Performed by: FAMILY MEDICINE

## 2022-09-21 PROCEDURE — 94664 DEMO&/EVAL PT USE INHALER: CPT

## 2022-09-21 PROCEDURE — 80048 BASIC METABOLIC PNL TOTAL CA: CPT | Performed by: FAMILY MEDICINE

## 2022-09-21 PROCEDURE — 85027 COMPLETE CBC AUTOMATED: CPT | Performed by: FAMILY MEDICINE

## 2022-09-21 PROCEDURE — 97530 THERAPEUTIC ACTIVITIES: CPT

## 2022-09-21 PROCEDURE — 87040 BLOOD CULTURE FOR BACTERIA: CPT | Performed by: FAMILY MEDICINE

## 2022-09-21 RX ORDER — MAGNESIUM CARB/ALUMINUM HYDROX 105-160MG
296 TABLET,CHEWABLE ORAL ONCE
Status: DISCONTINUED | OUTPATIENT
Start: 2022-09-21 | End: 2022-09-27 | Stop reason: HOSPADM

## 2022-09-21 RX ORDER — AMOXICILLIN 250 MG
1 CAPSULE ORAL 2 TIMES DAILY
Status: DISCONTINUED | OUTPATIENT
Start: 2022-09-21 | End: 2022-09-27 | Stop reason: HOSPADM

## 2022-09-21 RX ADMIN — METHYLPREDNISOLONE SODIUM SUCCINATE 60 MG: 125 INJECTION, POWDER, FOR SOLUTION INTRAMUSCULAR; INTRAVENOUS at 06:05

## 2022-09-21 RX ADMIN — BUDESONIDE AND FORMOTEROL FUMARATE DIHYDRATE 2 PUFF: 160; 4.5 AEROSOL RESPIRATORY (INHALATION) at 06:17

## 2022-09-21 RX ADMIN — OXYCODONE AND ACETAMINOPHEN 1 TABLET: 325; 10 TABLET ORAL at 20:30

## 2022-09-21 RX ADMIN — GABAPENTIN 600 MG: 300 CAPSULE ORAL at 20:30

## 2022-09-21 RX ADMIN — DOCUSATE SODIUM 50 MG AND SENNOSIDES 8.6 MG 1 TABLET: 8.6; 5 TABLET, FILM COATED ORAL at 12:48

## 2022-09-21 RX ADMIN — HYDROMORPHONE HYDROCHLORIDE 0.5 MG: 1 INJECTION, SOLUTION INTRAMUSCULAR; INTRAVENOUS; SUBCUTANEOUS at 19:46

## 2022-09-21 RX ADMIN — CYCLOBENZAPRINE 10 MG: 10 TABLET, FILM COATED ORAL at 22:10

## 2022-09-21 RX ADMIN — METHYLPREDNISOLONE SODIUM SUCCINATE 60 MG: 125 INJECTION, POWDER, FOR SOLUTION INTRAMUSCULAR; INTRAVENOUS at 23:31

## 2022-09-21 RX ADMIN — Medication 10 ML: at 08:24

## 2022-09-21 RX ADMIN — DOCUSATE SODIUM 100 MG: 100 CAPSULE, LIQUID FILLED ORAL at 08:24

## 2022-09-21 RX ADMIN — LISINOPRIL 5 MG: 5 TABLET ORAL at 08:24

## 2022-09-21 RX ADMIN — METAXALONE 800 MG: 800 TABLET ORAL at 15:28

## 2022-09-21 RX ADMIN — METHYLPREDNISOLONE SODIUM SUCCINATE 60 MG: 125 INJECTION, POWDER, FOR SOLUTION INTRAMUSCULAR; INTRAVENOUS at 15:28

## 2022-09-21 RX ADMIN — POLYETHYLENE GLYCOL 3350 17 G: 17 POWDER, FOR SOLUTION ORAL at 08:23

## 2022-09-21 RX ADMIN — LEVOTHYROXINE SODIUM 125 MCG: 125 TABLET ORAL at 06:05

## 2022-09-21 RX ADMIN — METAXALONE 800 MG: 800 TABLET ORAL at 20:30

## 2022-09-21 RX ADMIN — HYDROMORPHONE HYDROCHLORIDE 0.5 MG: 1 INJECTION, SOLUTION INTRAMUSCULAR; INTRAVENOUS; SUBCUTANEOUS at 23:48

## 2022-09-21 RX ADMIN — CYCLOBENZAPRINE 10 MG: 10 TABLET, FILM COATED ORAL at 12:49

## 2022-09-21 RX ADMIN — ASPIRIN 81 MG: 81 TABLET, COATED ORAL at 08:24

## 2022-09-21 RX ADMIN — HYDROMORPHONE HYDROCHLORIDE 0.5 MG: 1 INJECTION, SOLUTION INTRAMUSCULAR; INTRAVENOUS; SUBCUTANEOUS at 12:48

## 2022-09-21 RX ADMIN — OXYCODONE AND ACETAMINOPHEN 1 TABLET: 325; 10 TABLET ORAL at 02:27

## 2022-09-21 RX ADMIN — Medication 10 ML: at 20:30

## 2022-09-21 RX ADMIN — BUDESONIDE AND FORMOTEROL FUMARATE DIHYDRATE 2 PUFF: 160; 4.5 AEROSOL RESPIRATORY (INHALATION) at 18:20

## 2022-09-21 RX ADMIN — DOCUSATE SODIUM 100 MG: 100 CAPSULE, LIQUID FILLED ORAL at 20:30

## 2022-09-21 RX ADMIN — METAXALONE 800 MG: 800 TABLET ORAL at 08:23

## 2022-09-21 RX ADMIN — DOCUSATE SODIUM 50 MG AND SENNOSIDES 8.6 MG 1 TABLET: 8.6; 5 TABLET, FILM COATED ORAL at 20:30

## 2022-09-21 RX ADMIN — TAMOXIFEN CITRATE 20 MG: 20 TABLET ORAL at 08:23

## 2022-09-21 RX ADMIN — OXYBUTYNIN CHLORIDE 10 MG: 5 TABLET, EXTENDED RELEASE ORAL at 08:23

## 2022-09-21 RX ADMIN — METHYLPREDNISOLONE SODIUM SUCCINATE 60 MG: 125 INJECTION, POWDER, FOR SOLUTION INTRAMUSCULAR; INTRAVENOUS at 08:23

## 2022-09-22 PROCEDURE — 94799 UNLISTED PULMONARY SVC/PX: CPT

## 2022-09-22 PROCEDURE — 94664 DEMO&/EVAL PT USE INHALER: CPT

## 2022-09-22 PROCEDURE — 25010000002 METHYLPREDNISOLONE PER 125 MG: Performed by: INTERNAL MEDICINE

## 2022-09-22 PROCEDURE — 25010000002 HYDROMORPHONE PER 4 MG: Performed by: FAMILY MEDICINE

## 2022-09-22 PROCEDURE — 97116 GAIT TRAINING THERAPY: CPT

## 2022-09-22 PROCEDURE — 94761 N-INVAS EAR/PLS OXIMETRY MLT: CPT

## 2022-09-22 PROCEDURE — 97535 SELF CARE MNGMENT TRAINING: CPT

## 2022-09-22 RX ADMIN — CYCLOBENZAPRINE 10 MG: 10 TABLET, FILM COATED ORAL at 23:42

## 2022-09-22 RX ADMIN — OXYCODONE AND ACETAMINOPHEN 1 TABLET: 325; 10 TABLET ORAL at 08:46

## 2022-09-22 RX ADMIN — GABAPENTIN 600 MG: 300 CAPSULE ORAL at 22:24

## 2022-09-22 RX ADMIN — METHYLPREDNISOLONE SODIUM SUCCINATE 60 MG: 125 INJECTION, POWDER, FOR SOLUTION INTRAMUSCULAR; INTRAVENOUS at 08:32

## 2022-09-22 RX ADMIN — TAMOXIFEN CITRATE 20 MG: 20 TABLET ORAL at 08:32

## 2022-09-22 RX ADMIN — OXYBUTYNIN CHLORIDE 10 MG: 5 TABLET, EXTENDED RELEASE ORAL at 08:32

## 2022-09-22 RX ADMIN — METAXALONE 800 MG: 800 TABLET ORAL at 16:07

## 2022-09-22 RX ADMIN — DOCUSATE SODIUM 50 MG AND SENNOSIDES 8.6 MG 1 TABLET: 8.6; 5 TABLET, FILM COATED ORAL at 08:32

## 2022-09-22 RX ADMIN — DOCUSATE SODIUM 100 MG: 100 CAPSULE, LIQUID FILLED ORAL at 08:32

## 2022-09-22 RX ADMIN — OXYCODONE AND ACETAMINOPHEN 1 TABLET: 325; 10 TABLET ORAL at 17:40

## 2022-09-22 RX ADMIN — METAXALONE 800 MG: 800 TABLET ORAL at 22:34

## 2022-09-22 RX ADMIN — DOCUSATE SODIUM 50 MG AND SENNOSIDES 8.6 MG 1 TABLET: 8.6; 5 TABLET, FILM COATED ORAL at 22:34

## 2022-09-22 RX ADMIN — BUDESONIDE AND FORMOTEROL FUMARATE DIHYDRATE 2 PUFF: 160; 4.5 AEROSOL RESPIRATORY (INHALATION) at 06:03

## 2022-09-22 RX ADMIN — METAXALONE 800 MG: 800 TABLET ORAL at 08:32

## 2022-09-22 RX ADMIN — HYDROMORPHONE HYDROCHLORIDE 0.5 MG: 1 INJECTION, SOLUTION INTRAMUSCULAR; INTRAVENOUS; SUBCUTANEOUS at 22:28

## 2022-09-22 RX ADMIN — CYCLOBENZAPRINE 10 MG: 10 TABLET, FILM COATED ORAL at 06:03

## 2022-09-22 RX ADMIN — Medication 10 ML: at 22:25

## 2022-09-22 RX ADMIN — DOCUSATE SODIUM 100 MG: 100 CAPSULE, LIQUID FILLED ORAL at 22:25

## 2022-09-22 RX ADMIN — ASPIRIN 81 MG: 81 TABLET, COATED ORAL at 08:32

## 2022-09-22 RX ADMIN — LISINOPRIL 5 MG: 5 TABLET ORAL at 08:32

## 2022-09-22 RX ADMIN — LEVOTHYROXINE SODIUM 125 MCG: 125 TABLET ORAL at 06:03

## 2022-09-22 RX ADMIN — BUDESONIDE AND FORMOTEROL FUMARATE DIHYDRATE 2 PUFF: 160; 4.5 AEROSOL RESPIRATORY (INHALATION) at 18:06

## 2022-09-22 RX ADMIN — Medication 10 ML: at 08:33

## 2022-09-22 RX ADMIN — OXYCODONE AND ACETAMINOPHEN 1 TABLET: 325; 10 TABLET ORAL at 23:42

## 2022-09-22 RX ADMIN — POLYETHYLENE GLYCOL 3350 17 G: 17 POWDER, FOR SOLUTION ORAL at 08:33

## 2022-09-23 PROCEDURE — 94799 UNLISTED PULMONARY SVC/PX: CPT

## 2022-09-23 PROCEDURE — 25010000002 HYDROMORPHONE PER 4 MG: Performed by: FAMILY MEDICINE

## 2022-09-23 PROCEDURE — 25010000002 DIAZEPAM PER 5 MG: Performed by: FAMILY MEDICINE

## 2022-09-23 PROCEDURE — 94761 N-INVAS EAR/PLS OXIMETRY MLT: CPT

## 2022-09-23 PROCEDURE — 63710000001 DEXAMETHASONE PER 0.25 MG: Performed by: FAMILY MEDICINE

## 2022-09-23 RX ORDER — DIAZEPAM 5 MG/ML
5 INJECTION, SOLUTION INTRAMUSCULAR; INTRAVENOUS ONCE
Status: COMPLETED | OUTPATIENT
Start: 2022-09-23 | End: 2022-09-23

## 2022-09-23 RX ORDER — DEXAMETHASONE 4 MG/1
12 TABLET ORAL
Status: DISCONTINUED | OUTPATIENT
Start: 2022-09-24 | End: 2022-09-25

## 2022-09-23 RX ORDER — DIAZEPAM 5 MG/ML
5 INJECTION, SOLUTION INTRAMUSCULAR; INTRAVENOUS EVERY 6 HOURS PRN
Status: DISCONTINUED | OUTPATIENT
Start: 2022-09-23 | End: 2022-09-25

## 2022-09-23 RX ADMIN — LISINOPRIL 5 MG: 5 TABLET ORAL at 08:27

## 2022-09-23 RX ADMIN — METAXALONE 800 MG: 800 TABLET ORAL at 08:26

## 2022-09-23 RX ADMIN — GABAPENTIN 600 MG: 300 CAPSULE ORAL at 21:50

## 2022-09-23 RX ADMIN — BUDESONIDE AND FORMOTEROL FUMARATE DIHYDRATE 2 PUFF: 160; 4.5 AEROSOL RESPIRATORY (INHALATION) at 05:52

## 2022-09-23 RX ADMIN — BUDESONIDE AND FORMOTEROL FUMARATE DIHYDRATE 2 PUFF: 160; 4.5 AEROSOL RESPIRATORY (INHALATION) at 18:35

## 2022-09-23 RX ADMIN — OXYCODONE AND ACETAMINOPHEN 1 TABLET: 325; 10 TABLET ORAL at 21:54

## 2022-09-23 RX ADMIN — LEVOTHYROXINE SODIUM 125 MCG: 125 TABLET ORAL at 06:02

## 2022-09-23 RX ADMIN — ASPIRIN 81 MG: 81 TABLET, COATED ORAL at 08:27

## 2022-09-23 RX ADMIN — OXYCODONE AND ACETAMINOPHEN 1 TABLET: 325; 10 TABLET ORAL at 14:46

## 2022-09-23 RX ADMIN — TAMOXIFEN CITRATE 20 MG: 20 TABLET ORAL at 08:28

## 2022-09-23 RX ADMIN — OXYBUTYNIN CHLORIDE 10 MG: 5 TABLET, EXTENDED RELEASE ORAL at 08:26

## 2022-09-23 RX ADMIN — OXYCODONE AND ACETAMINOPHEN 1 TABLET: 325; 10 TABLET ORAL at 06:05

## 2022-09-23 RX ADMIN — Medication 10 ML: at 21:57

## 2022-09-23 RX ADMIN — CYCLOBENZAPRINE 10 MG: 10 TABLET, FILM COATED ORAL at 07:39

## 2022-09-23 RX ADMIN — DOCUSATE SODIUM 100 MG: 100 CAPSULE, LIQUID FILLED ORAL at 21:54

## 2022-09-23 RX ADMIN — DEXAMETHASONE 6 MG: 4 TABLET ORAL at 08:27

## 2022-09-23 RX ADMIN — DOCUSATE SODIUM 100 MG: 100 CAPSULE, LIQUID FILLED ORAL at 08:27

## 2022-09-23 RX ADMIN — METAXALONE 800 MG: 800 TABLET ORAL at 17:09

## 2022-09-23 RX ADMIN — DIAZEPAM 5 MG: 5 INJECTION, SOLUTION INTRAMUSCULAR; INTRAVENOUS at 15:24

## 2022-09-23 RX ADMIN — DEXAMETHASONE 6 MG: 4 TABLET ORAL at 17:09

## 2022-09-23 RX ADMIN — METAXALONE 800 MG: 800 TABLET ORAL at 21:54

## 2022-09-23 RX ADMIN — HYDROMORPHONE HYDROCHLORIDE 0.5 MG: 1 INJECTION, SOLUTION INTRAMUSCULAR; INTRAVENOUS; SUBCUTANEOUS at 03:33

## 2022-09-23 RX ADMIN — Medication 10 ML: at 08:27

## 2022-09-24 PROCEDURE — 25010000002 DIAZEPAM PER 5 MG: Performed by: FAMILY MEDICINE

## 2022-09-24 PROCEDURE — 94664 DEMO&/EVAL PT USE INHALER: CPT

## 2022-09-24 PROCEDURE — 94799 UNLISTED PULMONARY SVC/PX: CPT

## 2022-09-24 PROCEDURE — 97530 THERAPEUTIC ACTIVITIES: CPT

## 2022-09-24 PROCEDURE — 25010000002 HYDROMORPHONE PER 4 MG: Performed by: FAMILY MEDICINE

## 2022-09-24 PROCEDURE — 97116 GAIT TRAINING THERAPY: CPT

## 2022-09-24 PROCEDURE — 63710000001 DEXAMETHASONE PER 0.25 MG: Performed by: FAMILY MEDICINE

## 2022-09-24 RX ADMIN — BUDESONIDE AND FORMOTEROL FUMARATE DIHYDRATE 2 PUFF: 160; 4.5 AEROSOL RESPIRATORY (INHALATION) at 20:10

## 2022-09-24 RX ADMIN — DOCUSATE SODIUM 100 MG: 100 CAPSULE, LIQUID FILLED ORAL at 10:21

## 2022-09-24 RX ADMIN — LEVOTHYROXINE SODIUM 125 MCG: 125 TABLET ORAL at 05:48

## 2022-09-24 RX ADMIN — OXYCODONE AND ACETAMINOPHEN 1 TABLET: 325; 10 TABLET ORAL at 19:50

## 2022-09-24 RX ADMIN — GABAPENTIN 600 MG: 300 CAPSULE ORAL at 21:53

## 2022-09-24 RX ADMIN — DIAZEPAM 5 MG: 5 INJECTION, SOLUTION INTRAMUSCULAR; INTRAVENOUS at 17:48

## 2022-09-24 RX ADMIN — Medication 10 ML: at 10:21

## 2022-09-24 RX ADMIN — BUDESONIDE AND FORMOTEROL FUMARATE DIHYDRATE 2 PUFF: 160; 4.5 AEROSOL RESPIRATORY (INHALATION) at 07:25

## 2022-09-24 RX ADMIN — DIAZEPAM 5 MG: 5 INJECTION, SOLUTION INTRAMUSCULAR; INTRAVENOUS at 02:44

## 2022-09-24 RX ADMIN — METAXALONE 800 MG: 800 TABLET ORAL at 21:53

## 2022-09-24 RX ADMIN — OXYCODONE AND ACETAMINOPHEN 1 TABLET: 325; 10 TABLET ORAL at 12:02

## 2022-09-24 RX ADMIN — HYDROMORPHONE HYDROCHLORIDE 0.5 MG: 1 INJECTION, SOLUTION INTRAMUSCULAR; INTRAVENOUS; SUBCUTANEOUS at 02:43

## 2022-09-24 RX ADMIN — METAXALONE 800 MG: 800 TABLET ORAL at 16:10

## 2022-09-24 RX ADMIN — TAMOXIFEN CITRATE 20 MG: 20 TABLET ORAL at 10:20

## 2022-09-24 RX ADMIN — DOCUSATE SODIUM 50 MG AND SENNOSIDES 8.6 MG 1 TABLET: 8.6; 5 TABLET, FILM COATED ORAL at 21:53

## 2022-09-24 RX ADMIN — DOCUSATE SODIUM 100 MG: 100 CAPSULE, LIQUID FILLED ORAL at 21:53

## 2022-09-24 RX ADMIN — METAXALONE 800 MG: 800 TABLET ORAL at 10:20

## 2022-09-24 RX ADMIN — OXYBUTYNIN CHLORIDE 10 MG: 5 TABLET, EXTENDED RELEASE ORAL at 12:00

## 2022-09-24 RX ADMIN — OXYCODONE AND ACETAMINOPHEN 1 TABLET: 325; 10 TABLET ORAL at 05:48

## 2022-09-24 RX ADMIN — ASPIRIN 81 MG: 81 TABLET, COATED ORAL at 10:21

## 2022-09-24 RX ADMIN — DOCUSATE SODIUM 50 MG AND SENNOSIDES 8.6 MG 1 TABLET: 8.6; 5 TABLET, FILM COATED ORAL at 10:24

## 2022-09-24 RX ADMIN — DEXAMETHASONE 12 MG: 4 TABLET ORAL at 10:21

## 2022-09-24 RX ADMIN — LISINOPRIL 5 MG: 5 TABLET ORAL at 10:20

## 2022-09-24 RX ADMIN — Medication 10 ML: at 21:53

## 2022-09-25 PROCEDURE — 97535 SELF CARE MNGMENT TRAINING: CPT

## 2022-09-25 PROCEDURE — 25010000002 DIAZEPAM PER 5 MG: Performed by: FAMILY MEDICINE

## 2022-09-25 PROCEDURE — 97116 GAIT TRAINING THERAPY: CPT

## 2022-09-25 PROCEDURE — 94664 DEMO&/EVAL PT USE INHALER: CPT

## 2022-09-25 PROCEDURE — 63710000001 DEXAMETHASONE PER 0.25 MG: Performed by: FAMILY MEDICINE

## 2022-09-25 PROCEDURE — 94799 UNLISTED PULMONARY SVC/PX: CPT

## 2022-09-25 RX ORDER — DEXAMETHASONE 4 MG/1
8 TABLET ORAL
Status: DISCONTINUED | OUTPATIENT
Start: 2022-09-26 | End: 2022-09-27 | Stop reason: HOSPADM

## 2022-09-25 RX ORDER — DIAZEPAM 5 MG/1
5 TABLET ORAL EVERY 4 HOURS PRN
Status: DISCONTINUED | OUTPATIENT
Start: 2022-09-25 | End: 2022-09-27 | Stop reason: HOSPADM

## 2022-09-25 RX ADMIN — Medication 10 ML: at 20:38

## 2022-09-25 RX ADMIN — DIAZEPAM 5 MG: 5 INJECTION, SOLUTION INTRAMUSCULAR; INTRAVENOUS at 16:25

## 2022-09-25 RX ADMIN — OXYCODONE AND ACETAMINOPHEN 1 TABLET: 325; 10 TABLET ORAL at 04:25

## 2022-09-25 RX ADMIN — OXYCODONE AND ACETAMINOPHEN 1 TABLET: 325; 10 TABLET ORAL at 18:23

## 2022-09-25 RX ADMIN — OXYCODONE AND ACETAMINOPHEN 1 TABLET: 325; 10 TABLET ORAL at 11:44

## 2022-09-25 RX ADMIN — DIAZEPAM 5 MG: 5 INJECTION, SOLUTION INTRAMUSCULAR; INTRAVENOUS at 10:18

## 2022-09-25 RX ADMIN — TAMOXIFEN CITRATE 20 MG: 20 TABLET ORAL at 10:16

## 2022-09-25 RX ADMIN — OXYBUTYNIN CHLORIDE 10 MG: 5 TABLET, EXTENDED RELEASE ORAL at 10:17

## 2022-09-25 RX ADMIN — ASPIRIN 81 MG: 81 TABLET, COATED ORAL at 10:17

## 2022-09-25 RX ADMIN — POLYETHYLENE GLYCOL 3350 17 G: 17 POWDER, FOR SOLUTION ORAL at 10:16

## 2022-09-25 RX ADMIN — METAXALONE 800 MG: 800 TABLET ORAL at 10:16

## 2022-09-25 RX ADMIN — GABAPENTIN 600 MG: 300 CAPSULE ORAL at 20:38

## 2022-09-25 RX ADMIN — DEXAMETHASONE 12 MG: 4 TABLET ORAL at 10:17

## 2022-09-25 RX ADMIN — BUDESONIDE AND FORMOTEROL FUMARATE DIHYDRATE 2 PUFF: 160; 4.5 AEROSOL RESPIRATORY (INHALATION) at 20:12

## 2022-09-25 RX ADMIN — Medication 10 ML: at 10:19

## 2022-09-25 RX ADMIN — LEVOTHYROXINE SODIUM 125 MCG: 125 TABLET ORAL at 10:17

## 2022-09-25 RX ADMIN — LISINOPRIL 5 MG: 5 TABLET ORAL at 10:17

## 2022-09-25 RX ADMIN — METAXALONE 800 MG: 800 TABLET ORAL at 16:25

## 2022-09-25 RX ADMIN — DIAZEPAM 5 MG: 5 INJECTION, SOLUTION INTRAMUSCULAR; INTRAVENOUS at 02:11

## 2022-09-25 RX ADMIN — BUDESONIDE AND FORMOTEROL FUMARATE DIHYDRATE 2 PUFF: 160; 4.5 AEROSOL RESPIRATORY (INHALATION) at 07:09

## 2022-09-25 RX ADMIN — DOCUSATE SODIUM 50 MG AND SENNOSIDES 8.6 MG 1 TABLET: 8.6; 5 TABLET, FILM COATED ORAL at 10:17

## 2022-09-25 RX ADMIN — METAXALONE 800 MG: 800 TABLET ORAL at 20:37

## 2022-09-25 RX ADMIN — DOCUSATE SODIUM 100 MG: 100 CAPSULE, LIQUID FILLED ORAL at 10:16

## 2022-09-26 LAB — BACTERIA SPEC AEROBE CULT: NORMAL

## 2022-09-26 PROCEDURE — 25010000002 HYDROMORPHONE PER 4 MG: Performed by: FAMILY MEDICINE

## 2022-09-26 PROCEDURE — 94664 DEMO&/EVAL PT USE INHALER: CPT

## 2022-09-26 PROCEDURE — 94799 UNLISTED PULMONARY SVC/PX: CPT

## 2022-09-26 PROCEDURE — 94761 N-INVAS EAR/PLS OXIMETRY MLT: CPT

## 2022-09-26 PROCEDURE — 97530 THERAPEUTIC ACTIVITIES: CPT

## 2022-09-26 PROCEDURE — 63710000001 DEXAMETHASONE PER 0.25 MG: Performed by: FAMILY MEDICINE

## 2022-09-26 PROCEDURE — 97116 GAIT TRAINING THERAPY: CPT

## 2022-09-26 RX ORDER — LIDOCAINE 50 MG/G
2 PATCH TOPICAL
Status: DISCONTINUED | OUTPATIENT
Start: 2022-09-26 | End: 2022-09-27 | Stop reason: HOSPADM

## 2022-09-26 RX ADMIN — OXYCODONE AND ACETAMINOPHEN 1 TABLET: 325; 10 TABLET ORAL at 21:37

## 2022-09-26 RX ADMIN — LISINOPRIL 5 MG: 5 TABLET ORAL at 08:45

## 2022-09-26 RX ADMIN — ASPIRIN 81 MG: 81 TABLET, COATED ORAL at 08:39

## 2022-09-26 RX ADMIN — DOCUSATE SODIUM 100 MG: 100 CAPSULE, LIQUID FILLED ORAL at 21:37

## 2022-09-26 RX ADMIN — OXYCODONE AND ACETAMINOPHEN 1 TABLET: 325; 10 TABLET ORAL at 04:58

## 2022-09-26 RX ADMIN — DOCUSATE SODIUM 50 MG AND SENNOSIDES 8.6 MG 1 TABLET: 8.6; 5 TABLET, FILM COATED ORAL at 08:39

## 2022-09-26 RX ADMIN — TAMOXIFEN CITRATE 20 MG: 20 TABLET ORAL at 08:40

## 2022-09-26 RX ADMIN — METAXALONE 800 MG: 800 TABLET ORAL at 21:37

## 2022-09-26 RX ADMIN — Medication 10 ML: at 08:40

## 2022-09-26 RX ADMIN — OXYBUTYNIN CHLORIDE 10 MG: 5 TABLET, EXTENDED RELEASE ORAL at 08:39

## 2022-09-26 RX ADMIN — POLYETHYLENE GLYCOL 3350 17 G: 17 POWDER, FOR SOLUTION ORAL at 08:39

## 2022-09-26 RX ADMIN — DOCUSATE SODIUM 50 MG AND SENNOSIDES 8.6 MG 1 TABLET: 8.6; 5 TABLET, FILM COATED ORAL at 21:37

## 2022-09-26 RX ADMIN — LIDOCAINE 2 PATCH: 50 PATCH CUTANEOUS at 12:47

## 2022-09-26 RX ADMIN — DEXAMETHASONE 8 MG: 4 TABLET ORAL at 08:39

## 2022-09-26 RX ADMIN — HYDROMORPHONE HYDROCHLORIDE 0.5 MG: 1 INJECTION, SOLUTION INTRAMUSCULAR; INTRAVENOUS; SUBCUTANEOUS at 12:59

## 2022-09-26 RX ADMIN — DIAZEPAM 5 MG: 5 TABLET ORAL at 12:50

## 2022-09-26 RX ADMIN — METAXALONE 800 MG: 800 TABLET ORAL at 15:32

## 2022-09-26 RX ADMIN — GABAPENTIN 600 MG: 300 CAPSULE ORAL at 21:37

## 2022-09-26 RX ADMIN — METAXALONE 800 MG: 800 TABLET ORAL at 08:39

## 2022-09-26 RX ADMIN — BUDESONIDE AND FORMOTEROL FUMARATE DIHYDRATE 2 PUFF: 160; 4.5 AEROSOL RESPIRATORY (INHALATION) at 18:30

## 2022-09-26 RX ADMIN — DIAZEPAM 5 MG: 5 TABLET ORAL at 23:19

## 2022-09-26 RX ADMIN — LEVOTHYROXINE SODIUM 125 MCG: 125 TABLET ORAL at 05:00

## 2022-09-26 RX ADMIN — DOCUSATE SODIUM 100 MG: 100 CAPSULE, LIQUID FILLED ORAL at 08:39

## 2022-09-26 RX ADMIN — OXYCODONE AND ACETAMINOPHEN 1 TABLET: 325; 10 TABLET ORAL at 15:32

## 2022-09-26 RX ADMIN — BUDESONIDE AND FORMOTEROL FUMARATE DIHYDRATE 2 PUFF: 160; 4.5 AEROSOL RESPIRATORY (INHALATION) at 06:27

## 2022-09-26 RX ADMIN — Medication 10 ML: at 21:38

## 2022-09-27 ENCOUNTER — READMISSION MANAGEMENT (OUTPATIENT)
Dept: CALL CENTER | Facility: HOSPITAL | Age: 72
End: 2022-09-27

## 2022-09-27 VITALS
SYSTOLIC BLOOD PRESSURE: 116 MMHG | OXYGEN SATURATION: 98 % | HEIGHT: 65 IN | BODY MASS INDEX: 31.47 KG/M2 | RESPIRATION RATE: 16 BRPM | HEART RATE: 68 BPM | DIASTOLIC BLOOD PRESSURE: 64 MMHG | WEIGHT: 188.9 LBS | TEMPERATURE: 98.3 F

## 2022-09-27 LAB
ANION GAP SERPL CALCULATED.3IONS-SCNC: 8 MMOL/L (ref 5–15)
BUN SERPL-MCNC: 22 MG/DL (ref 8–23)
BUN/CREAT SERPL: 26.2 (ref 7–25)
CALCIUM SPEC-SCNC: 9.2 MG/DL (ref 8.6–10.5)
CHLORIDE SERPL-SCNC: 100 MMOL/L (ref 98–107)
CO2 SERPL-SCNC: 30 MMOL/L (ref 22–29)
CREAT SERPL-MCNC: 0.84 MG/DL (ref 0.57–1)
DEPRECATED RDW RBC AUTO: 47.4 FL (ref 37–54)
EGFRCR SERPLBLD CKD-EPI 2021: 74.9 ML/MIN/1.73
ERYTHROCYTE [DISTWIDTH] IN BLOOD BY AUTOMATED COUNT: 13.1 % (ref 12.3–15.4)
GLUCOSE SERPL-MCNC: 112 MG/DL (ref 65–99)
HCT VFR BLD AUTO: 38.8 % (ref 34–46.6)
HGB BLD-MCNC: 12.3 G/DL (ref 12–15.9)
MCH RBC QN AUTO: 31.3 PG (ref 26.6–33)
MCHC RBC AUTO-ENTMCNC: 31.7 G/DL (ref 31.5–35.7)
MCV RBC AUTO: 98.7 FL (ref 79–97)
PLATELET # BLD AUTO: 187 10*3/MM3 (ref 140–450)
PMV BLD AUTO: 11.6 FL (ref 6–12)
POTASSIUM SERPL-SCNC: 4.6 MMOL/L (ref 3.5–5.2)
RBC # BLD AUTO: 3.93 10*6/MM3 (ref 3.77–5.28)
SODIUM SERPL-SCNC: 138 MMOL/L (ref 136–145)
WBC NRBC COR # BLD: 11.45 10*3/MM3 (ref 3.4–10.8)

## 2022-09-27 PROCEDURE — 25010000002 HYDROMORPHONE PER 4 MG: Performed by: FAMILY MEDICINE

## 2022-09-27 PROCEDURE — 63710000001 DEXAMETHASONE PER 0.25 MG: Performed by: FAMILY MEDICINE

## 2022-09-27 PROCEDURE — 97116 GAIT TRAINING THERAPY: CPT

## 2022-09-27 PROCEDURE — 94799 UNLISTED PULMONARY SVC/PX: CPT

## 2022-09-27 PROCEDURE — 85027 COMPLETE CBC AUTOMATED: CPT | Performed by: FAMILY MEDICINE

## 2022-09-27 PROCEDURE — 94664 DEMO&/EVAL PT USE INHALER: CPT

## 2022-09-27 PROCEDURE — 94761 N-INVAS EAR/PLS OXIMETRY MLT: CPT

## 2022-09-27 PROCEDURE — 80048 BASIC METABOLIC PNL TOTAL CA: CPT | Performed by: FAMILY MEDICINE

## 2022-09-27 RX ORDER — AMOXICILLIN 250 MG
1 CAPSULE ORAL 2 TIMES DAILY PRN
Qty: 60 TABLET | Refills: 0 | Status: SHIPPED | OUTPATIENT
Start: 2022-09-27 | End: 2022-09-29

## 2022-09-27 RX ORDER — DEXAMETHASONE 4 MG/1
TABLET ORAL
Qty: 9 TABLET | Refills: 0 | Status: SHIPPED | OUTPATIENT
Start: 2022-09-27 | End: 2022-10-05

## 2022-09-27 RX ORDER — TIZANIDINE 4 MG/1
6 TABLET ORAL 3 TIMES DAILY PRN
Qty: 45 TABLET | Refills: 0 | Status: SHIPPED | OUTPATIENT
Start: 2022-09-27

## 2022-09-27 RX ORDER — OXYCODONE AND ACETAMINOPHEN 10; 325 MG/1; MG/1
1 TABLET ORAL EVERY 6 HOURS PRN
Qty: 12 TABLET | Refills: 0 | Status: SHIPPED | OUTPATIENT
Start: 2022-09-27

## 2022-09-27 RX ORDER — LIDOCAINE 50 MG/G
2 PATCH TOPICAL
Qty: 30 PATCH | Refills: 2 | Status: SHIPPED | OUTPATIENT
Start: 2022-09-27 | End: 2022-09-27 | Stop reason: HOSPADM

## 2022-09-27 RX ORDER — PSEUDOEPHEDRINE HCL 30 MG
100 TABLET ORAL 2 TIMES DAILY
Qty: 60 CAPSULE | Refills: 2 | Status: SHIPPED | OUTPATIENT
Start: 2022-09-27 | End: 2022-09-29

## 2022-09-27 RX ORDER — METAXALONE 800 MG/1
800 TABLET ORAL 3 TIMES DAILY
Qty: 90 TABLET | Refills: 0 | Status: SHIPPED | OUTPATIENT
Start: 2022-09-27 | End: 2022-09-27 | Stop reason: HOSPADM

## 2022-09-27 RX ADMIN — OXYCODONE AND ACETAMINOPHEN 1 TABLET: 325; 10 TABLET ORAL at 06:19

## 2022-09-27 RX ADMIN — TAMOXIFEN CITRATE 20 MG: 20 TABLET ORAL at 08:49

## 2022-09-27 RX ADMIN — DEXAMETHASONE 8 MG: 4 TABLET ORAL at 08:49

## 2022-09-27 RX ADMIN — OXYCODONE AND ACETAMINOPHEN 1 TABLET: 325; 10 TABLET ORAL at 13:16

## 2022-09-27 RX ADMIN — POLYETHYLENE GLYCOL 3350 17 G: 17 POWDER, FOR SOLUTION ORAL at 08:49

## 2022-09-27 RX ADMIN — DOCUSATE SODIUM 50 MG AND SENNOSIDES 8.6 MG 1 TABLET: 8.6; 5 TABLET, FILM COATED ORAL at 08:49

## 2022-09-27 RX ADMIN — DOCUSATE SODIUM 100 MG: 100 CAPSULE, LIQUID FILLED ORAL at 08:49

## 2022-09-27 RX ADMIN — LISINOPRIL 5 MG: 5 TABLET ORAL at 08:49

## 2022-09-27 RX ADMIN — ASPIRIN 81 MG: 81 TABLET, COATED ORAL at 08:49

## 2022-09-27 RX ADMIN — BUDESONIDE AND FORMOTEROL FUMARATE DIHYDRATE 2 PUFF: 160; 4.5 AEROSOL RESPIRATORY (INHALATION) at 07:15

## 2022-09-27 RX ADMIN — Medication 10 ML: at 08:49

## 2022-09-27 RX ADMIN — METAXALONE 800 MG: 800 TABLET ORAL at 08:49

## 2022-09-27 RX ADMIN — OXYBUTYNIN CHLORIDE 10 MG: 5 TABLET, EXTENDED RELEASE ORAL at 08:49

## 2022-09-27 RX ADMIN — HYDROMORPHONE HYDROCHLORIDE 0.5 MG: 1 INJECTION, SOLUTION INTRAMUSCULAR; INTRAVENOUS; SUBCUTANEOUS at 02:05

## 2022-09-27 RX ADMIN — LEVOTHYROXINE SODIUM 125 MCG: 125 TABLET ORAL at 06:19

## 2022-09-28 NOTE — OUTREACH NOTE
Prep Survey    Flowsheet Row Responses   [facility] facility patient discharged from? [place]   Is LACE score < 7 ? No   Emergency Room discharge w/ pulse ox? No   Eligibility Readm Mgmt   Discharge diagnosis Intractable back pain   Does the patient have one of the following disease processes/diagnoses(primary or secondary)? Other   Does the patient have Home health ordered? No   Is there a DME ordered? No   Prep survey completed? Yes          ATIF TIJERINA - Registered Nurse

## 2022-09-30 ENCOUNTER — READMISSION MANAGEMENT (OUTPATIENT)
Dept: CALL CENTER | Facility: HOSPITAL | Age: 72
End: 2022-09-30

## 2022-09-30 NOTE — OUTREACH NOTE
Medical Week 1 Survey    Flowsheet Row Responses   [facility] facility patient discharged from? [place]   Does the patient have one of the following disease processes/diagnoses(primary or secondary)? Other   Week 1 attempt successful? No   Unsuccessful attempts Attempt 1          PRINCESS LACY - Registered Nurse

## 2022-10-04 ENCOUNTER — READMISSION MANAGEMENT (OUTPATIENT)
Dept: CALL CENTER | Facility: HOSPITAL | Age: 72
End: 2022-10-04

## 2022-10-04 NOTE — OUTREACH NOTE
Medical Week 1 Survey    Flowsheet Row Responses   [facility] [place] [facility] from? [place]   Does the patient have one of the following disease processes/diagnoses(primary or secondary)? Other   Week 1 attempt successful? No   Unsuccessful attempts Attempt 2          YAJAIRA LOPEZ - Registered Nurse

## 2022-10-07 ENCOUNTER — READMISSION MANAGEMENT (OUTPATIENT)
Dept: CALL CENTER | Facility: HOSPITAL | Age: 72
End: 2022-10-07

## 2022-10-07 NOTE — OUTREACH NOTE
Medical Week 1 Survey    Flowsheet Row Responses   [facility] facility patient discharged from? [place]   Does the patient have one of the following disease processes/diagnoses(primary or secondary)? Other   Week 1 attempt successful? No   Unsuccessful attempts Attempt 3          OSBALDO DIOR - Registered Nurse

## 2022-11-07 ENCOUNTER — TRANSCRIBE ORDERS (OUTPATIENT)
Dept: ADMINISTRATIVE | Facility: HOSPITAL | Age: 72
End: 2022-11-07

## 2022-11-07 DIAGNOSIS — M48.50XG: Primary | ICD-10-CM

## 2023-02-21 ENCOUNTER — OFFICE VISIT (OUTPATIENT)
Dept: GASTROENTEROLOGY | Facility: CLINIC | Age: 73
End: 2023-02-21
Payer: MEDICARE

## 2023-02-21 VITALS
WEIGHT: 196 LBS | DIASTOLIC BLOOD PRESSURE: 74 MMHG | HEART RATE: 77 BPM | SYSTOLIC BLOOD PRESSURE: 138 MMHG | BODY MASS INDEX: 32.65 KG/M2 | HEIGHT: 65 IN | OXYGEN SATURATION: 95 % | TEMPERATURE: 96.8 F

## 2023-02-21 DIAGNOSIS — Z86.010 HISTORY OF ADENOMATOUS POLYP OF COLON: Primary | ICD-10-CM

## 2023-02-21 DIAGNOSIS — Z83.71 FAMILY HX COLONIC POLYPS: ICD-10-CM

## 2023-02-21 PROCEDURE — S0260 H&P FOR SURGERY: HCPCS | Performed by: NURSE PRACTITIONER

## 2023-02-21 RX ORDER — POLYETHYLENE GLYCOL 3350, SODIUM SULFATE ANHYDROUS, SODIUM BICARBONATE, SODIUM CHLORIDE, POTASSIUM CHLORIDE 236; 22.74; 6.74; 5.86; 2.97 G/4L; G/4L; G/4L; G/4L; G/4L
4 POWDER, FOR SOLUTION ORAL ONCE
Qty: 4000 ML | Refills: 0 | Status: SHIPPED | OUTPATIENT
Start: 2023-02-21 | End: 2023-02-21 | Stop reason: ALTCHOICE

## 2023-02-21 RX ORDER — BUPRENORPHINE 15 UG/H
1 PATCH TRANSDERMAL
COMMUNITY

## 2023-02-21 NOTE — PROGRESS NOTES
[facility] Gastroenterology    Primary Physician Janak Sherwood APRN    2/21/2023    Joann Finnegan   1950      Chief Complaint   Patient presents with   • Colonoscopy       Subjective     HPI    Joann Finnegan is a 72 y.o. female who presents as a referral for preventative maintenance. She has no complaints of nausea or vomiting. No change in bowels. No wt loss. No BRBPR. No melena. No abdominal pain.         COLONOSCOPY (02/14/2020 09:33)  Tissue Pathology Exam (02/14/2020 09:57) tubular adenomatous.         There is a family history of colon polyps: father. There is not a family history of colon cancer.     Past Medical History:   Diagnosis Date   • Abnormal weight gain    • Arthritis    • Breast cancer (HCC)     right.    • COVID    • Fatigue    • H/O melanoma excision     CLARKS  LEVEL III      BACK OF UPPER LEFT ARM   • History of adenomatous polyp of colon    • Hypertension    • Hypothyroidism    • Injury of back    • Melanoma (HCC)    • Osteoporosis    • Restless leg syndrome    • Shortness of breath        Past Surgical History:   Procedure Laterality Date   • APPENDECTOMY     • BACK SURGERY     • BACK SURGERY     • BREAST BIOPSY Right    • BREAST BIOPSY     • BREAST LUMPECTOMY      right.    • BROW LIFT     • COLONOSCOPY     • COLONOSCOPY N/A 02/14/2020    Procedure: COLONOSCOPY WITH ANESTHESIA;  Surgeon: Donovan Hernandez MD;  Location: [facility] ENDOSCOPY;  Service: Gastroenterology;  Laterality: N/A;  pre: family hx colon polyps  post: polyps  Janak Sherwood APRN   • ENDOSCOPY N/A 02/14/2020    Procedure: ESOPHAGOGASTRODUODENOSCOPY WITH ANESTHESIA;  Surgeon: Donovan Hernandez MD;  Location: [facility] ENDOSCOPY;  Service: Gastroenterology;  Laterality: N/A;  pre: dysphagia; heartburn  post: dilated  Janak Sherwood APRN   • ENDOSCOPY AND COLONOSCOPY  12/22/2011    very minimal distal esophagitis, incomplete esophagus ring dilated   • LUMBAR SYNOVIAL CYST REMOVAL       Spinal Cyst removed from Spinal Canal   • MASTECTOMY, PARTIAL     • PARTIAL HIP ARTHROPLASTY Left    • SKIN CANCER EXCISION      Melanoma    • SQUAMOUS CELL CARCINOMA EXCISION     • TEMPOROMANDIBULAR JOINT ARTHROPLASTY     • TOTAL ABDOMINAL HYSTERECTOMY WITH SALPINGO OOPHORECTOMY Bilateral        Outpatient Medications Marked as Taking for the 2/21/23 encounter (Office Visit) with Vandana Marroquin APRN   Medication Sig Dispense Refill   • ascorbic acid (VITAMIN C) 500 MG tablet Take 500 mg by mouth Daily.     • aspirin 81 MG EC tablet Take 81 mg by mouth Daily.     • Buprenorphine 15 MCG/HR patch weekly Place 1 patch on the skin as directed by provider Every 7 (Seven) Days.     • Cholecalciferol (Vitamin D3) 1.25 MG (85220 UT) capsule Take 50,000 Units by mouth Every 7 (Seven) Days. Sunday     • fluticasone (FLONASE) 50 MCG/ACT nasal spray 2 sprays into the nostril(s) as directed by provider Daily. (Patient taking differently: 2 sprays into the nostril(s) as directed by provider Daily. prn) 16 g 11   • gabapentin (NEURONTIN) 300 MG capsule Take 600 mg by mouth every night at bedtime. Normally take 2 capsules at bedtime but recently doctor told her to take 1 tid     • levothyroxine (SYNTHROID, LEVOTHROID) 125 MCG tablet Take 125 mcg by mouth Daily. NAME BRAND     • lisinopril (PRINIVIL,ZESTRIL) 5 MG tablet Take 1 tablet by mouth Daily.     • oxyCODONE-acetaminophen (PERCOCET)  MG per tablet Take 1 tablet by mouth Every 6 (Six) Hours As Needed for Moderate Pain 12 tablet 0   • Symbicort 160-4.5 MCG/ACT inhaler Inhale 2 puffs 2 (Two) Times a Day.     • tamoxifen (NOLVADEX) 20 MG chemo tablet Take 1 tablet by mouth once daily     • zinc sulfate (ZINCATE) 50 MG capsule Take 50 mg by mouth Daily.         Allergies   Allergen Reactions   • Requip [Ropinirole Hcl] Nausea And Vomiting       Social History     Socioeconomic History   • Marital status:    Tobacco Use   • Smoking status: Former     Types: Cigarettes    • Smokeless tobacco: Never   Substance and Sexual Activity   • Alcohol use: Yes     Comment: rare   • Drug use: No   • Sexual activity: Yes     Partners: Male     Birth control/protection: Surgical       Family History   Problem Relation Age of Onset   • Arthritis Mother    • Seizures Mother    • Diabetes Father    • Skin cancer Father    • Heart attack Father    • Heart disease Father    • Colon polyps Father    • Arthritis Sister    • Ulcers Brother    • No Known Problems Maternal Grandmother    • No Known Problems Maternal Grandfather    • No Known Problems Paternal Grandmother    • No Known Problems Paternal Grandfather    • Ulcers Sister    • Colon cancer Neg Hx        Review of Systems   Constitutional: Negative for chills, fever and unexpected weight change.   Respiratory: Negative for shortness of breath.    Cardiovascular: Negative for chest pain.   Gastrointestinal: Negative for abdominal distention, abdominal pain, anal bleeding, blood in stool, constipation, diarrhea, nausea and vomiting.       Objective     Vitals:    02/21/23 1001   BP: 138/74   Pulse: 77   Temp: 96.8 °F (36 °C)   SpO2: 95%         02/21/23  1001   Weight: 88.9 kg (196 lb)     Body mass index is 32.62 kg/m².    Physical Exam  Vitals reviewed.   Constitutional:       General: She is not in acute distress.  Cardiovascular:      Rate and Rhythm: Normal rate and regular rhythm.      Heart sounds: Normal heart sounds.   Pulmonary:      Effort: Pulmonary effort is normal.      Breath sounds: Normal breath sounds.   Abdominal:      General: Bowel sounds are normal. There is no distension.      Palpations: Abdomen is soft.      Tenderness: There is no abdominal tenderness.   Skin:     General: Skin is warm and dry.   Neurological:      Mental Status: She is alert.         Imaging Results (Most Recent)     None          Assessment & Plan     Diagnoses and all orders for this visit:    1. History of adenomatous polyp of colon (Primary)  -      Case Request; Standing  -     Case Request    2. Family hx colonic polyps  -     Case Request; Standing  -     Case Request    Other orders  -     Discontinue: polyethylene glycol (Golytely) 236 g solution; Take 4,000 mL by mouth 1 (One) Time for 1 dose. Take as directed per instruction sheet.  Dispense: 4000 mL; Refill: 0  -     Implement Anesthesia Orders Day of Procedure; Standing  -     Obtain Informed Consent; Standing        Schedule colonoscopy. Use miralax prep.                COLONOSCOPY WITH ANESTHESIA (N/A)  All risks, benefits, alternatives, and indications of colonoscopy procedure have been discussed with the patient. Risks to include perforation of the colon requiring possible surgery or colostomy, risk of bleeding from biopsies or removal of colon tissue, possibility of missing a colon polyp or cancer, or adverse drug reaction.  Benefits to include the diagnosis and management of disease of the colon and rectum. Alternatives to include barium enema, radiographic evaluation, lab testing or no intervention. Pt verbalizes understanding and agrees.     There are no Patient Instructions on file for this visit.    NITO Louie

## 2023-02-22 PROBLEM — Z86.010 HISTORY OF ADENOMATOUS POLYP OF COLON: Status: ACTIVE | Noted: 2023-02-22

## 2023-02-22 PROBLEM — Z86.0101 HISTORY OF ADENOMATOUS POLYP OF COLON: Status: ACTIVE | Noted: 2023-02-22

## 2023-03-21 DIAGNOSIS — C50.411 MALIGNANT NEOPLASM OF UPPER-OUTER QUADRANT OF RIGHT FEMALE BREAST, UNSPECIFIED ESTROGEN RECEPTOR STATUS (HCC): Primary | ICD-10-CM

## 2023-03-21 NOTE — PROGRESS NOTES
ductal carcinoma, grade 1, measuring 1.3 cm. Clear margins. LV I not identified. 4 sentinel lymph nodes negative for metastatic disease. Final pathologic staging [id number](sn)M0, stage IA, AJCC 2018.   7/27/2018-she underwent intraoperative RT 2000 cGy by Dr. Krupa Barnes. 8/29/2018 -she was first seen by me. Recommended 5 years adjuvant endocrine therapy with Femara. No need for Oncotype Dx.   11/6/2018-switched to tamoxifen (due to significant arthralgia) to complete 10 years in August 2028 1/31/2020 Mammo Changes from right breast cancer, lumpectomy and radiation therapy. No malignant features. ACR BI-RADS Category 2, benign findings. 3/26/21 CT chest [facility]): Bilateral interstitial fibrosis with no sign of pneumonia. 5/12/21 [facility] DIGITAL DIAGNOSTIC W OR WO CAD BILATERAL No mammographic evidence of malignancy within either breast. 12 month follow-up bilateral mammogram recommended. BI-RADS category 2- Benign. The patient will receive a letter notifying her of the results of this exam.   5/11/2022 [facility] Digital Diagnostic W OR WO CAD Bilateral No mammographic evidence of malignancy. Recommendation is for the patients to return for routine mammography in one year or sooner if clinically indicated. Assessment: BI-RADS category 2 benign.     PAST MEDICAL HISTORY:   Past Medical History:   Diagnosis Date    Arthritis     Breast cancer ([address] 2018    Cancer ([address]     melonoma    Condition not found 2019    per patient she was diagnosed at [facility] with [facility]    COVID-19     H/O screening mammography 01/30/2019    @LMP BR 2     History of therapeutic radiation 2018    PONV (postoperative nausea and vomiting)     Restless leg syndrome     Sinus disease     Thyroid disease       PAST SURGICAL HISTORY:  Past Surgical History:   Procedure Laterality Date    APPENDECTOMY      BACK SURGERY      synovial cyst off spinal canal    BREAST BIOPSY Right 2018    malignant    BREAST LUMPECTOMY Right 2018    IORT

## 2023-03-22 ENCOUNTER — HOSPITAL ENCOUNTER (OUTPATIENT)
Dept: INFUSION THERAPY | Age: 73
Discharge: HOME | End: 2023-03-22
Payer: MEDICARE

## 2023-03-22 ENCOUNTER — OFFICE VISIT (OUTPATIENT)
Dept: HEMATOLOGY | Age: 73
End: 2023-03-22
Payer: MEDICARE

## 2023-03-22 VITALS
DIASTOLIC BLOOD PRESSURE: 64 MMHG | WEIGHT: 192.1 LBS | SYSTOLIC BLOOD PRESSURE: 120 MMHG | OXYGEN SATURATION: 93 % | BODY MASS INDEX: 31.97 KG/M2 | HEART RATE: 81 BPM | TEMPERATURE: 97.7 F

## 2023-03-22 DIAGNOSIS — Z08 ENCOUNTER FOR FOLLOW-UP SURVEILLANCE OF BREAST CANCER: ICD-10-CM

## 2023-03-22 DIAGNOSIS — Z71.89 CARE PLAN DISCUSSED WITH PATIENT: ICD-10-CM

## 2023-03-22 DIAGNOSIS — C50.411 MALIGNANT NEOPLASM OF UPPER-OUTER QUADRANT OF RIGHT FEMALE BREAST, UNSPECIFIED ESTROGEN RECEPTOR STATUS (HCC): Primary | ICD-10-CM

## 2023-03-22 DIAGNOSIS — Z85.3 ENCOUNTER FOR FOLLOW-UP SURVEILLANCE OF BREAST CANCER: ICD-10-CM

## 2023-03-22 DIAGNOSIS — R23.2 HOT FLASHES: ICD-10-CM

## 2023-03-22 DIAGNOSIS — C50.411 MALIGNANT NEOPLASM OF UPPER-OUTER QUADRANT OF RIGHT FEMALE BREAST, UNSPECIFIED ESTROGEN RECEPTOR STATUS (HCC): ICD-10-CM

## 2023-03-22 LAB
BASOPHILS # BLD: 0.06 K/UL (ref 0.01–0.08)
BASOPHILS NFR BLD: 0.9 % (ref 0.1–1.2)
EOSINOPHIL # BLD: 0.15 K/UL (ref 0.04–0.54)
EOSINOPHIL NFR BLD: 2.2 % (ref 0.7–7)
ERYTHROCYTE [DISTWIDTH] IN BLOOD BY AUTOMATED COUNT: 12.6 % (ref 11.7–14.4)
HCT VFR BLD AUTO: 41.2 % (ref 34.1–44.9)
HGB BLD-MCNC: 13.2 G/DL (ref 11.2–15.7)
LYMPHOCYTES # BLD: 1.48 K/UL (ref 1.18–3.74)
LYMPHOCYTES NFR BLD: 21.6 % (ref 19.3–53.1)
MCH RBC QN AUTO: 30.8 PG (ref 25.6–32.2)
MCHC RBC AUTO-ENTMCNC: 32 G/DL (ref 32.3–35.5)
MCV RBC AUTO: 96.3 FL (ref 79.4–94.8)
MONOCYTES # BLD: 0.39 K/UL (ref 0.24–0.82)
MONOCYTES NFR BLD: 5.7 % (ref 4.7–12.5)
NEUTROPHILS # BLD: 4.76 K/UL (ref 1.56–6.13)
NEUTS SEG NFR BLD: 69.5 % (ref 34–71.1)
PLATELET # BLD AUTO: 106 K/UL (ref 182–369)
PMV BLD AUTO: 12.3 FL (ref 7.4–10.4)
RBC # BLD AUTO: 4.28 M/UL (ref 3.93–5.22)
WBC # BLD AUTO: 6.85 K/UL (ref 3.98–10.04)

## 2023-03-22 PROCEDURE — G8400 PT W/DXA NO RESULTS DOC: HCPCS | Performed by: INTERNAL MEDICINE

## 2023-03-22 PROCEDURE — 85025 COMPLETE CBC W/AUTO DIFF WBC: CPT

## 2023-03-22 PROCEDURE — 99211 OFF/OP EST MAY X REQ PHY/QHP: CPT

## 2023-03-22 PROCEDURE — G8417 CALC BMI ABV UP PARAM F/U: HCPCS | Performed by: INTERNAL MEDICINE

## 2023-03-22 PROCEDURE — G8427 DOCREV CUR MEDS BY ELIG CLIN: HCPCS | Performed by: INTERNAL MEDICINE

## 2023-03-22 PROCEDURE — G8484 FLU IMMUNIZE NO ADMIN: HCPCS | Performed by: INTERNAL MEDICINE

## 2023-03-22 PROCEDURE — 1090F PRES/ABSN URINE INCON ASSESS: CPT | Performed by: INTERNAL MEDICINE

## 2023-03-22 PROCEDURE — 99213 OFFICE O/P EST LOW 20 MIN: CPT | Performed by: INTERNAL MEDICINE

## 2023-03-22 PROCEDURE — 1036F TOBACCO NON-USER: CPT | Performed by: INTERNAL MEDICINE

## 2023-03-22 PROCEDURE — 36415 COLL VENOUS BLD VENIPUNCTURE: CPT

## 2023-03-22 PROCEDURE — 3017F COLORECTAL CA SCREEN DOC REV: CPT | Performed by: INTERNAL MEDICINE

## 2023-03-22 PROCEDURE — 1123F ACP DISCUSS/DSCN MKR DOCD: CPT | Performed by: INTERNAL MEDICINE

## 2023-03-22 RX ORDER — FLUTICASONE PROPIONATE 50 MCG
1 SPRAY, SUSPENSION (ML) NASAL DAILY
COMMUNITY
Start: 2021-04-23

## 2023-03-22 RX ORDER — OXYCODONE HYDROCHLORIDE 10 MG/1
TABLET ORAL
COMMUNITY
Start: 2022-11-17

## 2023-03-22 RX ORDER — DENOSUMAB 60 MG/ML
INJECTION SUBCUTANEOUS
COMMUNITY
Start: 2022-10-18

## 2023-03-23 DIAGNOSIS — C50.411 MALIGNANT NEOPLASM OF UPPER-OUTER QUADRANT OF RIGHT FEMALE BREAST, UNSPECIFIED ESTROGEN RECEPTOR STATUS (HCC): ICD-10-CM

## 2023-03-23 RX ORDER — TAMOXIFEN CITRATE 20 MG/1
TABLET ORAL
Qty: 90 TABLET | Refills: 1 | Status: SHIPPED | OUTPATIENT
Start: 2023-03-23

## 2023-03-31 ENCOUNTER — TELEPHONE (OUTPATIENT)
Dept: GASTROENTEROLOGY | Facility: CLINIC | Age: 73
End: 2023-03-31
Payer: MEDICARE

## 2023-05-10 ENCOUNTER — ANESTHESIA (OUTPATIENT)
Dept: GASTROENTEROLOGY | Facility: HOSPITAL | Age: 73
End: 2023-05-10
Payer: MEDICARE

## 2023-05-10 ENCOUNTER — ANESTHESIA EVENT (OUTPATIENT)
Dept: GASTROENTEROLOGY | Facility: HOSPITAL | Age: 73
End: 2023-05-10
Payer: MEDICARE

## 2023-05-10 ENCOUNTER — HOSPITAL ENCOUNTER (OUTPATIENT)
Facility: HOSPITAL | Age: 73
Setting detail: HOSPITAL OUTPATIENT SURGERY
Discharge: HOME | End: 2023-05-10
Attending: INTERNAL MEDICINE | Admitting: INTERNAL MEDICINE
Payer: MEDICARE

## 2023-05-10 VITALS
OXYGEN SATURATION: 97 % | TEMPERATURE: 98.2 F | SYSTOLIC BLOOD PRESSURE: 117 MMHG | DIASTOLIC BLOOD PRESSURE: 77 MMHG | HEIGHT: 65 IN | BODY MASS INDEX: 31.65 KG/M2 | WEIGHT: 190 LBS | HEART RATE: 86 BPM | RESPIRATION RATE: 17 BRPM

## 2023-05-10 DIAGNOSIS — Z86.010 HISTORY OF ADENOMATOUS POLYP OF COLON: ICD-10-CM

## 2023-05-10 DIAGNOSIS — Z83.71 FAMILY HX COLONIC POLYPS: ICD-10-CM

## 2023-05-10 PROCEDURE — 88305 TISSUE EXAM BY PATHOLOGIST: CPT | Performed by: INTERNAL MEDICINE

## 2023-05-10 PROCEDURE — 25010000002 PROPOFOL 10 MG/ML EMULSION: Performed by: NURSE ANESTHETIST, CERTIFIED REGISTERED

## 2023-05-10 PROCEDURE — 45385 COLONOSCOPY W/LESION REMOVAL: CPT | Performed by: INTERNAL MEDICINE

## 2023-05-10 RX ORDER — LIDOCAINE HYDROCHLORIDE 20 MG/ML
INJECTION, SOLUTION EPIDURAL; INFILTRATION; INTRACAUDAL; PERINEURAL AS NEEDED
Status: DISCONTINUED | OUTPATIENT
Start: 2023-05-10 | End: 2023-05-10 | Stop reason: SURG

## 2023-05-10 RX ORDER — SODIUM CHLORIDE 0.9 % (FLUSH) 0.9 %
10 SYRINGE (ML) INJECTION EVERY 12 HOURS SCHEDULED
Status: CANCELLED | OUTPATIENT
Start: 2023-05-10

## 2023-05-10 RX ORDER — SODIUM CHLORIDE 0.9 % (FLUSH) 0.9 %
10 SYRINGE (ML) INJECTION AS NEEDED
Status: DISCONTINUED | OUTPATIENT
Start: 2023-05-10 | End: 2023-05-10 | Stop reason: HOSPADM

## 2023-05-10 RX ORDER — SODIUM CHLORIDE 9 MG/ML
100 INJECTION, SOLUTION INTRAVENOUS CONTINUOUS
Status: CANCELLED | OUTPATIENT
Start: 2023-05-10

## 2023-05-10 RX ORDER — PROPOFOL 10 MG/ML
VIAL (ML) INTRAVENOUS AS NEEDED
Status: DISCONTINUED | OUTPATIENT
Start: 2023-05-10 | End: 2023-05-10 | Stop reason: SURG

## 2023-05-10 RX ORDER — SODIUM CHLORIDE 0.9 % (FLUSH) 0.9 %
10 SYRINGE (ML) INJECTION AS NEEDED
Status: CANCELLED | OUTPATIENT
Start: 2023-05-10

## 2023-05-10 RX ORDER — SODIUM CHLORIDE 9 MG/ML
500 INJECTION, SOLUTION INTRAVENOUS CONTINUOUS PRN
Status: DISCONTINUED | OUTPATIENT
Start: 2023-05-10 | End: 2023-05-10 | Stop reason: HOSPADM

## 2023-05-10 RX ORDER — SODIUM CHLORIDE 9 MG/ML
40 INJECTION, SOLUTION INTRAVENOUS AS NEEDED
Status: CANCELLED | OUTPATIENT
Start: 2023-05-10

## 2023-05-10 RX ORDER — LIDOCAINE HYDROCHLORIDE 10 MG/ML
0.5 INJECTION, SOLUTION EPIDURAL; INFILTRATION; INTRACAUDAL; PERINEURAL ONCE AS NEEDED
Status: DISCONTINUED | OUTPATIENT
Start: 2023-05-10 | End: 2023-05-10 | Stop reason: HOSPADM

## 2023-05-10 RX ORDER — ONDANSETRON 2 MG/ML
4 INJECTION INTRAMUSCULAR; INTRAVENOUS ONCE AS NEEDED
Status: DISCONTINUED | OUTPATIENT
Start: 2023-05-10 | End: 2023-05-10 | Stop reason: HOSPADM

## 2023-05-10 RX ADMIN — LIDOCAINE HYDROCHLORIDE 50 MG: 20 INJECTION, SOLUTION EPIDURAL; INFILTRATION; INTRACAUDAL; PERINEURAL at 10:08

## 2023-05-10 RX ADMIN — PROPOFOL INJECTABLE EMULSION 200 MG: 10 INJECTION, EMULSION INTRAVENOUS at 10:08

## 2023-05-10 RX ADMIN — SODIUM CHLORIDE 500 ML: 9 INJECTION, SOLUTION INTRAVENOUS at 08:36

## 2023-05-10 RX ADMIN — PROPOFOL INJECTABLE EMULSION 200 MG: 10 INJECTION, EMULSION INTRAVENOUS at 10:20

## 2023-05-10 NOTE — ANESTHESIA PREPROCEDURE EVALUATION
Anesthesia Evaluation     Patient summary reviewed   no history of anesthetic complications:  NPO Solid Status: > 8 hours  NPO Liquid Status: > 2 hours           Airway   Mallampati: I  TM distance: >3 FB  Neck ROM: full  No difficulty expected  Dental    (+) upper dentures and lower dentures    Pulmonary    (+) a smoker Former, home oxygen (not since a few months after coming home from rehab from [facility] ), sleep apnea on CPAP,   (-) COPD, asthma    ROS comment: COVID requiring ventilation December 2020   Cardiovascular   Exercise tolerance: good (4-7 METS)    (+) hypertension,   (-) past MI, CAD, cardiac stents      Neuro/Psych  (-) seizures, TIA, CVA  GI/Hepatic/Renal/Endo    (+) obesity,   thyroid problem hypothyroidism  (-) liver disease, no renal disease, diabetes    Musculoskeletal     Abdominal   (+) obese,    Substance History      OB/GYN          Other                          Anesthesia Plan    ASA 3     MAC     intravenous induction     Anesthetic plan, risks, benefits, and alternatives have been provided, discussed and informed consent has been obtained with: patient.

## 2023-05-10 NOTE — ANESTHESIA POSTPROCEDURE EVALUATION
"Patient: Joann Finnegan    Procedure Summary     Date: 05/10/23 Room / Location:  [facility] /  [facility]    Anesthesia Start: 1008 Anesthesia Stop: 1032    Procedure: COLONOSCOPY WITH ANESTHESIA Diagnosis:       History of adenomatous polyp of colon      Family hx colonic polyps      (History of adenomatous polyp of colon [Z86.010])      (Family hx colonic polyps [Z83.71])    Surgeons: Donovan Hernandez MD Provider: Quinn Roman CRNA    Anesthesia Type: MAC ASA Status: 3          Anesthesia Type: MAC    Vitals  No vitals data found for the desired time range.          Post Anesthesia Care and Evaluation    Patient location during evaluation: PHASE II  Patient participation: complete - patient participated  Level of consciousness: awake and alert  Pain management: adequate    Airway patency: patent  Anesthetic complications: No anesthetic complications    Cardiovascular status: acceptable  Respiratory status: acceptable  Hydration status: acceptable    Comments: Blood pressure 148/66, pulse 98, temperature 98.2 °F (36.8 °C), temperature source Temporal, resp. rate 18, height 165.1 cm (65\"), weight 86.2 kg (190 lb), last menstrual period 10/26/2003, SpO2 93 %, not currently breastfeeding.    Pt discharged from PACU based on donovan score >8      "

## 2023-05-10 NOTE — H&P
[facility]  Pre Procedure History & Physical    Chief Complaint:   Colon polyps    Subjective     HPI:   The patient has a history of colon polyps who presents for exam.    Past Medical History:   Past Medical History:   Diagnosis Date   • Abnormal weight gain    • Arthritis    • Breast cancer     right.    • COVID    • Fatigue    • H/O melanoma excision     CLARKS  LEVEL III      BACK OF UPPER LEFT ARM   • History of adenomatous polyp of colon    • Hypertension    • Hypothyroidism    • Injury of back    • Melanoma    • Osteoporosis    • Restless leg syndrome    • Shortness of breath        Past Surgical History:  Past Surgical History:   Procedure Laterality Date   • APPENDECTOMY     • BACK SURGERY     • BACK SURGERY     • BREAST BIOPSY Right    • BREAST BIOPSY     • BREAST LUMPECTOMY      right.    • BROW LIFT     • COLONOSCOPY     • COLONOSCOPY N/A 02/14/2020    Procedure: COLONOSCOPY WITH ANESTHESIA;  Surgeon: Donovan Hernandez MD;  Location: [facility] ENDOSCOPY;  Service: Gastroenterology;  Laterality: N/A;  pre: family hx colon polyps  post: polyps  Janak Sherwood APRN   • ENDOSCOPY N/A 02/14/2020    Procedure: ESOPHAGOGASTRODUODENOSCOPY WITH ANESTHESIA;  Surgeon: Donovan Hernandez MD;  Location: [facility] ENDOSCOPY;  Service: Gastroenterology;  Laterality: N/A;  pre: dysphagia; heartburn  post: dilated  Janak Sherwood APRN   • ENDOSCOPY AND COLONOSCOPY  12/22/2011    very minimal distal esophagitis, incomplete esophagus ring dilated   • LUMBAR SYNOVIAL CYST REMOVAL      Spinal Cyst removed from Spinal Canal   • MASTECTOMY, PARTIAL     • PARTIAL HIP ARTHROPLASTY Left    • SKIN CANCER EXCISION      Melanoma    • SQUAMOUS CELL CARCINOMA EXCISION     • TEMPOROMANDIBULAR JOINT ARTHROPLASTY     • TOTAL ABDOMINAL HYSTERECTOMY WITH SALPINGO OOPHORECTOMY Bilateral        Family History:  Family History   Problem Relation Age of Onset   • Arthritis Mother    • Seizures Mother    • Diabetes  Father    • Skin cancer Father    • Heart attack Father    • Heart disease Father    • Colon polyps Father    • Arthritis Sister    • Ulcers Brother    • No Known Problems Maternal Grandmother    • No Known Problems Maternal Grandfather    • No Known Problems Paternal Grandmother    • No Known Problems Paternal Grandfather    • Ulcers Sister    • Colon cancer Neg Hx        Social History:   reports that she has quit smoking. Her smoking use included cigarettes. She has never used smokeless tobacco. She reports current alcohol use. She reports that she does not use drugs.    Medications:   Prior to Admission medications    Medication Sig Start Date End Date Taking? Authorizing Provider   ascorbic acid (VITAMIN C) 500 MG tablet Take 1 tablet by mouth Daily.   Yes El Mckeon MD   aspirin 81 MG EC tablet Take 1 tablet by mouth Daily.   Yes El Mckeon MD   fluticasone (FLONASE) 50 MCG/ACT nasal spray 2 sprays into the nostril(s) as directed by provider Daily.  Patient taking differently: 2 sprays into the nostril(s) as directed by provider Daily. prn 4/23/21  Yes Taurus Eastman Jr., MD   gabapentin (NEURONTIN) 300 MG capsule Take 2 capsules by mouth every night at bedtime. Normally take 2 capsules at bedtime but recently doctor told her to take 1 tid 3/4/21  Yes El Mckeon MD   levothyroxine (SYNTHROID, LEVOTHROID) 125 MCG tablet Take 1 tablet by mouth Daily. NAME BRAND   Yes El Mckeon MD   lisinopril (PRINIVIL,ZESTRIL) 5 MG tablet Take 1 tablet by mouth Daily. 1/12/21  Yes Samir Teague MD   Symbicort 160-4.5 MCG/ACT inhaler Inhale 2 puffs 2 (Two) Times a Day. 3/4/21  Yes El Mckeon MD   tamoxifen (NOLVADEX) 20 MG chemo tablet Take 1 tablet by mouth once daily 11/11/20  Yes El Mckeon MD   zinc sulfate (ZINCATE) 50 MG capsule Take 1 capsule by mouth Daily.   Yes El Mckeon MD   Buprenorphine 15 MCG/HR patch weekly Place 1 patch on  "the skin as directed by provider Every 7 (Seven) Days.    ProviderEl MD   Cholecalciferol (Vitamin D3) 1.25 MG (28521 UT) capsule Take 1 capsule by mouth Every 7 (Seven) Days. Sunday    El Mckeon MD   oxyCODONE-acetaminophen (PERCOCET)  MG per tablet Take 1 tablet by mouth Every 6 (Six) Hours As Needed for Moderate Pain 9/27/22   Wil Jean MD   oxybutynin (DITROPAN) 5 MG tablet Take 1 tablet by mouth 2 (Two) Times a Day. 5/25/22 5/10/23  El Mckeon MD   tiZANidine (ZANAFLEX) 4 MG tablet Take 1.5 tablets by mouth 3 (Three) Times a Day As Needed for Muscle Spasms. 9/27/22 5/10/23  Wil Jean MD       Allergies:  Requip [ropinirole hcl]    ROS:    General: Weight stable  Resp: No SOA  Cardiovascular: No CP    Objective     Blood pressure 148/66, pulse 98, temperature 98.2 °F (36.8 °C), temperature source Temporal, resp. rate 18, height 165.1 cm (65\"), weight 86.2 kg (190 lb), last menstrual period 10/26/2003, SpO2 93 %, not currently breastfeeding.    Physical Exam   Constitutional: Pt is oriented to person, place, and in no distress.   Cardiovascular: Normal rate, regular rhythm.    Pulmonary/Chest: Effort normal. No respiratory distress.   Abdominal:  Non-distended.  Psychiatric: Mood, memory, affect and judgment appear normal.     Assessment & Plan     Diagnosis:  Colon polyps    Anticipated Surgical Procedure:  Colonoscopy    The risks, benefits, and alternatives of this procedure have been discussed with the patient or the responsible party- the patient understands and agrees to proceed.      EMR Dragon/transcription disclaimer:  Much of this encounter note is electronic transcription/translation of spoken language to printed text.  The electronic translation of spoken language may be erroneous, or at times, nonsensical words or phrases may be inadvertently transcribed.  Although I have reviewed the note for such errors, some may still exist.  "

## 2023-05-11 LAB
CYTO UR: NORMAL
LAB AP CASE REPORT: NORMAL
Lab: NORMAL
PATH REPORT.FINAL DX SPEC: NORMAL
PATH REPORT.GROSS SPEC: NORMAL

## 2023-06-27 ENCOUNTER — HOSPITAL ENCOUNTER (OUTPATIENT)
Dept: WOMENS IMAGING | Age: 73
Discharge: HOME | End: 2023-06-27
Payer: MEDICARE

## 2023-06-27 DIAGNOSIS — R92.8 ABNORMAL MAMMOGRAM: ICD-10-CM

## 2023-06-27 PROCEDURE — 77065 DX MAMMO INCL CAD UNI: CPT | Performed by: RADIOLOGY

## 2023-06-27 PROCEDURE — 88341 IMHCHEM/IMCYTCHM EA ADD ANTB: CPT

## 2023-06-27 PROCEDURE — 77065 DX MAMMO INCL CAD UNI: CPT

## 2023-06-27 PROCEDURE — 88342 IMHCHEM/IMCYTCHM 1ST ANTB: CPT

## 2023-06-27 PROCEDURE — 88305 TISSUE EXAM BY PATHOLOGIST: CPT

## 2023-06-27 PROCEDURE — 19081 BX BREAST 1ST LESION STRTCTC: CPT

## 2023-06-29 ENCOUNTER — TELEPHONE (OUTPATIENT)
Dept: SURGERY | Age: 73
End: 2023-06-29

## 2023-06-29 DIAGNOSIS — C50.812 MALIGNANT NEOPLASM OF OVERLAPPING SITES OF LEFT BREAST (HCC): Primary | ICD-10-CM

## 2023-06-30 ENCOUNTER — TELEPHONE (OUTPATIENT)
Dept: OTHER | Age: 73
End: 2023-06-30

## 2023-07-05 NOTE — TELEPHONE ENCOUNTER
Pt is getting very anxious about MRI being scheduled. She would like to be scheduled at [facility] MRI is back functioning, she will come back over here. She also needs Valium sent in. Pharmacy verified.

## 2023-07-06 DIAGNOSIS — C50.411 MALIGNANT NEOPLASM OF UPPER-OUTER QUADRANT OF RIGHT FEMALE BREAST, UNSPECIFIED ESTROGEN RECEPTOR STATUS (HCC): Primary | ICD-10-CM

## 2023-07-06 RX ORDER — DIAZEPAM 5 MG/1
TABLET ORAL
Qty: 10 TABLET | Refills: 0 | Status: SHIPPED | OUTPATIENT
Start: 2023-07-06 | End: 2023-07-16

## 2023-07-06 NOTE — TELEPHONE ENCOUNTER
Remove patch during MRI and may replace after if she continues to use. It may cause burning. Pt is scheduled at [facility] for breast MRI on 7/17/2023 @ 2:30. Arrive at 2:00.at [facility].

## 2023-07-07 NOTE — TELEPHONE ENCOUNTER
Patient was informed:  Breast US Scheduled before breast talk at [facility] on 7/24/2023 @ 11 AM. She has a Breast talk with Dr. Gianluca Brothers at 11:30 AM.    No Auth required by Ins for MRI  [facility]

## 2023-07-07 NOTE — TELEPHONE ENCOUNTER
Patient was informed:    MRI scheduled on 7/17/2023 at 2:30 Pm. All instructions for test were given.   Breast US Scheduled before breast talk at [facility] on 7/24/2023 @ 11 AM. She has a Breast talk with Dr. Dayna Garber at 11:30 AM.

## 2023-07-14 ENCOUNTER — TELEPHONE (OUTPATIENT)
Dept: SURGERY | Age: 73
End: 2023-07-14

## 2023-07-14 NOTE — TELEPHONE ENCOUNTER
7/14/2023  Mirandasummer Pimentelthomas with [facility] is needing order for MRI of breast w & w/o contrast and need by 2:00 pm to prevent patient from being rescheduled.     Callback # [phone]  barrett

## 2023-07-18 DIAGNOSIS — C50.812 MALIGNANT NEOPLASM OF OVERLAPPING SITES OF LEFT BREAST (HCC): ICD-10-CM

## 2023-07-21 NOTE — PROGRESS NOTES
medial to the biopsy clip there is one or 2 small foci of   nonmass-like enhancement, measuring no more than 4 mm. This may be   reactive related to the biopsy procedure itself, versus small foci of   DCIS. BI-RADS Category 6, known malignancy. Appropriate clinical action   should be taken. This report was finalized on 07/18/2023 09:45 by Dr. Brittani Bailey MD    DISCUSSION:  I had a lengthy discussion with Ms. Abbott  and her family about the ramifications of the diagnosis of breast cancer. We discussed the pathophysiology of cancer in general and also the ways in which surgery, radiation therapy, and chemotherapy are utilized in the treatment of different types of cancers. We also explained how these modalities related to her situation in particular. We discussed the pathophysiology of breast cancer and some length, including what is known about the causes of breast cancer, its relationship to fibrocystic disease, its relationship to hormone replacement therapy, and some of the genetic aspects involved in familial breast cancers. We discussed the BrCa genetic analysis and why it is appropriate for her. We discussed breast MRI and how it assists in evaluation of breast cancers and the results of her MRI if done. We discussed the surgical options including simple mastectomy and lumpectomy with  sentinel lymph node biopsy as well as the possibility of axillary lymph node dissection. We explained in depth why breast conservation therapy requires radiation treatments for the majority of women. These treatments may be external beam for 6 weeks, partial breast for 5 days,  or intraoperative. I explained that most women treated for invasive malignancy do receive systemic therapy, hormonal therapy or  chemotherapy postoperatively depending upon the final pathology, the lymph node status, and the hormone receptor status.   I discussed Oncotype Dx, Mammoprint, and Adjuvant Online as tools which aid in the

## 2023-07-24 ENCOUNTER — HOSPITAL ENCOUNTER (OUTPATIENT)
Dept: WOMENS IMAGING | Age: 73
Discharge: HOME | End: 2023-07-24

## 2023-07-24 ENCOUNTER — INITIAL CONSULT (OUTPATIENT)
Dept: SURGERY | Age: 73
End: 2023-07-24
Payer: MEDICARE

## 2023-07-24 DIAGNOSIS — Z80.8 FAMILY HISTORY OF MELANOMA: ICD-10-CM

## 2023-07-24 DIAGNOSIS — Z85.820 PERSONAL HISTORY OF MALIGNANT MELANOMA: ICD-10-CM

## 2023-07-24 DIAGNOSIS — Z85.3 PERSONAL HISTORY OF BREAST CANCER: ICD-10-CM

## 2023-07-24 DIAGNOSIS — C50.812 MALIGNANT NEOPLASM OF OVERLAPPING SITES OF LEFT BREAST (HCC): Primary | ICD-10-CM

## 2023-07-24 DIAGNOSIS — C50.812 MALIGNANT NEOPLASM OF OVERLAPPING SITES OF LEFT BREAST (HCC): ICD-10-CM

## 2023-07-24 PROCEDURE — 99215 OFFICE O/P EST HI 40 MIN: CPT | Performed by: SURGERY

## 2023-07-24 PROCEDURE — G8417 CALC BMI ABV UP PARAM F/U: HCPCS | Performed by: SURGERY

## 2023-07-24 PROCEDURE — G8400 PT W/DXA NO RESULTS DOC: HCPCS | Performed by: SURGERY

## 2023-07-24 PROCEDURE — 1090F PRES/ABSN URINE INCON ASSESS: CPT | Performed by: SURGERY

## 2023-07-24 PROCEDURE — 1036F TOBACCO NON-USER: CPT | Performed by: SURGERY

## 2023-07-24 PROCEDURE — 1123F ACP DISCUSS/DSCN MKR DOCD: CPT | Performed by: SURGERY

## 2023-07-24 PROCEDURE — 3017F COLORECTAL CA SCREEN DOC REV: CPT | Performed by: SURGERY

## 2023-07-24 PROCEDURE — G8428 CUR MEDS NOT DOCUMENT: HCPCS | Performed by: SURGERY

## 2023-07-25 ENCOUNTER — TELEPHONE (OUTPATIENT)
Dept: OTHER | Age: 73
End: 2023-07-25

## 2023-07-25 NOTE — TELEPHONE ENCOUNTER
Pt states her visit went well and she has no questions. She will get her blood drawn,  her surgical bra and  her new rx on Thursday. Pt will be awaiting call from Michaela Olmedo to get her surgery date. Pt to call with questions or concerns.

## 2023-07-26 ENCOUNTER — TELEPHONE (OUTPATIENT)
Dept: SURGERY | Age: 73
End: 2023-07-26

## 2023-07-26 DIAGNOSIS — R92.8 ABNORMAL MAMMOGRAM OF RIGHT BREAST: Primary | ICD-10-CM

## 2023-07-26 NOTE — TELEPHONE ENCOUNTER
Discussed mammographic finding with patient. Radiologist had discussed mammographic finding with me yesterday and I have discussed with Dr Georgiana Bedolla. Discussed the procedure and she is willing to proceed. This has been electronically signed by Juan Livingston.  Jimena Altamirano PA-C.

## 2023-07-28 DIAGNOSIS — C50.812 MALIGNANT NEOPLASM OF OVERLAPPING SITES OF LEFT BREAST (HCC): Primary | ICD-10-CM

## 2023-07-31 ENCOUNTER — HOSPITAL ENCOUNTER (OUTPATIENT)
Dept: WOMENS IMAGING | Age: 73
Discharge: HOME | End: 2023-07-31

## 2023-07-31 DIAGNOSIS — R92.8 ABNORMAL MAMMOGRAM OF RIGHT BREAST: ICD-10-CM

## 2023-08-13 LAB
Lab: NEGATIVE
Lab: NORMAL

## 2023-08-30 ENCOUNTER — HOSPITAL ENCOUNTER (OUTPATIENT)
Dept: PREADMISSION TESTING | Age: 73
Discharge: HOME | End: 2023-09-03
Payer: MEDICARE

## 2023-08-30 VITALS — HEIGHT: 65 IN | BODY MASS INDEX: 33.05 KG/M2 | WEIGHT: 198.4 LBS

## 2023-08-30 LAB
ANION GAP SERPL CALCULATED.3IONS-SCNC: 10 MMOL/L (ref 7–19)
BUN SERPL-MCNC: 14 MG/DL (ref 8–23)
CALCIUM SERPL-MCNC: 9.5 MG/DL (ref 8.8–10.2)
CHLORIDE SERPL-SCNC: 107 MMOL/L (ref 98–111)
CO2 SERPL-SCNC: 24 MMOL/L (ref 22–29)
CREAT SERPL-MCNC: 1.2 MG/DL (ref 0.5–0.9)
EKG P AXIS: 56 DEGREES
EKG P-R INTERVAL: 156 MS
EKG Q-T INTERVAL: 378 MS
EKG QRS DURATION: 82 MS
EKG QTC CALCULATION (BAZETT): 407 MS
EKG T AXIS: 29 DEGREES
ERYTHROCYTE [DISTWIDTH] IN BLOOD BY AUTOMATED COUNT: 13.2 % (ref 11.5–14.5)
GLUCOSE SERPL-MCNC: 106 MG/DL (ref 74–109)
HCT VFR BLD AUTO: 39.1 % (ref 37–47)
HGB BLD-MCNC: 12.9 G/DL (ref 12–16)
MCH RBC QN AUTO: 32.2 PG (ref 27–31)
MCHC RBC AUTO-ENTMCNC: 33 G/DL (ref 33–37)
MCV RBC AUTO: 97.5 FL (ref 81–99)
PLATELET # BLD AUTO: 105 K/UL (ref 130–400)
PMV BLD AUTO: 11.5 FL (ref 9.4–12.3)
POTASSIUM SERPL-SCNC: 4.3 MMOL/L (ref 3.5–5)
RBC # BLD AUTO: 4.01 M/UL (ref 4.2–5.4)
SODIUM SERPL-SCNC: 141 MMOL/L (ref 136–145)
WBC # BLD AUTO: 5.4 K/UL (ref 4.8–10.8)

## 2023-08-30 PROCEDURE — 93005 ELECTROCARDIOGRAM TRACING: CPT | Performed by: SURGERY

## 2023-08-30 PROCEDURE — 85027 COMPLETE CBC AUTOMATED: CPT

## 2023-08-30 PROCEDURE — 80048 BASIC METABOLIC PNL TOTAL CA: CPT

## 2023-08-30 RX ORDER — GABAPENTIN 300 MG/1
300 CAPSULE ORAL ONCE
OUTPATIENT
Start: 2023-09-15

## 2023-08-30 RX ORDER — ACETAMINOPHEN 325 MG/1
975 TABLET ORAL ONCE
OUTPATIENT
Start: 2023-09-15

## 2023-08-30 RX ORDER — MONTELUKAST SODIUM 10 MG/1
10 TABLET ORAL NIGHTLY
COMMUNITY

## 2023-08-30 RX ORDER — EXEMESTANE 25 MG/1
25 TABLET ORAL DAILY
COMMUNITY

## 2023-08-30 RX ORDER — CELECOXIB 200 MG/1
200 CAPSULE ORAL ONCE
OUTPATIENT
Start: 2023-09-15

## 2023-08-30 NOTE — DISCHARGE INSTRUCTIONS
The day before surgery you will receive a phone call from the surgery nurse to let you know what time to arrive on the day of surgery. This call will usually be between 2-4 PM. If you do not receive a phone call by 4 PM the day before your surgery please call [phone] and let them know you have not received an arrival time. If your surgery is on Monday, your call will be on the Friday before your Monday surgery. Bertha Paul for the [facility]    A script for Bactroban ointment has been call to your pharmacy or was given to you in written form by your surgeon. The guidelines for the ointment use are as follows:    1)  Start using the ointment 7 days before your surgery date    2)  Use the ointment two times a day - morning and night    3)  Place the ointment on a Q-tip and swirl up in your nose making sure you cover completely       the skin just inside of each nostril. Use one end of the Q-tip for each nostril. Chlorhexidine Gluconate 4% Solution    Patient should shower with this soap a minimum of 3 consecutive showers (2 nights before surgery, the night before surgery and the morning of surgery) washing from the neck down (avoiding contact with genitalia). DO  [address] YOUR HAIR OR FACE WITH THIS SOAP. When washing with this soap, apply enough to suds up the body thoroughly, turn the water away from your body and allow the soap suds to remain on the body for 2 full minutes, then rinse body completely. After using this soap on the body, please do not apply powders or lotions to your body. After the shower the night before surgery, please dry off with a new towel, sleep in new freshly laundered pj's, and change your bed linen before going to sleep. The morning of surgery, you may take all your prescribed medications with a sip of water. Any exceptions to this would be listed below:  Aspirin as per your prescribing physcian.     DO NOT take your lisinopril the morning of

## 2023-09-13 ENCOUNTER — HOSPITAL ENCOUNTER (OUTPATIENT)
Dept: WOMENS IMAGING | Age: 73
Discharge: HOME | End: 2023-09-13
Attending: SURGERY
Payer: MEDICARE

## 2023-09-13 DIAGNOSIS — C50.812 MALIGNANT NEOPLASM OF OVERLAPPING SITES OF LEFT BREAST (HCC): ICD-10-CM

## 2023-09-13 PROCEDURE — 76642 ULTRASOUND BREAST LIMITED: CPT

## 2023-09-15 ENCOUNTER — HOSPITAL ENCOUNTER (OUTPATIENT)
Dept: ULTRASOUND IMAGING | Age: 73
Discharge: HOME | End: 2023-09-15
Attending: SURGERY
Payer: MEDICARE

## 2023-09-15 ENCOUNTER — ANESTHESIA EVENT (OUTPATIENT)
Dept: OPERATING ROOM | Age: 73
End: 2023-09-15
Payer: MEDICARE

## 2023-09-15 ENCOUNTER — ANESTHESIA (OUTPATIENT)
Dept: OPERATING ROOM | Age: 73
End: 2023-09-15
Payer: MEDICARE

## 2023-09-15 ENCOUNTER — HOSPITAL ENCOUNTER (OUTPATIENT)
Age: 73
Setting detail: OUTPATIENT SURGERY
Discharge: HOME | End: 2023-09-15
Attending: SURGERY | Admitting: SURGERY
Payer: MEDICARE

## 2023-09-15 VITALS
WEIGHT: 198 LBS | RESPIRATION RATE: 20 BRPM | TEMPERATURE: 98.7 F | OXYGEN SATURATION: 93 % | BODY MASS INDEX: 32.99 KG/M2 | HEIGHT: 65 IN | SYSTOLIC BLOOD PRESSURE: 159 MMHG | HEART RATE: 91 BPM | DIASTOLIC BLOOD PRESSURE: 69 MMHG

## 2023-09-15 DIAGNOSIS — C50.812 MALIGNANT NEOPLASM OF OVERLAPPING SITES OF LEFT FEMALE BREAST, UNSPECIFIED ESTROGEN RECEPTOR STATUS (HCC): ICD-10-CM

## 2023-09-15 DIAGNOSIS — C50.812 MALIGNANT NEOPLASM OF OVERLAPPING SITES OF LEFT BREAST (HCC): ICD-10-CM

## 2023-09-15 PROCEDURE — C1713 ANCHOR/SCREW BN/BN,TIS/BN: HCPCS | Performed by: SURGERY

## 2023-09-15 PROCEDURE — 3700000000 HC ANESTHESIA ATTENDED CARE: Performed by: SURGERY

## 2023-09-15 PROCEDURE — 19301 PARTIAL MASTECTOMY: CPT | Performed by: SURGERY

## 2023-09-15 PROCEDURE — 3700000001 HC ADD 15 MINUTES (ANESTHESIA): Performed by: SURGERY

## 2023-09-15 PROCEDURE — 2500000003 HC RX 250 WO HCPCS: Performed by: SURGERY

## 2023-09-15 PROCEDURE — 88329 PATH CONSLTJ DRG SURG: CPT

## 2023-09-15 PROCEDURE — 19294 PREPJ TUM CAV IORT PRTL MAST: CPT | Performed by: PHYSICIAN ASSISTANT

## 2023-09-15 PROCEDURE — 2709999900 HC NON-CHARGEABLE SUPPLY: Performed by: SURGERY

## 2023-09-15 PROCEDURE — 19297 PLACE BREAST CATH FOR RAD: CPT | Performed by: PHYSICIAN ASSISTANT

## 2023-09-15 PROCEDURE — 2580000003 HC RX 258: Performed by: SURGERY

## 2023-09-15 PROCEDURE — 19340 INSJ BREAST IMPLT SM D MAST: CPT | Performed by: SURGERY

## 2023-09-15 PROCEDURE — 3600000014 HC SURGERY LEVEL 4 ADDTL 15MIN: Performed by: SURGERY

## 2023-09-15 PROCEDURE — 7100000011 HC PHASE II RECOVERY - ADDTL 15 MIN: Performed by: SURGERY

## 2023-09-15 PROCEDURE — 6360000002 HC RX W HCPCS: Performed by: SURGERY

## 2023-09-15 PROCEDURE — 2720000010 HC SURG SUPPLY STERILE: Performed by: SURGERY

## 2023-09-15 PROCEDURE — 6360000002 HC RX W HCPCS: Performed by: NURSE ANESTHETIST, CERTIFIED REGISTERED

## 2023-09-15 PROCEDURE — 6370000000 HC RX 637 (ALT 250 FOR IP): Performed by: SURGERY

## 2023-09-15 PROCEDURE — 7100000000 HC PACU RECOVERY - FIRST 15 MIN: Performed by: SURGERY

## 2023-09-15 PROCEDURE — 0546T RF SPECTRSC NTRAOP MRGN ASMT: CPT | Performed by: SURGERY

## 2023-09-15 PROCEDURE — 19297 PLACE BREAST CATH FOR RAD: CPT | Performed by: SURGERY

## 2023-09-15 PROCEDURE — 19340 INSJ BREAST IMPLT SM D MAST: CPT | Performed by: PHYSICIAN ASSISTANT

## 2023-09-15 PROCEDURE — 19294 PREPJ TUM CAV IORT PRTL MAST: CPT | Performed by: SURGERY

## 2023-09-15 PROCEDURE — 6360000002 HC RX W HCPCS: Performed by: ANESTHESIOLOGY

## 2023-09-15 PROCEDURE — 2580000003 HC RX 258: Performed by: ANESTHESIOLOGY

## 2023-09-15 PROCEDURE — 19301 PARTIAL MASTECTOMY: CPT | Performed by: PHYSICIAN ASSISTANT

## 2023-09-15 PROCEDURE — A4648 IMPLANTABLE TISSUE MARKER: HCPCS | Performed by: SURGERY

## 2023-09-15 PROCEDURE — 3600000004 HC SURGERY LEVEL 4 BASE: Performed by: SURGERY

## 2023-09-15 PROCEDURE — 88307 TISSUE EXAM BY PATHOLOGIST: CPT

## 2023-09-15 PROCEDURE — 7100000001 HC PACU RECOVERY - ADDTL 15 MIN: Performed by: SURGERY

## 2023-09-15 PROCEDURE — A4217 STERILE WATER/SALINE, 500 ML: HCPCS | Performed by: SURGERY

## 2023-09-15 PROCEDURE — 76998 US GUIDE INTRAOP: CPT | Performed by: SURGERY

## 2023-09-15 PROCEDURE — 19285 PERQ DEV BREAST 1ST US IMAG: CPT

## 2023-09-15 PROCEDURE — 77424 IO RAD TX DELIVERY BY X-RAY: CPT | Performed by: SURGERY

## 2023-09-15 PROCEDURE — 7100000010 HC PHASE II RECOVERY - FIRST 15 MIN: Performed by: SURGERY

## 2023-09-15 PROCEDURE — 6370000000 HC RX 637 (ALT 250 FOR IP): Performed by: ANESTHESIOLOGY

## 2023-09-15 PROCEDURE — C1728 CATH, BRACHYTX SEED ADM: HCPCS | Performed by: SURGERY

## 2023-09-15 RX ORDER — FAMOTIDINE 20 MG/1
20 TABLET, FILM COATED ORAL ONCE
Status: COMPLETED | OUTPATIENT
Start: 2023-09-15 | End: 2023-09-15

## 2023-09-15 RX ORDER — DIPHENHYDRAMINE HYDROCHLORIDE 50 MG/ML
12.5 INJECTION INTRAMUSCULAR; INTRAVENOUS
Status: DISCONTINUED | OUTPATIENT
Start: 2023-09-15 | End: 2023-09-15 | Stop reason: HOSPADM

## 2023-09-15 RX ORDER — SODIUM CHLORIDE 9 MG/ML
INJECTION, SOLUTION INTRAVENOUS PRN
Status: DISCONTINUED | OUTPATIENT
Start: 2023-09-15 | End: 2023-09-15 | Stop reason: HOSPADM

## 2023-09-15 RX ORDER — HYDROCODONE BITARTRATE AND ACETAMINOPHEN 5; 325 MG/1; MG/1
1 TABLET ORAL EVERY 6 HOURS PRN
Qty: 10 TABLET | Refills: 0 | Status: SHIPPED | OUTPATIENT
Start: 2023-09-15 | End: 2024-09-14

## 2023-09-15 RX ORDER — PROPOFOL 10 MG/ML
INJECTION, EMULSION INTRAVENOUS CONTINUOUS PRN
Status: DISCONTINUED | OUTPATIENT
Start: 2023-09-15 | End: 2023-09-15 | Stop reason: SDUPTHER

## 2023-09-15 RX ORDER — LORAZEPAM 2 MG/ML
0.5 INJECTION INTRAMUSCULAR
Status: DISCONTINUED | OUTPATIENT
Start: 2023-09-15 | End: 2023-09-15 | Stop reason: HOSPADM

## 2023-09-15 RX ORDER — SODIUM CHLORIDE 0.9 % (FLUSH) 0.9 %
5-40 SYRINGE (ML) INJECTION PRN
Status: DISCONTINUED | OUTPATIENT
Start: 2023-09-15 | End: 2023-09-15 | Stop reason: HOSPADM

## 2023-09-15 RX ORDER — ONDANSETRON 2 MG/ML
INJECTION INTRAMUSCULAR; INTRAVENOUS PRN
Status: DISCONTINUED | OUTPATIENT
Start: 2023-09-15 | End: 2023-09-15 | Stop reason: SDUPTHER

## 2023-09-15 RX ORDER — GABAPENTIN 300 MG/1
300 CAPSULE ORAL ONCE
Status: COMPLETED | OUTPATIENT
Start: 2023-09-15 | End: 2023-09-15

## 2023-09-15 RX ORDER — SCOLOPAMINE TRANSDERMAL SYSTEM 1 MG/1
1 PATCH, EXTENDED RELEASE TRANSDERMAL
Status: DISCONTINUED | OUTPATIENT
Start: 2023-09-15 | End: 2023-09-15 | Stop reason: HOSPADM

## 2023-09-15 RX ORDER — CELECOXIB 200 MG/1
200 CAPSULE ORAL ONCE
Status: DISCONTINUED | OUTPATIENT
Start: 2023-09-15 | End: 2023-09-15 | Stop reason: HOSPADM

## 2023-09-15 RX ORDER — DEXAMETHASONE SODIUM PHOSPHATE 10 MG/ML
INJECTION, SOLUTION INTRAMUSCULAR; INTRAVENOUS PRN
Status: DISCONTINUED | OUTPATIENT
Start: 2023-09-15 | End: 2023-09-15 | Stop reason: SDUPTHER

## 2023-09-15 RX ORDER — IPRATROPIUM BROMIDE AND ALBUTEROL SULFATE 2.5; .5 MG/3ML; MG/3ML
1 SOLUTION RESPIRATORY (INHALATION)
Status: DISCONTINUED | OUTPATIENT
Start: 2023-09-15 | End: 2023-09-15 | Stop reason: HOSPADM

## 2023-09-15 RX ORDER — MORPHINE SULFATE 4 MG/ML
4 INJECTION, SOLUTION INTRAMUSCULAR; INTRAVENOUS EVERY 5 MIN PRN
Status: DISCONTINUED | OUTPATIENT
Start: 2023-09-15 | End: 2023-09-15 | Stop reason: HOSPADM

## 2023-09-15 RX ORDER — SODIUM CHLORIDE 0.9 % (FLUSH) 0.9 %
5-40 SYRINGE (ML) INJECTION EVERY 12 HOURS SCHEDULED
Status: DISCONTINUED | OUTPATIENT
Start: 2023-09-15 | End: 2023-09-15 | Stop reason: HOSPADM

## 2023-09-15 RX ORDER — LIDOCAINE HYDROCHLORIDE 10 MG/ML
1 INJECTION, SOLUTION EPIDURAL; INFILTRATION; INTRACAUDAL; PERINEURAL
Status: DISCONTINUED | OUTPATIENT
Start: 2023-09-15 | End: 2023-09-15 | Stop reason: HOSPADM

## 2023-09-15 RX ORDER — CEPHALEXIN 500 MG/1
500 CAPSULE ORAL 3 TIMES DAILY
Qty: 21 CAPSULE | Refills: 0 | Status: SHIPPED | OUTPATIENT
Start: 2023-09-15 | End: 2023-09-22

## 2023-09-15 RX ORDER — METOCLOPRAMIDE HYDROCHLORIDE 5 MG/ML
10 INJECTION INTRAMUSCULAR; INTRAVENOUS
Status: DISCONTINUED | OUTPATIENT
Start: 2023-09-15 | End: 2023-09-15 | Stop reason: HOSPADM

## 2023-09-15 RX ORDER — MEPERIDINE HYDROCHLORIDE 25 MG/ML
12.5 INJECTION INTRAMUSCULAR; INTRAVENOUS; SUBCUTANEOUS EVERY 5 MIN PRN
Status: DISCONTINUED | OUTPATIENT
Start: 2023-09-15 | End: 2023-09-15 | Stop reason: HOSPADM

## 2023-09-15 RX ORDER — MORPHINE SULFATE 2 MG/ML
2 INJECTION, SOLUTION INTRAMUSCULAR; INTRAVENOUS EVERY 5 MIN PRN
Status: DISCONTINUED | OUTPATIENT
Start: 2023-09-15 | End: 2023-09-15 | Stop reason: HOSPADM

## 2023-09-15 RX ORDER — LABETALOL HYDROCHLORIDE 5 MG/ML
10 INJECTION, SOLUTION INTRAVENOUS
Status: DISCONTINUED | OUTPATIENT
Start: 2023-09-15 | End: 2023-09-15 | Stop reason: HOSPADM

## 2023-09-15 RX ORDER — MIDAZOLAM HYDROCHLORIDE 2 MG/2ML
2 INJECTION, SOLUTION INTRAMUSCULAR; INTRAVENOUS
Status: DISCONTINUED | OUTPATIENT
Start: 2023-09-15 | End: 2023-09-15 | Stop reason: HOSPADM

## 2023-09-15 RX ORDER — SODIUM CHLORIDE, SODIUM LACTATE, POTASSIUM CHLORIDE, CALCIUM CHLORIDE 600; 310; 30; 20 MG/100ML; MG/100ML; MG/100ML; MG/100ML
INJECTION, SOLUTION INTRAVENOUS CONTINUOUS
Status: DISCONTINUED | OUTPATIENT
Start: 2023-09-15 | End: 2023-09-15 | Stop reason: HOSPADM

## 2023-09-15 RX ORDER — FENTANYL CITRATE 50 UG/ML
INJECTION, SOLUTION INTRAMUSCULAR; INTRAVENOUS PRN
Status: DISCONTINUED | OUTPATIENT
Start: 2023-09-15 | End: 2023-09-15 | Stop reason: SDUPTHER

## 2023-09-15 RX ORDER — DROPERIDOL 2.5 MG/ML
0.62 INJECTION, SOLUTION INTRAMUSCULAR; INTRAVENOUS
Status: DISCONTINUED | OUTPATIENT
Start: 2023-09-15 | End: 2023-09-15 | Stop reason: HOSPADM

## 2023-09-15 RX ORDER — ACETAMINOPHEN 325 MG/1
975 TABLET ORAL ONCE
Status: COMPLETED | OUTPATIENT
Start: 2023-09-15 | End: 2023-09-15

## 2023-09-15 RX ADMIN — ONDANSETRON 4 MG: 2 INJECTION INTRAMUSCULAR; INTRAVENOUS at 13:21

## 2023-09-15 RX ADMIN — FENTANYL CITRATE 50 MCG: 0.05 INJECTION, SOLUTION INTRAMUSCULAR; INTRAVENOUS at 13:01

## 2023-09-15 RX ADMIN — CEFAZOLIN 2000 MG: 2 INJECTION, POWDER, FOR SOLUTION INTRAMUSCULAR; INTRAVENOUS at 11:19

## 2023-09-15 RX ADMIN — GABAPENTIN 300 MG: 300 CAPSULE ORAL at 09:55

## 2023-09-15 RX ADMIN — DEXAMETHASONE SODIUM PHOSPHATE 10 MG: 10 INJECTION, SOLUTION INTRAMUSCULAR; INTRAVENOUS at 11:48

## 2023-09-15 RX ADMIN — FENTANYL CITRATE 50 MCG: 0.05 INJECTION, SOLUTION INTRAMUSCULAR; INTRAVENOUS at 13:48

## 2023-09-15 RX ADMIN — FAMOTIDINE 20 MG: 20 TABLET, FILM COATED ORAL at 09:55

## 2023-09-15 RX ADMIN — PHENYLEPHRINE HYDROCHLORIDE 100 MCG: 10 INJECTION INTRAVENOUS at 12:31

## 2023-09-15 RX ADMIN — ACETAMINOPHEN 975 MG: 325 TABLET ORAL at 09:55

## 2023-09-15 RX ADMIN — SODIUM CHLORIDE, POTASSIUM CHLORIDE, SODIUM LACTATE AND CALCIUM CHLORIDE: 600; 310; 30; 20 INJECTION, SOLUTION INTRAVENOUS at 09:50

## 2023-09-15 RX ADMIN — FENTANYL CITRATE 50 MCG: 0.05 INJECTION, SOLUTION INTRAMUSCULAR; INTRAVENOUS at 12:10

## 2023-09-15 RX ADMIN — MORPHINE SULFATE 4 MG: 4 INJECTION, SOLUTION INTRAMUSCULAR; INTRAVENOUS at 14:02

## 2023-09-15 RX ADMIN — PROPOFOL 100 MCG/KG/MIN: 10 INJECTION, EMULSION INTRAVENOUS at 11:18

## 2023-09-15 ASSESSMENT — PAIN SCALES - GENERAL
PAINLEVEL_OUTOF10: 0
PAINLEVEL_OUTOF10: 4
PAINLEVEL_OUTOF10: 2
PAINLEVEL_OUTOF10: 10

## 2023-09-15 ASSESSMENT — PAIN DESCRIPTION - ORIENTATION
ORIENTATION: RIGHT;LEFT
ORIENTATION: RIGHT;LEFT

## 2023-09-15 ASSESSMENT — ENCOUNTER SYMPTOMS: SHORTNESS OF BREATH: 0

## 2023-09-15 ASSESSMENT — LIFESTYLE VARIABLES: SMOKING_STATUS: 0

## 2023-09-15 ASSESSMENT — PAIN - FUNCTIONAL ASSESSMENT: PAIN_FUNCTIONAL_ASSESSMENT: NONE - DENIES PAIN

## 2023-09-15 ASSESSMENT — PAIN DESCRIPTION - LOCATION
LOCATION: LEG
LOCATION: LEG

## 2023-09-15 NOTE — BRIEF OP NOTE
Brief Postoperative Note      DATE OF PROCEDURE: 9/15/2023     SURGEON: Laney Bumpers, MD    PREOPERATIVE DIAGNOSIS:  Malignant neoplasm of overlapping sites of left female breast, unspecified estrogen receptor status ([address]) [C50.812]    POSTOPERATIVE DIAGNOSIS: Same     OPERATION: Procedure(s):  LEFT LUMPECTOMY, PREOP US & INTRAOP US GUIDED NEEDLE LOC, BIOZORB, PEC BLOCK, MARGIN PROBE, FLAPS WITH IORT    ANESTHESIA: Monitor Anesthesia Care/ Pec block    ESTIMATED BLOOD LOSS: Minimal    COMPLICATIONS: None. SPECIMENS:   ID Type Source Tests Collected by Time Destination   A : left breast tissue Tissue Breast SURGICAL PATHOLOGY Laney Bumpers, MD 9/15/2023 1154    B : left breast tissue-additional medial margin Tissue Breast SURGICAL PATHOLOGY Laney Bumpers, MD 9/15/2023 1154    C : left breast tissue-additional lateral margin  Tissue Breast SURGICAL PATHOLOGY Laney Bumpers, MD 9/15/2023 1154    D : left breast tissue-additional deep caudal margin  Tissue Breast SURGICAL PATHOLOGY Laney Bumpers, MD 9/15/2023 1214        DRAINS: ARIANNA    The patient tolerated the procedure well.     Electronically signed by Laney Bumpers, MD  on 9/15/2023 at 1:21 PM

## 2023-09-15 NOTE — PERIOP NOTE
Pt denies any pain related to surgery today. ... She is complaining of right leg pain related to her restless leg syndrome. She was given a Neurontin 300 mg in Pre-op today. I have given her Morphine 4 mg IVP without relief. She states this is chronic for several months and that her relief is usually related to her finding a comfortable position  and \"waiting it out\". Other than that she takes Neurontin. She also thinks her recent back surgeries are causing extra pain in her right leg. Dorsalis Pulse present in right foot. .. Foot warm, dry, color appropriate for ethnicity,.

## 2023-09-15 NOTE — PROGRESS NOTES
CLINICAL PHARMACY NOTE: MEDS TO BEDS    Total # of Prescriptions Filled: 3   The following medications were delivered to the patient:  Current Discharge Medication List        START taking these medications    Details   HYDROcodone-acetaminophen (NORCO) 5-325 MG per tablet Take 1 tablet by mouth every 6 hours as needed for Pain. Max Daily Amount: 4 tablets  Qty: 10 tablet, Refills: 0    Associated Diagnoses: Malignant neoplasm of overlapping sites of left female breast, unspecified estrogen receptor status (HCC)      cephALEXin (KEFLEX) 500 MG capsule Take 1 capsule by mouth 3 times daily for 7 days  Qty: 21 capsule, Refills: 0           Naloxone nasal spray    Additional Documentation:    Delivered RX's to patient family in waiting room. Also dispensed free naloxone. Paid with cash.

## 2023-09-15 NOTE — ADDENDUM NOTE
Addendum  created 09/15/23 1356 by MANDIE Guerra CRNA    Intraprocedure Meds edited, Orders acknowledged in Narrator

## 2023-09-15 NOTE — ANESTHESIA POSTPROCEDURE EVALUATION
Department of Anesthesiology  Postprocedure Note    Patient: Larisa Castaneda  MRN: [id number]  YOB: 1950  Date of evaluation: 9/15/2023      Procedure Summary     Date: 09/15/23 Room / Location: [address]    Anesthesia Start: 7206 Anesthesia Stop: 1350    Procedure: LEFT LUMPECTOMY, PREOP [address] NEEDLE LOC, BIOZORB, PEC BLOCK, MARGIN PROBE, FLAPS WITH IORT (Left: Breast) Diagnosis:       Malignant neoplasm of overlapping sites of left female breast, unspecified estrogen receptor status ([address])      (Malignant neoplasm of overlapping sites of left female breast, unspecified estrogen receptor status ([address]) [C50.812])    Surgeons: Inna Mckenzie MD Responsible Provider: MANDIE Cortes CRNA    Anesthesia Type: MAC, TIVA ASA Status: 2          Anesthesia Type: No value filed.     Polo Phase I: Polo Score: 10    Polo Phase II:        Anesthesia Post Evaluation    Patient location during evaluation: PACU  Patient participation: complete - patient participated  Level of consciousness: sleepy but conscious  Pain score: 4  Airway patency: patent  Nausea & Vomiting: no nausea and no vomiting  Complications: no  Cardiovascular status: hemodynamically stable  Respiratory status: acceptable  Hydration status: euvolemic  Multimodal analgesia pain management approach  Pain management: adequate

## 2023-09-15 NOTE — CONSULTS
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 Moses Kent is a 72 y.o. female with a history of right breast cancer treated by lumpectomy and intraoperative radiation therapy and 2018. More recently, a diagnostic mammogram performed 6/14/2023 showed an area of focal asymmetry in the left breast 12 o'clock position. Ultrasound of the breasts was felt to be of limited utility due to heterogeneous background echotexture. Focal asymmetry in the right breast 12 o'clock position on mammogram appeared similar to prior studies. Stereotactic biopsy of the left breast was recommended with short-term follow-up for the right breast.    Stereotactic core needle biopsy of the left breast obtained 6/27/2023 showed invasive carcinoma of no special type (ductal). Denilson overall histologic grade 1. Maximum tumor diameter at least 1.2 cm. ER and NM were both strongly positive at 95% and 94%, respectively. HER2 was negative and Ki-67 was 21%. The range of treatment options was discussed with the patient by Dr. Dee Niño and she has elected to proceed with left breast lumpectomy with consideration of intraoperative radiation therapy utilizing the [facility] electronic brachytherapy system. Benefits and side effects of this approach have been discussed and the patient gives informed consent to proceed.     Past Medical History:   Diagnosis Date    Arthritis     Breast cancer ([address]) 2018    Cancer ([address]) 2003    melonoma    Condition not found 2019    per patient she was diagnosed at [facility] with [facility]    COVID-19     moderate scarring in lungs post covid,    H/O screening mammography 01/30/2019    @LMP BR 2     History of therapeutic radiation 2018    Hypertension     PONV (postoperative nausea and vomiting)     Restless leg

## 2023-09-15 NOTE — OP NOTE
Operative Note        Radiation Oncology      Patient's Name/Date of Birth:    Tha Lambert / 1950 (16 y.o.)    DATE OF OPERATION:    09/15/2023    SURGEON:    Mir Perez MD  Phone Number: [phone]    OTHER SURGEONS:      Surgeon(s):  MD Mir Roca MD Brannon Skipper, MD    PREOPERATIVE DIAGNOSIS:    X42.428    POSTOPERATIVE DIAGNOSIS:    C50.812    BRIEF HISTORY:    Ms. Jairon Brown is a 72 y.o. female with a history of right breast cancer treated by lumpectomy and intraoperative radiation therapy and 2018. More recently, a diagnostic mammogram performed 6/14/2023 showed an area of focal asymmetry in the left breast 12 o'clock position. Ultrasound of the breasts was felt to be of limited utility due to heterogeneous background echotexture. Focal asymmetry in the right breast 12 o'clock position on mammogram appeared similar to prior studies. Stereotactic biopsy of the left breast was recommended with short-term follow-up for the right breast.     Stereotactic core needle biopsy of the left breast obtained 6/27/2023 showed invasive carcinoma of no special type (ductal). Reading overall histologic grade 1. Maximum tumor diameter at least 1.2 cm. ER and RI were both strongly positive at 95% and 94%, respectively. HER2 was negative and Ki-67 was 21%. The range of treatment options was discussed with the patient by Dr. Marilyn Carlson and she has elected to proceed with left breast lumpectomy with consideration of intraoperative radiation therapy utilizing the [facility] electronic brachytherapy system. Benefits and side effects of this approach have been discussed and the patient gives informed consent to proceed. STAGE: IA (T1c N0 M0)    OPERATION PERFORMED:    1. Left partial mastectomy. 2. Ultrasound examination of the left breast following insertion of the Xoft balloon applicator into the lumpectomy cavity. 3. Treatment planning.   4. Intraoperative irradiation using the [facility] electronic brachytherapy system. DESCRIPTION OF PROCEDURE:    Under general anesthesia, following prepping and draping in the usual sterile manner, left partial mastectomy was performed by Dr. Nikhil Ratliff. This will be dictated separately. A 5-6 cm Xoft balloon applicator was inserted into the left breast lumpectomy cavity and filled with 90 cc sterile saline. The breast tissue and overlying skin were approximated using suture material. Ultrasound examination showed a minimum skin bridge of >1 cm. The miniature x-ray tube (with a measured length of 25.05 cm) was inserted into the central channel of the balloon applicator (with a measured depth of 24.95 cm) and a pull back test of the source was successfully performed. A thin, flexible lead shield was placed on the breast overlying the implant site. Rolling, leaded-glass shields were brought into the operating room and the room was evacuated except for the anesthesiologist, medical physicist and radiation oncologist. Radiation precaution signs were placed on the operating room entrances. The source was activated and a dose of 2000 cGy was delivered to the balloon surface in 1258.2 seconds. Treatment started 09/15/2023 at 12:36:15 PM and was completed 09/15/2023 at 12:57:35 PM.    Following the treatment, the x-ray source was withdrawn and placed in a shielded chamber in the Xoft controller unit. The flexible lead shield was removed from the patient. Removal of the balloon applicator was performed. The volume of saline removed from the balloon applicator was verified. Closing of the lumpectomy incision was performed by Dr. Nikhil Ratliff and also the subject of a separate dictation. Physics support, including machine calibration and , was performed by Esequiel WHITESIDER.     Raymon Nicholson MD       Patient: Parisa Mehta  YOB: 1950  MRN: [id number]      Electronically signed by Laura Lucia MD

## 2023-09-15 NOTE — ANESTHESIA PRE PROCEDURE
Department of Anesthesiology  Preprocedure Note       Name:  Shawna Pride   Age:  72 y.o.  :  1950                                          MRN:  [id number]         Date:  9/15/2023      Surgeon: Lincoln Mcardle):  MD Juan Gray MD    Procedure: Procedure(s):  LEFT LUMPECTOMY, PREOP US & INTRAOP US GUIDED NEEDLE LOC, BIOZORB, PEC BLOCK, MARGIN PROBE, FLAPS WITH IORT    Medications prior to admission:   Prior to Admission medications    Medication Sig Start Date End Date Taking?  Authorizing Provider   exemestane (AROMASIN) 25 MG tablet Take 1 tablet by mouth daily    Historical Provider, MD   montelukast (SINGULAIR) 10 MG tablet Take 1 tablet by mouth nightly    Historical Provider, MD   mupirocin (BACTROBAN) 2 % ointment Apply to each nostril BID 5 days prior to surgery  Patient taking differently: Apply 1 each topically 2 times daily Apply to each nostril BID 5 days prior to surgery 23   So Almaraz MD   fluticasone [facility]) 50 MCG/ACT nasal spray 1 spray by Nasal route as needed 21   Historical Provider, MD   denosumab (PROLIA) 60 MG/ML SOSY SC injection Inject 1 mL into the skin once 10/18/22   Historical Provider, MD   lisinopril (PRINIVIL;ZESTRIL) 5 MG tablet Take 1 tablet by mouth daily 22   Historical Provider, MD   aspirin 81 MG EC tablet Take 1 tablet by mouth daily    Historical Provider, MD   pantoprazole (PROTONIX) 40 MG tablet  10/19/21   Historical Provider, MD   Zinc Sulfate (ZINC 15 PO) Take 1 tablet by mouth daily    Historical Provider, MD   Ascorbic Acid (VITAMIN C) 100 MG CHEW Take 100 mg by mouth daily    Historical Provider, MD   budesonide-formoterol (SYMBICORT) 160-4.5 MCG/ACT AERO Inhale 2 puffs into the lungs 2 times daily 3/4/21   Historical Provider, MD   Ergocalciferol (VITAMIN D2 PO) Take 50,000 Units by mouth once a week    Historical Provider, MD   levothyroxine (SYNTHROID) 125 MCG tablet Take 1 tablet by mouth Daily Taking 125 mg 18

## 2023-09-16 NOTE — OP NOTE
cm Kopans wire directly  into the lesion. It should be noted she had very dense nodular breast  tissue and it was fairly easy to see the clip, but fairly hard to see  the actual tumor adjacent to the clip because of all the other dense  shadowing areas. We then prepped the skin with chlorhexidine, proceeded  with our pectoral block. We utilized a solution of 50 mL of 0.2%  ropivacaine, 50 mL of normal saline, and 8 mg of Decadron. Once this  was accomplished, we re-prepped, re-draped and then made a curvilinear  incision in the superolateral aspect of the left breast.  We dissected  down into the subcutaneous and elevated the skin and soft tissue flaps  in all directions for about 10 cm in all directions, this freed up the  entire upper half of the breast quite nicely. Then, using ultrasound  and our previous wire, we were able to identify the area of interest and  excised a generous ellipse of breast tissue containing the clip and the  end of the wire and the ultrasound abnormality. With the specimen still  in situ, we marked cranial, caudal, medial, and lateral margins. We  then completed our excision and marked the deep margin. We did specimen  ultrasound which showed a reasonable distance from tumor in all  directions. We then utilized the MarginProbe intraoperative margin  assessment device and it was positive on the medial, lateral, and deep  caudal margins all of which were re-excised. There was no gross tumor  in these areas, but she did have very dense nodular breast tissue. Once  this was accomplished, we irrigated copiously, obtained good hemostasis,  sized the cavity and it appeared that the large balloon would be  appropriate. We used the 24-Kyrgyz Molina with a 30 mL balloon and 80 mL  seemed approximately correct.   We then obtained the appropriate  catheter, filled with 80 mL of saline, checked for symmetry which was  fine, checked for balloon integrity which was good, and placed a 2-0  Prolene pursestring around the edges of our breast tissue defect. Once  that was completed, we inserted the balloon, tied down our pursestring. We had a little play in the balloon so we added an additional 10 mL,  which tightened things up nicely. We then measured skin to balloon  distance in all directions and there was no point that looked worrisome  for skin injury. I reviewed the specimen with Pathology, which appeared  to have excellent margins. She then underwent uncomplicated  intraoperative radiation therapy. At the completion of our radiation  therapy, the catheter was withdrawn, the pursestring was removed. We  irrigated copiously, placed a three-dimensional implant in the tumor bed  and sutured in place with 3-0 Vicryl sutures. We then rotated the  breast parenchymal flaps together closing the 5 x 9 cm defect. We then  placed a large round Robin-Casper drain and closed the secondary defect  which was 13 x 14 cm for 182 cm2 and closed the subcutaneous with 2-0  Vicryl and the skin with 4-0 Stratafix. Prineo dressing was applied. Estimated blood loss, minimal.  Complications, none. She tolerated the  procedure well.         Ady Ludwig MD    D: 09/15/2023 15:00:04      T: 09/15/2023 15:05:12     [email or web address]  Job#: [id number]     Doc#: [id number]    CC:

## 2023-09-19 NOTE — PROGRESS NOTES
HISTORY OF PRESENT ILLNESS:    Ms. Moses Kent presents for a post op following left partial mastectomy with IORT on 9/15/2023. She is recently status post stereotactic breast biopsy  on the left which revealed a 1.2 cm low grade invasive ductal carcinoma. ER positive at 95%. RI positive at 94%. Her2 negative. Ki67 21%. Mammaprint pending    She is about 5 years status post right lumpectomy with sentinel node and IORT for an ER positive luminal a breast cancer. She has been on tamoxifen for the last few years    MRI- 7/17/2023 ([facility])  FINDINGS: There is a biopsy clip in the left breast at 12:00, mid depth. Immediately inferior to the biopsy clip, also at 12:00, there is a   stellate area of masslike distortion that measures approximately 11 x 11   mm, compatible with the biopsy-proven malignancy. This is approximately   6 cm from the nipple. Immediately medial to the biopsy clip is a small   focus of nonmass-like enhancement, measuring no more than 4 mm. This may   be reactive and related to the biopsy procedure, however a small focus   of DCIS cannot be excluded. In the upper inner quadrant of the left   breast anterior depth there are 3 small well-circumscribed foci of   increased T1 signal, without enhancement. These are indeterminate but   could represent small complex cysts. There is no corresponding   significant increased T2 signal in this location. There are several   normal appearing lymph nodes in the left axilla, without evidence of   lymphadenopathy. No mass, distortion or abnormal enhancement is   identified within the right breast. No internal mammary lymphadenopathy   or right axillary lymphadenopathy is identified.      IMPRESSION:   Abnormal breast MRI, with postbiopsy changes at 12:00 in the   left breast, with a small area of stellate masslike distortion at 12:00   immediately inferior to the biopsy clip that measures approximately 11 x   11 mm, approximately 6 cm from the nipple, compatible

## 2023-09-20 ENCOUNTER — OFFICE VISIT (OUTPATIENT)
Dept: SURGERY | Age: 73
End: 2023-09-20

## 2023-09-20 VITALS — DIASTOLIC BLOOD PRESSURE: 72 MMHG | HEART RATE: 80 BPM | SYSTOLIC BLOOD PRESSURE: 138 MMHG

## 2023-09-20 DIAGNOSIS — C50.812 MALIGNANT NEOPLASM OF OVERLAPPING SITES OF LEFT BREAST (HCC): Primary | ICD-10-CM

## 2023-09-20 DIAGNOSIS — Z98.890 S/P LUMPECTOMY, LEFT BREAST: ICD-10-CM

## 2023-09-20 PROCEDURE — 99024 POSTOP FOLLOW-UP VISIT: CPT | Performed by: SURGERY

## 2023-09-27 ENCOUNTER — OFFICE VISIT (OUTPATIENT)
Dept: SURGERY | Age: 73
End: 2023-09-27

## 2023-09-27 VITALS
DIASTOLIC BLOOD PRESSURE: 70 MMHG | TEMPERATURE: 97.5 F | WEIGHT: 199.6 LBS | BODY MASS INDEX: 33.26 KG/M2 | HEIGHT: 65 IN | SYSTOLIC BLOOD PRESSURE: 120 MMHG

## 2023-09-27 DIAGNOSIS — Z98.890 S/P LUMPECTOMY, LEFT BREAST: Primary | ICD-10-CM

## 2023-09-27 PROCEDURE — 99024 POSTOP FOLLOW-UP VISIT: CPT | Performed by: PHYSICIAN ASSISTANT

## 2023-10-02 ENCOUNTER — TELEPHONE (OUTPATIENT)
Dept: HEMATOLOGY | Age: 73
End: 2023-10-02

## 2023-10-02 NOTE — PROGRESS NOTES
MEDICAL ONCOLOGY PROGRESS NOTE        Alpesh Rolle   1950  10/4/2023     Chief Complaint   Patient presents with    Follow-up     Malignant neoplasm of upper-outer quadrant of right female breast, unspecified estrogen receptor status ([address])      INTERVAL HISTORY/HISTORY OF PRESENT ILLNESS:    Diagnosis   Right breast IDC, April 2018   grade 1, ER 91%, NY 5%, HER-2/aziza IHC 1+/FISH not amplified, Ki-67 9%. [id number])M0, stage IA, AJCC 2018 prognostic stage IA  BCI: NO; 1.4%  Left breast IDC [id number], Jun 2023  G1, ER 95%, PgR 94%, Her-2 IHC 0, Ki-67 21%. Mammaprint Luminal A    Treatment summary  7/27/2018-Right lumpectomy/sentinel lymph node biopsy   7/27/2018-IORT 2000 cGy   November 2018-tamoxifen x 10 years  No need for Oncotype Dx- low risk disease  9/15/23 Breast, left lumpectomy with Dr. Elizabeth Shukla at [facility]. Interval history  The patient is a pleasant 72years old female who has been diagnosed with stage I ER/NY positive HER-2 negative right breast cancer in April 2018. She is node negative. She received IORT 2000 cGy. She was initially started on adjuvant endocrine therapy with Femara but was switched to tamoxifen due to poor tolerance. Patient was diagnosed with a new left breast tumor status post lumpectomy. Patient to receive IORT 2000 cGy to her left breast.  Patient was started on exemestane adjuvantly. Cancer history  Ms. Albania Walker was first seen by me on 8/29/2018 referred by Dr. Elizabeth Shukla for a diagnosis of breast cancer.   4/26/2018-screening mammogram showed a area of architectural distortion in the upper outer right breast.   5/3/2018-diagnostic mammogram/ultrasound showed a 8 mm nodule at 9 o'clock position right breast.   5/23/2018-core biopsy revealed invasive mammary carcinoma, grade 1, ER 91%, NY 5%, HER-2/aziza IHC 1+/FISH not amplified, Ki-67 9%.   6/19/2018-bilateral MRI of the breast showed known invasive mammary carcinoma measuring 8 mm at the 9

## 2023-10-02 NOTE — TELEPHONE ENCOUNTER
Called patient and reminded pt of appt on 10/04/23. Patient voiced their understanding.       Electronically signed by Nahed Donovan MA on 10/2/2023 at 2:52 PM

## 2023-10-04 ENCOUNTER — OFFICE VISIT (OUTPATIENT)
Dept: HEMATOLOGY | Age: 73
End: 2023-10-04
Payer: MEDICARE

## 2023-10-04 ENCOUNTER — HOSPITAL ENCOUNTER (OUTPATIENT)
Dept: INFUSION THERAPY | Age: 73
Discharge: HOME | End: 2023-10-04
Payer: MEDICARE

## 2023-10-04 VITALS
WEIGHT: 203.6 LBS | BODY MASS INDEX: 33.92 KG/M2 | TEMPERATURE: 98.2 F | HEIGHT: 65 IN | HEART RATE: 96 BPM | SYSTOLIC BLOOD PRESSURE: 134 MMHG | DIASTOLIC BLOOD PRESSURE: 80 MMHG | OXYGEN SATURATION: 95 %

## 2023-10-04 DIAGNOSIS — Z17.0 MALIGNANT NEOPLASM OF OVERLAPPING SITES OF LEFT BREAST IN FEMALE, ESTROGEN RECEPTOR POSITIVE (HCC): ICD-10-CM

## 2023-10-04 DIAGNOSIS — Z71.89 CARE PLAN DISCUSSED WITH PATIENT: ICD-10-CM

## 2023-10-04 DIAGNOSIS — C50.411 MALIGNANT NEOPLASM OF UPPER-OUTER QUADRANT OF RIGHT FEMALE BREAST, UNSPECIFIED ESTROGEN RECEPTOR STATUS (HCC): ICD-10-CM

## 2023-10-04 DIAGNOSIS — C50.812 MALIGNANT NEOPLASM OF OVERLAPPING SITES OF LEFT BREAST IN FEMALE, ESTROGEN RECEPTOR POSITIVE (HCC): ICD-10-CM

## 2023-10-04 DIAGNOSIS — D22.9 ATYPICAL MOLE: ICD-10-CM

## 2023-10-04 DIAGNOSIS — T45.1X5A HOT FLASHES RELATED TO AROMATASE INHIBITOR THERAPY: ICD-10-CM

## 2023-10-04 DIAGNOSIS — R23.2 HOT FLASHES RELATED TO AROMATASE INHIBITOR THERAPY: ICD-10-CM

## 2023-10-04 DIAGNOSIS — C50.411 MALIGNANT NEOPLASM OF UPPER-OUTER QUADRANT OF RIGHT FEMALE BREAST, UNSPECIFIED ESTROGEN RECEPTOR STATUS (HCC): Primary | ICD-10-CM

## 2023-10-04 LAB
BASOPHILS # BLD: 0.08 K/UL (ref 0.01–0.08)
BASOPHILS NFR BLD: 1 % (ref 0.1–1.2)
EOSINOPHIL # BLD: 0.27 K/UL (ref 0.04–0.54)
EOSINOPHIL NFR BLD: 3.3 % (ref 0.7–7)
ERYTHROCYTE [DISTWIDTH] IN BLOOD BY AUTOMATED COUNT: 12.8 % (ref 11.7–14.4)
HCT VFR BLD AUTO: 38.4 % (ref 34.1–44.9)
HGB BLD-MCNC: 13.3 G/DL (ref 11.2–15.7)
LYMPHOCYTES # BLD: 1.45 K/UL (ref 1.18–3.74)
LYMPHOCYTES NFR BLD: 17.6 % (ref 19.3–53.1)
MCH RBC QN AUTO: 32 PG (ref 25.6–32.2)
MCHC RBC AUTO-ENTMCNC: 34.6 G/DL (ref 32.3–35.5)
MCV RBC AUTO: 92.3 FL (ref 79.4–94.8)
MONOCYTES # BLD: 0.52 K/UL (ref 0.24–0.82)
MONOCYTES NFR BLD: 6.3 % (ref 4.7–12.5)
NEUTROPHILS # BLD: 5.85 K/UL (ref 1.56–6.13)
NEUTS SEG NFR BLD: 71.2 % (ref 34–71.1)
PLATELET # BLD AUTO: 73 K/UL (ref 182–369)
PMV BLD AUTO: 12.7 FL (ref 7.4–10.4)
RBC # BLD AUTO: 4.16 M/UL (ref 3.93–5.22)
WBC # BLD AUTO: 8.22 K/UL (ref 3.98–10.04)

## 2023-10-04 PROCEDURE — G8427 DOCREV CUR MEDS BY ELIG CLIN: HCPCS | Performed by: INTERNAL MEDICINE

## 2023-10-04 PROCEDURE — 99215 OFFICE O/P EST HI 40 MIN: CPT | Performed by: INTERNAL MEDICINE

## 2023-10-04 PROCEDURE — G8400 PT W/DXA NO RESULTS DOC: HCPCS | Performed by: INTERNAL MEDICINE

## 2023-10-04 PROCEDURE — G8417 CALC BMI ABV UP PARAM F/U: HCPCS | Performed by: INTERNAL MEDICINE

## 2023-10-04 PROCEDURE — 1123F ACP DISCUSS/DSCN MKR DOCD: CPT | Performed by: INTERNAL MEDICINE

## 2023-10-04 PROCEDURE — 99212 OFFICE O/P EST SF 10 MIN: CPT

## 2023-10-04 PROCEDURE — 36415 COLL VENOUS BLD VENIPUNCTURE: CPT

## 2023-10-04 PROCEDURE — 3017F COLORECTAL CA SCREEN DOC REV: CPT | Performed by: INTERNAL MEDICINE

## 2023-10-04 PROCEDURE — 85025 COMPLETE CBC W/AUTO DIFF WBC: CPT

## 2023-10-04 PROCEDURE — 1036F TOBACCO NON-USER: CPT | Performed by: INTERNAL MEDICINE

## 2023-10-04 PROCEDURE — 1090F PRES/ABSN URINE INCON ASSESS: CPT | Performed by: INTERNAL MEDICINE

## 2023-10-04 PROCEDURE — G8484 FLU IMMUNIZE NO ADMIN: HCPCS | Performed by: INTERNAL MEDICINE

## 2023-10-05 ENCOUNTER — TELEPHONE (OUTPATIENT)
Dept: SURGERY | Age: 73
End: 2023-10-05

## 2023-10-05 NOTE — TELEPHONE ENCOUNTER
Pt states Dr Florencio Hauser is referring her to a radiation oncologist and she wants to know if Dr Sridevi Paul is in agreement with that    Cc: Karen Low MA

## 2023-10-05 NOTE — TELEPHONE ENCOUNTER
I spoke with patient. She was referred to see radiation and was just wondering if she needs to. She wants Dr. Chaparro Burrows opinion on this. I let her know I would talk to him Monday when he returns to office to get her an answer.

## 2023-10-09 NOTE — TELEPHONE ENCOUNTER
Spoke with Dr. Le Rosenthal. He states radiation is not needed.  He looked at her pathology report and verified   Relayed message to patient

## 2023-10-20 NOTE — PROGRESS NOTES
HISTORY OF PRESENT ILLNESS:    Ms. Jackie Ellison presents for a post op following left partial mastectomy with IORT on 9/15/2023. She is recently status post stereotactic breast biopsy  on the left which revealed a 1.2 cm low grade invasive ductal carcinoma. ER positive at 95%. MO positive at 94%. Her2 negative. Ki67 21%. Mammaprint pending    She is about 5 years status post right lumpectomy with sentinel node and IORT for an ER positive luminal a breast cancer. She has been on tamoxifen for the last few years    MRI- 7/17/2023 ([facility])  FINDINGS: There is a biopsy clip in the left breast at 12:00, mid depth. Immediately inferior to the biopsy clip, also at 12:00, there is a   stellate area of masslike distortion that measures approximately 11 x 11   mm, compatible with the biopsy-proven malignancy. This is approximately   6 cm from the nipple. Immediately medial to the biopsy clip is a small   focus of nonmass-like enhancement, measuring no more than 4 mm. This may   be reactive and related to the biopsy procedure, however a small focus   of DCIS cannot be excluded. In the upper inner quadrant of the left   breast anterior depth there are 3 small well-circumscribed foci of   increased T1 signal, without enhancement. These are indeterminate but   could represent small complex cysts. There is no corresponding   significant increased T2 signal in this location. There are several   normal appearing lymph nodes in the left axilla, without evidence of   lymphadenopathy. No mass, distortion or abnormal enhancement is   identified within the right breast. No internal mammary lymphadenopathy   or right axillary lymphadenopathy is identified.      IMPRESSION:   Abnormal breast MRI, with postbiopsy changes at 12:00 in the   left breast, with a small area of stellate masslike distortion at 12:00   immediately inferior to the biopsy clip that measures approximately 11 x   11 mm, approximately 6 cm from the nipple, compatible

## 2023-10-23 ENCOUNTER — OFFICE VISIT (OUTPATIENT)
Dept: SURGERY | Age: 73
End: 2023-10-23

## 2023-10-23 VITALS — HEART RATE: 88 BPM | DIASTOLIC BLOOD PRESSURE: 80 MMHG | SYSTOLIC BLOOD PRESSURE: 138 MMHG

## 2023-10-23 DIAGNOSIS — Z98.890 S/P LUMPECTOMY, LEFT BREAST: ICD-10-CM

## 2023-10-23 DIAGNOSIS — Z98.890 STATUS POST RIGHT BREAST LUMPECTOMY: Primary | ICD-10-CM

## 2023-10-23 PROCEDURE — 99024 POSTOP FOLLOW-UP VISIT: CPT | Performed by: SURGERY

## 2023-10-27 DIAGNOSIS — Z85.3 PERSONAL HISTORY OF BREAST CANCER: Primary | ICD-10-CM

## 2023-11-20 ENCOUNTER — TELEPHONE (OUTPATIENT)
Dept: SURGERY | Age: 73
End: 2023-11-20

## 2023-11-20 NOTE — TELEPHONE ENCOUNTER
Pt called about getting mammograms, she just had surgery on left breast, and its only been 3 months, Dr. Moris Akbar, said she needs and might need to be spaced out, please review with her, woud like to speak with Merlyn Heck, Thanks !

## 2024-01-25 RX ORDER — MONTELUKAST SODIUM 10 MG/1
10 TABLET ORAL NIGHTLY
Qty: 30 TABLET | Refills: 5 | Status: SHIPPED | OUTPATIENT
Start: 2024-01-25

## 2024-02-01 NOTE — PROGRESS NOTES
respiratory effort   CARDIOVASCULAR: RRR, no murmurs.  No lower extremity edema  ABDOMEN: soft non-tender, active bowel sounds, no HSM.  No palpable masses  EXTREMITIES: warm, full ROM in all 4 extremities, no focal weakness.  SKIN: warm, dry with no rashes or lesions  LYMPH: No cervical, clavicular  lymphadenopathy  NEUROLOGIC: follows commands, non focal     Relevant Lab findings/reviewed by me:  Lab Results   Component Value Date    WBC 8.73 02/26/2024    HGB 13.9 02/26/2024    HCT 40.0 02/26/2024    MCV 90.3 02/26/2024     (L) 02/26/2024     Lab Results   Component Value Date    NEUTROABS 6.27 (H) 02/26/2024     Lab Results   Component Value Date     08/30/2023    K 4.3 08/30/2023     08/30/2023    CO2 24 08/30/2023    BUN 14 08/30/2023    CREATININE 1.2 (H) 08/30/2023    GLUCOSE 106 08/30/2023    CALCIUM 9.5 08/30/2023    PROT 7.1 12/18/2015    LABALBU 4.2 12/18/2015    BILITOT 0.5 12/18/2015    ALKPHOS 76 12/18/2015    AST 18 12/18/2015    ALT 20 12/18/2015    LABGLOM 48 (A) 08/30/2023    GLOB 2.9 12/18/2015         Relevant Imaging  findings/reviewed by me:  NONE    ASSESSMENT:    No orders of the defined types were placed in this encounter.       Yue was seen today for follow-up.    Diagnoses and all orders for this visit:    Malignant neoplasm of upper-outer quadrant of right female breast, unspecified estrogen receptor status (HCC)    Hot flashes related to aromatase inhibitor therapy    Encounter for follow-up surveillance of breast cancer    Chronic pain of both knees    Easy bruising    Care plan discussed with patient       Breast cancer stage I, July 2018 7/27/2018-Right lumpectomy/sentinel lymph node biopsy   7/27/2018-IORT 2000 cGy  She could not tolerate Femara.  She is currently on tamoxifen.   Plan to continue tamoxifen through April 2028  Consider breast cancer index April 2023  -No evidence of recurrence.    Breast Cancer IDC, June 2023 [id number]- ER 95%, PgR 94%, Her-2

## 2024-02-02 DIAGNOSIS — C50.411 MALIGNANT NEOPLASM OF UPPER-OUTER QUADRANT OF RIGHT FEMALE BREAST, UNSPECIFIED ESTROGEN RECEPTOR STATUS (HCC): Primary | ICD-10-CM

## 2024-02-07 RX ORDER — EXEMESTANE 25 MG/1
25 TABLET ORAL DAILY
Qty: 90 TABLET | Refills: 3 | Status: SHIPPED | OUTPATIENT
Start: 2024-02-07

## 2024-02-22 ENCOUNTER — TELEPHONE (OUTPATIENT)
Dept: HEMATOLOGY | Age: 74
End: 2024-02-22

## 2024-02-22 NOTE — TELEPHONE ENCOUNTER
Called pt. to remind them of appointment on 02/26/2024 and had to leave a detailed voicemail with appointment date and time.

## 2024-02-26 ENCOUNTER — HOSPITAL ENCOUNTER (OUTPATIENT)
Dept: INFUSION THERAPY | Age: 74
Discharge: HOME | End: 2024-02-26
Payer: MEDICARE

## 2024-02-26 ENCOUNTER — OFFICE VISIT (OUTPATIENT)
Dept: HEMATOLOGY | Age: 74
End: 2024-02-26
Payer: MEDICARE

## 2024-02-26 VITALS
HEIGHT: 65 IN | HEART RATE: 77 BPM | OXYGEN SATURATION: 95 % | TEMPERATURE: 98.1 F | BODY MASS INDEX: 36.39 KG/M2 | SYSTOLIC BLOOD PRESSURE: 130 MMHG | WEIGHT: 218.4 LBS | DIASTOLIC BLOOD PRESSURE: 88 MMHG

## 2024-02-26 DIAGNOSIS — R23.3 EASY BRUISING: ICD-10-CM

## 2024-02-26 DIAGNOSIS — Z08 ENCOUNTER FOR FOLLOW-UP SURVEILLANCE OF BREAST CANCER: ICD-10-CM

## 2024-02-26 DIAGNOSIS — M25.561 CHRONIC PAIN OF BOTH KNEES: ICD-10-CM

## 2024-02-26 DIAGNOSIS — T45.1X5A HOT FLASHES RELATED TO AROMATASE INHIBITOR THERAPY: ICD-10-CM

## 2024-02-26 DIAGNOSIS — Z71.89 CARE PLAN DISCUSSED WITH PATIENT: ICD-10-CM

## 2024-02-26 DIAGNOSIS — C50.411 MALIGNANT NEOPLASM OF UPPER-OUTER QUADRANT OF RIGHT FEMALE BREAST, UNSPECIFIED ESTROGEN RECEPTOR STATUS (HCC): Primary | ICD-10-CM

## 2024-02-26 DIAGNOSIS — M25.562 CHRONIC PAIN OF BOTH KNEES: ICD-10-CM

## 2024-02-26 DIAGNOSIS — Z85.3 ENCOUNTER FOR FOLLOW-UP SURVEILLANCE OF BREAST CANCER: ICD-10-CM

## 2024-02-26 DIAGNOSIS — G89.29 CHRONIC PAIN OF BOTH KNEES: ICD-10-CM

## 2024-02-26 DIAGNOSIS — R23.2 HOT FLASHES RELATED TO AROMATASE INHIBITOR THERAPY: ICD-10-CM

## 2024-02-26 DIAGNOSIS — C50.411 MALIGNANT NEOPLASM OF UPPER-OUTER QUADRANT OF RIGHT FEMALE BREAST, UNSPECIFIED ESTROGEN RECEPTOR STATUS (HCC): ICD-10-CM

## 2024-02-26 LAB
BASOPHILS # BLD: 0.08 K/UL (ref 0.01–0.08)
BASOPHILS NFR BLD: 0.9 % (ref 0.1–1.2)
EOSINOPHIL # BLD: 0.2 K/UL (ref 0.04–0.54)
EOSINOPHIL NFR BLD: 2.3 % (ref 0.7–7)
ERYTHROCYTE [DISTWIDTH] IN BLOOD BY AUTOMATED COUNT: 12.7 % (ref 11.7–14.4)
HCT VFR BLD AUTO: 40 % (ref 34.1–44.9)
HGB BLD-MCNC: 13.9 G/DL (ref 11.2–15.7)
LYMPHOCYTES # BLD: 1.55 K/UL (ref 1.18–3.74)
LYMPHOCYTES NFR BLD: 17.8 % (ref 19.3–53.1)
MCH RBC QN AUTO: 31.4 PG (ref 25.6–32.2)
MCHC RBC AUTO-ENTMCNC: 34.8 G/DL (ref 32.3–35.5)
MCV RBC AUTO: 90.3 FL (ref 79.4–94.8)
MONOCYTES # BLD: 0.6 K/UL (ref 0.24–0.82)
MONOCYTES NFR BLD: 6.9 % (ref 4.7–12.5)
NEUTROPHILS # BLD: 6.27 K/UL (ref 1.56–6.13)
NEUTS SEG NFR BLD: 71.8 % (ref 34–71.1)
PLATELET # BLD AUTO: 130 K/UL (ref 182–369)
PMV BLD AUTO: 12 FL (ref 7.4–10.4)
RBC # BLD AUTO: 4.43 M/UL (ref 3.93–5.22)
WBC # BLD AUTO: 8.73 K/UL (ref 3.98–10.04)

## 2024-02-26 PROCEDURE — 1123F ACP DISCUSS/DSCN MKR DOCD: CPT | Performed by: INTERNAL MEDICINE

## 2024-02-26 PROCEDURE — 3017F COLORECTAL CA SCREEN DOC REV: CPT | Performed by: INTERNAL MEDICINE

## 2024-02-26 PROCEDURE — 99212 OFFICE O/P EST SF 10 MIN: CPT

## 2024-02-26 PROCEDURE — G8427 DOCREV CUR MEDS BY ELIG CLIN: HCPCS | Performed by: INTERNAL MEDICINE

## 2024-02-26 PROCEDURE — G8484 FLU IMMUNIZE NO ADMIN: HCPCS | Performed by: INTERNAL MEDICINE

## 2024-02-26 PROCEDURE — 1036F TOBACCO NON-USER: CPT | Performed by: INTERNAL MEDICINE

## 2024-02-26 PROCEDURE — 1090F PRES/ABSN URINE INCON ASSESS: CPT | Performed by: INTERNAL MEDICINE

## 2024-02-26 PROCEDURE — 36415 COLL VENOUS BLD VENIPUNCTURE: CPT

## 2024-02-26 PROCEDURE — G8400 PT W/DXA NO RESULTS DOC: HCPCS | Performed by: INTERNAL MEDICINE

## 2024-02-26 PROCEDURE — 99213 OFFICE O/P EST LOW 20 MIN: CPT | Performed by: INTERNAL MEDICINE

## 2024-02-26 PROCEDURE — G8417 CALC BMI ABV UP PARAM F/U: HCPCS | Performed by: INTERNAL MEDICINE

## 2024-02-26 PROCEDURE — 85025 COMPLETE CBC W/AUTO DIFF WBC: CPT

## 2024-02-26 RX ORDER — PROPRANOLOL HCL 60 MG
60 CAPSULE, EXTENDED RELEASE 24HR ORAL DAILY
COMMUNITY
Start: 2024-01-25

## 2024-02-26 RX ORDER — MOMETASONE FUROATE AND FORMOTEROL FUMARATE DIHYDRATE 100; 5 UG/1; UG/1
2 AEROSOL RESPIRATORY (INHALATION) 2 TIMES DAILY
COMMUNITY
Start: 2024-02-16

## 2024-03-21 ENCOUNTER — TRANSCRIBE ORDERS (OUTPATIENT)
Dept: ADMINISTRATIVE | Facility: HOSPITAL | Age: 74
End: 2024-03-21
Payer: MEDICARE

## 2024-03-21 DIAGNOSIS — G89.4 CHRONIC PAIN DISORDER: Primary | ICD-10-CM

## 2024-04-08 ENCOUNTER — HOSPITAL ENCOUNTER (OUTPATIENT)
Dept: MRI IMAGING | Facility: HOSPITAL | Age: 74
Discharge: HOME | End: 2024-04-08
Admitting: ANESTHESIOLOGY
Payer: MEDICARE

## 2024-04-08 DIAGNOSIS — G89.4 CHRONIC PAIN DISORDER: ICD-10-CM

## 2024-04-08 PROCEDURE — 72148 MRI LUMBAR SPINE W/O DYE: CPT

## 2024-04-12 NOTE — PROGRESS NOTES
small well-circumscribed foci of   increased T1 signal, without enhancement. These are indeterminate but   could represent small complex cysts. There is no corresponding   significant increased T2 signal in this location. There are several   normal appearing lymph nodes in the left axilla, without evidence of   lymphadenopathy. No mass, distortion or abnormal enhancement is   identified within the right breast. No internal mammary lymphadenopathy   or right axillary lymphadenopathy is identified.     IMPRESSION:   Abnormal breast MRI, with postbiopsy changes at 12:00 in the   left breast, with a small area of stellate masslike distortion at 12:00   immediately inferior to the biopsy clip that measures approximately 11 x   11 mm, approximately 6 cm from the nipple, compatible with the   biopsy-proven malignancy. Slightly superior to the area of distortion   and medial to the biopsy clip there is one or 2 small foci of   nonmass-like enhancement, measuring no more than 4 mm. This may be   reactive related to the biopsy procedure itself, versus small foci of   DCIS. BI-RADS Category 6, known malignancy. Appropriate clinical action   should be taken.   This report was finalized on 07/18/2023 09:45 by Dr. Vincent Levin MD    PATHOLOGY REVEALS:  FINAL DIAGNOSIS:     A.  Breast, left lumpectomy:   1.  Infiltrating ductal carcinoma, no special type, grade 1.   2.  Infiltrating carcinoma measures 1.6 cm in greatest dimension   microscopically.   3.  Focal low-grade ductal carcinoma in situ is identified in conjunction   with the invasive lesion.   4.  Both invasive and in situ carcinoma are located greater than 1.0 cm   from the nearest lateral surgical excision margin.     B.  Breast, excision of additional left breast medial margin: Benign   breast parenchyma with changes consistent with fibrocystic mastopathy.     C.  Breast, excision of additional left breast lateral margin: Benign   breast parenchyma with sclerosing

## 2024-04-15 ENCOUNTER — HOSPITAL ENCOUNTER (OUTPATIENT)
Dept: WOMENS IMAGING | Age: 74
Discharge: HOME | End: 2024-04-15

## 2024-04-15 ENCOUNTER — OFFICE VISIT (OUTPATIENT)
Dept: SURGERY | Age: 74
End: 2024-04-15
Payer: MEDICARE

## 2024-04-15 VITALS — HEART RATE: 66 BPM | WEIGHT: 222 LBS | BODY MASS INDEX: 36.99 KG/M2 | HEIGHT: 65 IN

## 2024-04-15 DIAGNOSIS — C50.411 MALIGNANT NEOPLASM OF UPPER-OUTER QUADRANT OF RIGHT BREAST IN FEMALE, ESTROGEN RECEPTOR POSITIVE (HCC): ICD-10-CM

## 2024-04-15 DIAGNOSIS — Z17.0 MALIGNANT NEOPLASM OF UPPER-OUTER QUADRANT OF RIGHT BREAST IN FEMALE, ESTROGEN RECEPTOR POSITIVE (HCC): ICD-10-CM

## 2024-04-15 DIAGNOSIS — C50.812 MALIGNANT NEOPLASM OF OVERLAPPING SITES OF LEFT BREAST IN FEMALE, ESTROGEN RECEPTOR POSITIVE (HCC): Primary | ICD-10-CM

## 2024-04-15 DIAGNOSIS — Z85.3 PERSONAL HISTORY OF BREAST CANCER: ICD-10-CM

## 2024-04-15 DIAGNOSIS — Z98.890 STATUS POST RIGHT BREAST LUMPECTOMY: ICD-10-CM

## 2024-04-15 DIAGNOSIS — Z98.890 S/P LUMPECTOMY, LEFT BREAST: ICD-10-CM

## 2024-04-15 DIAGNOSIS — Z17.0 MALIGNANT NEOPLASM OF OVERLAPPING SITES OF LEFT BREAST IN FEMALE, ESTROGEN RECEPTOR POSITIVE (HCC): Primary | ICD-10-CM

## 2024-04-15 PROCEDURE — 1090F PRES/ABSN URINE INCON ASSESS: CPT | Performed by: SURGERY

## 2024-04-15 PROCEDURE — G8417 CALC BMI ABV UP PARAM F/U: HCPCS | Performed by: SURGERY

## 2024-04-15 PROCEDURE — G8400 PT W/DXA NO RESULTS DOC: HCPCS | Performed by: SURGERY

## 2024-04-15 PROCEDURE — G8427 DOCREV CUR MEDS BY ELIG CLIN: HCPCS | Performed by: SURGERY

## 2024-04-15 PROCEDURE — 99214 OFFICE O/P EST MOD 30 MIN: CPT | Performed by: SURGERY

## 2024-04-15 PROCEDURE — 1123F ACP DISCUSS/DSCN MKR DOCD: CPT | Performed by: SURGERY

## 2024-04-15 PROCEDURE — 3017F COLORECTAL CA SCREEN DOC REV: CPT | Performed by: SURGERY

## 2024-04-15 PROCEDURE — 1036F TOBACCO NON-USER: CPT | Performed by: SURGERY

## 2024-07-16 ENCOUNTER — LAB (OUTPATIENT)
Dept: LAB | Facility: HOSPITAL | Age: 74
End: 2024-07-16
Payer: MEDICARE

## 2024-07-16 ENCOUNTER — OFFICE VISIT (OUTPATIENT)
Dept: GASTROENTEROLOGY | Facility: CLINIC | Age: 74
End: 2024-07-16
Payer: MEDICARE

## 2024-07-16 VITALS
DIASTOLIC BLOOD PRESSURE: 80 MMHG | WEIGHT: 219 LBS | SYSTOLIC BLOOD PRESSURE: 140 MMHG | OXYGEN SATURATION: 95 % | TEMPERATURE: 98 F | BODY MASS INDEX: 36.49 KG/M2 | HEART RATE: 71 BPM | HEIGHT: 65 IN

## 2024-07-16 DIAGNOSIS — K74.60 CIRRHOSIS OF LIVER WITHOUT ASCITES, UNSPECIFIED HEPATIC CIRRHOSIS TYPE: Primary | ICD-10-CM

## 2024-07-16 DIAGNOSIS — I85.00 ESOPHAGEAL VARICES WITHOUT BLEEDING, UNSPECIFIED ESOPHAGEAL VARICES TYPE: ICD-10-CM

## 2024-07-16 DIAGNOSIS — K74.60 CIRRHOSIS OF LIVER WITHOUT ASCITES, UNSPECIFIED HEPATIC CIRRHOSIS TYPE: ICD-10-CM

## 2024-07-16 LAB
ALBUMIN SERPL-MCNC: 4 G/DL (ref 3.5–5.2)
ALBUMIN/GLOB SERPL: 1.1 G/DL
ALP SERPL-CCNC: 92 U/L (ref 39–117)
ALT SERPL W P-5'-P-CCNC: 13 U/L (ref 1–33)
ANION GAP SERPL CALCULATED.3IONS-SCNC: 9 MMOL/L (ref 5–15)
AST SERPL-CCNC: 26 U/L (ref 1–32)
BASOPHILS # BLD AUTO: 0.08 10*3/MM3 (ref 0–0.2)
BASOPHILS NFR BLD AUTO: 0.9 % (ref 0–1.5)
BILIRUB CONJ SERPL-MCNC: 0.3 MG/DL (ref 0–0.3)
BILIRUB SERPL-MCNC: 1.1 MG/DL (ref 0–1.2)
BUN SERPL-MCNC: 9 MG/DL (ref 8–23)
BUN/CREAT SERPL: 8.2 (ref 7–25)
CALCIUM SPEC-SCNC: 10 MG/DL (ref 8.6–10.5)
CHLORIDE SERPL-SCNC: 101 MMOL/L (ref 98–107)
CO2 SERPL-SCNC: 28 MMOL/L (ref 22–29)
CREAT SERPL-MCNC: 1.1 MG/DL (ref 0.57–1)
DEPRECATED RDW RBC AUTO: 48.9 FL (ref 37–54)
EGFRCR SERPLBLD CKD-EPI 2021: 53.5 ML/MIN/1.73
EOSINOPHIL # BLD AUTO: 0.37 10*3/MM3 (ref 0–0.4)
EOSINOPHIL NFR BLD AUTO: 4.4 % (ref 0.3–6.2)
ERYTHROCYTE [DISTWIDTH] IN BLOOD BY AUTOMATED COUNT: 13.7 % (ref 12.3–15.4)
FERRITIN SERPL-MCNC: 97.17 NG/ML (ref 13–150)
GLOBULIN UR ELPH-MCNC: 3.7 GM/DL
GLUCOSE SERPL-MCNC: 109 MG/DL (ref 65–99)
HAV IGM SERPL QL IA: NORMAL
HBV CORE IGM SERPL QL IA: NORMAL
HBV SURFACE AG SERPL QL IA: NORMAL
HCT VFR BLD AUTO: 41.7 % (ref 34–46.6)
HCV AB SER QL: NORMAL
HGB BLD-MCNC: 13.4 G/DL (ref 12–15.9)
IMM GRANULOCYTES # BLD AUTO: 0.03 10*3/MM3 (ref 0–0.05)
IMM GRANULOCYTES NFR BLD AUTO: 0.4 % (ref 0–0.5)
INR PPP: 1.01 (ref 0.91–1.09)
IRON 24H UR-MRATE: 108 MCG/DL (ref 37–145)
IRON SATN MFR SERPL: 22 % (ref 20–50)
LYMPHOCYTES # BLD AUTO: 1.36 10*3/MM3 (ref 0.7–3.1)
LYMPHOCYTES NFR BLD AUTO: 16.1 % (ref 19.6–45.3)
MCH RBC QN AUTO: 30.7 PG (ref 26.6–33)
MCHC RBC AUTO-ENTMCNC: 32.1 G/DL (ref 31.5–35.7)
MCV RBC AUTO: 95.6 FL (ref 79–97)
MONOCYTES # BLD AUTO: 0.7 10*3/MM3 (ref 0.1–0.9)
MONOCYTES NFR BLD AUTO: 8.3 % (ref 5–12)
NEUTROPHILS NFR BLD AUTO: 5.91 10*3/MM3 (ref 1.7–7)
NEUTROPHILS NFR BLD AUTO: 69.9 % (ref 42.7–76)
NRBC BLD AUTO-RTO: 0 /100 WBC (ref 0–0.2)
PLATELET # BLD AUTO: 166 10*3/MM3 (ref 140–450)
PMV BLD AUTO: 11.7 FL (ref 6–12)
POTASSIUM SERPL-SCNC: 4.4 MMOL/L (ref 3.5–5.2)
PROT SERPL-MCNC: 7.7 G/DL (ref 6–8.5)
PROTHROMBIN TIME: 13.7 SECONDS (ref 11.8–14.8)
RBC # BLD AUTO: 4.36 10*6/MM3 (ref 3.77–5.28)
SODIUM SERPL-SCNC: 138 MMOL/L (ref 136–145)
TIBC SERPL-MCNC: 487 MCG/DL (ref 298–536)
TRANSFERRIN SERPL-MCNC: 327 MG/DL (ref 200–360)
WBC NRBC COR # BLD AUTO: 8.45 10*3/MM3 (ref 3.4–10.8)

## 2024-07-16 PROCEDURE — 82103 ALPHA-1-ANTITRYPSIN TOTAL: CPT

## 2024-07-16 PROCEDURE — 99214 OFFICE O/P EST MOD 30 MIN: CPT | Performed by: NURSE PRACTITIONER

## 2024-07-16 PROCEDURE — 80074 ACUTE HEPATITIS PANEL: CPT

## 2024-07-16 PROCEDURE — 82390 ASSAY OF CERULOPLASMIN: CPT

## 2024-07-16 PROCEDURE — 85025 COMPLETE CBC W/AUTO DIFF WBC: CPT

## 2024-07-16 PROCEDURE — 86381 MITOCHONDRIAL ANTIBODY EACH: CPT

## 2024-07-16 PROCEDURE — 80053 COMPREHEN METABOLIC PANEL: CPT

## 2024-07-16 PROCEDURE — 84466 ASSAY OF TRANSFERRIN: CPT

## 2024-07-16 PROCEDURE — 83540 ASSAY OF IRON: CPT

## 2024-07-16 PROCEDURE — 82728 ASSAY OF FERRITIN: CPT

## 2024-07-16 PROCEDURE — 82105 ALPHA-FETOPROTEIN SERUM: CPT

## 2024-07-16 PROCEDURE — 36415 COLL VENOUS BLD VENIPUNCTURE: CPT

## 2024-07-16 PROCEDURE — 86015 ACTIN ANTIBODY EACH: CPT

## 2024-07-16 PROCEDURE — 85610 PROTHROMBIN TIME: CPT

## 2024-07-16 PROCEDURE — 86038 ANTINUCLEAR ANTIBODIES: CPT

## 2024-07-16 PROCEDURE — 82248 BILIRUBIN DIRECT: CPT

## 2024-07-16 NOTE — PROGRESS NOTES
"[facility] GASTROENTEROLOGY - OFFICE NOTE    7/16/2024    Joann Finnegan   1950    Primary Physician: Janak Sherwood APRN    Chief Complaint   Patient presents with    Cirrhosis         HISTORY OF PRESENT ILLNESS:     Joann Finnegan is a 74 y.o. female presents  with Cirrhosis. Diagnosed 1 mo ago by Angel BEAUCHAMP. This was after having a \" ct scan \" for back pain after a fall.  She is scheduled for ultrasound abdomen at [facility] this week. She was told had fatty liver 2 yrs ago. No history of heavy alcohol use. No family history of liver disease. No jaundice. No history of blood transfusions, iv drug use, or tattoos. No abdominal or ankle swelling at this time. No fever. No sign of active gi bleeding.       According to referral note the imaging noted esophageal varices as well. I will request imaging studies.     Started propranolol around 6 mo ago.       COLONOSCOPY (05/10/2023 10:02)     UPPER GI ENDOSCOPY (02/14/2020 09:32)       ================================================================  Office visit  2-21-23 HPI Joann Finnegan is a 72 y.o. female who presents as a referral for preventative maintenance. She has no complaints of nausea or vomiting. No change in bowels. No wt loss. No BRBPR. No melena. No abdominal pain.            COLONOSCOPY (02/14/2020 09:33)  Tissue Pathology Exam (02/14/2020 09:57) tubular adenomatous.            There is a family history of colon polyps: father. There is not a family history of colon cancer.     Past Medical History:   Diagnosis Date    Abnormal weight gain     Arthritis     Breast cancer     bilateral    COVID     Fatigue     H/O melanoma excision     CLARKS  LEVEL III      BACK OF UPPER LEFT ARM    History of adenomatous polyp of colon     Hypertension     Hypothyroidism     Injury of back     Melanoma     Osteoporosis     Restless leg syndrome     Shortness of breath        Past Surgical History:   Procedure Laterality Date    " APPENDECTOMY      BACK SURGERY      BACK SURGERY      BREAST BIOPSY Right     BREAST BIOPSY      BREAST LUMPECTOMY      right.     BROW LIFT      COLONOSCOPY      COLONOSCOPY N/A 02/14/2020    Procedure: COLONOSCOPY WITH ANESTHESIA;  Surgeon: Donovan Hernandez MD;  Location: [facility] ENDOSCOPY;  Service: Gastroenterology;  Laterality: N/A;  pre: family hx colon polyps  post: polyps  Janak Sherwood APRN    COLONOSCOPY N/A 05/10/2023    Procedure: COLONOSCOPY WITH ANESTHESIA;  Surgeon: Donovan Hernandez MD;  Location: [facility] ENDOSCOPY;  Service: Gastroenterology;  Laterality: N/A;  Pre: History of adenomatous polyp of colon, Family hx colonic polyps  Post: AVM, polyp  Janak Sherwood APRN    ENDOSCOPY N/A 02/14/2020    Procedure: ESOPHAGOGASTRODUODENOSCOPY WITH ANESTHESIA;  Surgeon: Donovan Hernandez MD;  Location: [facility] ENDOSCOPY;  Service: Gastroenterology;  Laterality: N/A;  pre: dysphagia; heartburn  post: dilated  Jnaak Sherwood APRN    ENDOSCOPY AND COLONOSCOPY  12/22/2011    very minimal distal esophagitis, incomplete esophagus ring dilated    LUMBAR SYNOVIAL CYST REMOVAL      Spinal Cyst removed from Spinal Canal    MASTECTOMY, PARTIAL      PAIN PUMP INSERTION/REVISION      PARTIAL HIP ARTHROPLASTY Left     SKIN CANCER EXCISION      Melanoma     SQUAMOUS CELL CARCINOMA EXCISION      TEMPOROMANDIBULAR JOINT ARTHROPLASTY      TOTAL ABDOMINAL HYSTERECTOMY WITH SALPINGO OOPHORECTOMY Bilateral        Outpatient Medications Marked as Taking for the 7/16/24 encounter (Office Visit) with Vandana Marroquin APRN   Medication Sig Dispense Refill    ascorbic acid (VITAMIN C) 500 MG tablet Take 1 tablet by mouth Daily.      Cholecalciferol (Vitamin D3) 1.25 MG (63928 UT) capsule Take 1 capsule by mouth Every 7 (Seven) Days. Sunday      gabapentin (NEURONTIN) 300 MG capsule Take 2 capsules by mouth every night at bedtime. Normally take 2 capsules at bedtime but recently doctor told her to take 1 tid       "levothyroxine (SYNTHROID, LEVOTHROID) 125 MCG tablet Take 1 tablet by mouth Daily. NAME BRAND      lisinopril (PRINIVIL,ZESTRIL) 5 MG tablet Take 1 tablet by mouth Daily.      Symbicort 160-4.5 MCG/ACT inhaler Inhale 2 puffs 2 (Two) Times a Day.         Allergies   Allergen Reactions    Requip [Ropinirole Hcl] Nausea And Vomiting       Social History     Socioeconomic History    Marital status:    Tobacco Use    Smoking status: Former     Types: Cigarettes    Smokeless tobacco: Never   Vaping Use    Vaping status: Never Used   Substance and Sexual Activity    Alcohol use: Yes     Comment: rare    Drug use: No    Sexual activity: Yes     Partners: Male     Birth control/protection: Surgical       Family History   Problem Relation Age of Onset    Arthritis Mother     Seizures Mother     Diabetes Father     Skin cancer Father     Heart attack Father     Heart disease Father     Colon polyps Father     Arthritis Sister     Ulcers Brother     No Known Problems Maternal Grandmother     No Known Problems Maternal Grandfather     No Known Problems Paternal Grandmother     No Known Problems Paternal Grandfather     Ulcers Sister     Colon cancer Neg Hx        Review of Systems   Constitutional:  Negative for chills, fever and unexpected weight change.   Respiratory:  Negative for shortness of breath.    Cardiovascular:  Negative for chest pain.   Gastrointestinal:  Negative for abdominal distention, abdominal pain, anal bleeding, blood in stool, constipation, diarrhea, nausea and vomiting.        Vitals:    07/16/24 1306   BP: 140/80   Pulse: 71   Temp: 98 °F (36.7 °C)   SpO2: 95%   Weight: 99.3 kg (219 lb)   Height: 165.1 cm (65\")      Body mass index is 36.44 kg/m².    Physical Exam  Vitals reviewed.   Constitutional:       General: She is not in acute distress.  Cardiovascular:      Rate and Rhythm: Normal rate and regular rhythm.      Heart sounds: Normal heart sounds.   Pulmonary:      Effort: Pulmonary effort is " normal.      Breath sounds: Normal breath sounds.   Abdominal:      General: Bowel sounds are normal. There is no distension.      Palpations: Abdomen is soft.      Tenderness: There is no abdominal tenderness.   Skin:     General: Skin is warm and dry.   Neurological:      Mental Status: She is alert.         Results for orders placed or performed during the hospital encounter of 07/17/23   POC Creatinine    Specimen: Blood   Result Value Ref Range    Creatinine 1.20 0.60 - 1.30 mg/dL           ASSESSMENT AND PLAN    Assessment & Plan     Diagnoses and all orders for this visit:    1. Cirrhosis of liver without ascites, unspecified hepatic cirrhosis type (Primary)  -     Alpha - 1 - Antitrypsin; Future  -     Anti-Smooth Muscle Antibody Titer; Future  -     Ferritin; Future  -     Ceruloplasmin; Future  -     Hepatic Function Panel; Future  -     Hepatitis Panel, Acute; Future  -     Iron Profile; Future  -     Mitochondrial Antibodies, M2; Future  -     IONA by IFA, Reflex to Titer and Pattern; Future  -     Case Request; Standing  -     Case Request  -     Protime-INR; Future  -     AFP Tumor Marker; Future  -     CBC & Differential; Future  -     Comprehensive Metabolic Panel; Future    2. Esophageal varices without bleeding, unspecified esophageal varices type  -     Case Request; Standing  -     Case Request    Other orders  -     Implement Anesthesia Orders Day of Procedure; Standing  -     Obtain Informed Consent; Standing    In regards to cirrhosis, I discussed what this meant. I recommend liver serologies. I also recommend request imaging studies and the patient will fax them to our office. I also recommend that she request ultrasound liver report be faxed to our office as well. I recommend 2 gm na diet.     Office visit 2-3 mo.             ESOPHAGOGASTRODUODENOSCOPY WITH ANESTHESIA (N/A)  Risk, benefits, and alternatives of endoscopy were explained in full.  They understand that there is a risk of  bleeding, perforation, and infection.  The risk of perforation goes up with esophageal dilation.  Other options to evaluate UGI complaints could involve barium swallow or UGI series, but these would be diagnostic tests only.  Patient was given time to ask questions.  I answered them to their satisfaction and they are agreeable to proceeding         Return in about 3 months (around 10/16/2024).            There are no Patient Instructions on file for this visit.      Vandaan Marroquin, APRN

## 2024-07-16 NOTE — H&P (VIEW-ONLY)
"[facility] GASTROENTEROLOGY - OFFICE NOTE    7/16/2024    Joann Finnegan   1950    Primary Physician: Janak Sherwood APRN    Chief Complaint   Patient presents with    Cirrhosis         HISTORY OF PRESENT ILLNESS:     Joann Finnegan is a 74 y.o. female presents  with Cirrhosis. Diagnosed 1 mo ago by Angel BEAUCHAMP. This was after having a \" ct scan \" for back pain after a fall.  She is scheduled for ultrasound abdomen at [facility] this week. She was told had fatty liver 2 yrs ago. No history of heavy alcohol use. No family history of liver disease. No jaundice. No history of blood transfusions, iv drug use, or tattoos. No abdominal or ankle swelling at this time. No fever. No sign of active gi bleeding.       According to referral note the imaging noted esophageal varices as well. I will request imaging studies.     Started propranolol around 6 mo ago.       COLONOSCOPY (05/10/2023 10:02)     UPPER GI ENDOSCOPY (02/14/2020 09:32)       ================================================================  Office visit  2-21-23 HPI Joann Finnegan is a 72 y.o. female who presents as a referral for preventative maintenance. She has no complaints of nausea or vomiting. No change in bowels. No wt loss. No BRBPR. No melena. No abdominal pain.            COLONOSCOPY (02/14/2020 09:33)  Tissue Pathology Exam (02/14/2020 09:57) tubular adenomatous.            There is a family history of colon polyps: father. There is not a family history of colon cancer.     Past Medical History:   Diagnosis Date    Abnormal weight gain     Arthritis     Breast cancer     bilateral    COVID     Fatigue     H/O melanoma excision     CLARKS  LEVEL III      BACK OF UPPER LEFT ARM    History of adenomatous polyp of colon     Hypertension     Hypothyroidism     Injury of back     Melanoma     Osteoporosis     Restless leg syndrome     Shortness of breath        Past Surgical History:   Procedure Laterality Date    " APPENDECTOMY      BACK SURGERY      BACK SURGERY      BREAST BIOPSY Right     BREAST BIOPSY      BREAST LUMPECTOMY      right.     BROW LIFT      COLONOSCOPY      COLONOSCOPY N/A 02/14/2020    Procedure: COLONOSCOPY WITH ANESTHESIA;  Surgeon: Donovan Hernandez MD;  Location: [facility] ENDOSCOPY;  Service: Gastroenterology;  Laterality: N/A;  pre: family hx colon polyps  post: polyps  Janak Sherwood APRN    COLONOSCOPY N/A 05/10/2023    Procedure: COLONOSCOPY WITH ANESTHESIA;  Surgeon: Donovan Hernandez MD;  Location: [facility] ENDOSCOPY;  Service: Gastroenterology;  Laterality: N/A;  Pre: History of adenomatous polyp of colon, Family hx colonic polyps  Post: AVM, polyp  Janak Sherwood APRN    ENDOSCOPY N/A 02/14/2020    Procedure: ESOPHAGOGASTRODUODENOSCOPY WITH ANESTHESIA;  Surgeon: Donovan Hernandez MD;  Location: [facility] ENDOSCOPY;  Service: Gastroenterology;  Laterality: N/A;  pre: dysphagia; heartburn  post: dilated  Janak Sherwood APRN    ENDOSCOPY AND COLONOSCOPY  12/22/2011    very minimal distal esophagitis, incomplete esophagus ring dilated    LUMBAR SYNOVIAL CYST REMOVAL      Spinal Cyst removed from Spinal Canal    MASTECTOMY, PARTIAL      PAIN PUMP INSERTION/REVISION      PARTIAL HIP ARTHROPLASTY Left     SKIN CANCER EXCISION      Melanoma     SQUAMOUS CELL CARCINOMA EXCISION      TEMPOROMANDIBULAR JOINT ARTHROPLASTY      TOTAL ABDOMINAL HYSTERECTOMY WITH SALPINGO OOPHORECTOMY Bilateral        Outpatient Medications Marked as Taking for the 7/16/24 encounter (Office Visit) with Vandana Marroquin APRN   Medication Sig Dispense Refill    ascorbic acid (VITAMIN C) 500 MG tablet Take 1 tablet by mouth Daily.      Cholecalciferol (Vitamin D3) 1.25 MG (24688 UT) capsule Take 1 capsule by mouth Every 7 (Seven) Days. Sunday      gabapentin (NEURONTIN) 300 MG capsule Take 2 capsules by mouth every night at bedtime. Normally take 2 capsules at bedtime but recently doctor told her to take 1 tid       "levothyroxine (SYNTHROID, LEVOTHROID) 125 MCG tablet Take 1 tablet by mouth Daily. NAME BRAND      lisinopril (PRINIVIL,ZESTRIL) 5 MG tablet Take 1 tablet by mouth Daily.      Symbicort 160-4.5 MCG/ACT inhaler Inhale 2 puffs 2 (Two) Times a Day.         Allergies   Allergen Reactions    Requip [Ropinirole Hcl] Nausea And Vomiting       Social History     Socioeconomic History    Marital status:    Tobacco Use    Smoking status: Former     Types: Cigarettes    Smokeless tobacco: Never   Vaping Use    Vaping status: Never Used   Substance and Sexual Activity    Alcohol use: Yes     Comment: rare    Drug use: No    Sexual activity: Yes     Partners: Male     Birth control/protection: Surgical       Family History   Problem Relation Age of Onset    Arthritis Mother     Seizures Mother     Diabetes Father     Skin cancer Father     Heart attack Father     Heart disease Father     Colon polyps Father     Arthritis Sister     Ulcers Brother     No Known Problems Maternal Grandmother     No Known Problems Maternal Grandfather     No Known Problems Paternal Grandmother     No Known Problems Paternal Grandfather     Ulcers Sister     Colon cancer Neg Hx        Review of Systems   Constitutional:  Negative for chills, fever and unexpected weight change.   Respiratory:  Negative for shortness of breath.    Cardiovascular:  Negative for chest pain.   Gastrointestinal:  Negative for abdominal distention, abdominal pain, anal bleeding, blood in stool, constipation, diarrhea, nausea and vomiting.        Vitals:    07/16/24 1306   BP: 140/80   Pulse: 71   Temp: 98 °F (36.7 °C)   SpO2: 95%   Weight: 99.3 kg (219 lb)   Height: 165.1 cm (65\")      Body mass index is 36.44 kg/m².    Physical Exam  Vitals reviewed.   Constitutional:       General: She is not in acute distress.  Cardiovascular:      Rate and Rhythm: Normal rate and regular rhythm.      Heart sounds: Normal heart sounds.   Pulmonary:      Effort: Pulmonary effort is " normal.      Breath sounds: Normal breath sounds.   Abdominal:      General: Bowel sounds are normal. There is no distension.      Palpations: Abdomen is soft.      Tenderness: There is no abdominal tenderness.   Skin:     General: Skin is warm and dry.   Neurological:      Mental Status: She is alert.         Results for orders placed or performed during the hospital encounter of 07/17/23   POC Creatinine    Specimen: Blood   Result Value Ref Range    Creatinine 1.20 0.60 - 1.30 mg/dL           ASSESSMENT AND PLAN    Assessment & Plan     Diagnoses and all orders for this visit:    1. Cirrhosis of liver without ascites, unspecified hepatic cirrhosis type (Primary)  -     Alpha - 1 - Antitrypsin; Future  -     Anti-Smooth Muscle Antibody Titer; Future  -     Ferritin; Future  -     Ceruloplasmin; Future  -     Hepatic Function Panel; Future  -     Hepatitis Panel, Acute; Future  -     Iron Profile; Future  -     Mitochondrial Antibodies, M2; Future  -     IONA by IFA, Reflex to Titer and Pattern; Future  -     Case Request; Standing  -     Case Request  -     Protime-INR; Future  -     AFP Tumor Marker; Future  -     CBC & Differential; Future  -     Comprehensive Metabolic Panel; Future    2. Esophageal varices without bleeding, unspecified esophageal varices type  -     Case Request; Standing  -     Case Request    Other orders  -     Implement Anesthesia Orders Day of Procedure; Standing  -     Obtain Informed Consent; Standing    In regards to cirrhosis, I discussed what this meant. I recommend liver serologies. I also recommend request imaging studies and the patient will fax them to our office. I also recommend that she request ultrasound liver report be faxed to our office as well. I recommend 2 gm na diet.     Office visit 2-3 mo.             ESOPHAGOGASTRODUODENOSCOPY WITH ANESTHESIA (N/A)  Risk, benefits, and alternatives of endoscopy were explained in full.  They understand that there is a risk of  bleeding, perforation, and infection.  The risk of perforation goes up with esophageal dilation.  Other options to evaluate UGI complaints could involve barium swallow or UGI series, but these would be diagnostic tests only.  Patient was given time to ask questions.  I answered them to their satisfaction and they are agreeable to proceeding         Return in about 3 months (around 10/16/2024).            There are no Patient Instructions on file for this visit.      Vandana Marroquin, APRN

## 2024-07-17 LAB
ALPHA-FETOPROTEIN: 5.91 NG/ML (ref 0–8.3)
ALPHA1 GLOB MFR UR ELPH: 191 MG/DL (ref 90–200)
ANA SER QL IF: NEGATIVE
CERULOPLASMIN SERPL-MCNC: 37 MG/DL (ref 19–39)
MITOCHONDRIA M2 IGG SER-ACNC: <20 UNITS (ref 0–20)
SMA IGG SER-ACNC: 9 UNITS (ref 0–19)

## 2024-07-23 ENCOUNTER — HOSPITAL ENCOUNTER (OUTPATIENT)
Facility: HOSPITAL | Age: 74
Setting detail: HOSPITAL OUTPATIENT SURGERY
Discharge: HOME | End: 2024-07-23
Attending: INTERNAL MEDICINE | Admitting: INTERNAL MEDICINE
Payer: MEDICARE

## 2024-07-23 ENCOUNTER — ANESTHESIA (OUTPATIENT)
Dept: GASTROENTEROLOGY | Facility: HOSPITAL | Age: 74
End: 2024-07-23
Payer: MEDICARE

## 2024-07-23 ENCOUNTER — ANESTHESIA EVENT (OUTPATIENT)
Dept: GASTROENTEROLOGY | Facility: HOSPITAL | Age: 74
End: 2024-07-23
Payer: MEDICARE

## 2024-07-23 VITALS
OXYGEN SATURATION: 95 % | RESPIRATION RATE: 12 BRPM | BODY MASS INDEX: 35.65 KG/M2 | HEIGHT: 65 IN | DIASTOLIC BLOOD PRESSURE: 61 MMHG | HEART RATE: 58 BPM | WEIGHT: 214 LBS | TEMPERATURE: 97.2 F | SYSTOLIC BLOOD PRESSURE: 132 MMHG

## 2024-07-23 PROCEDURE — 25010000002 PROPOFOL 10 MG/ML EMULSION

## 2024-07-23 PROCEDURE — 25810000003 SODIUM CHLORIDE 0.9 % SOLUTION: Performed by: ANESTHESIOLOGY

## 2024-07-23 RX ORDER — SODIUM CHLORIDE 9 MG/ML
40 INJECTION, SOLUTION INTRAVENOUS AS NEEDED
Status: CANCELLED | OUTPATIENT
Start: 2024-07-23

## 2024-07-23 RX ORDER — ONDANSETRON 2 MG/ML
4 INJECTION INTRAMUSCULAR; INTRAVENOUS ONCE AS NEEDED
Status: DISCONTINUED | OUTPATIENT
Start: 2024-07-23 | End: 2024-07-23 | Stop reason: HOSPADM

## 2024-07-23 RX ORDER — SODIUM CHLORIDE 9 MG/ML
100 INJECTION, SOLUTION INTRAVENOUS CONTINUOUS
Status: DISCONTINUED | OUTPATIENT
Start: 2024-07-23 | End: 2024-07-23 | Stop reason: HOSPADM

## 2024-07-23 RX ORDER — PROPOFOL 10 MG/ML
VIAL (ML) INTRAVENOUS AS NEEDED
Status: DISCONTINUED | OUTPATIENT
Start: 2024-07-23 | End: 2024-07-23 | Stop reason: SURG

## 2024-07-23 RX ORDER — SODIUM CHLORIDE 0.9 % (FLUSH) 0.9 %
10 SYRINGE (ML) INJECTION AS NEEDED
Status: CANCELLED | OUTPATIENT
Start: 2024-07-23

## 2024-07-23 RX ORDER — LIDOCAINE HYDROCHLORIDE 20 MG/ML
INJECTION, SOLUTION EPIDURAL; INFILTRATION; INTRACAUDAL; PERINEURAL AS NEEDED
Status: DISCONTINUED | OUTPATIENT
Start: 2024-07-23 | End: 2024-07-23 | Stop reason: SURG

## 2024-07-23 RX ORDER — EXEMESTANE 25 MG/1
25 TABLET ORAL DAILY
COMMUNITY

## 2024-07-23 RX ORDER — SODIUM CHLORIDE 0.9 % (FLUSH) 0.9 %
10 SYRINGE (ML) INJECTION EVERY 12 HOURS SCHEDULED
Status: CANCELLED | OUTPATIENT
Start: 2024-07-23

## 2024-07-23 RX ORDER — MONTELUKAST SODIUM 10 MG/1
10 TABLET ORAL NIGHTLY
COMMUNITY

## 2024-07-23 RX ADMIN — SODIUM CHLORIDE 100 ML/HR: 9 INJECTION, SOLUTION INTRAVENOUS at 10:31

## 2024-07-23 RX ADMIN — LIDOCAINE HYDROCHLORIDE 100 MG: 20 INJECTION, SOLUTION EPIDURAL; INFILTRATION; INTRACAUDAL; PERINEURAL at 11:06

## 2024-07-23 RX ADMIN — PROPOFOL 120 MG: 10 INJECTION, EMULSION INTRAVENOUS at 11:06

## 2024-07-23 NOTE — ANESTHESIA PREPROCEDURE EVALUATION
Anesthesia Evaluation     Patient summary reviewed   no history of anesthetic complications:   NPO Solid Status: > 8 hours  NPO Liquid Status: > 2 hours           Airway   Mallampati: I  TM distance: >3 FB  Neck ROM: full  No difficulty expected  Dental    (+) upper dentures and lower dentures    Pulmonary    (+) a smoker Former,home oxygen (not since a few months after coming home from rehab from [facility] ), sleep apnea on CPAP  (-) COPD, asthma    ROS comment: COVID requiring ventilation December 2020   Cardiovascular   Exercise tolerance: poor (<4 METS)    (+) hypertension  (-) past MI, CAD, cardiac stents      Neuro/Psych  (-) seizures, TIA, CVA  GI/Hepatic/Renal/Endo    (+) obesity, PUD, liver disease fatty liver disease cirrhosis, renal disease- CRI, thyroid problem hypothyroidism  (-) diabetes    Musculoskeletal     Abdominal   (+) obese   Substance History      OB/GYN          Other      history of cancer                  Anesthesia Plan    ASA 3     MAC     intravenous induction     Anesthetic plan, risks, benefits, and alternatives have been provided, discussed and informed consent has been obtained with: patient.

## 2024-07-23 NOTE — ANESTHESIA POSTPROCEDURE EVALUATION
"Patient: Joann Finnegan    Procedure Summary       Date: 07/23/24 Room / Location: [facility] ENDOSCOPY 2 /  [facility]    Anesthesia Start: 1058 Anesthesia Stop: 1112    Procedure: ESOPHAGOGASTRODUODENOSCOPY WITH ANESTHESIA Diagnosis:       Cirrhosis of liver without ascites, unspecified hepatic cirrhosis type      Esophageal varices without bleeding, unspecified esophageal varices type      (Cirrhosis of liver without ascites, unspecified hepatic cirrhosis type [K74.60])      (Esophageal varices without bleeding, unspecified esophageal varices type [I85.00])    Surgeons: Donovan Hernandez MD Provider: Lalo Gonzalez CRNA    Anesthesia Type: MAC ASA Status: 3            Anesthesia Type: MAC    Vitals  No vitals data found for the desired time range.          Post Anesthesia Care and Evaluation    Patient location during evaluation: PHASE II  Patient participation: complete - patient participated  Level of consciousness: awake and alert  Pain management: adequate    Airway patency: patent  Anesthetic complications: No anesthetic complications  PONV Status: none  Cardiovascular status: acceptable  Respiratory status: acceptable  Hydration status: acceptable    Comments: Blood pressure 160/70, pulse 60, temperature 97.2 °F (36.2 °C), temperature source Temporal, resp. rate 20, height 165.1 cm (65\"), weight 97.1 kg (214 lb), last menstrual period 10/26/2003, SpO2 96%, not currently breastfeeding.    No anesthesia care post op    "

## 2024-07-25 ENCOUNTER — TELEPHONE (OUTPATIENT)
Dept: GASTROENTEROLOGY | Facility: CLINIC | Age: 74
End: 2024-07-25

## 2024-07-25 ENCOUNTER — TELEPHONE (OUTPATIENT)
Dept: GASTROENTEROLOGY | Facility: CLINIC | Age: 74
End: 2024-07-25
Payer: MEDICARE

## 2024-07-25 DIAGNOSIS — K74.60 CIRRHOSIS OF LIVER WITHOUT ASCITES, UNSPECIFIED HEPATIC CIRRHOSIS TYPE: Primary | ICD-10-CM

## 2024-07-25 NOTE — TELEPHONE ENCOUNTER
Pt verbalized understanding and Stephanie with Angel Sherwood will let Angel know and I fax copy of labs to Sammy office.

## 2024-07-25 NOTE — TELEPHONE ENCOUNTER
Please let patient know that I reviewed scans. I recommend ultrasound liver. I will put in the order. Recommend she keep f/u appointment with Dr. Hernandez 10/2024.      Also let her know labs noted elevated creatinine so I will defer to her pcp, he may want her to see a kidney specialist. Please call Angel Sherwood NP office and let them know will defer to him.

## 2024-07-25 NOTE — TELEPHONE ENCOUNTER
Hub staff attempted to follow warm transfer process and was unsuccessful     Caller: Joann Finnegan    Relationship to patient: Self    Best call back number: [phone] (UNAVAILABLE TODAY BETWEEN 1 AND 2:30.)    Patient is needing: PATIENT IS RETURNING A CALL POSSIBLY IN REGARDS TO LAB RESULTS.  PLEASE CALL BACK.

## 2024-07-29 RX ORDER — MONTELUKAST SODIUM 10 MG/1
10 TABLET ORAL NIGHTLY
Qty: 90 TABLET | Refills: 3 | Status: SHIPPED | OUTPATIENT
Start: 2024-07-29

## 2024-08-22 ENCOUNTER — TELEPHONE (OUTPATIENT)
Dept: HEMATOLOGY | Age: 74
End: 2024-08-22

## 2024-08-22 NOTE — TELEPHONE ENCOUNTER
I called and left patient a detailed voicemail with their appointment date and time for 08/26/24 and to come at the follow up appointment time and not the lab appointment time. I also made them aware of what that time was.  I made patient aware that we are in the [facility] and where it is located and gave the address in case, they use GPS. Left message for patient to call our office if they could not keep this appointment or if they did not know where our new building is located.

## 2024-08-23 NOTE — PROGRESS NOTES
CONSTITUTIONAL: no fever, no night sweats, no fatigue;  HEENT: no blurring of vision, no double vision, no hearing difficulty, no tinnitus, no ulceration, no dysplasia, no epistaxis;   LUNGS: no cough, no hemoptysis, no wheeze,  no shortness of breath;  CARDIOVASCULAR: no palpitation, no chest pain, no shortness of breath;  GI: no abdominal pain, no nausea, no vomiting, no diarrhea, no constipation;  ELEAZAR: no dysuria, no hematuria, no frequency or urgency, no nephrolithiasis;  MUSCULOSKELETAL: no joint pain, no swelling, no stiffness;  ENDOCRINE: no polyuria, no polydipsia, no cold or heat intolerance;  HEMATOLOGY: no easy bruising or bleeding, no history of clotting disorder;  DERMATOLOGY: no skin rash, no eczema, no pruritus;  NEUROLOGY: no syncope, no seizures, no numbness or tingling of hands, no numbness or tingling of feet, no paresis;     Vitals signs:  /80   Pulse 86   Temp 97.9 °F (36.6 °C)   Ht 1.651 m (5' 5\")   Wt 93.9 kg (207 lb)   SpO2 97%   BMI 34.45 kg/m²      PHYSICAL EXAM:  CONSTITUTIONAL: Alert, appropriate, no acute distress  NECK: Supple, no masses.  No palpable thyroid mass  CHEST/LUNGS: CTA bilaterally, normal respiratory effort   CARDIOVASCULAR: RRR, no murmurs.  No lower extremity edema  ABDOMEN: soft non-tender, active bowel sounds, no HSM.  No palpable masses  EXTREMITIES: warm, full ROM in all 4 extremities, no focal weakness.  SKIN: warm, dry with no rashes or lesions  LYMPH: No cervical, clavicular  lymphadenopathy  NEUROLOGIC: follows commands, non focal     Relevant Lab findings/reviewed by me:  7/16/24 Iron Studies ([facility]) Iron 108, TIBC 487, Iron Sat 22, Ferritin 97.7  7/16/24 AFP ([facility]) 5.91    7/23/24 Upper EGD by /[facility] GI: Three non-bleeding colonic angiodysplastic lesions.One 7 mm polyp in the distal transverse colon, removed with a hot snare. Resected and retrieved. The examination was otherwise normal on direct and retroflexion views.      Relevant Imaging

## 2024-09-18 ENCOUNTER — OFFICE VISIT (OUTPATIENT)
Dept: PULMONOLOGY | Facility: CLINIC | Age: 74
End: 2024-09-18
Payer: MEDICARE

## 2024-09-18 VITALS
HEIGHT: 65 IN | DIASTOLIC BLOOD PRESSURE: 62 MMHG | WEIGHT: 205.6 LBS | OXYGEN SATURATION: 96 % | SYSTOLIC BLOOD PRESSURE: 128 MMHG | HEART RATE: 56 BPM | BODY MASS INDEX: 34.26 KG/M2

## 2024-09-18 DIAGNOSIS — Z86.16 HISTORY OF 2019 NOVEL CORONAVIRUS DISEASE (COVID-19): ICD-10-CM

## 2024-09-18 DIAGNOSIS — J84.10 PULMONARY FIBROSIS: Primary | ICD-10-CM

## 2024-09-18 DIAGNOSIS — U09.9 LONG COVID: ICD-10-CM

## 2024-09-18 RX ORDER — ALENDRONATE SODIUM 70 MG/1
70 TABLET ORAL
COMMUNITY

## 2024-09-30 ENCOUNTER — HOSPITAL ENCOUNTER (OUTPATIENT)
Dept: CT IMAGING | Facility: HOSPITAL | Age: 74
Discharge: HOME | End: 2024-09-30
Admitting: INTERNAL MEDICINE
Payer: MEDICARE

## 2024-09-30 DIAGNOSIS — J84.10 PULMONARY FIBROSIS: ICD-10-CM

## 2024-09-30 DIAGNOSIS — Z86.16 HISTORY OF 2019 NOVEL CORONAVIRUS DISEASE (COVID-19): ICD-10-CM

## 2024-09-30 PROCEDURE — 71250 CT THORAX DX C-: CPT

## 2024-10-01 ENCOUNTER — HOSPITAL ENCOUNTER (OUTPATIENT)
Dept: CT IMAGING | Facility: HOSPITAL | Age: 74
Discharge: HOME | End: 2024-10-01
Payer: MEDICARE

## 2024-10-01 ENCOUNTER — TELEPHONE (OUTPATIENT)
Dept: PULMONOLOGY | Facility: CLINIC | Age: 74
End: 2024-10-01
Payer: MEDICARE

## 2024-10-01 NOTE — TELEPHONE ENCOUNTER
Spoke with patient and relayed test results. Patient voiced understanding.   Patient is agreeable to coming in sooner to discuss.

## 2024-10-01 NOTE — TELEPHONE ENCOUNTER
----- Message from Chato Degroot sent at 10/1/2024  7:50 AM CDT -----  Please call the patient regarding her abnormal result.   There is scarring and some other areas that are not normal. It all might relate back to Covid.  One area with a nodule or spot in it that would be best to check further before scheduled follow up.  See if we can get her in sometime soon to discuss.  Options include continuing to monitor with follow up ct scan, or other tests including PET or bronchoscopy.

## 2024-10-01 NOTE — PROGRESS NOTES
Please call the patient regarding her abnormal result.   There is scarring and some other areas that are not normal. It all might relate back to Covid.  One area with a nodule or spot in it that would be best to check further before scheduled follow up.  See if we can get her in sometime soon to discuss.  Options include continuing to monitor with follow up ct scan, or other tests including PET or bronchoscopy.

## 2024-10-02 NOTE — PROGRESS NOTES
Chief Complaint  Pulmonary fibrosis    Subjective    History of Present Illness {CC  Problem List  Visit Diagnosis   Encounters  Notes  Medications  Labs  Result Review Imaging  Media     Joann Finnegan presents to [facility] PULMONARY & CRITICAL CARE MEDICINE for:    History of Present Illness  Ms. Finnegan is here for follow up of lingular nodule. She has been followed by Dr. Degroot for long covid and uses Symbicort. She had life threatening covid in 2020. She has a history of breast cancer, in the right breast 5 years ago and in the left breast this year (2024). She was treated with intraoperative radiation. She had a ct chest completed recently showing a new left lingular lung nodule approximately 8mm.        Prior to Admission medications    Medication Sig Start Date End Date Taking? Authorizing Provider   alendronate (FOSAMAX) 70 MG tablet Take 1 tablet by mouth Every 7 (Seven) Days.    El Mckeon MD   ascorbic acid (VITAMIN C) 500 MG tablet Take 1 tablet by mouth Daily.  Patient not taking: Reported on 9/18/2024    El Mckeon MD   Cholecalciferol (Vitamin D3) 1.25 MG (62842 UT) capsule Take 1 capsule by mouth Every 7 (Seven) Days. Sunday    El Mckeon MD   exemestane (AROMASIN) 25 MG tablet Take 1 tablet by mouth Daily.    El Mckeon MD   fluticasone (FLONASE) 50 MCG/ACT nasal spray 2 sprays into the nostril(s) as directed by provider Daily.  Patient not taking: Reported on 7/16/2024 4/23/21   Taurus Eastman Jr., MD   gabapentin (NEURONTIN) 300 MG capsule Take 2 capsules by mouth every night at bedtime. Normally take 2 capsules at bedtime but recently doctor told her to take 1 tid 3/4/21   ProviderEl MD   levothyroxine (SYNTHROID, LEVOTHROID) 125 MCG tablet Take 1 tablet by mouth Daily. NAME BRAND    El Mckeon MD   lisinopril (PRINIVIL,ZESTRIL) 5 MG tablet Take 1 tablet by mouth Daily. 1/12/21   Ho  Samir Ahumada MD   montelukast (SINGULAIR) 10 MG tablet Take 1 tablet by mouth Every Night.    El Mckeon MD   PROPRANOLOL HCL ER PO Take 60 mg by mouth Daily.    El Mckeon MD   Symbicort 160-4.5 MCG/ACT inhaler Inhale 2 puffs 2 (Two) Times a Day. 3/4/21   El Mckeon MD   tamoxifen (NOLVADEX) 20 MG chemo tablet Take 1 tablet by mouth once daily  Patient not taking: Reported on 9/18/2024 11/11/20   El Mckeon MD   zinc sulfate (ZINCATE) 50 MG capsule Take 1 capsule by mouth Daily.  Patient not taking: Reported on 7/16/2024    El Mckeon MD       Social History     Socioeconomic History    Marital status:    Tobacco Use    Smoking status: Former     Types: Cigarettes     Passive exposure: Past    Smokeless tobacco: Never   Vaping Use    Vaping status: Never Used   Substance and Sexual Activity    Alcohol use: Yes     Comment: rare    Drug use: No    Sexual activity: Yes     Partners: Male     Birth control/protection: Surgical       Objective   Vital Signs:   /70 (BP Location: Right arm, Patient Position: Sitting, Cuff Size: Adult) Comment (Cuff Size): Manual  Pulse 64   SpO2 93% Comment: RA    Physical Exam  Constitutional:       General: She is not in acute distress.  HENT:      Head: Normocephalic.      Nose: Nose normal.      Mouth/Throat:      Mouth: Mucous membranes are moist.   Eyes:      General: No scleral icterus.  Cardiovascular:      Rate and Rhythm: Normal rate.   Pulmonary:      Effort: No respiratory distress.   Abdominal:      General: There is no distension.   Neurological:      Mental Status: She is alert and oriented to person, place, and time.   Psychiatric:         Mood and Affect: Mood normal.         Behavior: Behavior is cooperative.        Result Review :{ Labs  Result Review  Imaging  Med Tab  Media :    PFT Values          9/18/2024    10:15   Pre Drug PFT Results   FVC 79   FEV1 73   FEF 25-75% 52   FEV1/FVC 72.47    Other Tests PFT Results   DLCO 92   D/VAsb 123         Results for orders placed in visit on 09/18/24    Spirometry with Diffusion Capacity    Narrative  Spirometry with Diffusion Capacity    Performed by: Lloyd Case CMA  Authorized by: Chato Degroot MD  Pre Drug % Predicted  FVC: 79%  FEV1: 73%  FEF 25-75%: 52%  FEV1/FVC: 72.47%  DLCO: 92%  D/VAsb: 123%    Interpretation  Spirometry  Spirometry shows moderate restriction. There is reduced midflow suggesting small airway/airflow obstruction.  Diffusion Capacity  The patient's diffusion capacity is normal.  Diffusion capacity is normal when corrected for alveolar volume.  Electronically signed by Chato Degroot MD, 9/18/2024, 20:42 CDT               CT Chest Hi Resolution Diagnostic (09/30/2024 14:08)   My interpretation of imaging:  stable peripheral lung scarring, bronchiectasis, new lingular nodule approximately 8mm          Assessment and Plan {CC Problem List  Visit Diagnosis  ROS  Review (Popup)  Health Maintenance  Quality  BestPractice  Medications  SmartSets  SnapShot Encounters  Media      Diagnoses and all orders for this visit:    1. Nodule of left lung (Primary)  Comments:  schedule for PET scan.  Orders:  -     NM Pet Brain Metabolic Evaluation; Future    2. Pulmonary fibrosis  Comments:  stable on high res ct.    3. Long COVID  Comments:  Continue Symbicort.          Plan as above. Office follow up in 2 weeks or sooner if needed.     Maine Witt, NITO  10/4/2024  14:16 CDT    Follow Up {Instructions Charge Capture  Follow-up Communications   Return in about 2 weeks (around 10/18/2024).    Patient was given instructions and counseling regarding her condition or for health maintenance advice. Please see specific information pulled into the AVS if appropriate.

## 2024-10-04 ENCOUNTER — OFFICE VISIT (OUTPATIENT)
Dept: PULMONOLOGY | Facility: CLINIC | Age: 74
End: 2024-10-04
Payer: MEDICARE

## 2024-10-04 VITALS — SYSTOLIC BLOOD PRESSURE: 128 MMHG | OXYGEN SATURATION: 93 % | HEART RATE: 64 BPM | DIASTOLIC BLOOD PRESSURE: 70 MMHG

## 2024-10-04 DIAGNOSIS — U09.9 LONG COVID: Chronic | ICD-10-CM

## 2024-10-04 DIAGNOSIS — J84.10 PULMONARY FIBROSIS: Chronic | ICD-10-CM

## 2024-10-04 DIAGNOSIS — R91.1 NODULE OF LEFT LUNG: Primary | ICD-10-CM

## 2024-10-04 PROCEDURE — 1159F MED LIST DOCD IN RCRD: CPT | Performed by: NURSE PRACTITIONER

## 2024-10-04 PROCEDURE — 99214 OFFICE O/P EST MOD 30 MIN: CPT | Performed by: NURSE PRACTITIONER

## 2024-10-04 PROCEDURE — 1160F RVW MEDS BY RX/DR IN RCRD: CPT | Performed by: NURSE PRACTITIONER

## 2024-10-04 RX ORDER — PANTOPRAZOLE SODIUM 40 MG/1
40 TABLET, DELAYED RELEASE ORAL DAILY
COMMUNITY
Start: 2024-09-19

## 2024-10-08 ENCOUNTER — TELEPHONE (OUTPATIENT)
Dept: HEMATOLOGY | Age: 74
End: 2024-10-08

## 2024-10-08 NOTE — TELEPHONE ENCOUNTER
I called patient and reminded patient of their appt on 10/09/24 and patient let me know she needed to reschedule and the front office has been notified

## 2024-10-09 NOTE — PROGRESS NOTES
Chief Complaint  Nodule of left lung and Pulmonary fibrosis    Subjective    History of Present Illness {CC  Problem List  Visit Diagnosis   Encounters  Notes  Medications  Labs  Result Review Imaging  Media     Joann Finnegan presents to [facility] for:    History of Present Illness  Ms. Finnegan is here for follow up of left lingular lung nodule. She had PET scan completed 10-. PET scan did NOT show any abnormal uptake in nodule of concern. She has a history of breast cancer, in the right breast 5 years ago and in the left breast this year (2024). She was treated with intraoperative radiation. She gets short of breath with exertion. She has fibrosis from covid pneumonia in 2020. PFT last month was stable.        Prior to Admission medications    Medication Sig Start Date End Date Taking? Authorizing Provider   alendronate (FOSAMAX) 70 MG tablet Take 1 tablet by mouth Every 7 (Seven) Days.    El Mckeon MD   Cholecalciferol (Vitamin D3) 1.25 MG (16372 UT) capsule Take 1 capsule by mouth Every 7 (Seven) Days. Sunday    El Mckeon MD   exemestane (AROMASIN) 25 MG tablet Take 1 tablet by mouth Daily.    El Mckeon MD   gabapentin (NEURONTIN) 300 MG capsule Take 2 capsules by mouth every night at bedtime. Normally take 2 capsules at bedtime but recently doctor told her to take 1 tid 3/4/21   El Mckeon MD   levothyroxine (SYNTHROID, LEVOTHROID) 125 MCG tablet Take 1 tablet by mouth Daily. NAME BRAND    El Mckeon MD   lisinopril (PRINIVIL,ZESTRIL) 5 MG tablet Take 1 tablet by mouth Daily. 1/12/21   Samir Teague MD   montelukast (SINGULAIR) 10 MG tablet Take 1 tablet by mouth Every Night.    El Mckeon MD   pantoprazole (PROTONIX) 40 MG EC tablet Take 1 tablet by mouth Daily. 9/19/24   El Mckeon MD   PROPRANOLOL HCL ER PO Take 60 mg by mouth Daily.    Mike  "MD El   Symbicort 160-4.5 MCG/ACT inhaler Inhale 2 puffs 2 (Two) Times a Day. 3/4/21   El Mckeon MD   tamoxifen (NOLVADEX) 20 MG chemo tablet Take 1 tablet by mouth once daily  Patient not taking: Reported on 20   El Mckeon MD       Social History     Socioeconomic History    Marital status:    Tobacco Use    Smoking status: Former     Current packs/day: 0.00     Average packs/day: 1 pack/day for 30.0 years (30.0 ttl pk-yrs)     Types: Cigarettes     Start date: 1972     Quit date: 2002     Years since quittin.3     Passive exposure: Past    Smokeless tobacco: Never   Vaping Use    Vaping status: Never Used   Substance and Sexual Activity    Alcohol use: Yes     Comment: wine occassionally    Drug use: No    Sexual activity: Yes     Partners: Male     Birth control/protection: Post-menopausal, Surgical       Objective   Vital Signs:   /70   Pulse 69   Ht 165.1 cm (65\")   Wt 92.7 kg (204 lb 6.4 oz)   SpO2 96% Comment: RA  BMI 34.01 kg/m²     Physical Exam  Constitutional:       General: She is not in acute distress.  HENT:      Head: Normocephalic.      Nose: Nose normal.      Mouth/Throat:      Mouth: Mucous membranes are moist.   Eyes:      General: No scleral icterus.  Cardiovascular:      Rate and Rhythm: Normal rate.   Pulmonary:      Effort: No respiratory distress.   Abdominal:      General: There is no distension.   Neurological:      Mental Status: She is alert and oriented to person, place, and time.   Psychiatric:         Mood and Affect: Mood normal.         Behavior: Behavior is cooperative.        Result Review :{ Labs  Result Review  Imaging  Med Tab  Media :    PFT Values          2024    10:15   Pre Drug PFT Results   FVC 79   FEV1 73   FEF 25-75% 52   FEV1/FVC 72.47   Other Tests PFT Results   DLCO 92   D/VAsb 123         Results for orders placed in visit on 24    Spirometry with Diffusion " Capacity    Narrative  Spirometry with Diffusion Capacity    Performed by: Lloyd Case CMA  Authorized by: Chato Degroot MD  Pre Drug % Predicted  FVC: 79%  FEV1: 73%  FEF 25-75%: 52%  FEV1/FVC: 72.47%  DLCO: 92%  D/VAsb: 123%    Interpretation  Spirometry  Spirometry shows moderate restriction. There is reduced midflow suggesting small airway/airflow obstruction.  Diffusion Capacity  The patient's diffusion capacity is normal.  Diffusion capacity is normal when corrected for alveolar volume.  Electronically signed by Chato Degroot MD, 9/18/2024, 20:42 CDT             NM PET/CT Skull Base to Mid Thigh (10/11/2024 10:09)     My interpretation of imaging:  no abnormal uptake in left upper lobe nodule of concern       Assessment and Plan {CC Problem List  Visit Diagnosis  ROS  Review (Popup)  Health Maintenance  Quality  BestPractice  Medications  SmartSets  SnapShot Encounters  Media      Diagnoses and all orders for this visit:    1. Nodule of left lung (Primary)  Comments:  we reviewed pet scan which was favorable. Follow up ct in 6 months to ensure stability.  Orders:  -     CT Chest Without Contrast; Future    2. Pulmonary fibrosis  Comments:  walking oximetry did confirm need for supplemental oxygen with exertion.  Orders:  -     Oxygen Therapy    3. Long COVID  Comments:  continue Symbicort  Orders:  -     Oxygen Therapy    4. Need for vaccination  Comments:  influenza vaccine today.  Orders:  -     Fluzone High-Dose 65+yrs      Plan as above. Office follow up as already scheduled with Dr. Degroot. Call sooner if needed.       NITO Molina  10/18/2024  14:07 CDT    Follow Up {Instructions Charge Capture  Follow-up Communications   Return for Next scheduled follow up.    Patient was given instructions and counseling regarding her condition or for health maintenance advice. Please see specific information pulled into the AVS if appropriate.

## 2024-10-11 ENCOUNTER — HOSPITAL ENCOUNTER (OUTPATIENT)
Dept: CT IMAGING | Facility: HOSPITAL | Age: 74
Discharge: HOME | End: 2024-10-11
Payer: MEDICARE

## 2024-10-11 DIAGNOSIS — R91.1 NODULE OF LEFT LUNG: ICD-10-CM

## 2024-10-11 PROCEDURE — A9552 F18 FDG: HCPCS | Performed by: NURSE PRACTITIONER

## 2024-10-11 PROCEDURE — 78815 PET IMAGE W/CT SKULL-THIGH: CPT

## 2024-10-11 PROCEDURE — 0 FLUDEOXYGLUCOSE F18 SOLUTION: Performed by: NURSE PRACTITIONER

## 2024-10-11 RX ADMIN — FLUDEOXYGLUCOSE F18 1 DOSE: 300 INJECTION INTRAVENOUS at 08:55

## 2024-10-14 ENCOUNTER — TELEPHONE (OUTPATIENT)
Dept: PULMONOLOGY | Facility: CLINIC | Age: 74
End: 2024-10-14
Payer: MEDICARE

## 2024-10-14 NOTE — TELEPHONE ENCOUNTER
----- Message from Maine Witt sent at 10/14/2024  7:08 AM CDT -----  Let her know PET scan has no abnormal uptake in left upper lobe nodule. This is good news. We will need to continue to watch this spot. Will will discuss further and review together at upcoming appointment.

## 2024-10-18 ENCOUNTER — OFFICE VISIT (OUTPATIENT)
Dept: PULMONOLOGY | Facility: CLINIC | Age: 74
End: 2024-10-18
Payer: MEDICARE

## 2024-10-18 ENCOUNTER — TELEPHONE (OUTPATIENT)
Dept: HEMATOLOGY | Age: 74
End: 2024-10-18

## 2024-10-18 VITALS
BODY MASS INDEX: 34.05 KG/M2 | HEIGHT: 65 IN | DIASTOLIC BLOOD PRESSURE: 70 MMHG | OXYGEN SATURATION: 96 % | HEART RATE: 69 BPM | WEIGHT: 204.4 LBS | SYSTOLIC BLOOD PRESSURE: 128 MMHG

## 2024-10-18 DIAGNOSIS — R91.1 NODULE OF LEFT LUNG: Primary | Chronic | ICD-10-CM

## 2024-10-18 DIAGNOSIS — Z23 NEED FOR VACCINATION: ICD-10-CM

## 2024-10-18 DIAGNOSIS — U09.9 LONG COVID: Chronic | ICD-10-CM

## 2024-10-18 DIAGNOSIS — J84.10 PULMONARY FIBROSIS: Chronic | ICD-10-CM

## 2024-10-18 PROCEDURE — 99214 OFFICE O/P EST MOD 30 MIN: CPT | Performed by: NURSE PRACTITIONER

## 2024-10-22 ENCOUNTER — HOSPITAL ENCOUNTER (OUTPATIENT)
Dept: INFUSION THERAPY | Age: 74
Discharge: HOME | End: 2024-10-22
Payer: MEDICARE

## 2024-10-22 ENCOUNTER — OFFICE VISIT (OUTPATIENT)
Dept: HEMATOLOGY | Age: 74
End: 2024-10-22
Payer: MEDICARE

## 2024-10-22 ENCOUNTER — TELEPHONE (OUTPATIENT)
Dept: GASTROENTEROLOGY | Facility: CLINIC | Age: 74
End: 2024-10-22

## 2024-10-22 ENCOUNTER — OFFICE VISIT (OUTPATIENT)
Dept: GASTROENTEROLOGY | Facility: CLINIC | Age: 74
End: 2024-10-22
Payer: MEDICARE

## 2024-10-22 VITALS
HEART RATE: 86 BPM | BODY MASS INDEX: 34.49 KG/M2 | HEIGHT: 65 IN | OXYGEN SATURATION: 97 % | TEMPERATURE: 97.9 F | SYSTOLIC BLOOD PRESSURE: 132 MMHG | DIASTOLIC BLOOD PRESSURE: 80 MMHG | WEIGHT: 207 LBS

## 2024-10-22 VITALS
OXYGEN SATURATION: 97 % | BODY MASS INDEX: 34.66 KG/M2 | TEMPERATURE: 97.7 F | DIASTOLIC BLOOD PRESSURE: 68 MMHG | WEIGHT: 208 LBS | SYSTOLIC BLOOD PRESSURE: 134 MMHG | HEART RATE: 67 BPM | HEIGHT: 65 IN

## 2024-10-22 DIAGNOSIS — M85.80 OSTEOPENIA, UNSPECIFIED LOCATION: ICD-10-CM

## 2024-10-22 DIAGNOSIS — Z08 ENCOUNTER FOR FOLLOW-UP SURVEILLANCE OF BREAST CANCER: ICD-10-CM

## 2024-10-22 DIAGNOSIS — K76.0 FATTY LIVER: ICD-10-CM

## 2024-10-22 DIAGNOSIS — C50.411 MALIGNANT NEOPLASM OF UPPER-OUTER QUADRANT OF RIGHT FEMALE BREAST, UNSPECIFIED ESTROGEN RECEPTOR STATUS (HCC): Primary | ICD-10-CM

## 2024-10-22 DIAGNOSIS — R23.2 HOT FLASHES RELATED TO AROMATASE INHIBITOR THERAPY: ICD-10-CM

## 2024-10-22 DIAGNOSIS — K74.60 CIRRHOSIS OF LIVER WITHOUT ASCITES, UNSPECIFIED HEPATIC CIRRHOSIS TYPE: Primary | ICD-10-CM

## 2024-10-22 DIAGNOSIS — Z79.811 ENCOUNTER FOR MONITORING AROMATASE INHIBITOR THERAPY: ICD-10-CM

## 2024-10-22 DIAGNOSIS — Z51.81 ENCOUNTER FOR MONITORING AROMATASE INHIBITOR THERAPY: ICD-10-CM

## 2024-10-22 DIAGNOSIS — T45.1X5A HOT FLASHES RELATED TO AROMATASE INHIBITOR THERAPY: ICD-10-CM

## 2024-10-22 DIAGNOSIS — Z85.3 ENCOUNTER FOR FOLLOW-UP SURVEILLANCE OF BREAST CANCER: ICD-10-CM

## 2024-10-22 DIAGNOSIS — I85.10 SECONDARY ESOPHAGEAL VARICES WITHOUT BLEEDING: ICD-10-CM

## 2024-10-22 DIAGNOSIS — Z71.89 CARE PLAN DISCUSSED WITH PATIENT: ICD-10-CM

## 2024-10-22 PROCEDURE — 99212 OFFICE O/P EST SF 10 MIN: CPT

## 2024-10-22 PROCEDURE — 99213 OFFICE O/P EST LOW 20 MIN: CPT | Performed by: INTERNAL MEDICINE

## 2024-10-22 PROCEDURE — 1159F MED LIST DOCD IN RCRD: CPT | Performed by: INTERNAL MEDICINE

## 2024-10-22 PROCEDURE — 1160F RVW MEDS BY RX/DR IN RCRD: CPT | Performed by: INTERNAL MEDICINE

## 2024-10-22 RX ORDER — ALENDRONATE SODIUM 70 MG/1
70 TABLET ORAL
COMMUNITY

## 2024-10-22 NOTE — TELEPHONE ENCOUNTER
Please put her on recall for an ultrasound of her liver for hepatocellular carcinoma surveillance to be done in March

## 2024-10-22 NOTE — PROGRESS NOTES
"[facility] Gastroenterology    Chief Complaint   Patient presents with    Cirrhosis       Subjective     HPI    Joann Finnegan is a 74 y.o. female who presents with a chief complaint of liver disease    She saw Vandana in July for CT scan that suggested an underlying cirrhosis.  Liver ultrasound was done which noted some splenomegaly but a normal appearing liver.  Liver serologies were unremarkable.  She does have a history of fatty liver.  Of note, she does have lung disease.  She recently had a PET scan which noted no abnormal uptake.    She presents today.  She is doing well she states.  She does have some shortness of breath with exertion which is not new.  She is now using oxygen at night.  He did not use it today.  She has a knee that she is scheduled to have replacement this coming December.  She has lost weight.  She states she is lost 14 pounds since she was advised by us to do so.  I do see where she did lose 14 pounds but now she is gained 3 pounds back.    ========== copy of July 16, 2024 HPI by Vandana============     HISTORY OF PRESENT ILLNESS:      Joann Finnegan is a 74 y.o. female presents  with Cirrhosis. Diagnosed 1 mo ago by Angel BEAUCHAMP. This was after having a \" ct scan \" for back pain after a fall.  She is scheduled for ultrasound abdomen at [facility] this week. She was told had fatty liver 2 yrs ago. No history of heavy alcohol use. No family history of liver disease. No jaundice. No history of blood transfusions, iv drug use, or tattoos. No abdominal or ankle swelling at this time. No fever. No sign of active gi bleeding.         According to referral note the imaging noted esophageal varices as well. I will request imaging studies.      Started propranolol around 6 mo ago.         COLONOSCOPY (05/10/2023 10:02)      UPPER GI ENDOSCOPY (02/14/2020 09:32)         ================================================================  Office visit  2-21-23 HPI Joann Finnegan is a 72 " y.o. female who presents as a referral for preventative maintenance. She has no complaints of nausea or vomiting. No change in bowels. No wt loss. No BRBPR. No melena. No abdominal pain.            COLONOSCOPY (02/14/2020 09:33)  Tissue Pathology Exam (02/14/2020 09:57) tubular adenomatous.            There is a family history of colon polyps: father. There is not a family history of colon cancer.        Past Medical History:   Diagnosis Date    Abnormal weight gain     Arthritis     Breast cancer     bilateral    Cirrhosis     with esophageal varices    COPD (chronic obstructive pulmonary disease)     COVID     Fatigue     GERD (gastroesophageal reflux disease)     H/O melanoma excision     CLARKS  LEVEL III      BACK OF UPPER LEFT ARM    History of adenomatous polyp of colon     Hypertension     Hypothyroidism     Injury of back     Interstitial lung disease     Melanoma     Osteoporosis     Restless leg syndrome     Shortness of breath     Sleep apnea     cpap, wears occasionallly    Sleep apnea, obstructive     TIA (transient ischemic attack)        Past Surgical History:   Procedure Laterality Date    APPENDECTOMY      BACK SURGERY      BACK SURGERY      BREAST BIOPSY Right     BREAST BIOPSY      BREAST LUMPECTOMY      right.     BROW LIFT      COLONOSCOPY      COLONOSCOPY N/A 02/14/2020    Procedure: COLONOSCOPY WITH ANESTHESIA;  Surgeon: Donovan Hernandez MD;  Location: [facility] ENDOSCOPY;  Service: Gastroenterology;  Laterality: N/A;  pre: family hx colon polyps  post: polyps  Janak Sherwood APRN    COLONOSCOPY N/A 05/10/2023    Procedure: COLONOSCOPY WITH ANESTHESIA;  Surgeon: Donovan Hernandez MD;  Location: [facility] ENDOSCOPY;  Service: Gastroenterology;  Laterality: N/A;  Pre: History of adenomatous polyp of colon, Family hx colonic polyps  Post: AVM, polyp  Janak Sherwood APRN    ENDOSCOPY N/A 02/14/2020    Procedure: ESOPHAGOGASTRODUODENOSCOPY WITH ANESTHESIA;  Surgeon: Donovan Hernandez MD;   Location:  [facility] ENDOSCOPY;  Service: Gastroenterology;  Laterality: N/A;  pre: dysphagia; heartburn  post: dilated  Janak Sherwood APRN    ENDOSCOPY N/A 7/23/2024    Procedure: ESOPHAGOGASTRODUODENOSCOPY WITH ANESTHESIA;  Surgeon: Donovan Hernandez MD;  Location: [facility] ENDOSCOPY;  Service: Gastroenterology;  Laterality: N/A;  pre: cirrhosis. varices.  post: esophageal varices  Janak Ortiz Kaela    ENDOSCOPY AND COLONOSCOPY  12/22/2011    very minimal distal esophagitis, incomplete esophagus ring dilated    LUMBAR SYNOVIAL CYST REMOVAL      Spinal Cyst removed from Spinal Canal    MASTECTOMY, PARTIAL      PAIN PUMP INSERTION/REVISION      PARTIAL HIP ARTHROPLASTY Left     SKIN CANCER EXCISION      Melanoma     SQUAMOUS CELL CARCINOMA EXCISION      TEMPOROMANDIBULAR JOINT ARTHROPLASTY      TOTAL ABDOMINAL HYSTERECTOMY WITH SALPINGO OOPHORECTOMY Bilateral          Current Outpatient Medications:     alendronate (FOSAMAX) 70 MG tablet, Take 1 tablet by mouth Every 7 (Seven) Days., Disp: , Rfl:     Cholecalciferol (Vitamin D3) 1.25 MG (26600 UT) capsule, Take 1 capsule by mouth Every 7 (Seven) Days. Sunday, Disp: , Rfl:     exemestane (AROMASIN) 25 MG tablet, Take 1 tablet by mouth Daily., Disp: , Rfl:     gabapentin (NEURONTIN) 300 MG capsule, Take 2 capsules by mouth every night at bedtime. Normally take 2 capsules at bedtime but recently doctor told her to take 1 tid, Disp: , Rfl:     levothyroxine (SYNTHROID, LEVOTHROID) 125 MCG tablet, Take 1 tablet by mouth Daily. NAME BRAND, Disp: , Rfl:     lisinopril (PRINIVIL,ZESTRIL) 5 MG tablet, Take 1 tablet by mouth Daily., Disp:  , Rfl:     montelukast (SINGULAIR) 10 MG tablet, Take 1 tablet by mouth Every Night., Disp: , Rfl:     pantoprazole (PROTONIX) 40 MG EC tablet, Take 1 tablet by mouth Daily., Disp: , Rfl:     PROPRANOLOL HCL ER PO, Take 60 mg by mouth Daily., Disp: , Rfl:     Symbicort 160-4.5 MCG/ACT inhaler, Inhale 2 puffs 2 (Two) Times a Day., Disp:  , Rfl:     tamoxifen (NOLVADEX) 20 MG chemo tablet, Take 1 tablet by mouth once daily (Patient not taking: Reported on 2024), Disp: , Rfl:     Allergies   Allergen Reactions    Requip [Ropinirole Hcl] Nausea And Vomiting       Social History     Socioeconomic History    Marital status:    Tobacco Use    Smoking status: Former     Current packs/day: 0.00     Average packs/day: 1 pack/day for 30.0 years (30.0 ttl pk-yrs)     Types: Cigarettes     Start date: 1972     Quit date: 2002     Years since quittin.3     Passive exposure: Past    Smokeless tobacco: Never   Vaping Use    Vaping status: Never Used   Substance and Sexual Activity    Alcohol use: Yes     Comment: wine occassionally    Drug use: No    Sexual activity: Yes     Partners: Male     Birth control/protection: Post-menopausal, Surgical       Family History   Problem Relation Age of Onset    Arthritis Mother     Seizures Mother     Diabetes Father     Skin cancer Father     Heart attack Father     Heart disease Father     Colon polyps Father     Arthritis Sister     Ulcers Brother     No Known Problems Maternal Grandmother     No Known Problems Maternal Grandfather     No Known Problems Paternal Grandmother     No Known Problems Paternal Grandfather     Ulcers Sister     Colon cancer Neg Hx        Review of Systems  Knee pain as above    Objective     Vitals:    10/22/24 1329   BP: 134/68   Pulse: 67   Temp: 97.7 °F (36.5 °C)   SpO2: 97%       Physical Exam  Vitals reviewed.   Constitutional:       Appearance: Normal appearance. She is not ill-appearing or diaphoretic.   Pulmonary:      Effort: Pulmonary effort is normal.   Neurological:      Mental Status: She is alert.   Psychiatric:         Thought Content: Thought content normal.         Judgment: Judgment normal.               Assessment & Plan   Problem List Items Addressed This Visit          Gastrointestinal Abdominal     Cirrhosis of liver without ascites - Primary     Esophageal varices without bleeding    Overview     Diminutive varices noted on endoscopy July 2024         Fatty liver         She does have what appears to be early cirrhosis.  She had an ultrasound that suggested some nodularity but her follow-up ultrasound did not suggest that although she did have mild splenomegaly.  I discussed the differences.  We talked about pursuing liver biopsy as an option.  She asked if it would change anything and I do not think it would change our diagnosis or recommendations.  At this time appears she has fibrotic liver disease secondary to fatty liver.  She is already lost some weight.  I commended her for this.  I recommend that she continue to try to lose weight down to ideal body weight.  We talked about the overall health benefits.  We talked about worsening liver disease.  Unfortunately cannot tell if it will get worse over the next couple years or never progress.  The better she can take care of her overall body and health which includes ideal weight and ideal eating habits and exercise, theoretically this would be quite beneficial.  She expressed understanding.  She plans to continue to try to lose weight.    Lastly we did talk about increased risk for developing of hepatocellular carcinoma.  We talked about the option of pursuing surveillance ultrasounds of her liver.  Since she just had a PET scan done in September I think would be reasonable to repeat an ultrasound of her liver for surveillance in March.  She is in agreement with this approach.  We will see her back in our office in 1 year.    Continue ongoing management by primary care provider and other specialists.     Body mass index is 34.61 kg/m².  Elevated BMI, weight loss recommended as discussed above      EMR Dragon/transcription disclaimer:  Much of this encounter note is electronic transcription/translation of spoken language to printed text.  The electronic translation of spoken language may be erroneous, or  at times, nonsensical words or phrases may be inadvertently transcribed.  Although I have reviewed the note for such errors, some may still exist.    Donovan Hernandez MD  14:20 CDT  10/22/24

## 2024-11-14 ENCOUNTER — TELEPHONE (OUTPATIENT)
Dept: HEMATOLOGY | Age: 74
End: 2024-11-14

## 2024-11-14 NOTE — TELEPHONE ENCOUNTER
Spoke with patient to clarify Imaging appt scheduled for 11/21/24 @ 1:00 PM @ [facility]- Patient prefers Lauren did not wish to reschedule at [facility]

## 2024-11-21 ENCOUNTER — HOSPITAL ENCOUNTER (OUTPATIENT)
Dept: WOMENS IMAGING | Age: 74
Discharge: HOME | End: 2024-11-21
Attending: INTERNAL MEDICINE
Payer: MEDICARE

## 2024-11-21 DIAGNOSIS — Z79.811 ENCOUNTER FOR MONITORING AROMATASE INHIBITOR THERAPY: ICD-10-CM

## 2024-11-21 DIAGNOSIS — Z51.81 ENCOUNTER FOR MONITORING AROMATASE INHIBITOR THERAPY: ICD-10-CM

## 2024-11-21 PROCEDURE — 77080 DXA BONE DENSITY AXIAL: CPT

## 2025-02-24 ENCOUNTER — TELEPHONE (OUTPATIENT)
Dept: SURGERY | Age: 75
End: 2025-02-24

## 2025-02-24 NOTE — TELEPHONE ENCOUNTER
Patient said that she found a knot on her left breast . Patient had cancer in both breast few years ago.  Patient ask  to get a Mammogram and appointment this week with Dr Roe. Please called patient [phone].      Thank you

## 2025-02-24 NOTE — TELEPHONE ENCOUNTER
I spoke with Dr. Roe. He would like patient to see Nic. I have her scheduled for 10/27@10:30 Am. I left a message with appointment and asked for a call back to confirm

## 2025-02-27 ENCOUNTER — OFFICE VISIT (OUTPATIENT)
Dept: SURGERY | Age: 75
End: 2025-02-27
Payer: MEDICARE

## 2025-02-27 ENCOUNTER — TELEPHONE (OUTPATIENT)
Dept: SURGERY | Age: 75
End: 2025-02-27

## 2025-02-27 VITALS — OXYGEN SATURATION: 96 % | BODY MASS INDEX: 34.22 KG/M2 | HEIGHT: 65 IN | HEART RATE: 58 BPM | WEIGHT: 205.4 LBS

## 2025-02-27 DIAGNOSIS — N63.21 MASS OF UPPER OUTER QUADRANT OF LEFT BREAST: ICD-10-CM

## 2025-02-27 DIAGNOSIS — Z85.3 HISTORY OF BREAST CANCER: Primary | ICD-10-CM

## 2025-02-27 PROCEDURE — 1159F MED LIST DOCD IN RCRD: CPT | Performed by: PHYSICIAN ASSISTANT

## 2025-02-27 PROCEDURE — G8427 DOCREV CUR MEDS BY ELIG CLIN: HCPCS | Performed by: PHYSICIAN ASSISTANT

## 2025-02-27 PROCEDURE — 1036F TOBACCO NON-USER: CPT | Performed by: PHYSICIAN ASSISTANT

## 2025-02-27 PROCEDURE — G8417 CALC BMI ABV UP PARAM F/U: HCPCS | Performed by: PHYSICIAN ASSISTANT

## 2025-02-27 PROCEDURE — G8399 PT W/DXA RESULTS DOCUMENT: HCPCS | Performed by: PHYSICIAN ASSISTANT

## 2025-02-27 PROCEDURE — 99213 OFFICE O/P EST LOW 20 MIN: CPT | Performed by: PHYSICIAN ASSISTANT

## 2025-02-27 PROCEDURE — 1090F PRES/ABSN URINE INCON ASSESS: CPT | Performed by: PHYSICIAN ASSISTANT

## 2025-02-27 PROCEDURE — 3017F COLORECTAL CA SCREEN DOC REV: CPT | Performed by: PHYSICIAN ASSISTANT

## 2025-02-27 PROCEDURE — 1123F ACP DISCUSS/DSCN MKR DOCD: CPT | Performed by: PHYSICIAN ASSISTANT

## 2025-02-27 RX ORDER — BUDESONIDE AND FORMOTEROL FUMARATE DIHYDRATE 160; 4.5 UG/1; UG/1
AEROSOL RESPIRATORY (INHALATION)
COMMUNITY
Start: 2024-11-21

## 2025-02-27 NOTE — PROGRESS NOTES
HISTORY OF PRESENT ILLNESS:  Ms. Abbott presents for a breast exam.  She has noticed a new palpable mass about two weeks ago of the left breast.   There is no tenderness or drainage.  She has a history of left partial mastectomy with IORT on 9/15/2023.    She is recently status post stereotactic breast biopsy  on the left which revealed a 1.2 cm low grade invasive ductal carcinoma. ER positive at 95%. WI positive at 94%. Her2 negative. Ki67 21%. Mammaprint pending    She is about 5 years status post right lumpectomy with sentinel node and IORT for an ER positive luminal a breast cancer.  She has been on tamoxifen for the last few years    Bilateral Mammogram-4/15/2024    FINDINGS:  The breast tissue is heterogeneously dense which can obscure small masses..     There are expected post-surgical changes in both breasts, including architectural distortion and increased parenchymal density.  There are no focal suspicious masses, areas of architectural distortion, or suspicious calcifications in either breast.     IMPRESSION:  1.  No mammographic evidence of malignancy.  Stable post-treatment changes in both breasts.     BI-RADS 2:  Benign.     RECOMMENDATION: Diagnostic mammogram Bilateral 12 months  Electronically signed by: ROSY HARPER M.D.    MRI- 7/17/2023 ([facility])  FINDINGS: There is a biopsy clip in the left breast at 12:00, mid depth.   Immediately inferior to the biopsy clip, also at 12:00, there is a   stellate area of masslike distortion that measures approximately 11 x 11   mm, compatible with the biopsy-proven malignancy. This is approximately   6 cm from the nipple. Immediately medial to the biopsy clip is a small   focus of nonmass-like enhancement, measuring no more than 4 mm. This may   be reactive and related to the biopsy procedure, however a small focus   of DCIS cannot be excluded. In the upper inner quadrant of the left   breast anterior depth there are 3 small well-circumscribed foci of

## 2025-02-27 NOTE — TELEPHONE ENCOUNTER
Araceli @ [facility] s/w pt & got her scheduled for mammo & us on 3/6/25 @ 8:00 am. Does she needs a follow up appt w/ministerio Lucero?

## 2025-03-03 NOTE — TELEPHONE ENCOUNTER
Requested Prescriptions     Pending Prescriptions Disp Refills    exemestane (AROMASIN) 25 MG tablet [Pharmacy Med Name: EXEMESTANE 25 MG TABLET] 90 tablet 3     Sig: TAKE 1 TABLET BY MOUTH DAILY       Last Office Visit: 2/27/2025  Next Office Visit: Visit date not found  Last Medication Refill: 2/4/2024 with 3 RF

## 2025-03-04 RX ORDER — EXEMESTANE 25 MG/1
25 TABLET ORAL DAILY
Qty: 90 TABLET | Refills: 3 | Status: SHIPPED | OUTPATIENT
Start: 2025-03-04

## 2025-03-06 ENCOUNTER — HOSPITAL ENCOUNTER (OUTPATIENT)
Dept: WOMENS IMAGING | Age: 75
Discharge: HOME | End: 2025-03-06
Payer: MEDICARE

## 2025-03-06 ENCOUNTER — OFFICE VISIT (OUTPATIENT)
Dept: SURGERY | Age: 75
End: 2025-03-06
Payer: MEDICARE

## 2025-03-06 VITALS — HEIGHT: 65 IN | OXYGEN SATURATION: 96 % | HEART RATE: 66 BPM | WEIGHT: 205 LBS | BODY MASS INDEX: 34.16 KG/M2

## 2025-03-06 DIAGNOSIS — N63.21 MASS OF UPPER OUTER QUADRANT OF LEFT BREAST: ICD-10-CM

## 2025-03-06 DIAGNOSIS — Z85.3 HISTORY OF BREAST CANCER: Primary | ICD-10-CM

## 2025-03-06 DIAGNOSIS — Z85.3 HISTORY OF BREAST CANCER: ICD-10-CM

## 2025-03-06 PROCEDURE — 1036F TOBACCO NON-USER: CPT | Performed by: PHYSICIAN ASSISTANT

## 2025-03-06 PROCEDURE — G8417 CALC BMI ABV UP PARAM F/U: HCPCS | Performed by: PHYSICIAN ASSISTANT

## 2025-03-06 PROCEDURE — 99213 OFFICE O/P EST LOW 20 MIN: CPT | Performed by: PHYSICIAN ASSISTANT

## 2025-03-06 PROCEDURE — 1123F ACP DISCUSS/DSCN MKR DOCD: CPT | Performed by: PHYSICIAN ASSISTANT

## 2025-03-06 PROCEDURE — 1159F MED LIST DOCD IN RCRD: CPT | Performed by: PHYSICIAN ASSISTANT

## 2025-03-06 PROCEDURE — G0279 TOMOSYNTHESIS, MAMMO: HCPCS

## 2025-03-06 PROCEDURE — G8427 DOCREV CUR MEDS BY ELIG CLIN: HCPCS | Performed by: PHYSICIAN ASSISTANT

## 2025-03-06 PROCEDURE — G8399 PT W/DXA RESULTS DOCUMENT: HCPCS | Performed by: PHYSICIAN ASSISTANT

## 2025-03-06 PROCEDURE — 3017F COLORECTAL CA SCREEN DOC REV: CPT | Performed by: PHYSICIAN ASSISTANT

## 2025-03-06 PROCEDURE — 1090F PRES/ABSN URINE INCON ASSESS: CPT | Performed by: PHYSICIAN ASSISTANT

## 2025-03-06 PROCEDURE — 76642 ULTRASOUND BREAST LIMITED: CPT

## 2025-04-18 ENCOUNTER — RESULTS FOLLOW-UP (OUTPATIENT)
Dept: PULMONOLOGY | Facility: CLINIC | Age: 75
End: 2025-04-18
Payer: MEDICARE

## 2025-04-18 ENCOUNTER — HOSPITAL ENCOUNTER (OUTPATIENT)
Dept: CT IMAGING | Facility: HOSPITAL | Age: 75
Discharge: HOME | End: 2025-04-18
Payer: MEDICARE

## 2025-04-18 DIAGNOSIS — R91.1 NODULE OF LEFT LUNG: Chronic | ICD-10-CM

## 2025-04-18 PROCEDURE — 71250 CT THORAX DX C-: CPT

## 2025-05-01 ENCOUNTER — TELEPHONE (OUTPATIENT)
Dept: UROLOGY | Facility: CLINIC | Age: 75
End: 2025-05-01
Payer: MEDICARE

## 2025-05-27 ENCOUNTER — OFFICE VISIT (OUTPATIENT)
Dept: PULMONOLOGY | Facility: CLINIC | Age: 75
End: 2025-05-27
Payer: MEDICARE

## 2025-05-27 VITALS
WEIGHT: 198 LBS | SYSTOLIC BLOOD PRESSURE: 130 MMHG | HEIGHT: 65 IN | HEART RATE: 66 BPM | BODY MASS INDEX: 32.99 KG/M2 | OXYGEN SATURATION: 92 % | DIASTOLIC BLOOD PRESSURE: 70 MMHG

## 2025-05-27 DIAGNOSIS — R91.1 NODULE OF LEFT LUNG: Primary | ICD-10-CM

## 2025-05-27 DIAGNOSIS — U09.9 LONG COVID: ICD-10-CM

## 2025-05-27 DIAGNOSIS — Z86.16 HISTORY OF 2019 NOVEL CORONAVIRUS DISEASE (COVID-19): ICD-10-CM

## 2025-05-27 DIAGNOSIS — G47.33 OBSTRUCTIVE SLEEP APNEA: ICD-10-CM

## 2025-05-27 DIAGNOSIS — J84.10 PULMONARY FIBROSIS: ICD-10-CM

## 2025-05-27 DIAGNOSIS — Z85.3 HISTORY OF BREAST CANCER: ICD-10-CM

## 2025-05-27 NOTE — PROGRESS NOTES
"Background:  Pt w hx resp failure with first Covid variant 12/2020, dyspnea.   Chief Complaint  Nodule of left lung    Subjective    History of Present Illness     Joann Finnegan is here for follow up with [facility] PULMONARY [facility] CRITICAL CARE MEDICINE.  History of Present Illness  Pt follows up for lung nodules, hx breast ca most recently last year, lung fibrosis after Covid.  She quit smoking in 2002.  She feels she can't get a full deep breath.  She has not used the oxygen much.  She monitors sat and it is usually above 90.   She has some dyspnea on exertion.  She was on the ventilator for about 3 weeks with the Covid.  She has chronic back pain and prior kyphoplasty, still bothers her.       Tobacco Use: Medium Risk (5/27/2025)    Patient History     Smoking Tobacco Use: Former     Smokeless Tobacco Use: Never     Passive Exposure: Past      Current Outpatient Medications   Medication Instructions    alendronate (FOSAMAX) 70 mg, Every 7 Days    exemestane (AROMASIN) 25 mg, Daily    gabapentin (NEURONTIN) 600 mg, Every Night at Bedtime    levothyroxine (SYNTHROID, LEVOTHROID) 125 mcg, Daily    lisinopril (PRINIVIL,ZESTRIL) 5 mg, Oral, Every 24 Hours Scheduled    montelukast (SINGULAIR) 10 mg, Nightly    pantoprazole (PROTONIX) 40 mg, Daily    PROPRANOLOL HCL ER PO 60 mg, Daily    Symbicort 160-4.5 MCG/ACT inhaler 2 puffs, 2 Times Daily    Vitamin D3 50,000 Units, Every 7 Days      Objective     Vital Signs:   /70   Pulse 66   Ht 165.1 cm (65\")   Wt 89.8 kg (198 lb)   SpO2 92% Comment: RA  BMI 32.95 kg/m²   Physical Exam  Constitutional:       General: She is not in acute distress.     Appearance: She is not ill-appearing.   Pulmonary:      Effort: No respiratory distress.      Breath sounds: Rales present.        Result Review  Data Reviewed:  CT Chest Without Contrast Diagnostic (04/18/2025 11:40)   1.  The lingular pulmonary nodule of concern has decreased in size now  measuring " 5 mm (previously 8 mm). Other scattered sub-6 mm pulmonary  nodules are stable.  2.  No significant change in pulmonary fibrosis, likely post viral.  3.  Nodular liver concerning for chronic liver disease/cirrhosis.  4.  Splenomegaly.       PFT Values          9/18/2024    10:15   Pre Drug PFT Results   FVC 79   FEV1 73   FEF 25-75% 52   FEV1/FVC 72.47   Other Tests PFT Results   DLCO 92   D/VAsb 123                Assessment and Plan    Diagnoses and all orders for this visit:    1. Nodule of left lung (Primary)    2. Pulmonary fibrosis    3. Long COVID    4. History of 2019 novel coronavirus disease (COVID-19)    5. History of breast cancer    6. Obstructive sleep apnea  -     Overnight Sleep Oximetry Study; Future    Will plan follow up PFT in the fall to reassess pulm fibrosis related to long Covid following Covid infection  We discussed improvement in nodule.  Continue Symbicort  She has not felt better with using cpap at night, not using now.  Check overnight pulse ox and consider stopping  Follow up ct next year.  Follow Up   Return in about 6 months (around 11/27/2025) for Spirometry and DLCO.  Patient was given instructions and counseling regarding her condition or for health maintenance advice. Please see specific information pulled into the AVS if appropriate.    Electronically signed by Chato Degroot MD, 5/27/2025, 23:24 CDT

## 2025-05-28 ENCOUNTER — TELEPHONE (OUTPATIENT)
Dept: HEMATOLOGY | Age: 75
End: 2025-05-28

## 2025-05-28 NOTE — TELEPHONE ENCOUNTER
I called patient and left detailed voicemail about their appointment on 06/02/2025. I made patient aware not to arrive any earlier than the appointment time and to come at the time of the follow up not the time of the lab appointment if it is different than the follow up appt time. I also made patient aware to eat and drink plenty of water to hydrate properly before coming to these appointments because this will make their lab draw much easier. Made patient aware that we are now located at the [facility] at [address] [place] [address]. Located between [facility] and the [facility].

## 2025-05-30 DIAGNOSIS — Z08 ENCOUNTER FOR FOLLOW-UP SURVEILLANCE OF BREAST CANCER: ICD-10-CM

## 2025-05-30 DIAGNOSIS — C50.411 MALIGNANT NEOPLASM OF UPPER-OUTER QUADRANT OF RIGHT FEMALE BREAST, UNSPECIFIED ESTROGEN RECEPTOR STATUS (HCC): Primary | ICD-10-CM

## 2025-05-30 DIAGNOSIS — Z85.3 ENCOUNTER FOR FOLLOW-UP SURVEILLANCE OF BREAST CANCER: ICD-10-CM

## 2025-05-30 NOTE — PROGRESS NOTES
mis-transcribed.)      Electronically signed by Binta Puentes MD on 6/2/2025 at 3:26 PM

## 2025-06-02 ENCOUNTER — OFFICE VISIT (OUTPATIENT)
Dept: HEMATOLOGY | Age: 75
End: 2025-06-02
Payer: MEDICARE

## 2025-06-02 ENCOUNTER — HOSPITAL ENCOUNTER (OUTPATIENT)
Dept: INFUSION THERAPY | Age: 75
Discharge: HOME | End: 2025-06-02
Payer: MEDICARE

## 2025-06-02 VITALS
HEIGHT: 65 IN | HEART RATE: 69 BPM | OXYGEN SATURATION: 96 % | DIASTOLIC BLOOD PRESSURE: 80 MMHG | WEIGHT: 200.1 LBS | SYSTOLIC BLOOD PRESSURE: 122 MMHG | TEMPERATURE: 97.7 F | BODY MASS INDEX: 33.34 KG/M2

## 2025-06-02 DIAGNOSIS — Z71.89 CARE PLAN DISCUSSED WITH PATIENT: ICD-10-CM

## 2025-06-02 DIAGNOSIS — D69.6 THROMBOCYTOPENIA: ICD-10-CM

## 2025-06-02 DIAGNOSIS — Z08 ENCOUNTER FOR FOLLOW-UP SURVEILLANCE OF BREAST CANCER: ICD-10-CM

## 2025-06-02 DIAGNOSIS — K75.81 NASH (NONALCOHOLIC STEATOHEPATITIS): ICD-10-CM

## 2025-06-02 DIAGNOSIS — C50.411 MALIGNANT NEOPLASM OF UPPER-OUTER QUADRANT OF RIGHT FEMALE BREAST, UNSPECIFIED ESTROGEN RECEPTOR STATUS (HCC): Primary | ICD-10-CM

## 2025-06-02 DIAGNOSIS — N28.9 RENAL IMPAIRMENT: ICD-10-CM

## 2025-06-02 DIAGNOSIS — Z85.3 ENCOUNTER FOR FOLLOW-UP SURVEILLANCE OF BREAST CANCER: ICD-10-CM

## 2025-06-02 DIAGNOSIS — C50.411 MALIGNANT NEOPLASM OF UPPER-OUTER QUADRANT OF RIGHT FEMALE BREAST, UNSPECIFIED ESTROGEN RECEPTOR STATUS (HCC): ICD-10-CM

## 2025-06-02 LAB
ALBUMIN SERPL-MCNC: 4 G/DL (ref 3.5–5.2)
ALP SERPL-CCNC: 99 U/L (ref 35–104)
ALT SERPL-CCNC: 12 U/L (ref 5–33)
ANION GAP SERPL CALCULATED.3IONS-SCNC: 12 MMOL/L (ref 7–19)
AST SERPL-CCNC: 25 U/L (ref 5–32)
BASOPHILS # BLD: 0.04 K/UL (ref 0–0.2)
BASOPHILS NFR BLD: 0.7 % (ref 0–1)
BILIRUB SERPL-MCNC: 0.7 MG/DL (ref 0–1.2)
BUN SERPL-MCNC: 13 MG/DL (ref 8–23)
CALCIUM SERPL-MCNC: 9.1 MG/DL (ref 8.8–10.2)
CHLORIDE SERPL-SCNC: 100 MMOL/L (ref 98–107)
CO2 SERPL-SCNC: 28 MMOL/L (ref 22–29)
CREAT SERPL-MCNC: 1.2 MG/DL (ref 0.5–0.9)
EOSINOPHIL # BLD: 0.13 K/UL (ref 0–0.6)
EOSINOPHIL NFR BLD: 2.2 % (ref 0–5)
ERYTHROCYTE [DISTWIDTH] IN BLOOD BY AUTOMATED COUNT: 14.1 % (ref 11.5–14.5)
GLUCOSE SERPL-MCNC: 107 MG/DL (ref 70–99)
HCT VFR BLD AUTO: 38.2 % (ref 37–47)
HGB BLD-MCNC: 12.9 G/DL (ref 12–16)
LYMPHOCYTES # BLD: 1.03 K/UL (ref 1.1–4.5)
LYMPHOCYTES NFR BLD: 17.4 % (ref 20–40)
MCH RBC QN AUTO: 30.5 PG (ref 27–31)
MCHC RBC AUTO-ENTMCNC: 33.8 G/DL (ref 33–37)
MCV RBC AUTO: 90.3 FL (ref 81–99)
MONOCYTES # BLD: 0.44 K/UL (ref 0–0.9)
MONOCYTES NFR BLD: 7.4 % (ref 1–10)
NEUTROPHILS # BLD: 4.26 K/UL (ref 1.5–7.5)
NEUTS SEG NFR BLD: 72.1 % (ref 50–65)
PLATELET # BLD AUTO: 111 K/UL (ref 130–400)
PMV BLD AUTO: 12.8 FL (ref 9.4–12.3)
POTASSIUM SERPL-SCNC: 4.7 MMOL/L (ref 3.5–5.1)
PROT SERPL-MCNC: 7.1 G/DL (ref 6.4–8.3)
RBC # BLD AUTO: 4.23 M/UL (ref 4.2–5.4)
SODIUM SERPL-SCNC: 140 MMOL/L (ref 136–145)
WBC # BLD AUTO: 5.91 K/UL (ref 4.8–10.8)

## 2025-06-02 PROCEDURE — 3017F COLORECTAL CA SCREEN DOC REV: CPT | Performed by: INTERNAL MEDICINE

## 2025-06-02 PROCEDURE — G8417 CALC BMI ABV UP PARAM F/U: HCPCS | Performed by: INTERNAL MEDICINE

## 2025-06-02 PROCEDURE — 80053 COMPREHEN METABOLIC PANEL: CPT

## 2025-06-02 PROCEDURE — 1090F PRES/ABSN URINE INCON ASSESS: CPT | Performed by: INTERNAL MEDICINE

## 2025-06-02 PROCEDURE — G8399 PT W/DXA RESULTS DOCUMENT: HCPCS | Performed by: INTERNAL MEDICINE

## 2025-06-02 PROCEDURE — G8428 CUR MEDS NOT DOCUMENT: HCPCS | Performed by: INTERNAL MEDICINE

## 2025-06-02 PROCEDURE — 36415 COLL VENOUS BLD VENIPUNCTURE: CPT

## 2025-06-02 PROCEDURE — 1123F ACP DISCUSS/DSCN MKR DOCD: CPT | Performed by: INTERNAL MEDICINE

## 2025-06-02 PROCEDURE — G2211 COMPLEX E/M VISIT ADD ON: HCPCS | Performed by: INTERNAL MEDICINE

## 2025-06-02 PROCEDURE — 1125F AMNT PAIN NOTED PAIN PRSNT: CPT | Performed by: INTERNAL MEDICINE

## 2025-06-02 PROCEDURE — 1036F TOBACCO NON-USER: CPT | Performed by: INTERNAL MEDICINE

## 2025-06-02 PROCEDURE — 85025 COMPLETE CBC W/AUTO DIFF WBC: CPT

## 2025-06-02 PROCEDURE — 99213 OFFICE O/P EST LOW 20 MIN: CPT

## 2025-06-02 PROCEDURE — 99214 OFFICE O/P EST MOD 30 MIN: CPT | Performed by: INTERNAL MEDICINE

## 2025-06-04 ENCOUNTER — HOSPITAL ENCOUNTER (OUTPATIENT)
Dept: ULTRASOUND IMAGING | Age: 75
Discharge: HOME | End: 2025-06-04
Payer: MEDICARE

## 2025-06-04 DIAGNOSIS — K75.81 NASH (NONALCOHOLIC STEATOHEPATITIS): ICD-10-CM

## 2025-06-04 DIAGNOSIS — D69.6 THROMBOCYTOPENIA: ICD-10-CM

## 2025-06-04 PROCEDURE — 76705 ECHO EXAM OF ABDOMEN: CPT

## 2025-06-04 PROCEDURE — 76981 USE PARENCHYMA: CPT

## 2025-06-11 ENCOUNTER — TELEPHONE (OUTPATIENT)
Dept: SURGERY | Age: 75
End: 2025-06-11

## 2025-06-11 NOTE — TELEPHONE ENCOUNTER
6/11/2025 Patient called wanting to know if she should discontinue Montelukast or get refill.    Callback # [phone]  barrett

## 2025-06-12 NOTE — PROGRESS NOTES
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  Patient has a history of breast cancer.  She was found to have thrombocytopenia.  Therefore, I have requested ultrasound of the spleen and US liver elastography.  She is doing well from breast cancer standpoint.    Diagnosis   Right breast IDC, April 2018   grade 1, ER 91%, DE 5%, HER-2/aziza IHC 1+/FISH not amplified, Ki-67 9%.   [id number](sn)M0, stage IA, AJCC 2018 prognostic stage IA  BCI: NO; 1.4%  Left breast IDC [id number], Jun 2023  G1, ER 95%, PgR 94%, Her-2 IHC 0, Ki-67 21%. Mammaprint Luminal A    Treatment summary  7/27/2018-Right lumpectomy/sentinel lymph node biopsy   7/27/2018-IORT 2000 cGy   November 2018-tamoxifen x 10 years  No need for Oncotype Dx- low risk disease  9/15/23 Breast, left lumpectomy with Dr. Erlin Roe at .      Interval history  The patient is a pleasant 74 years old female who has been diagnosed with stage I ER/DE positive HER-2 negative right breast cancer in April 2018.  She is node negative.  She received IORT 2000 cGy. She was initially started on adjuvant endocrine therapy with Femara but was switched to tamoxifen due to poor tolerance. Patient was diagnosed with a new left breast tumor status post lumpectomy.  Patient to receive IORT 2000 cGy to her left breast.  Patient was started on exemestane adjuvantly and is tolerating it relatively well.  She continues to complain of significant fatigue.  She denies any new breast complaints.  She is compliant with her Aromasin.    Cancer history  Ms. Yue Abbott was first seen by

## 2025-06-14 ENCOUNTER — RESULTS FOLLOW-UP (OUTPATIENT)
Dept: PULMONOLOGY | Facility: CLINIC | Age: 75
End: 2025-06-14
Payer: MEDICARE

## 2025-06-14 NOTE — PROGRESS NOTES
Please call the patient regarding her abnormal result.  She did have desaturations.  I do recommend continuing the cpap.  An alternative if she can't do that is the inspire device,  or just use oxygen with sleep although that treatment is not as good as the other options

## 2025-06-16 ENCOUNTER — SCHEDULED TELEPHONE ENCOUNTER (OUTPATIENT)
Dept: HEMATOLOGY | Age: 75
End: 2025-06-16
Payer: MEDICARE

## 2025-06-16 DIAGNOSIS — K74.00 LIVER FIBROSIS: ICD-10-CM

## 2025-06-16 DIAGNOSIS — N28.9 RENAL IMPAIRMENT: ICD-10-CM

## 2025-06-16 DIAGNOSIS — D69.6 THROMBOCYTOPENIA: ICD-10-CM

## 2025-06-16 DIAGNOSIS — Z08 ENCOUNTER FOR FOLLOW-UP SURVEILLANCE OF BREAST CANCER: ICD-10-CM

## 2025-06-16 DIAGNOSIS — K75.81 NASH (NONALCOHOLIC STEATOHEPATITIS): ICD-10-CM

## 2025-06-16 DIAGNOSIS — C50.411 MALIGNANT NEOPLASM OF UPPER-OUTER QUADRANT OF RIGHT FEMALE BREAST, UNSPECIFIED ESTROGEN RECEPTOR STATUS (HCC): Primary | ICD-10-CM

## 2025-06-16 DIAGNOSIS — Z85.3 ENCOUNTER FOR FOLLOW-UP SURVEILLANCE OF BREAST CANCER: ICD-10-CM

## 2025-06-16 DIAGNOSIS — Z71.89 CARE PLAN DISCUSSED WITH PATIENT: ICD-10-CM

## 2025-06-16 DIAGNOSIS — K75.81 NASH (NONALCOHOLIC STEATOHEPATITIS): Primary | ICD-10-CM

## 2025-06-16 PROCEDURE — 99213 OFFICE O/P EST LOW 20 MIN: CPT | Performed by: INTERNAL MEDICINE

## 2025-06-23 NOTE — TELEPHONE ENCOUNTER
Patient was recently diagnosed with breast cancer.  For this, she was referred to genetics.  Patient has  [employer], which requires a referral from PCP.     She has an appointment to see Deisy Feliciano [facility] on 6/27/25 for this.    New referral prepped as per previous one; please make changes as appropriate and sign.   yes

## 2025-07-09 ENCOUNTER — TRANSCRIBE ORDERS (OUTPATIENT)
Dept: ADMINISTRATIVE | Facility: HOSPITAL | Age: 75
End: 2025-07-09
Payer: MEDICARE

## 2025-07-09 DIAGNOSIS — M23.8X2 OTHER INTERNAL DERANGEMENTS OF LEFT KNEE: ICD-10-CM

## 2025-07-09 DIAGNOSIS — M25.562 LEFT KNEE PAIN, UNSPECIFIED CHRONICITY: Primary | ICD-10-CM

## 2025-08-01 ENCOUNTER — HOSPITAL ENCOUNTER (OUTPATIENT)
Dept: MRI IMAGING | Facility: HOSPITAL | Age: 75
Discharge: HOME | End: 2025-08-01
Payer: MEDICARE

## 2025-08-01 DIAGNOSIS — M23.8X2 OTHER INTERNAL DERANGEMENTS OF LEFT KNEE: ICD-10-CM

## 2025-08-01 DIAGNOSIS — M25.562 LEFT KNEE PAIN, UNSPECIFIED CHRONICITY: ICD-10-CM

## 2025-08-01 PROCEDURE — 73721 MRI JNT OF LWR EXTRE W/O DYE: CPT

## 2025-08-01 PROCEDURE — 76014 MR SFTY IMPLT&/FB ASMT STF 1: CPT

## (undated) DEVICE — ADHESIVE SKIN CLOSURE WND 8.661X1.5 IN 22 CM LIQUIBAND SECUR

## (undated) DEVICE — Device: Brand: DEFENDO AIR/WATER/SUCTION AND BIOPSY VALVE

## (undated) DEVICE — PLASMABLADE PS210-030S 3.0S LOCK: Brand: PLASMABLADE™

## (undated) DEVICE — CATHETER URETH 24FR 30CC BLLN SILAS F 2 W SPEC M RND TIP

## (undated) DEVICE — SUTURE MNCRYL STRATAFIX PS 4-0 30CM

## (undated) DEVICE — MASK,OXYGEN,MED CONC,ADLT,7' TUB, UC: Brand: PENDING

## (undated) DEVICE — GAUZE,SPONGE,FLUFF,6"X6.75",STRL,10/TRAY: Brand: MEDLINE

## (undated) DEVICE — SINGLE-USE POLYPECTOMY SNARE: Brand: CAPTIVATOR II

## (undated) DEVICE — DRESSING GRMCDL 6 12FR D1N CNTR HOLE 4MM ANTMCRBL PRTCTVE DI

## (undated) DEVICE — DRAIN JACKSON PRATT ROUND 15FR: Brand: CARDINAL HEALTH

## (undated) DEVICE — Device

## (undated) DEVICE — PEN: MARKING STD 100/CS: Brand: MEDICAL ACTION INDUSTRIES

## (undated) DEVICE — TOWEL,OR,DSP,ST,BLUE,DLX,4/PK,20PK/CS: Brand: MEDLINE

## (undated) DEVICE — 3M™ IOBAN™ 2 ANTIMICROBIAL INCISE DRAPE 6650EZ: Brand: IOBAN™ 2

## (undated) DEVICE — YANKAUER,BULB TIP WITH VENT: Brand: ARGYLE

## (undated) DEVICE — ASTOUND STANDARD SURGICAL GOWN, XXL: Brand: CONVERTORS

## (undated) DEVICE — TBG SMPL FLTR LINE NASL 02/C02 A/ BX/100

## (undated) DEVICE — SUTURE VCRL SZ 3-0 L27IN ABSRB UD L26MM SH 1/2 CIR J416H

## (undated) DEVICE — GOWN,PREVENTION PLUS,2XL,ST,22/CS: Brand: MEDLINE

## (undated) DEVICE — 60 ML SYRINGE,ECCENTRIC TIP: Brand: MONOJECT

## (undated) DEVICE — SOLUTION IV IRRIG WATER 1000ML POUR BRL 2F7114

## (undated) DEVICE — SUTURE ETHLN SZ 2-0 L30IN NONABSORBABLE BLK L36MM FSLX 3/8 1674H

## (undated) DEVICE — PACKAGE ASSY BALLOON POUCH ST 5-6CM

## (undated) DEVICE — SUTURE PROL SZ 2-0 L30IN NONABSORBABLE BLU L26MM CT-2 1/2 8411H

## (undated) DEVICE — SENSR O2 OXIMAX FNGR A/ 18IN NONSTR

## (undated) DEVICE — DRESSING GERM 6-12FR DIA1IN CNTR H 4MM ANTIMIC PROTCT DISK

## (undated) DEVICE — STANDARD HYPODERMIC NEEDLE,POLYPROPYLENE HUB: Brand: MONOJECT

## (undated) DEVICE — SYRINGE IRRIG 60ML SFT PLIABLE BLB EZ TO GRP 1 HND USE W/

## (undated) DEVICE — CUFF,BP,DISP,1 TUBE,ADULT,HP: Brand: MEDLINE

## (undated) DEVICE — PACK,UNIVERSAL,NO GOWNS: Brand: MEDLINE

## (undated) DEVICE — THE CHANNEL CLEANING BRUSH IS A NYLON FLEXI BRUSH ATTACHED TO A FLEXIBLE PLASTIC SHEATH DESIGNED TO SAFELY REMOVE DEBRIS FROM FLEXIBLE ENDOSCOPES.

## (undated) DEVICE — SUTURE VCRL SZ 2-0 L36IN ABSRB UD L36MM CT-1 1/2 CIR J945H

## (undated) DEVICE — COVER US PRB W5XL96IN LTX W/ GEL

## (undated) DEVICE — CONMED SCOPE SAVER BITE BLOCK, 20X27 MM: Brand: SCOPE SAVER

## (undated) DEVICE — THE SINGLE USE ETRAP – POLYP TRAP IS USED FOR SUCTION RETRIEVAL OF ENDOSCOPICALLY REMOVED POLYPS.: Brand: ETRAP

## (undated) DEVICE — GLOVE SURG SZ 85 L12IN FNGR ORTHO 126MIL CRM LTX FREE

## (undated) DEVICE — BULB SYRINGE, IRRIGATION WITH PROTECTIVE CAP, 60 CC, INDIVIDUALLY WRAPPED: Brand: DOVER

## (undated) DEVICE — PROBE DTECT MARGIN F/LUMPECTOMY

## (undated) DEVICE — DOUBLE BASIN PLUS 2-LF: Brand: MEDLINE INDUSTRIES, INC.

## (undated) DEVICE — MINOR CDS: Brand: MEDLINE INDUSTRIES, INC.

## (undated) DEVICE — SYSTEM SKIN CLSR 22CM DERMBND PRINEO

## (undated) DEVICE — DRESSING TRNSPAR W5XL4.5IN FLM SHT SEMIPERMEABLE WIND

## (undated) DEVICE — GLOVE SURG SZ 75 CRM LTX FREE POLYISOPRENE POLYMER BEAD ANTI

## (undated) DEVICE — GOWN,PREVENTION PLUS,XL,ST,24/CS: Brand: MEDLINE

## (undated) DEVICE — JACKSON-PRATT 100CC BULB RESERVOIR: Brand: CARDINAL HEALTH

## (undated) DEVICE — 3M™ BAIR HUGGER® LOWER BODY BLANKET, 10 PER CASE 52500: Brand: BAIR HUGGER™

## (undated) DEVICE — SPECIMEN ORIENTATION CHARMS, SIX DISTINCTLY SHAPED STERILE 10MM CHARMS: Brand: MARGINMAP

## (undated) DEVICE — 35 ML SYRINGE LUER-LOCK TIP: Brand: MONOJECT

## (undated) DEVICE — SUTURE VCRL SZ 3-0 L36IN ABSRB UD L36MM CT-1 1/2 CIR J944H

## (undated) DEVICE — SUTURE PERMAHAND SZ 2-0 L18IN NONABSORBABLE BLK L26MM SH C012D

## (undated) DEVICE — MINOR SET UP PK

## (undated) DEVICE — CLIP INT M L GRN TI TRNSVRS GRV CHEVRON SHP W/ PRECIS TIP

## (undated) DEVICE — 3M™ TEGADERM™ TRANSPARENT FILM DRESSING FRAME STYLE, 1626W, 4 IN X 4-3/4 IN (10 CM X 12 CM), 50/CT 4CT/CASE: Brand: 3M™ TEGADERM™

## (undated) DEVICE — THREE QUARTER SHEET: Brand: CONVERTORS

## (undated) DEVICE — KIT APPLICATOR BALLOON POUCH ST 3-4 CM
Type: IMPLANTABLE DEVICE | Site: BREAST | Status: NON-FUNCTIONAL
Removed: 2018-07-27

## (undated) DEVICE — 3 ML SYRINGE WITH HYPODERMIC SAFETY NEEDLE: Brand: MAGELLAN

## (undated) DEVICE — SHEET,DRAPE,53X77,STERILE: Brand: MEDLINE

## (undated) DEVICE — AEGIS 1" DISK 4MM HOLE, PEEL OPEN: Brand: MEDLINE